# Patient Record
Sex: MALE | Race: WHITE | NOT HISPANIC OR LATINO | Employment: OTHER | URBAN - METROPOLITAN AREA
[De-identification: names, ages, dates, MRNs, and addresses within clinical notes are randomized per-mention and may not be internally consistent; named-entity substitution may affect disease eponyms.]

---

## 2017-06-16 ENCOUNTER — OFFICE VISIT (OUTPATIENT)
Dept: UROLOGY | Facility: CLINIC | Age: 67
End: 2017-06-16
Payer: MEDICARE

## 2017-06-16 VITALS
HEART RATE: 105 BPM | WEIGHT: 238 LBS | TEMPERATURE: 98 F | DIASTOLIC BLOOD PRESSURE: 97 MMHG | BODY MASS INDEX: 37.35 KG/M2 | HEIGHT: 67 IN | SYSTOLIC BLOOD PRESSURE: 159 MMHG

## 2017-06-16 DIAGNOSIS — R79.89 LOW SERUM TESTOSTERONE LEVEL: ICD-10-CM

## 2017-06-16 DIAGNOSIS — N40.0 BENIGN PROSTATIC HYPERPLASIA, PRESENCE OF LOWER URINARY TRACT SYMPTOMS UNSPECIFIED, UNSPECIFIED MORPHOLOGY: Primary | ICD-10-CM

## 2017-06-16 LAB
BILIRUB SERPL-MCNC: ABNORMAL MG/DL
BLOOD URINE, POC: ABNORMAL
COLOR, POC UA: ABNORMAL
GLUCOSE UR QL STRIP: ABNORMAL
KETONES UR QL STRIP: ABNORMAL
LEUKOCYTE ESTERASE URINE, POC: ABNORMAL
NITRITE, POC UA: ABNORMAL
PH, POC UA: 5
PROTEIN, POC: ABNORMAL
SPECIFIC GRAVITY, POC UA: 1.02
UROBILINOGEN, POC UA: ABNORMAL

## 2017-06-16 PROCEDURE — 1159F MED LIST DOCD IN RCRD: CPT | Mod: S$GLB,,, | Performed by: UROLOGY

## 2017-06-16 PROCEDURE — 99213 OFFICE O/P EST LOW 20 MIN: CPT | Mod: 25,S$GLB,, | Performed by: UROLOGY

## 2017-06-16 PROCEDURE — 99999 PR PBB SHADOW E&M-EST. PATIENT-LVL III: CPT | Mod: PBBFAC,,, | Performed by: UROLOGY

## 2017-06-16 PROCEDURE — 81002 URINALYSIS NONAUTO W/O SCOPE: CPT | Mod: S$GLB,,, | Performed by: UROLOGY

## 2017-06-16 RX ORDER — TESTOSTERONE CYPIONATE 200 MG/ML
200 INJECTION, SOLUTION INTRAMUSCULAR
Qty: 1 ML | Refills: 5 | Status: SHIPPED | OUTPATIENT
Start: 2017-06-16 | End: 2017-12-27 | Stop reason: SDUPTHER

## 2017-06-16 RX ORDER — DIVALPROEX SODIUM 500 MG/1
TABLET, FILM COATED, EXTENDED RELEASE ORAL
COMMUNITY
Start: 2017-05-16 | End: 2017-06-16

## 2017-06-16 NOTE — PROGRESS NOTES
OFFICE NOTE    CHIEF COMPLAINT:  Low serum testosterone.  History of chronic recurrent   prostatitis.    Mr. Orlando Treadwell is a 67-year-old male who has a long history of low serum   testosterone.  He is taking testosterone replacement in the form of   Depo-Testosterone 200 mg IM q. 28 days.  The patient feels that everything is   within normal limits when taking that amount of medication.  The patient gives   himself injections every 28 days.  He uses 1 mL of the medicine intramuscular.    The patient is free of any lower urinary tract symptoms.  He refers that   urinating with a good flow, good control.  No dysuria.  No hematuria and he   feels that he can empty the bladder satisfactorily.  We had a PSA done on him on   12/16/2016 of 1.9.  The patient in December have his physical exam and the   prostate gland was unremarkable.    The urinalysis today is within normal limits.    I went ahead and suggested to the patient to keep the same management and to   return to the clinic in six months for his annual prostate evaluation.  I spent   approximately 30 minutes with Mr. Treadwell and all the time was spent on   counseling and he left the office in satisfactory condition.      SAMMY/YARIEL  dd: 06/16/2017 09:25:28 (CDT)  td: 06/16/2017 14:58:24 (CDT)  Doc ID   #[id number]  Job ID #[id number]    CC:

## 2017-06-19 RX ORDER — TESTOSTERONE CYPIONATE 200 MG/ML
INJECTION, SOLUTION INTRAMUSCULAR
Qty: 1 ML | Refills: 5 | Status: SHIPPED | OUTPATIENT
Start: 2017-06-19 | End: 2018-01-06 | Stop reason: SDUPTHER

## 2017-09-15 DIAGNOSIS — M25.519 SHOULDER PAIN, UNSPECIFIED CHRONICITY, UNSPECIFIED LATERALITY: Primary | ICD-10-CM

## 2017-09-16 ENCOUNTER — PATIENT MESSAGE (OUTPATIENT)
Dept: ADMINISTRATIVE | Facility: OTHER | Age: 67
End: 2017-09-16

## 2017-09-18 ENCOUNTER — HOSPITAL ENCOUNTER (OUTPATIENT)
Dept: RADIOLOGY | Facility: HOSPITAL | Age: 67
Discharge: HOME | End: 2017-09-18
Attending: ORTHOPAEDIC SURGERY
Payer: MEDICARE

## 2017-09-18 ENCOUNTER — OFFICE VISIT (OUTPATIENT)
Dept: ORTHOPEDICS | Facility: CLINIC | Age: 67
End: 2017-09-18
Payer: MEDICARE

## 2017-09-18 VITALS
HEART RATE: 95 BPM | HEIGHT: 67 IN | DIASTOLIC BLOOD PRESSURE: 101 MMHG | SYSTOLIC BLOOD PRESSURE: 172 MMHG | WEIGHT: 238 LBS | BODY MASS INDEX: 37.35 KG/M2

## 2017-09-18 DIAGNOSIS — M25.519 SHOULDER PAIN, UNSPECIFIED CHRONICITY, UNSPECIFIED LATERALITY: ICD-10-CM

## 2017-09-18 DIAGNOSIS — M19.032 PRIMARY OSTEOARTHRITIS OF LEFT WRIST: ICD-10-CM

## 2017-09-18 DIAGNOSIS — M25.532 LEFT WRIST PAIN: ICD-10-CM

## 2017-09-18 DIAGNOSIS — M19.011 OSTEOARTHRITIS OF RIGHT GLENOHUMERAL JOINT: Primary | ICD-10-CM

## 2017-09-18 DIAGNOSIS — M25.532 LEFT WRIST PAIN: Primary | ICD-10-CM

## 2017-09-18 PROCEDURE — 73110 X-RAY EXAM OF WRIST: CPT | Mod: 26,LT,, | Performed by: RADIOLOGY

## 2017-09-18 PROCEDURE — 3008F BODY MASS INDEX DOCD: CPT | Mod: S$GLB,,, | Performed by: ORTHOPAEDIC SURGERY

## 2017-09-18 PROCEDURE — 73030 X-RAY EXAM OF SHOULDER: CPT | Mod: TC,PN,RT

## 2017-09-18 PROCEDURE — 20605 DRAIN/INJ JOINT/BURSA W/O US: CPT | Mod: 51,LT,S$GLB, | Performed by: ORTHOPAEDIC SURGERY

## 2017-09-18 PROCEDURE — 73110 X-RAY EXAM OF WRIST: CPT | Mod: TC,PN,LT

## 2017-09-18 PROCEDURE — 99213 OFFICE O/P EST LOW 20 MIN: CPT | Mod: 25,S$GLB,, | Performed by: ORTHOPAEDIC SURGERY

## 2017-09-18 PROCEDURE — 20610 DRAIN/INJ JOINT/BURSA W/O US: CPT | Mod: RT,S$GLB,, | Performed by: ORTHOPAEDIC SURGERY

## 2017-09-18 PROCEDURE — 1159F MED LIST DOCD IN RCRD: CPT | Mod: S$GLB,,, | Performed by: ORTHOPAEDIC SURGERY

## 2017-09-18 PROCEDURE — 1125F AMNT PAIN NOTED PAIN PRSNT: CPT | Mod: S$GLB,,, | Performed by: ORTHOPAEDIC SURGERY

## 2017-09-18 PROCEDURE — 73030 X-RAY EXAM OF SHOULDER: CPT | Mod: 26,RT,, | Performed by: RADIOLOGY

## 2017-09-18 PROCEDURE — 99999 PR PBB SHADOW E&M-EST. PATIENT-LVL III: CPT | Mod: PBBFAC,,, | Performed by: ORTHOPAEDIC SURGERY

## 2017-09-18 RX ORDER — TRIAMCINOLONE ACETONIDE 40 MG/ML
40 INJECTION, SUSPENSION INTRA-ARTICULAR; INTRAMUSCULAR
Status: DISCONTINUED | OUTPATIENT
Start: 2017-09-18 | End: 2017-09-18 | Stop reason: HOSPADM

## 2017-09-18 RX ADMIN — TRIAMCINOLONE ACETONIDE 40 MG: 40 INJECTION, SUSPENSION INTRA-ARTICULAR; INTRAMUSCULAR at 03:09

## 2017-09-18 NOTE — PROCEDURES
Large Joint Aspiration/Injection  Date/Time: 9/18/2017 3:32 PM  Performed by: BARBER FAN  Authorized by: BARBER FAN     Consent Done?:  Yes (Verbal)  Indications:  Pain  Procedure site marked: Yes    Timeout: Prior to procedure the correct patient, procedure, and site was verified      Location:  Shoulder  Site:  R glenohumeral  Prep: Patient was prepped and draped in usual sterile fashion    Ultrasonic Guidance for needle placement: No  Needle size:  20 G  Approach:  Posterior  Medications:  40 mg triamcinolone acetonide 40 mg/mL  Patient tolerance:  Patient tolerated the procedure well with no immediate complications

## 2017-09-18 NOTE — PROCEDURES
Intermediate Joint Aspiration/Injection  Date/Time: 9/18/2017 3:32 PM  Performed by: BARBER FAN  Authorized by: BARBER FAN     Consent Done?:  Yes (Verbal)  Indications:  Pain  Timeout: Prior to procedure the correct patient, procedure, and site was verified      Location:  Wrist  Site:  L radiocarpal  Prep: Patient was prepped and draped in usual sterile fashion    Medications:  40 mg triamcinolone acetonide 40 mg/mL

## 2017-09-18 NOTE — PROGRESS NOTES
Past Medical History:   Diagnosis Date    Arthritis     Asbestos exposure     SOB     Back pain     Bulging discs     lumbar    Hypertension     Kidney stone     Prostatitis     Wears glasses        Past Surgical History:   Procedure Laterality Date    APPENDECTOMY      HAND SURGERY      rt hand    JOINT REPLACEMENT      right knee    KNEE LIGAMENT RECONSTRUCTION      left knee    LITHOTRIPSY      TONSILLECTOMY, ADENOIDECTOMY, BILATERAL MYRINGOTOMY AND TUBES      TOTAL KNEE ARTHROPLASTY  1971    rt knee    URETERAL STENT PLACEMENT         Current Outpatient Prescriptions   Medication Sig    amlodipine (NORVASC) 10 MG tablet Take 10 mg by mouth once daily.     aspirin 325 MG tablet Take 325 mg by mouth once daily.    diclofenac sodium 1 % Gel Apply 2 g topically 2 (two) times daily.    gabapentin (NEURONTIN) 300 MG capsule Take 300 mg by mouth every evening.     hydrochlorothiazide (MICROZIDE) 12.5 mg capsule Take 12.5 mg by mouth once daily.    hydrocodone-acetaminophen 10-325mg (NORCO)  mg Tab Take 1 tablet by mouth daily as needed.    lisinopril (PRINIVIL,ZESTRIL) 40 MG tablet Take 40 mg by mouth once daily.    metoprolol succinate (TOPROL-XL) 100 MG 24 hr tablet Take 50 mg by mouth once daily.     PROAIR HFA 90 mcg/actuation inhaler INL 2 PFS PO Q 4 H PRN    testosterone cypionate (DEPOTESTOTERONE CYPIONATE) 200 mg/mL injection Inject 1 mL (200 mg total) into the muscle every 28 days.    testosterone cypionate (DEPOTESTOTERONE CYPIONATE) 200 mg/mL injection INJECT 1 ML IN THE MUSCLE EVERY 28 DAYS    tramadol (ULTRAM) 50 mg tablet Take 50 mg by mouth every 8 (eight) hours as needed.    venlafaxine (EFFEXOR-XR) 150 MG Cp24 Take 75 mg by mouth once daily.     No current facility-administered medications for this visit.        Allergies   Allergen Reactions    Morphine Nausea Only     Ok with nausea med.       Family History   Problem Relation Age of Onset    Cancer Mother      Stroke Father     Collagen disease Neg Hx        Social History     Social History    Marital status:      Spouse name: N/A    Number of children: N/A    Years of education: N/A     Occupational History    Not on file.     Social History Main Topics    Smoking status: Never Smoker    Smokeless tobacco: Never Used    Alcohol use No    Drug use: No    Sexual activity: Not on file     Other Topics Concern    Not on file     Social History Narrative    No narrative on file       Chief Complaint:   Chief Complaint   Patient presents with    Shoulder Pain       History of present illness: This is a 67-year-old patient of mine who comes in with recurrent right shoulder pain.  Patient has polyarthritis throughout his whole body.  He's had both knees replaced.  Patient states the pain in these joints of been getting worse over the last year or so.  Pain when reaching above his head.  Pain with reaching behind his back.  Rates the pain overall as a 4 out of 10.  Gave him a cortisone injection back in April 2016.  Left wrist is also hurting him.  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        Answers for HPI/ROS submitted by the patient on 9/16/2017   Arm pain  unexpected weight change: No  appetite change : No  sleep disturbance: No  IMMUNOCOMPROMISED: No  nervous/ anxious: No  dysphoric mood: No  rash: No  visual disturbance: No  eye redness: No  eye pain: No  ear pain: No  tinnitus: No  hearing loss: No  sinus pressure : No  nosebleeds: No  enviro allergies: No  food allergies: No  cough: No  shortness of breath: No  sweating: No  frequency: No  difficulty urinating: No  hematuria: No  chest pain: No  palpitations: No  nausea: No  vomiting: No  diarrhea: No  blood in stool: No  constipation: No  headaches: No  dizziness: No  numbness: No  seizures: No  joint swelling: Yes  myalgia: Yes  weakness: Yes  back pain: No  Pain Chronicity: recurrent  History of trauma: No  Onset: more than 1 year ago  Frequency:  daily  Progression since onset: waxing and waning  injury location: at home  pain- numeric: 6/10  pain location: right shoulder, left wrist  pain quality: aching, throbbing  Radiating Pain: No  Aggravating factors: extension, twisting, rotation  fever: No  inability to bear weight: No  itching: No  joint locking: No  limited range of motion: Yes  stiffness: Yes  tingling: No  Treatments tried: cold, heat, injection treatment, oral narcotics  physical therapy: not tried  Improvement on treatment: significant        Physical Examination:    Vital Signs:    There were no vitals filed for this visit.    Body mass index is 37.28 kg/m².    This a well-developed, well nourished patient in no acute distress.  They are alert and oriented and cooperative to examination.  Pt. walks without an antalgic gait.      Examination of the right shoulder shows no rashes or erythema. There are no masses, ecchymosis, or atrophy. The patient has .  range of motion in forward flexion, external rotation, and internal rotation to the lower L-spine. The patient has marked crepitus and impingement signs.  Nontender to palpation over a.c. joint. Passive range of motion: Forward flexion of 180°, external rotation at 90° of 90°, internal rotation of 50°, and external rotation at 0° of 50°. 2+ radial pulse. Intact axillary, radial, median and ulnar sensation. 5 out of 5 resisted forward flexion, external rotation, and negative lift off test.    Examination of the left hand and wrist shows no signs of rashes or erythema. Patient has no masses ecchymosis or effusions. Patient has limited range of motion of the wrist in flexion and extension as well as ulnar and radial deviation.  Pain and crepitus.  The patient also has full range of motion of all joints in the hand. There are 2+ radial pulse and intact light touch sensation in all 5 digits.       X-rays: 4 views of the right shoulder ordered and review which show marked osteoarthritis of the right  shoulder.  Loose body or calcific deposit within the rotator cuff.  3 views of the left wrist are ordered and review which show well moderate to severe arthritic changes of the radial carpal joint      Assessment::right shoulder  Arthritis  Left wrist arthritis    Plan:  I reviewed the findings with the patient today.  We discussed treatment options including injections and ultimately some form of arthroplasty particularly for the shoulder.  Patient does not want to do that at this time as he is still working.  He will follow-up as needed.  He did agreed to the injection.

## 2017-12-27 NOTE — TELEPHONE ENCOUNTER
----- Message from Sneha Zurita sent at 12/27/2017 11:30 AM CST -----  Contact: self [phone]  Patient is needing a refill on testosterone injection/ please call when ready thanks

## 2017-12-28 RX ORDER — TESTOSTERONE CYPIONATE 200 MG/ML
200 INJECTION, SOLUTION INTRAMUSCULAR
Qty: 1 ML | Refills: 5 | Status: SHIPPED | OUTPATIENT
Start: 2017-12-28 | End: 2018-07-02 | Stop reason: SDUPTHER

## 2018-01-08 RX ORDER — TESTOSTERONE CYPIONATE 200 MG/ML
INJECTION, SOLUTION INTRAMUSCULAR
Qty: 1 ML | Refills: 0 | Status: SHIPPED | OUTPATIENT
Start: 2018-01-08 | End: 2018-07-18 | Stop reason: SDUPTHER

## 2018-01-11 ENCOUNTER — TELEPHONE (OUTPATIENT)
Dept: UROLOGY | Facility: CLINIC | Age: 68
End: 2018-01-11

## 2018-01-11 NOTE — TELEPHONE ENCOUNTER
----- Message from Giovana Melo sent at 1/11/2018  9:00 AM CST -----  Contact: Malachi with [facility] [phone]  Malachi with [facility] [phone] calling because they need the patients testosterone levels, and the risk benefits statement over the phone. Please advise. Thanks.

## 2018-06-05 ENCOUNTER — TELEPHONE (OUTPATIENT)
Dept: UROLOGY | Facility: CLINIC | Age: 68
End: 2018-06-05

## 2018-06-05 NOTE — TELEPHONE ENCOUNTER
----- Message from Holly Galo sent at 6/5/2018 11:42 AM CDT -----  Type:  Sooner Apoointment Request    Caller is requesting a sooner appointment.  Caller declined first available appointment listed below.  Caller will not accept being placed on the waitlist and is requesting a message be sent to doctor.    Name of Caller:  patient  When is the first available appointment? 9/20/18  Symptoms:  annual  Best Call Back Number: [phone] (home)     Additional Information:  Need medication refills by next month

## 2018-06-07 ENCOUNTER — TELEPHONE (OUTPATIENT)
Dept: UROLOGY | Facility: CLINIC | Age: 68
End: 2018-06-07

## 2018-06-07 NOTE — TELEPHONE ENCOUNTER
Pt returned call, states will be out of medication prior to Sept appointment and no earlier availability.  Informed Dr. Stark will be seeing patients in [place] and can get him scheduled in July.  States will like to stay with him so that will be fine.  Appointment scheduled as requested.

## 2018-07-03 RX ORDER — TESTOSTERONE CYPIONATE 200 MG/ML
INJECTION, SOLUTION INTRAMUSCULAR
Qty: 1 ML | Refills: 0 | Status: SHIPPED | OUTPATIENT
Start: 2018-07-03 | End: 2019-06-20 | Stop reason: SDUPTHER

## 2018-07-18 ENCOUNTER — OFFICE VISIT (OUTPATIENT)
Dept: UROLOGY | Facility: CLINIC | Age: 68
End: 2018-07-18
Payer: MEDICARE

## 2018-07-18 ENCOUNTER — LAB VISIT (OUTPATIENT)
Dept: LAB | Facility: HOSPITAL | Age: 68
End: 2018-07-18
Attending: UROLOGY
Payer: MEDICARE

## 2018-07-18 VITALS
HEART RATE: 81 BPM | HEIGHT: 67 IN | DIASTOLIC BLOOD PRESSURE: 92 MMHG | WEIGHT: 233 LBS | SYSTOLIC BLOOD PRESSURE: 157 MMHG | BODY MASS INDEX: 36.57 KG/M2

## 2018-07-18 DIAGNOSIS — R79.89 LOW SERUM TESTOSTERONE LEVEL: ICD-10-CM

## 2018-07-18 DIAGNOSIS — N40.0 BENIGN PROSTATIC HYPERPLASIA, UNSPECIFIED WHETHER LOWER URINARY TRACT SYMPTOMS PRESENT: ICD-10-CM

## 2018-07-18 DIAGNOSIS — R79.89 LOW SERUM TESTOSTERONE LEVEL: Primary | ICD-10-CM

## 2018-07-18 DIAGNOSIS — N40.0 BENIGN PROSTATIC HYPERPLASIA: ICD-10-CM

## 2018-07-18 LAB
BILIRUB SERPL-MCNC: NEGATIVE MG/DL
BLOOD URINE, POC: NEGATIVE
COLOR, POC UA: NORMAL
COMPLEXED PSA SERPL-MCNC: 3.8 NG/ML
GLUCOSE UR QL STRIP: NEGATIVE
KETONES UR QL STRIP: NEGATIVE
LEUKOCYTE ESTERASE URINE, POC: NEGATIVE
NITRITE, POC UA: NEGATIVE
PH, POC UA: 5
PROTEIN, POC: NEGATIVE
SPECIFIC GRAVITY, POC UA: 1020
UROBILINOGEN, POC UA: NEGATIVE

## 2018-07-18 PROCEDURE — 36415 COLL VENOUS BLD VENIPUNCTURE: CPT

## 2018-07-18 PROCEDURE — 99204 OFFICE O/P NEW MOD 45 MIN: CPT | Mod: 25,S$GLB,, | Performed by: UROLOGY

## 2018-07-18 PROCEDURE — 84153 ASSAY OF PSA TOTAL: CPT

## 2018-07-18 PROCEDURE — 99999 PR PBB SHADOW E&M-EST. PATIENT-LVL III: CPT | Mod: PBBFAC,,, | Performed by: UROLOGY

## 2018-07-18 PROCEDURE — 81002 URINALYSIS NONAUTO W/O SCOPE: CPT | Mod: S$GLB,,, | Performed by: UROLOGY

## 2018-07-18 RX ORDER — TESTOSTERONE CYPIONATE 200 MG/ML
200 INJECTION, SOLUTION INTRAMUSCULAR
Qty: 1 ML | Refills: 5 | Status: SHIPPED | OUTPATIENT
Start: 2018-07-18 | End: 2019-02-02 | Stop reason: SDUPTHER

## 2018-07-31 NOTE — PROGRESS NOTES
Subjective:       Patient ID: Orlando Treadwell is a 68 y.o. male.    Chief Complaint:   CHIEF COMPLAINT:  Low serum testosterone, BPH.    Mr. Angulo is a 68-year-old male with a long history of low serum  testosterone.  He is managing his condition with the injections of  Depo-Testosterone 200 mg every 28 days.  With that he feels fine.  His  energy level is adequate.  He feels that he is not suffering of any fatigue  or loss of muscle mass.  The patient also is due to have his PSA level  drawn.    Regarding his urination, the patient referred that he is doing fairly well  with nocturia x1, on occasions x2.  No dysuria.  No hematuria.  The flow is  adequate and he feels that he empties the bladder satisfactory.    PAST MEDICAL HISTORY:  Well documented in the electronic health record and  all these were reviewed by me during this visit.    The urinalysis today is within normal limits.        EOR/IN dd: 07/31/2018 16:27:10 (CDT)   td: 07/31/2018 20:15:07 (CDT)  Doc ID #[id number]   Job ID #[id number]    CC:            HPI  Review of Systems   Constitutional: Negative for activity change and appetite change.        Morbid Obese 233 lb   HENT: Negative.    Eyes: Negative for discharge.   Respiratory: Negative for cough and shortness of breath.    Cardiovascular: Negative for chest pain and palpitations.   Gastrointestinal: Negative for abdominal distention, abdominal pain, constipation and vomiting.   Genitourinary: Negative for discharge, dysuria, flank pain, frequency, hematuria, testicular pain and urgency.   Musculoskeletal: Negative for arthralgias.   Skin: Negative for rash.   Neurological: Negative for dizziness.   Psychiatric/Behavioral: The patient is not nervous/anxious.        Objective:      Physical Exam   Constitutional: He appears well-developed and well-nourished.   HENT:   Head: Normocephalic.   Eyes: Pupils are equal, round, and reactive to light.   Neck: Normal range of motion.   Cardiovascular: Normal rate.     Pulmonary/Chest: Effort normal.   Abdominal: Soft. He exhibits no distension and no mass. There is no tenderness. Hernia confirmed negative in the right inguinal area and confirmed negative in the left inguinal area.   Genitourinary: Rectum normal, prostate normal and penis normal. Rectal exam shows no external hemorrhoid, no mass and no tenderness. Prostate is not enlarged and not tender. Right testis shows no mass and no tenderness. Left testis shows no mass and no tenderness. No discharge found.   Musculoskeletal: Normal range of motion.   Neurological: He is alert.   Skin: Skin is warm.     Psychiatric: He has a normal mood and affect.       Assessment:       1. Low serum testosterone level    2. Benign prostatic hyperplasia, unspecified whether lower urinary tract symptoms present        Plan:       Low serum testosterone level    Benign prostatic hyperplasia, unspecified whether lower urinary tract symptoms present  -     POCT URINE DIPSTICK WITHOUT MICROSCOPE  -     Prostate Specific Antigen, Diagnostic; Future; Expected date: 07/18/2018    Other orders  -     testosterone cypionate (DEPOTESTOTERONE CYPIONATE) 200 mg/mL injection; Inject 1 mL (200 mg total) into the muscle every 28 days. for 6 doses  Dispense: 1 mL; Refill: 5    RTC 1 yr

## 2019-02-04 RX ORDER — TESTOSTERONE CYPIONATE 200 MG/ML
INJECTION, SOLUTION INTRAMUSCULAR
Qty: 1 ML | Refills: 0 | Status: SHIPPED | OUTPATIENT
Start: 2019-02-04 | End: 2019-03-06 | Stop reason: SDUPTHER

## 2019-03-06 RX ORDER — TESTOSTERONE CYPIONATE 200 MG/ML
INJECTION, SOLUTION INTRAMUSCULAR
Qty: 1 ML | Refills: 5 | Status: SHIPPED | OUTPATIENT
Start: 2019-03-06 | End: 2019-09-02

## 2019-03-11 RX ORDER — TESTOSTERONE CYPIONATE 200 MG/ML
INJECTION, SOLUTION INTRAMUSCULAR
Qty: 1 ML | Refills: 5 | Status: SHIPPED | OUTPATIENT
Start: 2019-03-11 | End: 2019-09-20 | Stop reason: SDUPTHER

## 2019-05-31 ENCOUNTER — TELEPHONE (OUTPATIENT)
Dept: DERMATOLOGY | Facility: CLINIC | Age: 69
End: 2019-05-31

## 2019-06-03 ENCOUNTER — OFFICE VISIT (OUTPATIENT)
Dept: DERMATOLOGY | Facility: CLINIC | Age: 69
End: 2019-06-03
Payer: MEDICARE

## 2019-06-03 VITALS — WEIGHT: 233 LBS | HEIGHT: 67 IN | BODY MASS INDEX: 36.57 KG/M2

## 2019-06-03 DIAGNOSIS — L03.113 CELLULITIS OF RIGHT UPPER EXTREMITY: Primary | ICD-10-CM

## 2019-06-03 PROCEDURE — 87205 SMEAR GRAM STAIN: CPT

## 2019-06-03 PROCEDURE — 99202 OFFICE O/P NEW SF 15 MIN: CPT | Mod: 25,S$GLB,, | Performed by: DERMATOLOGY

## 2019-06-03 PROCEDURE — 87176 TISSUE HOMOGENIZATION CULTR: CPT

## 2019-06-03 PROCEDURE — 87077 CULTURE AEROBIC IDENTIFY: CPT

## 2019-06-03 PROCEDURE — 11104 PUNCH BX SKIN SINGLE LESION: CPT | Mod: S$GLB,,, | Performed by: DERMATOLOGY

## 2019-06-03 PROCEDURE — 1101F PR PT FALLS ASSESS DOC 0-1 FALLS W/OUT INJ PAST YR: ICD-10-PCS | Mod: CPTII,S$GLB,, | Performed by: DERMATOLOGY

## 2019-06-03 PROCEDURE — 99999 PR PBB SHADOW E&M-EST. PATIENT-LVL III: CPT | Mod: PBBFAC,,, | Performed by: DERMATOLOGY

## 2019-06-03 PROCEDURE — 11104 PR PUNCH BIOPSY, SKIN, SINGLE LESION: ICD-10-PCS | Mod: S$GLB,,, | Performed by: DERMATOLOGY

## 2019-06-03 PROCEDURE — 87070 CULTURE OTHR SPECIMN AEROBIC: CPT

## 2019-06-03 PROCEDURE — 99202 PR OFFICE/OUTPT VISIT, NEW, LEVL II, 15-29 MIN: ICD-10-PCS | Mod: 25,S$GLB,, | Performed by: DERMATOLOGY

## 2019-06-03 PROCEDURE — 99999 PR PBB SHADOW E&M-EST. PATIENT-LVL III: ICD-10-PCS | Mod: PBBFAC,,, | Performed by: DERMATOLOGY

## 2019-06-03 PROCEDURE — 1101F PT FALLS ASSESS-DOCD LE1/YR: CPT | Mod: CPTII,S$GLB,, | Performed by: DERMATOLOGY

## 2019-06-03 PROCEDURE — 87186 SC STD MICRODIL/AGAR DIL: CPT | Mod: 59

## 2019-06-03 RX ORDER — OXYCODONE AND ACETAMINOPHEN 10; 325 MG/1; MG/1
TABLET ORAL
Refills: 0 | COMMUNITY
Start: 2019-05-06 | End: 2020-01-08 | Stop reason: ALTCHOICE

## 2019-06-03 RX ORDER — OLMESARTAN MEDOXOMIL AND HYDROCHLOROTHIAZIDE 40/25 40; 25 MG/1; MG/1
1 TABLET ORAL NIGHTLY
Refills: 3 | COMMUNITY
Start: 2019-05-21 | End: 2020-10-29

## 2019-06-03 RX ORDER — HYDRALAZINE HYDROCHLORIDE 50 MG/1
TABLET, FILM COATED ORAL
Refills: 11 | COMMUNITY
Start: 2019-04-30 | End: 2019-06-03

## 2019-06-03 NOTE — PROGRESS NOTES
Subjective:       Patient ID:  Orlando Treadwell is a 69 y.o. male who presents for   Chief Complaint   Patient presents with    Lesion     B arms, shoulder-Tender, red, warm to touch, pain radiates to ears, itchy     Initial visit  C/o 4 days history of worsening redness and tenderness with wounds on R shoulder  Also describes wounds on R forearm and left upper arm, draining pus  Works for [employer], cannot elaborate on exact function, [employer]  Cannot tell me if has been out of country or where  Denies water exposure    15 years ago, h/o severe infection to R hand after water related injury    PMH includes PTSD    Felt feverish, took a lot of ASA and tylenol    Current Outpatient Medications:     aspirin 325 MG tablet, Take 325 mg by mouth once daily., Disp: , Rfl:     diclofenac sodium 1 % Gel, Apply 2 g topically 2 (two) times daily., Disp: 1 Tube, Rfl: 2    gabapentin (NEURONTIN) 300 MG capsule, Take 300 mg by mouth every evening. , Disp: , Rfl:     hydrochlorothiazide (MICROZIDE) 12.5 mg capsule, Take 12.5 mg by mouth once daily., Disp: , Rfl:     hydrocodone-acetaminophen 10-325mg (NORCO)  mg Tab, Take 1 tablet by mouth daily as needed., Disp: 50 tablet, Rfl: 0    lisinopril (PRINIVIL,ZESTRIL) 40 MG tablet, Take 40 mg by mouth once daily., Disp: , Rfl:     metoprolol fall-hydrochlorothiaz 50-12.5 mg Tb24, metoprolol succ 50 mg-hydrochlorothiazide 12.5 mg tablet,ext.rel 24 hr  Take 1 tablet every day by oral route., Disp: , Rfl:     metoprolol succinate (TOPROL-XL) 100 MG 24 hr tablet, Take 50 mg by mouth once daily. , Disp: , Rfl:     olmesartan-hydrochlorothiazide (BENICAR HCT) 40-25 mg per tablet, Take 1 tablet by mouth once daily., Disp: , Rfl: 3    oxyCODONE-acetaminophen (PERCOCET)  mg per tablet, TK 1 T PO QID PRN P, Disp: , Rfl: 0    pantoprazole (PROTONIX) 40 MG tablet, TAKE 1 TABLET BY MOUTH EVERY DAY, Disp: 30 tablet, Rfl: 0    QUEtiapine (SEROQUEL) 50  MG tablet, TAKE 1 TABLET BY MOUTH AT BEDTIME, Disp: 30 tablet, Rfl: 0    testosterone cypionate (DEPOTESTOTERONE CYPIONATE) 200 mg/mL injection, ADMINISTER 1 ML(200 MG) IN THE MUSCLE EVERY 28 DAYS, Disp: 1 mL, Rfl: 0    testosterone cypionate (DEPOTESTOTERONE CYPIONATE) 200 mg/mL injection, ADMINISTER 1 ML IN THE MUSCLE EVERY 28 DAYS FOR 6 DOSES, Disp: 1 mL, Rfl: 5    testosterone cypionate (DEPOTESTOTERONE CYPIONATE) 200 mg/mL injection, ADMINISTER 1 ML IN THE MUSCLE EVERY 28 DAYS FOR 6 DOSES, Disp: 1 mL, Rfl: 5    tramadol (ULTRAM) 50 mg tablet, Take 50 mg by mouth every 8 (eight) hours as needed., Disp: , Rfl: 1    venlafaxine (EFFEXOR-XR) 150 MG Cp24, Take 75 mg by mouth once daily., Disp: , Rfl:         Lesion  - Initial  Affected locations: B arms, shoulders.  Duration: 4 months  Signs / symptoms: itching, redness, tender, swelling, inflamed and blistering  Severity: mild  Timing: constant  Aggravated by: nothing  Treatments tried: Voltaren gel, neosporin.  Improvement on treatment: no relief        Review of Systems   Constitutional: Positive for fever, chills and fatigue.   Skin: Positive for itching and rash. Negative for activity-related sunscreen use and wears hat.   Neurological: Positive for lightheadedness with standing.   Hematologic/Lymphatic: Does not bruise/bleed easily.        Objective:    Physical Exam   Skin:                 Diagram Legend     Erythematous scaling macule/papule c/w actinic keratosis       Vascular papule c/w angioma      Pigmented verrucoid papule/plaque c/w seborrheic keratosis      Yellow umbilicated papule c/w sebaceous hyperplasia      Irregularly shaped tan macule c/w lentigo     1-2 mm smooth white papules consistent with Milia      Movable subcutaneous cyst with punctum c/w epidermal inclusion cyst      Subcutaneous movable cyst c/w pilar cyst      Firm pink to brown papule c/w dermatofibroma      Pedunculated fleshy papule(s) c/w skin tag(s)      Evenly pigmented  macule c/w junctional nevus     Mildly variegated pigmented, slightly irregular-bordered macule c/w mildly atypical nevus      Flesh colored to evenly pigmented papule c/w intradermal nevus       Pink pearly papule/plaque c/w basal cell carcinoma      Erythematous hyperkeratotic cursted plaque c/w SCC      Surgical scar with no sign of skin cancer recurrence      Open and closed comedones      Inflammatory papules and pustules      Verrucoid papule consistent consistent with wart     Erythematous eczematous patches and plaques     Dystrophic onycholytic nail with subungual debris c/w onychomycosis     Umbilicated papule    Erythematous-base heme-crusted tan verrucoid plaque consistent with inflamed seborrheic keratosis     Erythematous Silvery Scaling Plaque c/w Psoriasis     See annotation              Assessment / Plan:        Cellulitis of right upper extremity  -     Aerobic culture  -     Aerobic culture  -     GRAM STAIN    multiple satellite lesions on face, arms and left shoulder  No purulence  H/o subjective fevers (measured temp normal today but took a lot of antipiretics)  Rapidly progressing, systemic symptoms, intense shoulder pain, needs IV antibiotics with broad spectrum coverage for staph strep and GN, CBC and potentially blood cultures  Mysterious occupational history and travel history, denies recent travel to [place] or [place]    Tissue culture for accurate organism identification  Punch biopsy procedure note:  Punch biopsy performed after verbal consent obtained. Area marked and prepped with alcohol. Approximately 1cc of 1% lidocaine with epinephrine injected. 3 mm disposable punch used to remove lesion. Hemostasis obtained with surgifoam. Wound care instructions reviewed with patient and handout given.    Has PCP appointment at 2:30 today  Called  Dr Worthy, shared concern and need for direct admission for IV abx  Will take it from here             Follow up if symptoms worsen or fail to  improve.

## 2019-06-04 ENCOUNTER — TELEPHONE (OUTPATIENT)
Dept: ORTHOPEDICS | Facility: CLINIC | Age: 69
End: 2019-06-04

## 2019-06-04 ENCOUNTER — OUTSIDE PLACE OF SERVICE (OUTPATIENT)
Dept: ORTHOPEDICS | Facility: CLINIC | Age: 69
End: 2019-06-04
Payer: MEDICARE

## 2019-06-04 LAB
GRAM STN SPEC: NORMAL
GRAM STN SPEC: NORMAL

## 2019-06-04 PROCEDURE — 10060 I&D ABSCESS SIMPLE/SINGLE: CPT | Mod: 59,51,LT, | Performed by: ORTHOPAEDIC SURGERY

## 2019-06-04 PROCEDURE — 10060 PR DRAIN SKIN ABSCESS SIMPLE: ICD-10-PCS | Mod: 59,51,LT, | Performed by: ORTHOPAEDIC SURGERY

## 2019-06-04 PROCEDURE — 23030 I&D SHOULDER DEEP ABSC/HMTMA: CPT | Mod: RT,,, | Performed by: ORTHOPAEDIC SURGERY

## 2019-06-04 PROCEDURE — 99223 1ST HOSP IP/OBS HIGH 75: CPT | Mod: 25,,, | Performed by: ORTHOPAEDIC SURGERY

## 2019-06-04 PROCEDURE — 99223 PR INITIAL HOSPITAL CARE,LEVL III: ICD-10-PCS | Mod: 25,,, | Performed by: ORTHOPAEDIC SURGERY

## 2019-06-04 PROCEDURE — 23030 PR INCIS/DRAIN SHLDR ABSC/HEMA,DEEP: ICD-10-PCS | Mod: RT,,, | Performed by: ORTHOPAEDIC SURGERY

## 2019-06-04 NOTE — TELEPHONE ENCOUNTER
Returned call to nurse and advised Dr. Ty would like pt to remain NPO. Nurse verbalized understanding.

## 2019-06-04 NOTE — TELEPHONE ENCOUNTER
----- Message from Ismael Schwarz sent at 6/4/2019 10:47 AM CDT -----  Contact: Beba with [facility]  Beba has question regarding pt care. Please call ASAP

## 2019-06-06 LAB
BACTERIA SPEC AEROBE CULT: NORMAL
BACTERIA SPEC AEROBE CULT: NORMAL

## 2019-06-10 ENCOUNTER — TELEPHONE (OUTPATIENT)
Dept: INFECTIOUS DISEASES | Facility: CLINIC | Age: 69
End: 2019-06-10

## 2019-06-10 ENCOUNTER — TELEPHONE (OUTPATIENT)
Dept: ORTHOPEDICS | Facility: CLINIC | Age: 69
End: 2019-06-10

## 2019-06-10 NOTE — TELEPHONE ENCOUNTER
----- Message from Ismael Schwarz sent at 6/10/2019 11:10 AM CDT -----  Contact: pt  Patient need to be seen post op this week. I can not schedule, please call at

## 2019-06-10 NOTE — TELEPHONE ENCOUNTER
Does this pt need to be seen this week? [facility] consult s/p shoulder I&D 6/4/19.    Please advise. Thanks!

## 2019-06-10 NOTE — TELEPHONE ENCOUNTER
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

## 2019-06-14 LAB
ALBUMIN SERPL-MCNC: 3.8 G/DL (ref 3.1–4.7)
ALP SERPL-CCNC: 46 IU/L (ref 40–104)
ALT (SGPT): 42 IU/L (ref 3–33)
AST SERPL-CCNC: 35 IU/L (ref 10–40)
BILIRUB SERPL-MCNC: 0.5 MG/DL (ref 0.3–1)
BUN SERPL-MCNC: 22 MG/DL (ref 8–20)
CALCIUM SERPL-MCNC: 9.7 MG/DL (ref 7.7–10.4)
CHLORIDE: 104 MMOL/L (ref 98–110)
CK SERPL-CCNC: 89 IU/L (ref 18–170)
CO2 SERPL-SCNC: 28.8 MMOL/L (ref 22.8–31.6)
CREATININE: 1.16 MG/DL (ref 0.6–1.4)
CRP SERPL-MCNC: 0.53 MG/DL (ref 0–1.4)
GLUCOSE: 137 MG/DL (ref 70–99)
HCT VFR BLD AUTO: 47 % (ref 39–55)
HGB BLD-MCNC: 14.3 G/DL (ref 14–16)
MCH RBC QN AUTO: 28 PG (ref 25–35)
MCHC RBC AUTO-ENTMCNC: 30.4 G/DL (ref 31–36)
MCV RBC AUTO: 92.2 FL (ref 80–100)
NUCLEATED RBCS: 0 %
PLATELET # BLD AUTO: 317 K/UL (ref 140–440)
POTASSIUM SERPL-SCNC: 4.5 MMOL/L (ref 3.5–5)
PROT SERPL-MCNC: 8.1 G/DL (ref 6–8.2)
RBC # BLD AUTO: 5.1 M/UL (ref 4.3–5.9)
SODIUM: 143 MMOL/L (ref 134–144)
WBC # BLD AUTO: 9 K/UL (ref 5–10)

## 2019-06-17 ENCOUNTER — TELEPHONE (OUTPATIENT)
Dept: ORTHOPEDICS | Facility: CLINIC | Age: 69
End: 2019-06-17

## 2019-06-17 NOTE — TELEPHONE ENCOUNTER
Returned call and advised ok to change dressing on 6/19. Advised stiches need to be removed on 6/19 as well per Dr. Ty. Belén verbalized understanding.

## 2019-06-17 NOTE — TELEPHONE ENCOUNTER
----- Message from Ness Pretty MA sent at 6/17/2019 11:57 AM CDT -----  Contact: [facility]  [facility]   Wants to know if dressing change can be moved to Wednesday   Same day Pic line dressing will be changed   Call back      They only see pt 1 weekly

## 2019-06-19 LAB
ALBUMIN SERPL-MCNC: 3.8 G/DL (ref 3.1–4.7)
ALP SERPL-CCNC: 39 IU/L (ref 40–104)
ALT (SGPT): 33 IU/L (ref 3–33)
AST SERPL-CCNC: 30 IU/L (ref 10–40)
BASOPHILS NFR BLD: 0.1 K/UL (ref 0–0.2)
BASOPHILS NFR BLD: 0.7 %
BILIRUB SERPL-MCNC: 0.7 MG/DL (ref 0.3–1)
BUN SERPL-MCNC: 19 MG/DL (ref 8–20)
CALCIUM SERPL-MCNC: 9.2 MG/DL (ref 7.7–10.4)
CHLORIDE: 101 MMOL/L (ref 98–110)
CK SERPL-CCNC: 107 IU/L (ref 18–170)
CO2 SERPL-SCNC: 29.4 MMOL/L (ref 22.8–31.6)
CREATININE: 1.1 MG/DL (ref 0.6–1.4)
CRP SERPL-MCNC: 0.79 MG/DL (ref 0–1.4)
EOSINOPHIL NFR BLD: 0.2 K/UL (ref 0–0.7)
EOSINOPHIL NFR BLD: 2.3 %
ERYTHROCYTE [DISTWIDTH] IN BLOOD BY AUTOMATED COUNT: 15 % (ref 11.7–14.9)
GLUCOSE: 123 MG/DL (ref 70–99)
GRAN #: 4.4 K/UL (ref 1.4–6.5)
GRAN%: 58.4 %
HCT VFR BLD AUTO: 43.6 % (ref 39–55)
HGB BLD-MCNC: 13.5 G/DL (ref 14–16)
IMMATURE GRANS (ABS): 0 K/UL (ref 0–1)
IMMATURE GRANULOCYTES: 0.3 %
LYMPH #: 2 K/UL (ref 1.2–3.4)
LYMPH%: 26.5 %
MCH RBC QN AUTO: 27.9 PG (ref 25–35)
MCHC RBC AUTO-ENTMCNC: 31 G/DL (ref 31–36)
MCV RBC AUTO: 90.1 FL (ref 80–100)
MONO #: 0.9 K/UL (ref 0.1–0.6)
MONO%: 11.8 %
NUCLEATED RBCS: 0 %
PLATELET # BLD AUTO: 267 K/UL (ref 140–440)
PMV BLD AUTO: 10.9 FL (ref 8.8–12.7)
POTASSIUM SERPL-SCNC: 4.2 MMOL/L (ref 3.5–5)
PROT SERPL-MCNC: 7.7 G/DL (ref 6–8.2)
RBC # BLD AUTO: 4.84 M/UL (ref 4.3–5.9)
SODIUM: 138 MMOL/L (ref 134–144)
WBC # BLD AUTO: 7.5 K/UL (ref 5–10)

## 2019-06-20 ENCOUNTER — INITIAL CONSULT (OUTPATIENT)
Dept: INFECTIOUS DISEASES | Facility: CLINIC | Age: 69
End: 2019-06-20
Payer: MEDICARE

## 2019-06-20 VITALS
SYSTOLIC BLOOD PRESSURE: 126 MMHG | OXYGEN SATURATION: 96 % | BODY MASS INDEX: 36.73 KG/M2 | HEART RATE: 88 BPM | HEIGHT: 67 IN | TEMPERATURE: 98 F | WEIGHT: 234 LBS | DIASTOLIC BLOOD PRESSURE: 80 MMHG

## 2019-06-20 DIAGNOSIS — Z79.2 ENCOUNTER FOR LONG-TERM (CURRENT) USE OF ANTIBIOTICS: ICD-10-CM

## 2019-06-20 DIAGNOSIS — M71.021: ICD-10-CM

## 2019-06-20 DIAGNOSIS — A41.02 SEPSIS DUE TO METHICILLIN RESISTANT STAPHYLOCOCCUS AUREUS (MRSA): Primary | ICD-10-CM

## 2019-06-20 DIAGNOSIS — F42.4 EXCORIATION (SKIN-PICKING) DISORDER: ICD-10-CM

## 2019-06-20 PROCEDURE — 1101F PR PT FALLS ASSESS DOC 0-1 FALLS W/OUT INJ PAST YR: ICD-10-PCS | Mod: ,,, | Performed by: INTERNAL MEDICINE

## 2019-06-20 PROCEDURE — 1101F PT FALLS ASSESS-DOCD LE1/YR: CPT | Mod: ,,, | Performed by: INTERNAL MEDICINE

## 2019-06-20 PROCEDURE — 99214 PR OFFICE/OUTPT VISIT, EST, LEVL IV, 30-39 MIN: ICD-10-PCS | Mod: ,,, | Performed by: INTERNAL MEDICINE

## 2019-06-20 PROCEDURE — 99214 OFFICE O/P EST MOD 30 MIN: CPT | Mod: ,,, | Performed by: INTERNAL MEDICINE

## 2019-06-20 NOTE — PATIENT INSTRUCTIONS
No further antibiotics are needed  Keep the draining spot clean with soap and water and covered until its closed    Please call if you develop chills or fever or any spot that you are concerned about developing STaph infection    Please keep your nails short so that you are less able to scratch and cause skin injury.

## 2019-06-20 NOTE — PROGRESS NOTES
"Subjective:       Patient ID: Orlando Treadwell is a 69 y.o. male.    Chief Complaint:: Hospital Follow Up    HPI seen at [facility] 6/3: for Staph aureus infection right shoulder and left upper arm, requiring I and D, with bacteremia  "69 year old man, sent from derm/PCP office to ED for extensive soft tissue infection and abscess right shoulder and smaller abscess left shoulder. this began 4-5 days prior to admission, was associated with fever and local inflammation, pain and swelling. He could not identify a source of infection, but his wife does endorse that he picks. he has had a serious infection of the right hand(water related) in 2007 for which he required more than one surgical procedure. He states this affected his lymph nodes on the right, in the axilla. he had a long wait in the ED, about which he is quite frustrated, underwent limited I and D of right arm abscesses and was admitted and placed on Vanc and ancef. He has had no further fever and is NPO for orthopedic I and D this afternoon. Last tetanus 3-4 years ago  Dermatologist obtained biopsy and culture, in progress, at [facility]."    06/20/2019: He underwent incision and drainage of a large right upper arm abscess and a smaller left upper arm abscess. Blood and abscess cultures grew MRSA. Blood cultures cleared in less than 48 hours. He was discharged to receive the remainder of 14 days of daptomycin. He states that the antibiotic "got him down" but he has had resolution of bilateral upper arm infection. Laboratory studies have been supportive of his response with normal CRP ×2 weeks. He has returned to usual activities, only limited by his midline catheter. He does report that he needs eventual right shoulder replacement. We discussed how his involuntary picking and scratching of the upper extremities makes this a higher risk surgery. We also discussed trimming his nails and addressing the PTSD which is likely responsible for this behavior. He is under the " care of his primary physician for this, having had an unpleasant experience with a psychiatrist. He has no chills, fever, sweats and no additional areas of skin infection or joint pain.      Review of patient's allergies indicates:   Allergen Reactions    Morphine Nausea Only     Ok with nausea med.     Past Medical History:   Diagnosis Date    Arthritis     Asbestos exposure     SOB     Back pain     Basal cell carcinoma     Bulging discs     lumbar    Hypertension     Kidney stone     Prostatitis     Sepsis due to methicillin resistant Staphylococcus aureus (MRSA) 06/2019    Due to large right upper arm abscess    Wears glasses     posttraumatic stress disorder, anxiety  GERD  15 years ago, h/o severe infection to R hand after water related injury that affected his lymph nodes   He did not have endocarditis  TROY      left knee ligament reconstruction  lumbar discectomy 1/2015  low testosterone, BPH  Past Surgical History:   Procedure Laterality Date    APPENDECTOMY      ARTHROPLASTY-KNEE Left 9/23/2014    [facility]    CYSTOSCOPY, WITH URETERAL STENT INSERTION Left 6/14/2013    [facility] at [facility] OR    HAND SURGERY      rt hand    INCISION AND DRAINAGE Bilateral 06/2019    Right upper arm and left upper arm abscesses    INJECTION-STEROID-EPIDURAL-TRANSFORAMINAL Right 11/18/2014    [facility]    INJECTION-STEROID-EPIDURAL-TRANSFORAMINAL Right 7/15/2014    [facility] at [facility]    JOINT REPLACEMENT      right knee    KNEE LIGAMENT RECONSTRUCTION      left knee    LITHOTRIPSY      TONSILLECTOMY, ADENOIDECTOMY, BILATERAL MYRINGOTOMY AND TUBES      TOTAL KNEE ARTHROPLASTY  1971    rt knee    URETERAL STENT PLACEMENT       Social History     Tobacco Use    Smoking status: Never Smoker    Smokeless tobacco: Never Used   Substance Use Topics    Alcohol use: No     Social History  "    Occupational History    Not on file     Family History   Problem Relation Age of Onset    Cancer Mother     Stroke Father     Collagen disease Neg Hx     Melanoma Neg Hx     Psoriasis Neg Hx     Lupus Neg Hx     Eczema Neg Hx    mother had cancer and father had a stroke      Review of Systems    Constitutional: No fever, chills, sweats,     Eyes: No change in vision, loss of vision      ENT: No sore throat, mouth pains, or lesions    Cardiovascular: No chest pain, RODRIGUES, or pedal edema    Respiratory: No shortness of breath, RODRIGUES,     Gastrointestinal: No abdominal pain, nausea, vomiting, diarrhea,    or focal abdominal pain. He has been taking a probiotic    Genitourinary: No dysuria, hematuria     Musculoskeletal: No new pain, joint swelling, or injuries    Integumentary: No new rashes, skin lesions, or wounds    Neurological: No dizziness, vertigo, unusual headaches     Psychiatric: Under treatment for PTSD    Endocrine: Blood sugars are well controlled    Lymphatic: No lymphadenopathy, blood loss, anemia, malignancy    VAD:     Objective:      Blood pressure 126/80, pulse 88, temperature 97.6 °F (36.4 °C), temperature source Temporal, height 5' 7" (1.702 m), weight 106.1 kg (234 lb), SpO2 96 %. Body mass index is 36.65 kg/m².  Physical Exam      General: Alert and attentive, cooperative and in no distress, pressured speech, very pleasant    Eyes: Pupils equal, round, reactive to light, anicteric, EOMI    Neck: Supple, non-tender, no thyromegaly or masses    ENT: EAC patent, TM normal, nares patent, no oral lesions, teeth in good condition, no thrush    Cardiovascular: Regular rate and rhythm, 1/6 systolic murmurs, no rubs, or gallop    Respiratory: Lungs clear without wheezes, rales, rubs or rhonci    Gastrointestinal:  Soft, bowel sounds normal, protuberant    Genitourinary: no flank tenderness    Integumentary: Numerous excoriated skin lesions on upper extremities and chest, and jagged " fingernails    Vascular: No peripheral edema or phlebitis, warm and well perfused    Musculoskeletal: Ambulates without difficulty, no acute arthritis, synovitis or myositis. Normal muscle bulk and strength    Lymphatic: No cervical, axillary,   LAD    Neurological: Normal LOC, cranial nerves, speech, gait    Psychiatric: Very pleasant, pressured speech, very difficult to redirect from his history in [place] and with the [employer]     Wound: tiny opening superior end of incision, some dependent edema. No cellulitis, no purulence    VAD:  Midline removed, tip intact       Recent Diagnostics:  SOURCE: Blood LEFT HAND COLLECTED: 06/03/19 17:53   CULTURE BLOOD FINAL 06/06/19 07:59 From both bottles METHICILLIN RESISTANT STAPHYLOCOCCUS   _______________________________________________  Vancomycin 1 S     Aerobic Bacterial Culture METHICILLIN RESISTANT STAPHYLOCOCCUS AUREUS   Many    Resulting Agency [facility]   Susceptibility      Methicillin resistant staphylococcus aureus     CULTURE, AEROBIC  (SPECIFY SOURCE)     Clindamycin <=0.5 mcg/mL Sensitive     Erythromycin >4 mcg/mL Resistant     Oxacillin >2 mcg/mL Resistant     Penicillin >8 mcg/mL Resistant     Tetracycline <=4 mcg/mL Sensitive     Trimeth/Sulfa <=0.5/9.5 m... Sensitive     Vancomycin 1 mcg/mL Sensitive         CRP  June 19, 2019 13:30 0.79 mg/dL   June 14, 2019 11:00 0.53 mg/dL   June 4, 2019 05:39 23.51 H mg/dL   Vandana 3, 2019 15:19 23.81 H       Assessment and Plan:           Sepsis due to methicillin resistant Staphylococcus aureus (MRSA)    Abscess of bursa of right upper arm    Encounter for long-term (current) use of antibiotics    Excoriation (skin-picking) disorder      No further antibiotics are needed  Keep the draining spot clean with soap and water and covered until its closed    Please call if you develop chills or fever or any spot that you are concerned about developing STaph infection    Please keep your nails short so that you are less able to scratch  and cause skin injury.     This note was created using Dragon voice recognition software that occasionally misinterpreted phrases or words.

## 2019-07-01 ENCOUNTER — OFFICE VISIT (OUTPATIENT)
Dept: ORTHOPEDICS | Facility: CLINIC | Age: 69
End: 2019-07-01
Payer: MEDICARE

## 2019-07-01 VITALS — HEIGHT: 67 IN | WEIGHT: 234 LBS | BODY MASS INDEX: 36.73 KG/M2

## 2019-07-01 DIAGNOSIS — L02.413 ABSCESS OF RIGHT SHOULDER: Primary | ICD-10-CM

## 2019-07-01 PROCEDURE — 1101F PR PT FALLS ASSESS DOC 0-1 FALLS W/OUT INJ PAST YR: ICD-10-PCS | Mod: CPTII,S$GLB,, | Performed by: ORTHOPAEDIC SURGERY

## 2019-07-01 PROCEDURE — 1101F PT FALLS ASSESS-DOCD LE1/YR: CPT | Mod: CPTII,S$GLB,, | Performed by: ORTHOPAEDIC SURGERY

## 2019-07-01 PROCEDURE — 99999 PR PBB SHADOW E&M-EST. PATIENT-LVL II: ICD-10-PCS | Mod: PBBFAC,,, | Performed by: ORTHOPAEDIC SURGERY

## 2019-07-01 PROCEDURE — 99999 PR PBB SHADOW E&M-EST. PATIENT-LVL II: CPT | Mod: PBBFAC,,, | Performed by: ORTHOPAEDIC SURGERY

## 2019-07-01 PROCEDURE — 99024 PR POST-OP FOLLOW-UP VISIT: ICD-10-PCS | Mod: S$GLB,,, | Performed by: ORTHOPAEDIC SURGERY

## 2019-07-01 PROCEDURE — 99024 POSTOP FOLLOW-UP VISIT: CPT | Mod: S$GLB,,, | Performed by: ORTHOPAEDIC SURGERY

## 2019-07-01 NOTE — PROGRESS NOTES
Past Medical History:   Diagnosis Date    Arthritis     Asbestos exposure     SOB     Back pain     Basal cell carcinoma     Bulging discs     lumbar    Hypertension     Kidney stone     Prostatitis     Sepsis due to methicillin resistant Staphylococcus aureus (MRSA) 06/2019    Due to large right upper arm abscess    Wears glasses        Past Surgical History:   Procedure Laterality Date    APPENDECTOMY      ARTHROPLASTY-KNEE Left 9/23/2014    [facility] at [facility] OR    CYSTOSCOPY, WITH URETERAL STENT INSERTION Left 6/14/2013    Performed by Sof[facility]ahu [facility] at [facility] OR    HAND SURGERY      rt hand    INCISION AND DRAINAGE Bilateral 06/2019    Right upper arm and left upper arm abscesses    INJECTION-STEROID-EPIDURAL-TRANSFORAMINAL Right 11/18/2014    [facility]    INJECTION-STEROID-EPIDURAL-TRANSFORAMINAL Right 7/15/2014    [facility] OR    JOINT REPLACEMENT      right knee    KNEE LIGAMENT RECONSTRUCTION      left knee    LITHOTRIPSY      TONSILLECTOMY, ADENOIDECTOMY, BILATERAL MYRINGOTOMY AND TUBES      TOTAL KNEE ARTHROPLASTY  1971    rt knee    URETERAL STENT PLACEMENT         Current Outpatient Medications   Medication Sig    aspirin 325 MG tablet Take 325 mg by mouth once daily.    diclofenac sodium 1 % Gel Apply 2 g topically 2 (two) times daily.    hydrochlorothiazide (MICROZIDE) 12.5 mg capsule Take 12.5 mg by mouth once daily.    metoprolol fall-hydrochlorothiaz 50-12.5 mg Tb24 metoprolol succ 50 mg-hydrochlorothiazide 12.5 mg tablet,ext.rel 24 hr   Take 1 tablet every day by oral route.    metoprolol succinate (TOPROL-XL) 100 MG 24 hr tablet Take 50 mg by mouth once daily.     olmesartan-hydrochlorothiazide (BENICAR HCT) 40-25 mg per tablet Take 1 tablet by mouth once daily.    oxyCODONE-acetaminophen (PERCOCET)  mg per tablet TK 1 T PO QID PRN P    pantoprazole (PROTONIX) 40 MG tablet  TAKE 1 TABLET BY MOUTH EVERY DAY    QUEtiapine (SEROQUEL) 50 MG tablet TAKE 1 TABLET BY MOUTH AT BEDTIME    testosterone cypionate (DEPOTESTOTERONE CYPIONATE) 200 mg/mL injection ADMINISTER 1 ML IN THE MUSCLE EVERY 28 DAYS FOR 6 DOSES    testosterone cypionate (DEPOTESTOTERONE CYPIONATE) 200 mg/mL injection ADMINISTER 1 ML IN THE MUSCLE EVERY 28 DAYS FOR 6 DOSES    tramadol (ULTRAM) 50 mg tablet Take 50 mg by mouth every 8 (eight) hours as needed.    venlafaxine (EFFEXOR-XR) 150 MG Cp24 Take 75 mg by mouth once daily.     No current facility-administered medications for this visit.        Review of patient's allergies indicates:   Allergen Reactions    Morphine Nausea Only     Ok with nausea med.       Family History   Problem Relation Age of Onset    Cancer Mother     Stroke Father     Collagen disease Neg Hx     Melanoma Neg Hx     Psoriasis Neg Hx     Lupus Neg Hx     Eczema Neg Hx        Social History     Socioeconomic History    Marital status:      Spouse name: Not on file    Number of children: Not on file    Years of education: Not on file    Highest education level: Not on file   Occupational History    Not on file   Social Needs    Financial resource strain: Not on file    Food insecurity:     Worry: Not on file     Inability: Not on file    Transportation needs:     Medical: Not on file     Non-medical: Not on file   Tobacco Use    Smoking status: Never Smoker    Smokeless tobacco: Never Used   Substance and Sexual Activity    Alcohol use: No    Drug use: No    Sexual activity: Not on file   Lifestyle    Physical activity:     Days per week: Not on file     Minutes per session: Not on file    Stress: Not on file   Relationships    Social connections:     Talks on phone: Not on file     Gets together: Not on file     Attends Rastafari service: Not on file     Active member of club or organization: Not on file     Attends meetings of clubs or organizations: Not on file      Relationship status: Not on file   Other Topics Concern    Not on file   Social History Narrative    Not on file       Chief Complaint:   Chief Complaint   Patient presents with    Shoulder Pain     R shoulder I&D 6/3/19        Date of surgery:  Vandana 3, 2019    History of present illness:  69-year-old male who underwent right shoulder I&D for right shoulder abscess.  It did not involve the joint was more superficial.  Patient is doing well. He is finishing off some antibiotics.  Pain is a 3/10.  No more drainage or redness.      Review of Systems:    Musculoskeletal:  See HPI        Physical Examination:    Vital Signs:  There were no vitals filed for this visit.    Body mass index is 36.65 kg/m².    This a well-developed, well nourished patient in no acute distress.  They are alert and oriented and cooperative to examination.  Pt. walks without an antalgic gait.      Examination of the right shoulder shows healing surgical incision. No erythema or drainage.    X-rays:  None     Assessment::  Status post incision and drainage of right shoulder abscess    Plan:  I reviewed the findings with him today.  Needs to continue the antibiotics per the Infectious Disease doctor.  Follow-up as needed.    This note was created using [email or web address] voice recognition software that occasionally misinterpreted phrases or words.

## 2019-09-18 DIAGNOSIS — R10.9 STOMACH ACHE: Primary | ICD-10-CM

## 2019-09-19 ENCOUNTER — HOSPITAL ENCOUNTER (OUTPATIENT)
Dept: RADIOLOGY | Facility: HOSPITAL | Age: 69
Discharge: HOME | End: 2019-09-19
Attending: FAMILY MEDICINE
Payer: MEDICARE

## 2019-09-19 DIAGNOSIS — R10.9 STOMACH ACHE: ICD-10-CM

## 2019-09-19 PROCEDURE — 74177 CT ABD & PELVIS W/CONTRAST: CPT | Mod: TC

## 2019-09-19 PROCEDURE — 25500020 PHARM REV CODE 255: Performed by: FAMILY MEDICINE

## 2019-09-19 RX ADMIN — IOHEXOL 100 ML: 350 INJECTION, SOLUTION INTRAVENOUS at 11:09

## 2019-09-20 RX ORDER — TESTOSTERONE CYPIONATE 200 MG/ML
INJECTION, SOLUTION INTRAMUSCULAR
Qty: 1 ML | Refills: 0 | Status: SHIPPED | OUTPATIENT
Start: 2019-09-20 | End: 2019-10-30 | Stop reason: SDUPTHER

## 2019-10-30 RX ORDER — TESTOSTERONE CYPIONATE 200 MG/ML
INJECTION, SOLUTION INTRAMUSCULAR
Qty: 1 ML | Refills: 0 | Status: SHIPPED | OUTPATIENT
Start: 2019-10-30 | End: 2019-11-13 | Stop reason: SDUPTHER

## 2019-11-13 ENCOUNTER — OFFICE VISIT (OUTPATIENT)
Dept: UROLOGY | Facility: CLINIC | Age: 69
End: 2019-11-13
Payer: MEDICARE

## 2019-11-13 VITALS
BODY MASS INDEX: 37.2 KG/M2 | HEART RATE: 114 BPM | DIASTOLIC BLOOD PRESSURE: 86 MMHG | TEMPERATURE: 98 F | HEIGHT: 67 IN | SYSTOLIC BLOOD PRESSURE: 136 MMHG | WEIGHT: 237 LBS | RESPIRATION RATE: 16 BRPM

## 2019-11-13 DIAGNOSIS — N40.0 BENIGN PROSTATIC HYPERPLASIA, UNSPECIFIED WHETHER LOWER URINARY TRACT SYMPTOMS PRESENT: Primary | ICD-10-CM

## 2019-11-13 PROCEDURE — 1101F PR PT FALLS ASSESS DOC 0-1 FALLS W/OUT INJ PAST YR: ICD-10-PCS | Mod: CPTII,S$GLB,, | Performed by: UROLOGY

## 2019-11-13 PROCEDURE — 1101F PT FALLS ASSESS-DOCD LE1/YR: CPT | Mod: CPTII,S$GLB,, | Performed by: UROLOGY

## 2019-11-13 PROCEDURE — 99214 PR OFFICE/OUTPT VISIT, EST, LEVL IV, 30-39 MIN: ICD-10-PCS | Mod: S$GLB,,, | Performed by: UROLOGY

## 2019-11-13 PROCEDURE — 99999 PR PBB SHADOW E&M-EST. PATIENT-LVL III: CPT | Mod: PBBFAC,,, | Performed by: UROLOGY

## 2019-11-13 PROCEDURE — 99999 PR PBB SHADOW E&M-EST. PATIENT-LVL III: ICD-10-PCS | Mod: PBBFAC,,, | Performed by: UROLOGY

## 2019-11-13 PROCEDURE — 99214 OFFICE O/P EST MOD 30 MIN: CPT | Mod: S$GLB,,, | Performed by: UROLOGY

## 2019-11-13 RX ORDER — TESTOSTERONE CYPIONATE 200 MG/ML
200 INJECTION, SOLUTION INTRAMUSCULAR
Qty: 1 ML | Refills: 5 | Status: SHIPPED | OUTPATIENT
Start: 2019-11-13 | End: 2020-01-08 | Stop reason: SDUPTHER

## 2019-11-13 NOTE — PROGRESS NOTES
Subjective:       Patient ID: Orlando Treadwell is a 69 y.o. male.    Chief Complaint:   Low serum testosterone.  BPH.  HPI   Mr. Treadwell this is a 69-year-old male who has been treated for low serum testosterone for quite some time with 200 mg of Depo-Testosterone IM every 28 days the patient referred that that regimen a.m. keep him free of any low testosterone serum level symptoms.    He is referred to have nocturia x2 occasionally x3 denies dysuria the urinary flow is adequate on cues refers that he is able to empty the bladder satisfactory.    His past medical and surgical history is well documented in the medical record including medications and allergies are all these were reviewed by me during this visit.  He is to be noted that he recently at the end of June of this year he suffered an MRSA infection in his right shoulder a require surgical drainage on care on IV antibiotics with a prolonged hospitalization.  At the present time the patient is taking no antibiotics.  He is also to be noted the patient is a morbidly overweight obese patient with a weight of 237 lb.  Review of Systems   Constitutional: Negative for activity change and appetite change.   HENT: Negative.    Eyes: Negative for discharge.   Respiratory: Negative for cough and shortness of breath.    Cardiovascular: Negative for chest pain and palpitations.   Gastrointestinal: Negative for abdominal distention, abdominal pain, constipation and vomiting.   Genitourinary: Negative for discharge, dysuria, flank pain, frequency, hematuria, testicular pain and urgency.   Musculoskeletal: Positive for arthralgias.   Skin: Negative for rash.   Neurological: Negative for dizziness.   Psychiatric/Behavioral: The patient is not nervous/anxious.          Objective:      Physical Exam   Constitutional: He appears well-developed and well-nourished.   HENT:   Head: Normocephalic.   Eyes: Pupils are equal, round, and reactive to light.   Neck: Normal range of  motion.   Cardiovascular: Normal rate.    Pulmonary/Chest: Effort normal.   Abdominal: Soft. He exhibits no distension and no mass. There is no tenderness.   Genitourinary: Rectum normal, prostate normal and penis normal. Rectal exam shows no external hemorrhoid, no mass and no tenderness. Prostate is not enlarged and not tender. Right testis shows tenderness. Right testis shows no mass. Left testis shows no mass and no tenderness. No discharge found.             Musculoskeletal: Normal range of motion.   Neurological: He is alert.   Skin: Skin is warm.     Psychiatric: He has a normal mood and affect.       Assessment:       1. Benign prostatic hyperplasia, unspecified whether lower urinary tract symptoms present        Plan:       Benign prostatic hyperplasia, unspecified whether lower urinary tract symptoms present  -     POCT URINE DIPSTICK WITHOUT MICROSCOPE  -     Prostate Specific Antigen, Diagnostic; Future; Expected date: 11/13/2019    Other orders  -     testosterone cypionate (DEPOTESTOTERONE CYPIONATE) 200 mg/mL injection; Inject 1 mL (200 mg total) into the muscle every 28 days. for 6 days  Dispense: 1 mL; Refill: 5     Patient to be informed of the results of the PSA level.  He is to continue to 100 mg of Depo-Testosterone IM Q for 28 days.  Return to the clinic in 1 year.

## 2019-11-13 NOTE — LETTER
November 13, 2019      Kleber Worthy III, MD  [address] [facility]  [address] [place] [id number]           [facility]  [address] [place] [address]  Phone: [phone]  Fax: [phone]          Patient: Orlando rTeadwell   MR Number: [id number]   YOB: 1950   Date of Visit: 11/13/2019       Dear Dr. Kleber Worthy III:    Thank you for referring Orlando Treadwell to me for evaluation. Attached you will find relevant portions of my assessment and plan of care.    If you have questions, please do not hesitate to call me. I look forward to following Orlando Treadwell along with you.    Sincerely,    Sahil Stark MD    Enclosure  CC:  No Recipients    If you would like to receive this communication electronically, please contact [email or web address] or [phone] to request more information on [employer] access.    For providers and/or their staff who would like to refer a patient to [facility], please contact us through our one-stop-shop provider referral line, [facility], at [phone].    If you feel you have received this communication in error or would no longer like to receive these types of communications, please e-mail [email or web address]

## 2019-11-15 DIAGNOSIS — M25.511 RIGHT SHOULDER PAIN, UNSPECIFIED CHRONICITY: Primary | ICD-10-CM

## 2019-11-18 ENCOUNTER — HOSPITAL ENCOUNTER (OUTPATIENT)
Dept: RADIOLOGY | Facility: HOSPITAL | Age: 69
Discharge: HOME | End: 2019-11-18
Attending: ORTHOPAEDIC SURGERY
Payer: MEDICARE

## 2019-11-18 ENCOUNTER — OFFICE VISIT (OUTPATIENT)
Dept: ORTHOPEDICS | Facility: CLINIC | Age: 69
End: 2019-11-18
Payer: MEDICARE

## 2019-11-18 VITALS — RESPIRATION RATE: 18 BRPM | BODY MASS INDEX: 36.1 KG/M2 | HEIGHT: 67 IN | WEIGHT: 230 LBS

## 2019-11-18 DIAGNOSIS — M25.511 RIGHT SHOULDER PAIN, UNSPECIFIED CHRONICITY: ICD-10-CM

## 2019-11-18 DIAGNOSIS — M19.011 OSTEOARTHRITIS OF RIGHT GLENOHUMERAL JOINT: Primary | ICD-10-CM

## 2019-11-18 DIAGNOSIS — M25.511 RIGHT SHOULDER PAIN, UNSPECIFIED CHRONICITY: Primary | ICD-10-CM

## 2019-11-18 PROCEDURE — 99999 PR PBB SHADOW E&M-EST. PATIENT-LVL III: ICD-10-PCS | Mod: PBBFAC,,, | Performed by: ORTHOPAEDIC SURGERY

## 2019-11-18 PROCEDURE — 1101F PR PT FALLS ASSESS DOC 0-1 FALLS W/OUT INJ PAST YR: ICD-10-PCS | Mod: CPTII,S$GLB,, | Performed by: ORTHOPAEDIC SURGERY

## 2019-11-18 PROCEDURE — 73030 XR SHOULDER TRAUMA 3 VIEW RIGHT: ICD-10-PCS | Mod: 26,RT,, | Performed by: RADIOLOGY

## 2019-11-18 PROCEDURE — 99999 PR PBB SHADOW E&M-EST. PATIENT-LVL III: CPT | Mod: PBBFAC,,, | Performed by: ORTHOPAEDIC SURGERY

## 2019-11-18 PROCEDURE — 73030 X-RAY EXAM OF SHOULDER: CPT | Mod: TC,PN,RT

## 2019-11-18 PROCEDURE — 73030 X-RAY EXAM OF SHOULDER: CPT | Mod: 26,RT,, | Performed by: RADIOLOGY

## 2019-11-18 PROCEDURE — 1101F PT FALLS ASSESS-DOCD LE1/YR: CPT | Mod: CPTII,S$GLB,, | Performed by: ORTHOPAEDIC SURGERY

## 2019-11-18 PROCEDURE — 99213 PR OFFICE/OUTPT VISIT, EST, LEVL III, 20-29 MIN: ICD-10-PCS | Mod: S$GLB,,, | Performed by: ORTHOPAEDIC SURGERY

## 2019-11-18 PROCEDURE — 99213 OFFICE O/P EST LOW 20 MIN: CPT | Mod: S$GLB,,, | Performed by: ORTHOPAEDIC SURGERY

## 2019-11-18 NOTE — PROGRESS NOTES
Past Medical History:   Diagnosis Date    Arthritis     Asbestos exposure     SOB     Back pain     Basal cell carcinoma     Bulging discs     lumbar    Hypertension     Kidney stone     Prostatitis     Sepsis due to methicillin resistant Staphylococcus aureus (MRSA) 06/2019    Due to large right upper arm abscess    Wears glasses        Past Surgical History:   Procedure Laterality Date    APPENDECTOMY      HAND SURGERY      rt hand    INCISION AND DRAINAGE Bilateral 06/2019    Right upper arm and left upper arm abscesses    JOINT REPLACEMENT      right knee    KNEE LIGAMENT RECONSTRUCTION      left knee    LITHOTRIPSY      TONSILLECTOMY, ADENOIDECTOMY, BILATERAL MYRINGOTOMY AND TUBES      TOTAL KNEE ARTHROPLASTY  1971    rt knee    URETERAL STENT PLACEMENT         Current Outpatient Medications   Medication Sig    aspirin 325 MG tablet Take 325 mg by mouth once daily.    diclofenac sodium 1 % Gel Apply 2 g topically 2 (two) times daily.    metoprolol succinate (TOPROL-XL) 100 MG 24 hr tablet Take 50 mg by mouth once daily.     olmesartan-hydrochlorothiazide (BENICAR HCT) 40-25 mg per tablet Take 1 tablet by mouth once daily.    oxyCODONE-acetaminophen (PERCOCET)  mg per tablet TK 1 T PO QID PRN P    pantoprazole (PROTONIX) 40 MG tablet TAKE 1 TABLET BY MOUTH EVERY DAY    QUEtiapine (SEROQUEL) 50 MG tablet TAKE 1 TABLET BY MOUTH AT BEDTIME    testosterone cypionate (DEPOTESTOTERONE CYPIONATE) 200 mg/mL injection Inject 1 mL (200 mg total) into the muscle every 28 days. for 6 days    tramadol (ULTRAM) 50 mg tablet Take 50 mg by mouth every 8 (eight) hours as needed.    venlafaxine (EFFEXOR-XR) 150 MG Cp24 Take 75 mg by mouth once daily.     No current facility-administered medications for this visit.        Review of patient's allergies indicates:   Allergen Reactions    Morphine Nausea Only     Ok with nausea med.       Family History   Problem Relation Age of Onset    Cancer  Mother     Stroke Father     Collagen disease Neg Hx     Melanoma Neg Hx     Psoriasis Neg Hx     Lupus Neg Hx     Eczema Neg Hx        Social History     Socioeconomic History    Marital status:      Spouse name: Not on file    Number of children: Not on file    Years of education: Not on file    Highest education level: Not on file   Occupational History    Not on file   Social Needs    Financial resource strain: Not on file    Food insecurity:     Worry: Not on file     Inability: Not on file    Transportation needs:     Medical: Not on file     Non-medical: Not on file   Tobacco Use    Smoking status: Never Smoker    Smokeless tobacco: Never Used   Substance and Sexual Activity    Alcohol use: No    Drug use: No    Sexual activity: Not on file   Lifestyle    Physical activity:     Days per week: Not on file     Minutes per session: Not on file    Stress: Not on file   Relationships    Social connections:     Talks on phone: Not on file     Gets together: Not on file     Attends [employer] service: Not on file     Active member of club or organization: Not on file     Attends meetings of clubs or organizations: Not on file     Relationship status: Not on file   Other Topics Concern    Not on file   Social History Narrative    Not on file       Chief Complaint:   Chief Complaint   Patient presents with    Right Shoulder - Pain       Date of surgery:  Vandana 3, 2019    History of present illness:  69-year-old male who underwent right shoulder I&D for right shoulder abscess.  It did not involve the joint and was more superficial.  Patient is doing a little worse.  More pain up in the neck and lateral shoulder.  Saw Dr. Hurst I recommended evaluation for possible osteomyelitis.  Patient recently had an ultrasound guided cortisone injection into his right shoulder by Dr. Arias.  It did not help.  Pain is an 8/10.      Review of Systems:    Musculoskeletal:  See HPI        Physical  Examination:    Vital Signs:    Vitals:    11/18/19 1418   Resp: 18       Body mass index is 36.02 kg/m².    This a well-developed, well nourished patient in no acute distress.  They are alert and oriented and cooperative to examination.  Pt. walks without an antalgic gait.      Examination of the right shoulder shows healed surgical incision. No erythema or drainage. Shoulder stiff and has a lot of pain in the joint.    X-rays:  X-rays of the right shoulder ordered and reviewed which show severe glenohumeral arthritis. No obvious signs of osteomyelitis.     Assessment::  Status post incision and drainage of right shoulder abscess  Right glenohumeral arthritis    Plan:  I reviewed the findings with him today.  I recommended an MRI to further evaluate the bone and rule out osteomyelitis.    This note was created using [employer] voice recognition software that occasionally misinterpreted phrases or words.

## 2019-11-18 NOTE — LETTER
November 18, 2019      Sahil Stark MD  [address] [facility] [place] [address]           [facility] [place] [address] [place] [id number]  Phone: [phone]          Patient: Orlando Treadwell   MR Number: [id number]   YOB: 1950   Date of Visit: 11/18/2019       Dear Dr. Sahil Stark:    Thank you for referring Orlando Treadwell to me for evaluation. Attached you will find relevant portions of my assessment and plan of care.    If you have questions, please do not hesitate to call me. I look forward to following Orlando Treadwell along with you.    Sincerely,    Guille Ty MD    Enclosure  CC:  No Recipients    If you would like to receive this communication electronically, please contact [email or web address] or [phone] to request more information on [employer] Link access.    For providers and/or their staff who would like to refer a patient to [facility], please contact us through our one-stop-shop provider referral line, [facility] , at [phone].    If you feel you have received this communication in error or would no longer like to receive these types of communications, please e-mail [email or web address]

## 2019-11-20 ENCOUNTER — HOSPITAL ENCOUNTER (OUTPATIENT)
Dept: RADIOLOGY | Facility: HOSPITAL | Age: 69
Discharge: HOME | End: 2019-11-20
Attending: ORTHOPAEDIC SURGERY
Payer: MEDICARE

## 2019-11-20 DIAGNOSIS — M25.511 RIGHT SHOULDER PAIN, UNSPECIFIED CHRONICITY: ICD-10-CM

## 2019-11-20 LAB
CREAT SERPL-MCNC: 1.1 MG/DL (ref 0.5–1.4)
SAMPLE: NORMAL

## 2019-11-20 PROCEDURE — A9585 GADOBUTROL INJECTION: HCPCS | Mod: PO | Performed by: ORTHOPAEDIC SURGERY

## 2019-11-20 PROCEDURE — 25500020 PHARM REV CODE 255: Mod: PO | Performed by: ORTHOPAEDIC SURGERY

## 2019-11-20 PROCEDURE — 73223 MRI JOINT UPR EXTR W/O&W/DYE: CPT | Mod: TC,PO,RT

## 2019-11-20 RX ORDER — GADOBUTROL 604.72 MG/ML
10.5 INJECTION INTRAVENOUS
Status: COMPLETED | OUTPATIENT
Start: 2019-11-20 | End: 2019-11-20

## 2019-11-20 RX ADMIN — GADOBUTROL 10.5 ML: 604.72 INJECTION INTRAVENOUS at 01:11

## 2019-12-02 ENCOUNTER — LAB VISIT (OUTPATIENT)
Dept: LAB | Facility: HOSPITAL | Age: 69
End: 2019-12-02
Attending: UROLOGY
Payer: MEDICARE

## 2019-12-02 DIAGNOSIS — N40.0 BENIGN PROSTATIC HYPERPLASIA, UNSPECIFIED WHETHER LOWER URINARY TRACT SYMPTOMS PRESENT: ICD-10-CM

## 2019-12-02 LAB — COMPLEXED PSA SERPL-MCNC: 1.6 NG/ML (ref 0–4)

## 2019-12-02 PROCEDURE — 84153 ASSAY OF PSA TOTAL: CPT

## 2019-12-02 PROCEDURE — 36415 COLL VENOUS BLD VENIPUNCTURE: CPT | Mod: PO

## 2020-01-08 ENCOUNTER — HOSPITAL ENCOUNTER (INPATIENT)
Facility: HOSPITAL | Age: 70
LOS: 6 days | Discharge: HOME | DRG: 603 | End: 2020-01-14
Attending: EMERGENCY MEDICINE | Admitting: INTERNAL MEDICINE
Payer: MEDICARE

## 2020-01-08 DIAGNOSIS — B95.62 MRSA BACTEREMIA: ICD-10-CM

## 2020-01-08 DIAGNOSIS — R78.81 MRSA BACTEREMIA: ICD-10-CM

## 2020-01-08 DIAGNOSIS — L02.91 ABSCESS: Primary | ICD-10-CM

## 2020-01-08 DIAGNOSIS — R53.1 WEAKNESS: ICD-10-CM

## 2020-01-08 DIAGNOSIS — R78.81 BACTEREMIA: ICD-10-CM

## 2020-01-08 PROBLEM — F11.90 CHRONIC NARCOTIC USE: Status: ACTIVE | Noted: 2020-01-08

## 2020-01-08 PROBLEM — I10 HYPERTENSION: Status: ACTIVE | Noted: 2020-01-08

## 2020-01-08 PROBLEM — A49.02 MRSA INFECTION: Status: ACTIVE | Noted: 2020-01-08

## 2020-01-08 LAB
ALBUMIN SERPL BCP-MCNC: 3.6 G/DL (ref 3.5–5.2)
ALP SERPL-CCNC: 58 U/L (ref 55–135)
ALT SERPL W/O P-5'-P-CCNC: 32 U/L (ref 10–44)
ANION GAP SERPL CALC-SCNC: 12 MMOL/L (ref 8–16)
AST SERPL-CCNC: 36 U/L (ref 10–40)
BACTERIA #/AREA URNS HPF: NEGATIVE /HPF
BASOPHILS # BLD AUTO: 0.06 K/UL (ref 0–0.2)
BASOPHILS NFR BLD: 0.4 % (ref 0–1.9)
BILIRUB SERPL-MCNC: 0.7 MG/DL (ref 0.1–1)
BILIRUB UR QL STRIP: NEGATIVE
BUN SERPL-MCNC: 31 MG/DL (ref 8–23)
CALCIUM SERPL-MCNC: 10.2 MG/DL (ref 8.7–10.5)
CHLORIDE SERPL-SCNC: 95 MMOL/L (ref 95–110)
CLARITY UR: CLEAR
CO2 SERPL-SCNC: 28 MMOL/L (ref 23–29)
COLOR UR: YELLOW
CREAT SERPL-MCNC: 1.2 MG/DL (ref 0.5–1.4)
DIFFERENTIAL METHOD: ABNORMAL
EOSINOPHIL # BLD AUTO: 0.1 K/UL (ref 0–0.5)
EOSINOPHIL NFR BLD: 0.8 % (ref 0–8)
ERYTHROCYTE [DISTWIDTH] IN BLOOD BY AUTOMATED COUNT: 15.7 % (ref 11.5–14.5)
EST. GFR  (AFRICAN AMERICAN): >60 ML/MIN/1.73 M^2
EST. GFR  (NON AFRICAN AMERICAN): >60 ML/MIN/1.73 M^2
GLUCOSE SERPL-MCNC: 112 MG/DL (ref 70–110)
GLUCOSE UR QL STRIP: NEGATIVE
HCT VFR BLD AUTO: 42.9 % (ref 40–54)
HGB BLD-MCNC: 13.5 G/DL (ref 14–18)
HGB UR QL STRIP: ABNORMAL
HYALINE CASTS #/AREA URNS LPF: 23 /LPF
IMM GRANULOCYTES # BLD AUTO: 0.15 K/UL (ref 0–0.04)
IMM GRANULOCYTES NFR BLD AUTO: 1 % (ref 0–0.5)
KETONES UR QL STRIP: NEGATIVE
LDH SERPL L TO P-CCNC: 1.56 MMOL/L (ref 0.5–2.2)
LEUKOCYTE ESTERASE UR QL STRIP: NEGATIVE
LYMPHOCYTES # BLD AUTO: 1.2 K/UL (ref 1–4.8)
LYMPHOCYTES NFR BLD: 7.6 % (ref 18–48)
MCH RBC QN AUTO: 26.9 PG (ref 27–31)
MCHC RBC AUTO-ENTMCNC: 31.5 G/DL (ref 32–36)
MCV RBC AUTO: 86 FL (ref 82–98)
MICROSCOPIC COMMENT: ABNORMAL
MONOCYTES # BLD AUTO: 1.7 K/UL (ref 0.3–1)
MONOCYTES NFR BLD: 11 % (ref 4–15)
NEUTROPHILS # BLD AUTO: 12.3 K/UL (ref 1.8–7.7)
NEUTROPHILS NFR BLD: 79.2 % (ref 38–73)
NITRITE UR QL STRIP: NEGATIVE
NRBC BLD-RTO: 0 /100 WBC
PH UR STRIP: 6 [PH] (ref 5–8)
PLATELET # BLD AUTO: 341 K/UL (ref 150–350)
PMV BLD AUTO: 11.1 FL (ref 9.2–12.9)
POTASSIUM SERPL-SCNC: 4.1 MMOL/L (ref 3.5–5.1)
PROT SERPL-MCNC: 8.7 G/DL (ref 6–8.4)
PROT UR QL STRIP: ABNORMAL
RBC # BLD AUTO: 5.01 M/UL (ref 4.6–6.2)
RBC #/AREA URNS HPF: 7 /HPF (ref 0–4)
SAMPLE: NORMAL
SODIUM SERPL-SCNC: 135 MMOL/L (ref 136–145)
SP GR UR STRIP: 1.02 (ref 1–1.03)
SQUAMOUS #/AREA URNS HPF: 4 /HPF
URN SPEC COLLECT METH UR: ABNORMAL
UROBILINOGEN UR STRIP-ACNC: NEGATIVE EU/DL
WBC # BLD AUTO: 15.47 K/UL (ref 3.9–12.7)
WBC #/AREA URNS HPF: 3 /HPF (ref 0–5)

## 2020-01-08 PROCEDURE — 99285 EMERGENCY DEPT VISIT HI MDM: CPT | Mod: 25

## 2020-01-08 PROCEDURE — 25000003 PHARM REV CODE 250: Performed by: INTERNAL MEDICINE

## 2020-01-08 PROCEDURE — 63600175 PHARM REV CODE 636 W HCPCS: Performed by: EMERGENCY MEDICINE

## 2020-01-08 PROCEDURE — 80053 COMPREHEN METABOLIC PANEL: CPT

## 2020-01-08 PROCEDURE — 87186 SC STD MICRODIL/AGAR DIL: CPT

## 2020-01-08 PROCEDURE — 83605 ASSAY OF LACTIC ACID: CPT

## 2020-01-08 PROCEDURE — 87070 CULTURE OTHR SPECIMN AEROBIC: CPT

## 2020-01-08 PROCEDURE — 85025 COMPLETE CBC W/AUTO DIFF WBC: CPT

## 2020-01-08 PROCEDURE — 96375 TX/PRO/DX INJ NEW DRUG ADDON: CPT

## 2020-01-08 PROCEDURE — 93005 ELECTROCARDIOGRAM TRACING: CPT

## 2020-01-08 PROCEDURE — 87077 CULTURE AEROBIC IDENTIFY: CPT

## 2020-01-08 PROCEDURE — 87040 BLOOD CULTURE FOR BACTERIA: CPT | Mod: 59

## 2020-01-08 PROCEDURE — 96365 THER/PROPH/DIAG IV INF INIT: CPT

## 2020-01-08 PROCEDURE — 63600175 PHARM REV CODE 636 W HCPCS: Performed by: INTERNAL MEDICINE

## 2020-01-08 PROCEDURE — 36415 COLL VENOUS BLD VENIPUNCTURE: CPT

## 2020-01-08 PROCEDURE — 87147 CULTURE TYPE IMMUNOLOGIC: CPT

## 2020-01-08 PROCEDURE — 12000002 HC ACUTE/MED SURGE SEMI-PRIVATE ROOM

## 2020-01-08 PROCEDURE — 81001 URINALYSIS AUTO W/SCOPE: CPT

## 2020-01-08 RX ORDER — POLYETHYLENE GLYCOL 3350 17 G/17G
17 POWDER, FOR SOLUTION ORAL DAILY
Status: DISCONTINUED | OUTPATIENT
Start: 2020-01-09 | End: 2020-01-14 | Stop reason: HOSPADM

## 2020-01-08 RX ORDER — ACETAMINOPHEN 325 MG/1
650 TABLET ORAL EVERY 4 HOURS PRN
Status: DISCONTINUED | OUTPATIENT
Start: 2020-01-08 | End: 2020-01-14 | Stop reason: HOSPADM

## 2020-01-08 RX ORDER — ASPIRIN 325 MG
325 TABLET ORAL DAILY
Status: DISCONTINUED | OUTPATIENT
Start: 2020-01-09 | End: 2020-01-14 | Stop reason: HOSPADM

## 2020-01-08 RX ORDER — MUPIROCIN 20 MG/G
1 OINTMENT TOPICAL 2 TIMES DAILY
COMMUNITY
Start: 2020-01-06 | End: 2020-10-29

## 2020-01-08 RX ORDER — GABAPENTIN 300 MG/1
600 CAPSULE ORAL NIGHTLY
Status: DISCONTINUED | OUTPATIENT
Start: 2020-01-08 | End: 2020-01-14 | Stop reason: HOSPADM

## 2020-01-08 RX ORDER — DOXYCYCLINE 100 MG/1
100 CAPSULE ORAL EVERY 12 HOURS
COMMUNITY
Start: 2020-01-14 | End: 2020-01-23

## 2020-01-08 RX ORDER — HYDRALAZINE HYDROCHLORIDE 25 MG/1
50 TABLET, FILM COATED ORAL EVERY 12 HOURS
Status: DISCONTINUED | OUTPATIENT
Start: 2020-01-08 | End: 2020-01-14 | Stop reason: HOSPADM

## 2020-01-08 RX ORDER — VANCOMYCIN HCL IN 5 % DEXTROSE 1G/250ML
1000 PLASTIC BAG, INJECTION (ML) INTRAVENOUS
Status: DISCONTINUED | OUTPATIENT
Start: 2020-01-08 | End: 2020-01-08

## 2020-01-08 RX ORDER — IBUPROFEN 200 MG
16 TABLET ORAL
Status: DISCONTINUED | OUTPATIENT
Start: 2020-01-08 | End: 2020-01-14 | Stop reason: HOSPADM

## 2020-01-08 RX ORDER — ACETAMINOPHEN 325 MG/1
650 TABLET ORAL EVERY 8 HOURS PRN
Status: DISCONTINUED | OUTPATIENT
Start: 2020-01-08 | End: 2020-01-14 | Stop reason: HOSPADM

## 2020-01-08 RX ORDER — ENOXAPARIN SODIUM 100 MG/ML
40 INJECTION SUBCUTANEOUS EVERY 24 HOURS
Status: DISCONTINUED | OUTPATIENT
Start: 2020-01-08 | End: 2020-01-14 | Stop reason: HOSPADM

## 2020-01-08 RX ORDER — GABAPENTIN 300 MG/1
600 CAPSULE ORAL NIGHTLY
COMMUNITY
Start: 2019-12-12

## 2020-01-08 RX ORDER — TRAMADOL HYDROCHLORIDE 50 MG/1
50 TABLET ORAL EVERY 8 HOURS PRN
Status: DISCONTINUED | OUTPATIENT
Start: 2020-01-08 | End: 2020-01-14 | Stop reason: HOSPADM

## 2020-01-08 RX ORDER — DICLOFENAC SODIUM 10 MG/G
2 GEL TOPICAL 2 TIMES DAILY
Status: DISCONTINUED | OUTPATIENT
Start: 2020-01-08 | End: 2020-01-09

## 2020-01-08 RX ORDER — ONDANSETRON 2 MG/ML
4 INJECTION INTRAMUSCULAR; INTRAVENOUS EVERY 8 HOURS PRN
Status: DISCONTINUED | OUTPATIENT
Start: 2020-01-08 | End: 2020-01-14 | Stop reason: HOSPADM

## 2020-01-08 RX ORDER — HYDRALAZINE HYDROCHLORIDE 25 MG/1
50 TABLET, FILM COATED ORAL EVERY 12 HOURS
Status: DISCONTINUED | OUTPATIENT
Start: 2020-01-08 | End: 2020-01-08

## 2020-01-08 RX ORDER — METOPROLOL SUCCINATE 50 MG/1
50 TABLET, EXTENDED RELEASE ORAL NIGHTLY
Status: DISCONTINUED | OUTPATIENT
Start: 2020-01-08 | End: 2020-01-14 | Stop reason: HOSPADM

## 2020-01-08 RX ORDER — QUETIAPINE FUMARATE 25 MG/1
50 TABLET, FILM COATED ORAL NIGHTLY
Status: DISCONTINUED | OUTPATIENT
Start: 2020-01-08 | End: 2020-01-14 | Stop reason: HOSPADM

## 2020-01-08 RX ORDER — SODIUM CHLORIDE 0.9 % (FLUSH) 0.9 %
10 SYRINGE (ML) INJECTION
Status: DISCONTINUED | OUTPATIENT
Start: 2020-01-08 | End: 2020-01-14 | Stop reason: HOSPADM

## 2020-01-08 RX ORDER — HYDRALAZINE HYDROCHLORIDE 50 MG/1
50 TABLET, FILM COATED ORAL 2 TIMES DAILY
COMMUNITY

## 2020-01-08 RX ORDER — VENLAFAXINE HYDROCHLORIDE 150 MG/1
150 CAPSULE, EXTENDED RELEASE ORAL NIGHTLY
Status: DISCONTINUED | OUTPATIENT
Start: 2020-01-08 | End: 2020-01-14 | Stop reason: HOSPADM

## 2020-01-08 RX ORDER — HYDROMORPHONE HYDROCHLORIDE 1 MG/ML
1 INJECTION, SOLUTION INTRAMUSCULAR; INTRAVENOUS; SUBCUTANEOUS
Status: COMPLETED | OUTPATIENT
Start: 2020-01-08 | End: 2020-01-08

## 2020-01-08 RX ORDER — MUPIROCIN 20 MG/G
1 OINTMENT TOPICAL 2 TIMES DAILY
Status: DISCONTINUED | OUTPATIENT
Start: 2020-01-08 | End: 2020-01-14 | Stop reason: HOSPADM

## 2020-01-08 RX ORDER — VENLAFAXINE HYDROCHLORIDE 150 MG/1
150 CAPSULE, EXTENDED RELEASE ORAL DAILY
Status: DISCONTINUED | OUTPATIENT
Start: 2020-01-09 | End: 2020-01-08

## 2020-01-08 RX ORDER — PANTOPRAZOLE SODIUM 40 MG/1
40 TABLET, DELAYED RELEASE ORAL DAILY
Status: DISCONTINUED | OUTPATIENT
Start: 2020-01-09 | End: 2020-01-14 | Stop reason: HOSPADM

## 2020-01-08 RX ORDER — OXYCODONE HYDROCHLORIDE 5 MG/1
15 TABLET ORAL 4 TIMES DAILY PRN
Status: DISCONTINUED | OUTPATIENT
Start: 2020-01-08 | End: 2020-01-13

## 2020-01-08 RX ORDER — OXYCODONE HYDROCHLORIDE 15 MG/1
15 TABLET ORAL 4 TIMES DAILY PRN
Status: ON HOLD | COMMUNITY
Start: 2019-12-19 | End: 2020-10-23 | Stop reason: HOSPADM

## 2020-01-08 RX ORDER — TESTOSTERONE CYPIONATE 200 MG/ML
200 INJECTION, SOLUTION INTRAMUSCULAR
COMMUNITY
Start: 2020-01-06 | End: 2020-05-25

## 2020-01-08 RX ORDER — ESOMEPRAZOLE MAGNESIUM 40 MG/1
40 CAPSULE, DELAYED RELEASE ORAL NIGHTLY
COMMUNITY
Start: 2020-01-01 | End: 2020-04-21

## 2020-01-08 RX ORDER — IBUPROFEN 200 MG
24 TABLET ORAL
Status: DISCONTINUED | OUTPATIENT
Start: 2020-01-08 | End: 2020-01-14 | Stop reason: HOSPADM

## 2020-01-08 RX ADMIN — OXYCODONE HYDROCHLORIDE 15 MG: 5 TABLET ORAL at 05:01

## 2020-01-08 RX ADMIN — PIPERACILLIN AND TAZOBACTAM 4.5 G: 4; .5 INJECTION, POWDER, LYOPHILIZED, FOR SOLUTION INTRAVENOUS; PARENTERAL at 01:01

## 2020-01-08 RX ADMIN — PIPERACILLIN AND TAZOBACTAM 4.5 G: 4; .5 INJECTION, POWDER, LYOPHILIZED, FOR SOLUTION INTRAVENOUS; PARENTERAL at 09:01

## 2020-01-08 RX ADMIN — ACETAMINOPHEN 650 MG: 325 TABLET ORAL at 10:01

## 2020-01-08 RX ADMIN — HYDRALAZINE HYDROCHLORIDE 50 MG: 25 TABLET, FILM COATED ORAL at 09:01

## 2020-01-08 RX ADMIN — QUETIAPINE 50 MG: 25 TABLET ORAL at 09:01

## 2020-01-08 RX ADMIN — VANCOMYCIN HYDROCHLORIDE 1500 MG: 1.5 INJECTION, POWDER, LYOPHILIZED, FOR SOLUTION INTRAVENOUS at 01:01

## 2020-01-08 RX ADMIN — METOPROLOL SUCCINATE 50 MG: 50 TABLET, FILM COATED, EXTENDED RELEASE ORAL at 09:01

## 2020-01-08 RX ADMIN — SODIUM CHLORIDE 1000 ML: 900 INJECTION INTRAVENOUS at 01:01

## 2020-01-08 RX ADMIN — MUPIROCIN 1 G: 20 OINTMENT TOPICAL at 09:01

## 2020-01-08 RX ADMIN — VENLAFAXINE HYDROCHLORIDE 150 MG: 150 CAPSULE, EXTENDED RELEASE ORAL at 10:01

## 2020-01-08 RX ADMIN — GABAPENTIN 600 MG: 300 CAPSULE ORAL at 09:01

## 2020-01-08 RX ADMIN — HYDROMORPHONE HYDROCHLORIDE 1 MG: 1 INJECTION, SOLUTION INTRAMUSCULAR; INTRAVENOUS; SUBCUTANEOUS at 01:01

## 2020-01-08 NOTE — HPI
Year old white male with known past medical history of hypertension, pre diabetes, PTSD, chronic pain on opioids in outpatient setting, history of cellulitis and soft tissue infection with MRSA (last admission 6/3/19-6/7/19 with MRSA wound and bacteremia), presented to ER on 01/08/2020 with complaint of pain, redness and concern for MRSA abscess on the right upper back.  Patient informed that his wife was recently discharged yesterday from the hospital for MRSA wound in her pubic area.  Patient reports he has a history of MRSA infection and has been using Hibiclens at home.  Patient does report that he has PTSD and he scratches his skin at night when he is not aware and that is how he has all the excoriation marks and the healed scars from the previous small wounds on his skin  Currently patient denies fever, chills, weakness.  Reports he was seen by Dr. Worthy day before yesterday on 01/06/2020, was started on doxycycline oral and had blood work done and he was informed today that his kidney function has declined markedly and he needs to come to the ER today.   In the ER patient WBC count was 15.4 H&H 13.5/42.9, platelet 341 CMP showed sodium 135 potassium 4.1 BUN 31, creatinine 1.2 with GFR greater than 60.  I did compared his levels from 01/06/2020 which showed BUN and creatinine of 43/2.35 with GFR of 27  Discussed with patient that his BUN creatinine has improved and the GFR is normal today but the concern for MRSA bacteremia still there and we would wait till his blood cultures comes back, we will consult Dr. Hurst and if needed will consult surgery for I&D  Patient specifically was concerned about his pain medication from home reporting he takes his medicine a certain way.  Discussed with patient that we will continue his medication as prescribed by his physician/home medications.    I did reviewed patient passed admission with this.  Noted he was admitted 06/03  at [facility] with MRSA sepsis right upper  extremity and small abscess left upper extremity with bacteremia.  Repeat blood cultures after 24-48 hours were negative.  Patient was discharged on 06/07/2019 on 2 weeks of IV daptomycin

## 2020-01-08 NOTE — SUBJECTIVE & OBJECTIVE
Past Medical History:   Diagnosis Date    Arthritis     Asbestos exposure     SOB     Back pain     Basal cell carcinoma     Bulging discs     lumbar    Hypertension     Kidney stone     Prostatitis     Sepsis due to methicillin resistant Staphylococcus aureus (MRSA) 06/2019    Due to large right upper arm abscess    Wears glasses        Past Surgical History:   Procedure Laterality Date    APPENDECTOMY      HAND SURGERY      rt hand    INCISION AND DRAINAGE Bilateral 06/2019    Right upper arm and left upper arm abscesses    JOINT REPLACEMENT      right knee    KNEE LIGAMENT RECONSTRUCTION      left knee    LITHOTRIPSY      TONSILLECTOMY, ADENOIDECTOMY, BILATERAL MYRINGOTOMY AND TUBES      TOTAL KNEE ARTHROPLASTY  1971    rt knee    URETERAL STENT PLACEMENT         Review of patient's allergies indicates:   Allergen Reactions    Morphine Nausea Only     Ok with nausea med.       No current facility-administered medications on file prior to encounter.      Current Outpatient Medications on File Prior to Encounter   Medication Sig    aspirin 325 MG tablet Take 325 mg by mouth once daily.    diclofenac sodium 1 % Gel Apply 2 g topically 2 (two) times daily.    doxycycline (VIBRAMYCIN) 100 MG Cap Take 100 mg by mouth every 12 (twelve) hours.     esomeprazole (NEXIUM) 40 MG capsule Take 40 mg by mouth every evening.     gabapentin (NEURONTIN) 300 MG capsule Take 600 mg by mouth every evening.     hydrALAZINE (APRESOLINE) 50 MG tablet Take 50 mg by mouth 2 (two) times daily.    Lactobacillus rhamnosus GG (CULTURELLE) 10 billion cell capsule Take 1 capsule by mouth once daily.    metoprolol succinate (TOPROL-XL) 100 MG 24 hr tablet Take 50 mg by mouth every evening.     multivitamin (THERAGRAN) tablet Take 1 tablet by mouth once daily.    mupirocin (BACTROBAN) 2 % ointment Apply 1 g topically 2 (two) times daily. Pt applying to would and putting in nares    olmesartan-hydrochlorothiazide  (BENICAR HCT) 40-25 mg per tablet Take 1 tablet by mouth every evening.     oxyCODONE (ROXICODONE) 15 MG Tab Take 15 mg by mouth 4 (four) times daily as needed for Pain.     QUEtiapine (SEROQUEL) 50 MG tablet TAKE 1 TABLET BY MOUTH AT BEDTIME (Patient taking differently: Take 50 mg by mouth nightly. )    tramadol (ULTRAM) 50 mg tablet Take 50 mg by mouth every 8 (eight) hours as needed for Pain.     venlafaxine (EFFEXOR-XR) 150 MG Cp24 TAKE 1 CAPSULE BY MOUTH EVERY DAY (Patient taking differently: Take 150 mg by mouth every evening. )    testosterone cypionate (DEPOTESTOTERONE CYPIONATE) 200 mg/mL injection Inject 200 mg into the muscle every 28 days.     [DISCONTINUED] FLUZONE HIGH-DOSE 2019-20, PF, 180 mcg/0.5 mL Syrg ADM 0.5ML IM UTD    [DISCONTINUED] oxyCODONE-acetaminophen (PERCOCET)  mg per tablet TK 1 T PO QID PRN P    [DISCONTINUED] pantoprazole (PROTONIX) 40 MG tablet TAKE 1 TABLET BY MOUTH EVERY DAY    [DISCONTINUED] testosterone cypionate (DEPOTESTOTERONE CYPIONATE) 200 mg/mL injection Inject 1 mL (200 mg total) into the muscle every 28 days. for 6 days     Family History     Problem Relation (Age of Onset)    Cancer Mother    Heart disease Father    Stroke Father        Tobacco Use    Smoking status: Never Smoker    Smokeless tobacco: Never Used   Substance and Sexual Activity    Alcohol use: No    Drug use: No    Sexual activity: Not on file     Review of Systems   Constitutional: Negative.    HENT: Negative.    Eyes: Negative.    Respiratory: Negative.    Cardiovascular: Negative.    Gastrointestinal: Negative.    Genitourinary: Negative.    Musculoskeletal: Positive for arthralgias, back pain, joint swelling and neck pain.        Joint deformity   Skin: Positive for wound (Right upper back).   Neurological: Negative.    Psychiatric/Behavioral:        History of PTSD     Objective:     Vital Signs (Most Recent):  Temp: 98.7 °F (37.1 °C) (01/08/20 1216)  Pulse: (!) 122 (01/08/20  1216)  Resp: 20 (01/08/20 1216)  BP: (!) 125/4 (01/08/20 1216)  SpO2: 96 % (01/08/20 1216) Vital Signs (24h Range):  Temp:  [98.7 °F (37.1 °C)] 98.7 °F (37.1 °C)  Pulse:  [122] 122  Resp:  [20] 20  SpO2:  [96 %] 96 %  BP: (125)/(4) 125/4     Weight: 104.3 kg (230 lb)  Body mass index is 37.12 kg/m².    Physical Exam   Constitutional: He is oriented to person, place, and time. He appears well-developed and well-nourished. No distress.   Obese   HENT:   Head: Normocephalic and atraumatic.   Mouth/Throat: Oropharynx is clear and moist.   Eyes: Pupils are equal, round, and reactive to light. EOM are normal. No scleral icterus.   Neck: Normal range of motion. Neck supple.   Cardiovascular: Regular rhythm and intact distal pulses. Exam reveals no gallop and no friction rub.   Murmur (2/6 systolic murmur) heard.  Tachycardia   Pulmonary/Chest: Effort normal and breath sounds normal. No respiratory distress. He has no wheezes. He has no rales.   Examined anteriorly as patient uncomfortable with sitting up due to his generalized chronic pain   Abdominal: Soft. Bowel sounds are normal.   Obese   Musculoskeletal: Normal range of motion. He exhibits deformity ( deformities in the small joints of hands). He exhibits no edema or tenderness.   Neurological: He is alert and oriented to person, place, and time.   Skin: Skin is warm. No rash noted. He is not diaphoretic. No erythema.   Unable to examine the wound on the right upper shoulder as it has been dressed after wound cultures collected by the ER physician   Psychiatric: He has a normal mood and affect. His behavior is normal.   Nursing note and vitals reviewed.        CRANIAL NERVES     CN III, IV, VI   Pupils are equal, round, and reactive to light.  Extraocular motions are normal.        Significant Labs:   Blood Culture: No results for input(s): LABBLOO in the last 48 hours.  CBC:   Recent Labs   Lab 01/06/20  1509 01/08/20  1240   WBC 16.5* 15.47*   HGB 13.0* 13.5*   HCT  40.0 42.9    341     CMP:   Recent Labs   Lab 01/06/20  1509 01/08/20  1240   * 135*   K 3.6 4.1   CL 95* 95   CO2 27 28    112*   BUN 43* 31*   CREATININE 2.35* 1.2   CALCIUM 9.4 10.2   PROT 7.8 8.7*   ALBUMIN 3.8 3.6   BILITOT 0.5 0.7   ALKPHOS 58 58   AST 21 36   ALT 20 32   ANIONGAP  --  12   EGFRNONAA 27* >60.0     Cardiac Markers: No results for input(s): CKMB, MYOGLOBIN, BNP, TROPISTAT in the last 48 hours.  Lactic Acid: No results for input(s): LACTATE in the last 48 hours.  Lipase: No results for input(s): LIPASE in the last 48 hours.  Lipid Panel: No results for input(s): CHOL, HDL, LDLCALC, TRIG, CHOLHDL in the last 48 hours.  Magnesium: No results for input(s): MG in the last 48 hours.  Troponin: No results for input(s): TROPONINI in the last 48 hours.  TSH: No results for input(s): TSH in the last 4320 hours.  Urine Studies:   Recent Labs   Lab 01/08/20  1303   COLORU Yellow   APPEARANCEUA Clear   PHUR 6.0   SPECGRAV 1.020   PROTEINUA 1+*   GLUCUA Negative   KETONESU Negative   BILIRUBINUA Negative   OCCULTUA 1+*   NITRITE Negative   UROBILINOGEN Negative   LEUKOCYTESUR Negative   RBCUA 7*   WBCUA 3   BACTERIA Negative   SQUAMEPITHEL 4   HYALINECASTS 23*       Significant Imaging: I have reviewed all pertinent imaging results/findings within the past 24 hours.     X-Ray Chest AP Portable   Final Result      No acute cardiopulmonary abnormality.         Electronically signed by: Elizabeth Lynch MD   Date:    01/08/2020   Time:    12:49       X-ray chest personally reviewed

## 2020-01-08 NOTE — ASSESSMENT & PLAN NOTE
Admit to [facility] with tele monitoring  History of MRSA bacteremia  in 06/2019  Currently having abscess with pus drainage on the right upper back  Vancomycin plus Zosyn for now  Blood cultures collected will follow and deescalate antibiotic per sensitivities  Wound culture collected in the ER  Id Dr. Hurst consulted  Will continue normal saline at 75 cc/hour.  Patient does not look septic but highly suspected of bacteremia  If needed will consult surgery for I and D  Contact precautions

## 2020-01-08 NOTE — ASSESSMENT & PLAN NOTE
I spoke to Dr. Hugo Cross to verify the dose of his roxycodone.  Informed that his oxycodone as prescribed as 15 mg Q i.d.  Patient reports on last visit Dr. pierre recommended taking 1.5 tablets t.i.d. and also provided him with a pill cutter  Informed patient that I will continue the medicine as prescribed and is written on the bottle.  Patient verbalized understanding  Dr. mujica eager is nurse Tay will call me back after speaking to Dr. iyer later today or tomorrow morning  Will also continue his tramadol as prescribed

## 2020-01-08 NOTE — H&P
[facility]  History & Physical    Patient Name: Orlando Treadwell  MRN: [id number]  Admission Date: 1/8/2020  Attending Physician: Boo Martinez MD   Primary Care Provider: Kleber Willard III, MD         Patient information was obtained from patient, past medical records and ER records.   Discussed case with ED physician Dr Saunders    Subjective:     Principal Problem:MRSA infection    Chief Complaint:   Chief Complaint   Patient presents with    Abnormal Lab     states sent by dr willard told has abnormal lab results, feeling weak all over for days not eating, states being treated for MRSA of the back and states now in blood        HPI: Year old white male with known past medical history of hypertension, pre diabetes, PTSD, chronic pain on opioids in outpatient setting, history of cellulitis and soft tissue infection with MRSA (last admission 6/3/19-6/7/19 with MRSA wound and bacteremia), presented to ER on 01/08/2020 with complaint of pain, redness and concern for MRSA abscess on the right upper back.  Patient informed that his wife was recently discharged yesterday from the hospital for MRSA wound in her pubic area.  Patient reports he has a history of MRSA infection and has been using Hibiclens at home.  Patient does report that he has PTSD and he scratches his skin at night when he is not aware and that is how he has all the excoriation marks and the healed scars from the previous small wounds on his skin  Currently patient denies fever, chills, weakness.  Reports he was seen by Dr. Willard day before yesterday on 01/06/2020, was started on doxycycline oral and had blood work done and he was informed today that his kidney function has declined markedly and he needs to come to the ER today.   In the ER patient WBC count was 15.4 H&H 13.5/42.9, platelet 341 CMP showed sodium 135 potassium 4.1 BUN 31, creatinine 1.2 with GFR greater than 60.  I did compared his levels from 01/06/2020 which  showed BUN and creatinine of 43/2.35 with GFR of 27  Discussed with patient that his BUN creatinine has improved and the GFR is normal today but the concern for MRSA bacteremia still there and we would wait till his blood cultures comes back, we will consult Dr. Hurst and if needed will consult surgery for I&D  Patient specifically was concerned about his pain medication from home reporting he takes his medicine a certain way.  Discussed with patient that we will continue his medication as prescribed by his physician/home medications.    I did reviewed patient passed admission with this.  Noted he was admitted 06/03  at [facility] with MRSA sepsis right upper extremity and small abscess left upper extremity with bacteremia.  Repeat blood cultures after 24-48 hours were negative.  Patient was discharged on 06/07/2019 on 2 weeks of IV daptomycin    Past Medical History:   Diagnosis Date    Arthritis     Asbestos exposure     SOB     Back pain     Basal cell carcinoma     Bulging discs     lumbar    Hypertension     Kidney stone     Prostatitis     Sepsis due to methicillin resistant Staphylococcus aureus (MRSA) 06/2019    Due to large right upper arm abscess    Wears glasses        Past Surgical History:   Procedure Laterality Date    APPENDECTOMY      HAND SURGERY      rt hand    INCISION AND DRAINAGE Bilateral 06/2019    Right upper arm and left upper arm abscesses    JOINT REPLACEMENT      right knee    KNEE LIGAMENT RECONSTRUCTION      left knee    LITHOTRIPSY      TONSILLECTOMY, ADENOIDECTOMY, BILATERAL MYRINGOTOMY AND TUBES      TOTAL KNEE ARTHROPLASTY  1971    rt knee    URETERAL STENT PLACEMENT         Review of patient's allergies indicates:   Allergen Reactions    Morphine Nausea Only     Ok with nausea med.       No current facility-administered medications on file prior to encounter.      Current Outpatient Medications on File Prior to Encounter   Medication Sig    aspirin 325 MG  tablet Take 325 mg by mouth once daily.    diclofenac sodium 1 % Gel Apply 2 g topically 2 (two) times daily.    doxycycline (VIBRAMYCIN) 100 MG Cap Take 100 mg by mouth every 12 (twelve) hours.     esomeprazole (NEXIUM) 40 MG capsule Take 40 mg by mouth every evening.     gabapentin (NEURONTIN) 300 MG capsule Take 600 mg by mouth every evening.     hydrALAZINE (APRESOLINE) 50 MG tablet Take 50 mg by mouth 2 (two) times daily.    Lactobacillus rhamnosus GG (CULTURELLE) 10 billion cell capsule Take 1 capsule by mouth once daily.    metoprolol succinate (TOPROL-XL) 100 MG 24 hr tablet Take 50 mg by mouth every evening.     multivitamin (THERAGRAN) tablet Take 1 tablet by mouth once daily.    mupirocin (BACTROBAN) 2 % ointment Apply 1 g topically 2 (two) times daily. Pt applying to would and putting in nares    olmesartan-hydrochlorothiazide (BENICAR HCT) 40-25 mg per tablet Take 1 tablet by mouth every evening.     oxyCODONE (ROXICODONE) 15 MG Tab Take 15 mg by mouth 4 (four) times daily as needed for Pain.     QUEtiapine (SEROQUEL) 50 MG tablet TAKE 1 TABLET BY MOUTH AT BEDTIME (Patient taking differently: Take 50 mg by mouth nightly. )    tramadol (ULTRAM) 50 mg tablet Take 50 mg by mouth every 8 (eight) hours as needed for Pain.     venlafaxine (EFFEXOR-XR) 150 MG Cp24 TAKE 1 CAPSULE BY MOUTH EVERY DAY (Patient taking differently: Take 150 mg by mouth every evening. )    testosterone cypionate (DEPOTESTOTERONE CYPIONATE) 200 mg/mL injection Inject 200 mg into the muscle every 28 days.     [DISCONTINUED] FLUZONE HIGH-DOSE 2019-20, PF, 180 mcg/0.5 mL Syrg ADM 0.5ML IM UTD    [DISCONTINUED] oxyCODONE-acetaminophen (PERCOCET)  mg per tablet TK 1 T PO QID PRN P    [DISCONTINUED] pantoprazole (PROTONIX) 40 MG tablet TAKE 1 TABLET BY MOUTH EVERY DAY    [DISCONTINUED] testosterone cypionate (DEPOTESTOTERONE CYPIONATE) 200 mg/mL injection Inject 1 mL (200 mg total) into the muscle every 28 days. for  6 days     Family History     Problem Relation (Age of Onset)    Cancer Mother    Heart disease Father    Stroke Father        Tobacco Use    Smoking status: Never Smoker    Smokeless tobacco: Never Used   Substance and Sexual Activity    Alcohol use: No    Drug use: No    Sexual activity: Not on file     Review of Systems   Constitutional: Negative.    HENT: Negative.    Eyes: Negative.    Respiratory: Negative.    Cardiovascular: Negative.    Gastrointestinal: Negative.    Genitourinary: Negative.    Musculoskeletal: Positive for arthralgias, back pain, joint swelling and neck pain.        Joint deformity   Skin: Positive for wound (Right upper back).   Neurological: Negative.    Psychiatric/Behavioral:        History of PTSD     Objective:     Vital Signs (Most Recent):  Temp: 98.7 °F (37.1 °C) (01/08/20 1216)  Pulse: (!) 122 (01/08/20 1216)  Resp: 20 (01/08/20 1216)  BP: (!) 125/4 (01/08/20 1216)  SpO2: 96 % (01/08/20 1216) Vital Signs (24h Range):  Temp:  [98.7 °F (37.1 °C)] 98.7 °F (37.1 °C)  Pulse:  [122] 122  Resp:  [20] 20  SpO2:  [96 %] 96 %  BP: (125)/(4) 125/4     Weight: 104.3 kg (230 lb)  Body mass index is 37.12 kg/m².    Physical Exam   Constitutional: He is oriented to person, place, and time. He appears well-developed and well-nourished. No distress.   Obese   HENT:   Head: Normocephalic and atraumatic.   Mouth/Throat: Oropharynx is clear and moist.   Eyes: Pupils are equal, round, and reactive to light. EOM are normal. No scleral icterus.   Neck: Normal range of motion. Neck supple.   Cardiovascular: Regular rhythm and intact distal pulses. Exam reveals no gallop and no friction rub.   Murmur (2/6 systolic murmur) heard.  Tachycardia   Pulmonary/Chest: Effort normal and breath sounds normal. No respiratory distress. He has no wheezes. He has no rales.   Examined anteriorly as patient uncomfortable with sitting up due to his generalized chronic pain   Abdominal: Soft. Bowel sounds are normal.    Obese   Musculoskeletal: Normal range of motion. He exhibits deformity ( deformities in the small joints of hands). He exhibits no edema or tenderness.   Neurological: He is alert and oriented to person, place, and time.   Skin: Skin is warm. No rash noted. He is not diaphoretic. No erythema.   Unable to examine the wound on the right upper shoulder as it has been dressed after wound cultures collected by the ER physician   Psychiatric: He has a normal mood and affect. His behavior is normal.   Nursing note and vitals reviewed.        CRANIAL NERVES     CN III, IV, VI   Pupils are equal, round, and reactive to light.  Extraocular motions are normal.        Significant Labs:   Blood Culture: No results for input(s): LABBLOO in the last 48 hours.  CBC:   Recent Labs   Lab 01/06/20  1509 01/08/20  1240   WBC 16.5* 15.47*   HGB 13.0* 13.5*   HCT 40.0 42.9    341     CMP:   Recent Labs   Lab 01/06/20  1509 01/08/20  1240   * 135*   K 3.6 4.1   CL 95* 95   CO2 27 28    112*   BUN 43* 31*   CREATININE 2.35* 1.2   CALCIUM 9.4 10.2   PROT 7.8 8.7*   ALBUMIN 3.8 3.6   BILITOT 0.5 0.7   ALKPHOS 58 58   AST 21 36   ALT 20 32   ANIONGAP  --  12   EGFRNONAA 27* >60.0     Cardiac Markers: No results for input(s): CKMB, MYOGLOBIN, BNP, TROPISTAT in the last 48 hours.  Lactic Acid: No results for input(s): LACTATE in the last 48 hours.  Lipase: No results for input(s): LIPASE in the last 48 hours.  Lipid Panel: No results for input(s): CHOL, HDL, LDLCALC, TRIG, CHOLHDL in the last 48 hours.  Magnesium: No results for input(s): MG in the last 48 hours.  Troponin: No results for input(s): TROPONINI in the last 48 hours.  TSH: No results for input(s): TSH in the last 4320 hours.  Urine Studies:   Recent Labs   Lab 01/08/20  1303   COLORU Yellow   APPEARANCEUA Clear   PHUR 6.0   SPECGRAV 1.020   PROTEINUA 1+*   GLUCUA Negative   KETONESU Negative   BILIRUBINUA Negative   OCCULTUA 1+*   NITRITE Negative    UROBILINOGEN Negative   LEUKOCYTESUR Negative   RBCUA 7*   WBCUA 3   BACTERIA Negative   SQUAMEPITHEL 4   HYALINECASTS 23*       Significant Imaging: I have reviewed all pertinent imaging results/findings within the past 24 hours.     X-Ray Chest AP Portable   Final Result      No acute cardiopulmonary abnormality.         Electronically signed by: Elizabeth Lynch MD   Date:    01/08/2020   Time:    12:49       X-ray chest personally reviewed      Assessment/Plan:     * MRSA infection, with history of MRSA infections and bactermeia in the past  Admit to [facility] with tele monitoring  History of MRSA bacteremia  in 06/2019  Currently having abscess with pus drainage on the right upper back  Vancomycin plus Zosyn for now  Blood cultures collected will follow and deescalate antibiotic per sensitivities  Wound culture collected in the ER  Id Dr. Hurst consulted  Will continue normal saline at 75 cc/hour.  Patient does not look septic but highly suspected of bacteremia  If needed will consult surgery for I and D  Contact precautions  I ordered an echocardiogram as I heard a murmur        Hypertension  Reports has been taking metoprolol only and is not taking his almost heart and HCTZ and hydralazine as his blood pressure has been running low for the last 5 days  Metoprolol continued  Hydralazine 50 mg b.i.d. restarted as blood pressure 157/92  Will hold off on losartan HCTZ as 2 days ago patient had a KI      MRSA bacteremia  Highly suspected of MRSA bacteremia  Blood cultures collected  Noted patient was treated for 2 more weeks with IV daptomycin upon discharge on 06/07/2019      Chronic narcotic use  I spoke to Dr. Hugo Cross to verify the dose of his roxycodone.  Informed that his oxycodone as prescribed as 15 mg Q i.d.  Patient reports on last visit Dr. pierre recommended taking 1.5 tablets t.i.d. and also provided him with a pill cutter  Informed patient that I will continue the medicine as prescribed  and is written on the bottle.  Patient verbalized understanding  Dr. mujica eager is nurse Tay will call me back after speaking to Dr. iyer later today or tomorrow morning  Will also continue his tramadol as prescribed      VTE Risk Mitigation (From admission, onward)         Ordered     enoxaparin injection 40 mg  Daily      01/08/20 9088                   Boo Martinez MD  Department of Hospital Medicine   [facility]

## 2020-01-08 NOTE — PROGRESS NOTES
Pharmacokinetic Initial Assessment: IV Vancomycin    Assessment/Plan:    Initiate intravenous vancomycin with loading dose of 2000 mg once followed by a maintenance dose of vancomycin 1750 mg IV every 18 hours  Desired empiric serum trough concentration is 15 to 20 mcg/mL  Draw vancomycin trough level 30 min prior to fourth dose on 1/10 at approximately 22:00   Pharmacy will continue to follow and monitor vancomycin.      Please contact pharmacy at extension [phone] with any questions regarding this assessment.     Thank you for the consult,   Anya Acevedo       Patient brief summary:  Orlando Treadwell is a 69 y.o. male initiated on antimicrobial therapy with IV Vancomycin for treatment of suspected MRSA bacteremia, abscess    Drug Allergies:   Review of patient's allergies indicates:   Allergen Reactions    Morphine Nausea Only     Ok with nausea med.       Actual Body Weight:   104.3 kg    Renal Function:   Estimated Creatinine Clearance: 64.8 mL/min (based on SCr of 1.2 mg/dL).,     Dialysis Method (if applicable):  N/A    CBC (last 72 hours):  Recent Labs   Lab Result Units 01/06/20  1509 01/08/20  1240   WBC K/uL 16.5* 15.47*   Hemoglobin g/dL 13.0* 13.5*   Hematocrit % 40.0 42.9   Platelets K/uL 281 341   Gran% %  --  79.2*   Lymph% % 11.3 7.6*   Mono% % 10.0 11.0   Eosinophil% % 0.7 0.8   Basophil% % 0.3 0.4   Differential Method   --  Automated       Metabolic Panel (last 72 hours):  Recent Labs   Lab Result Units 01/06/20  1509 01/08/20  1240 01/08/20  1303   Sodium mmol/L 133* 135*  --    Potassium mmol/L 3.6 4.1  --    Chloride mmol/L 95* 95  --    CO2 mmol/L 27 28  --    Glucose mg/dL 108 112*  --    Glucose, UA   --   --  Negative   BUN, Bld mg/dL 43* 31*  --    Creatinine mg/dL 2.35* 1.2  --    Albumin g/dL 3.8 3.6  --    Total Bilirubin mg/dL 0.5 0.7  --    Alkaline Phosphatase U/L 58 58  --    AST U/L 21 36  --    ALT U/L 20 32  --        Drug levels (last 3 results):  No results for input(s): VANCOMYCINRA,  VANCOMYCINPE, VANCOMYCINTR in the last 72 hours.    Microbiologic Results:  Microbiology Results (last 7 days)       Procedure Component Value Units Date/Time    Aerobic culture [[id number]] Collected:  01/08/20 1356    Order Status:  Sent Specimen:  Abscess from Back Updated:  01/08/20 1411    Blood culture x two cultures. Draw prior to antibiotics. [[id number]] Collected:  01/08/20 1246    Order Status:  Sent Specimen:  Blood from Peripheral, Antecubital, Left Updated:  01/08/20 1320    Blood culture x two cultures. Draw prior to antibiotics. [[id number]] Collected:  01/08/20 1240    Order Status:  Sent Specimen:  Blood from Peripheral, Antecubital, Right Updated:  01/08/20 1319

## 2020-01-08 NOTE — ED PROVIDER NOTES
Encounter Date: 1/8/2020       History     Chief Complaint   Patient presents with    Abnormal Lab     states sent by dr willard told has abnormal lab results, feeling weak all over for days not eating, states being treated for MRSA of the back and states now in blood     69-year-old male presented emergency department sent from Dr. Willard office for admission.  Patient has a upper back abscess which is spontaneously draining.  Patient also has low-grade fever.  Patient had previous history of MRSA abscess.  Patient was sent here for admission and further management.        Review of patient's allergies indicates:   Allergen Reactions    Morphine Nausea Only     Ok with nausea med.     Past Medical History:   Diagnosis Date    Arthritis     Asbestos exposure     SOB     Back pain     Basal cell carcinoma     Bulging discs     lumbar    Hypertension     Kidney stone     Prostatitis     Sepsis due to methicillin resistant Staphylococcus aureus (MRSA) 06/2019    Due to large right upper arm abscess    Wears glasses      Past Surgical History:   Procedure Laterality Date    APPENDECTOMY      HAND SURGERY      rt hand    INCISION AND DRAINAGE Bilateral 06/2019    Right upper arm and left upper arm abscesses    JOINT REPLACEMENT      right knee    KNEE LIGAMENT RECONSTRUCTION      left knee    LITHOTRIPSY      TONSILLECTOMY, ADENOIDECTOMY, BILATERAL MYRINGOTOMY AND TUBES      TOTAL KNEE ARTHROPLASTY  1971    rt knee    URETERAL STENT PLACEMENT       Family History   Problem Relation Age of Onset    Cancer Mother     Stroke Father     Collagen disease Neg Hx     Melanoma Neg Hx     Psoriasis Neg Hx     Lupus Neg Hx     Eczema Neg Hx      Social History     Tobacco Use    Smoking status: Never Smoker    Smokeless tobacco: Never Used   Substance Use Topics    Alcohol use: No    Drug use: No     Review of Systems   Constitutional: Positive for fatigue and fever.   HENT: Negative.    Eyes:  Negative.    Respiratory: Negative.    Cardiovascular: Negative.    Gastrointestinal: Negative.    Endocrine: Negative.    Genitourinary: Negative.    Skin: Positive for wound.        Upper back abscess which is spontaneously draining   Allergic/Immunologic: Negative.    Neurological: Negative.    Hematological: Negative.    Psychiatric/Behavioral: Negative.    All other systems reviewed and are negative.      Physical Exam     Initial Vitals [01/08/20 1216]   BP Pulse Resp Temp SpO2   (!) 125/4 (!) 122 20 98.7 °F (37.1 °C) 96 %      MAP       --         Physical Exam    Nursing note and vitals reviewed.  Constitutional: He appears well-developed and well-nourished.   HENT:   Head: Normocephalic and atraumatic.   Nose: Nose normal.   Eyes: Conjunctivae and EOM are normal.   Neck: Normal range of motion. Neck supple. No tracheal deviation present.   Cardiovascular: Normal rate, regular rhythm, normal heart sounds and intact distal pulses. Exam reveals no friction rub.    No murmur heard.  Pulmonary/Chest: Breath sounds normal. No respiratory distress. He has no wheezes. He has no rales.   Abdominal: Soft. He exhibits no distension. There is no tenderness.   Musculoskeletal: Normal range of motion.   Neurological: He is alert and oriented to person, place, and time. He has normal strength. No cranial nerve deficit or sensory deficit. GCS score is 15. GCS eye subscore is 4. GCS verbal subscore is 5. GCS motor subscore is 6.   Skin: Skin is warm. Capillary refill takes less than 2 seconds. Abscess noted. There is erythema.   Tenderness of the upper back with a spontaneously draining abscess and every time there is pressure applied pus is draining out and is open with some redness in the surrounding tissue   Psychiatric: He has a normal mood and affect. Thought content normal.         ED Course   Procedures  Labs Reviewed   CBC W/ AUTO DIFFERENTIAL - Abnormal; Notable for the following components:       Result Value     WBC 15.47 (*)     Hemoglobin 13.5 (*)     Mean Corpuscular Hemoglobin 26.9 (*)     Mean Corpuscular Hemoglobin Conc 31.5 (*)     RDW 15.7 (*)     Immature Granulocytes 1.0 (*)     Gran # (ANC) 12.3 (*)     Immature Grans (Abs) 0.15 (*)     Mono # 1.7 (*)     Gran% 79.2 (*)     Lymph% 7.6 (*)     All other components within normal limits   CULTURE, BLOOD   CULTURE, BLOOD   CULTURE, AEROBIC  (SPECIFY SOURCE)   COMPREHENSIVE METABOLIC PANEL   URINALYSIS, REFLEX TO URINE CULTURE   ISTAT LACTATE   POCT LACTATE        ECG Results          EKG 12-lead (In process)  Result time 01/08/20 12:32:05    In process by Interface, Lab In [facility] (01/08/20 12:32:05)                 Narrative:    Test Reason : R53.1,    Vent. Rate : 108 BPM     Atrial Rate : 108 BPM     P-R Int : 158 ms          QRS Dur : 112 ms      QT Int : 346 ms       P-R-T Axes : 043 019 014 degrees     QTc Int : 463 ms    Sinus tachycardia  Inferior infarct ,age undetermined  Abnormal ECG  When compared with ECG of 16-SEP-2014 14:38,  No significant change was found    Referred By:             Confirmed By:                             Imaging Results          X-Ray Chest AP Portable (Final result)  Result time 01/08/20 12:49:22    Final result by Elizabeth Lynch MD (01/08/20 12:49:22)                 Impression:      No acute cardiopulmonary abnormality.      Electronically signed by: Elizabeth Lynch MD  Date:    01/08/2020  Time:    12:49             Narrative:    EXAMINATION:  XR CHEST AP PORTABLE    CLINICAL HISTORY:  Sepsis;    FINDINGS:  Portable chest at 12:35 is compared to 06/03/2019 shows normal cardiomediastinal silhouette.    Lungs are clear. Pulmonary vasculature is normal. No acute osseous abnormality.                                 Medical Decision Making:   Differential Diagnosis:   69-year-old male presented emergency department sent for IV antibiotics.  Patient likely has bacteremia and a spontaneously draining abscess of upper back.   Broad-spectrum antibiotics started.  IV fluids given.  Pain control.  Hospital Medicine consult for evaluation for admission and further management.  Clinical Tests:   Lab Tests: Reviewed                                 Clinical Impression:       ICD-10-CM ICD-9-CM   1. Abscess L02.91 682.9   2. Weakness R53.1 780.79   3. Bacteremia R78.81 790.7                             Annika Saunders MD  01/08/20 0649

## 2020-01-08 NOTE — ASSESSMENT & PLAN NOTE
Highly suspected of MRSA bacteremia  Blood cultures collected  Noted patient was treated for 2 more weeks with IV daptomycin upon discharge on 06/07/2019

## 2020-01-09 ENCOUNTER — CLINICAL SUPPORT (OUTPATIENT)
Dept: CARDIOLOGY | Facility: HOSPITAL | Age: 70
DRG: 603 | End: 2020-01-09
Attending: INTERNAL MEDICINE
Payer: MEDICARE

## 2020-01-09 PROBLEM — L02.91 ABSCESS: Status: ACTIVE | Noted: 2020-01-09

## 2020-01-09 LAB
ALBUMIN SERPL BCP-MCNC: 2.9 G/DL (ref 3.5–5.2)
ALP SERPL-CCNC: 43 U/L (ref 55–135)
ALT SERPL W/O P-5'-P-CCNC: 27 U/L (ref 10–44)
ANION GAP SERPL CALC-SCNC: 10 MMOL/L (ref 8–16)
AST SERPL-CCNC: 25 U/L (ref 10–40)
BASOPHILS # BLD AUTO: 0.05 K/UL (ref 0–0.2)
BASOPHILS NFR BLD: 0.4 % (ref 0–1.9)
BILIRUB SERPL-MCNC: 0.9 MG/DL (ref 0.1–1)
BUN SERPL-MCNC: 24 MG/DL (ref 8–23)
CALCIUM SERPL-MCNC: 8.9 MG/DL (ref 8.7–10.5)
CHLORIDE SERPL-SCNC: 97 MMOL/L (ref 95–110)
CO2 SERPL-SCNC: 27 MMOL/L (ref 23–29)
CREAT SERPL-MCNC: 1.1 MG/DL (ref 0.5–1.4)
DIFFERENTIAL METHOD: ABNORMAL
EOSINOPHIL # BLD AUTO: 0.2 K/UL (ref 0–0.5)
EOSINOPHIL NFR BLD: 1.4 % (ref 0–8)
ERYTHROCYTE [DISTWIDTH] IN BLOOD BY AUTOMATED COUNT: 15.8 % (ref 11.5–14.5)
EST. GFR  (AFRICAN AMERICAN): >60 ML/MIN/1.73 M^2
EST. GFR  (NON AFRICAN AMERICAN): >60 ML/MIN/1.73 M^2
ESTIMATED AVG GLUCOSE: 134 MG/DL (ref 68–131)
GLUCOSE SERPL-MCNC: 104 MG/DL (ref 70–110)
HBA1C MFR BLD HPLC: 6.3 % (ref 4.5–6.2)
HCT VFR BLD AUTO: 36.4 % (ref 40–54)
HGB BLD-MCNC: 11.6 G/DL (ref 14–18)
IMM GRANULOCYTES # BLD AUTO: 0.11 K/UL (ref 0–0.04)
IMM GRANULOCYTES NFR BLD AUTO: 0.8 % (ref 0–0.5)
LYMPHOCYTES # BLD AUTO: 1.5 K/UL (ref 1–4.8)
LYMPHOCYTES NFR BLD: 11.8 % (ref 18–48)
MAGNESIUM SERPL-MCNC: 1.8 MG/DL (ref 1.6–2.6)
MCH RBC QN AUTO: 27.1 PG (ref 27–31)
MCHC RBC AUTO-ENTMCNC: 31.9 G/DL (ref 32–36)
MCV RBC AUTO: 85 FL (ref 82–98)
MONOCYTES # BLD AUTO: 1.5 K/UL (ref 0.3–1)
MONOCYTES NFR BLD: 11.6 % (ref 4–15)
NEUTROPHILS # BLD AUTO: 9.7 K/UL (ref 1.8–7.7)
NEUTROPHILS NFR BLD: 74 % (ref 38–73)
NRBC BLD-RTO: 0 /100 WBC
PHOSPHATE SERPL-MCNC: 4.1 MG/DL (ref 2.7–4.5)
PLATELET # BLD AUTO: 278 K/UL (ref 150–350)
PMV BLD AUTO: 10.7 FL (ref 9.2–12.9)
POTASSIUM SERPL-SCNC: 3.5 MMOL/L (ref 3.5–5.1)
PROT SERPL-MCNC: 7.1 G/DL (ref 6–8.4)
RBC # BLD AUTO: 4.28 M/UL (ref 4.6–6.2)
SODIUM SERPL-SCNC: 134 MMOL/L (ref 136–145)
WBC # BLD AUTO: 13.05 K/UL (ref 3.9–12.7)

## 2020-01-09 PROCEDURE — 25000003 PHARM REV CODE 250: Performed by: INTERNAL MEDICINE

## 2020-01-09 PROCEDURE — 85025 COMPLETE CBC W/AUTO DIFF WBC: CPT

## 2020-01-09 PROCEDURE — G0378 HOSPITAL OBSERVATION PER HR: HCPCS

## 2020-01-09 PROCEDURE — 83735 ASSAY OF MAGNESIUM: CPT

## 2020-01-09 PROCEDURE — 12000002 HC ACUTE/MED SURGE SEMI-PRIVATE ROOM

## 2020-01-09 PROCEDURE — 93306 TTE W/DOPPLER COMPLETE: CPT

## 2020-01-09 PROCEDURE — 83036 HEMOGLOBIN GLYCOSYLATED A1C: CPT

## 2020-01-09 PROCEDURE — 80053 COMPREHEN METABOLIC PANEL: CPT

## 2020-01-09 PROCEDURE — 63600175 PHARM REV CODE 636 W HCPCS: Performed by: INTERNAL MEDICINE

## 2020-01-09 PROCEDURE — 36415 COLL VENOUS BLD VENIPUNCTURE: CPT

## 2020-01-09 PROCEDURE — 84100 ASSAY OF PHOSPHORUS: CPT

## 2020-01-09 RX ORDER — MUPIROCIN 20 MG/G
OINTMENT TOPICAL 2 TIMES DAILY
Status: COMPLETED | OUTPATIENT
Start: 2020-01-09 | End: 2020-01-13

## 2020-01-09 RX ORDER — DOXYCYCLINE 100 MG/1
100 CAPSULE ORAL EVERY 12 HOURS
Status: DISCONTINUED | OUTPATIENT
Start: 2020-01-09 | End: 2020-01-14 | Stop reason: HOSPADM

## 2020-01-09 RX ORDER — GABAPENTIN 100 MG/1
100 CAPSULE ORAL 2 TIMES DAILY
Status: DISCONTINUED | OUTPATIENT
Start: 2020-01-09 | End: 2020-01-14 | Stop reason: HOSPADM

## 2020-01-09 RX ADMIN — OXYCODONE HYDROCHLORIDE 15 MG: 5 TABLET ORAL at 12:01

## 2020-01-09 RX ADMIN — MUPIROCIN 1 G: 20 OINTMENT TOPICAL at 09:01

## 2020-01-09 RX ADMIN — DOXYCYCLINE HYCLATE 100 MG: 100 CAPSULE ORAL at 09:01

## 2020-01-09 RX ADMIN — LACTOBACILLUS TAB 1 TABLET: TAB at 12:01

## 2020-01-09 RX ADMIN — OXYCODONE HYDROCHLORIDE 15 MG: 5 TABLET ORAL at 07:01

## 2020-01-09 RX ADMIN — GABAPENTIN 100 MG: 100 CAPSULE ORAL at 03:01

## 2020-01-09 RX ADMIN — OXYCODONE HYDROCHLORIDE 15 MG: 5 TABLET ORAL at 06:01

## 2020-01-09 RX ADMIN — PIPERACILLIN AND TAZOBACTAM 4.5 G: 4; .5 INJECTION, POWDER, LYOPHILIZED, FOR SOLUTION INTRAVENOUS; PARENTERAL at 05:01

## 2020-01-09 RX ADMIN — HYDRALAZINE HYDROCHLORIDE 50 MG: 25 TABLET, FILM COATED ORAL at 09:01

## 2020-01-09 RX ADMIN — PANTOPRAZOLE SODIUM 40 MG: 40 TABLET, DELAYED RELEASE ORAL at 05:01

## 2020-01-09 RX ADMIN — GABAPENTIN 600 MG: 300 CAPSULE ORAL at 09:01

## 2020-01-09 RX ADMIN — GABAPENTIN 100 MG: 100 CAPSULE ORAL at 08:01

## 2020-01-09 RX ADMIN — VENLAFAXINE HYDROCHLORIDE 150 MG: 150 CAPSULE, EXTENDED RELEASE ORAL at 09:01

## 2020-01-09 RX ADMIN — MUPIROCIN: 20 OINTMENT TOPICAL at 10:01

## 2020-01-09 RX ADMIN — METOPROLOL SUCCINATE 50 MG: 50 TABLET, FILM COATED, EXTENDED RELEASE ORAL at 09:01

## 2020-01-09 RX ADMIN — MUPIROCIN: 20 OINTMENT TOPICAL at 09:01

## 2020-01-09 RX ADMIN — OXYCODONE HYDROCHLORIDE 15 MG: 5 TABLET ORAL at 01:01

## 2020-01-09 RX ADMIN — VANCOMYCIN HYDROCHLORIDE 1750 MG: 1 INJECTION, POWDER, LYOPHILIZED, FOR SOLUTION INTRAVENOUS at 11:01

## 2020-01-09 RX ADMIN — LACTOBACILLUS TAB 1 TABLET: TAB at 07:01

## 2020-01-09 RX ADMIN — ASPIRIN 325 MG ORAL TABLET 325 MG: 325 PILL ORAL at 09:01

## 2020-01-09 RX ADMIN — QUETIAPINE 50 MG: 25 TABLET ORAL at 09:01

## 2020-01-09 RX ADMIN — LACTOBACILLUS TAB 1 TABLET: TAB at 05:01

## 2020-01-09 NOTE — ASSESSMENT & PLAN NOTE
Currently having abscess with pus drainage on the right upper back and he has a hx of MRSA bacteremia in 06/2019 - culture of wound growing Staph Aureus - likely mRsa  Continue iv antibiotics with vancomycin and PO doxycycline per ID recs  Gen surg consulted for I &D  Contact precautions

## 2020-01-09 NOTE — CONSULTS
[facility]  General Surgery  Consult Note    Patient Name: Orlando Treadwell  MRN: [id number]  Code Status: DNR  Admission Date: 1/8/2020  Hospital Length of Stay: 1 days  Attending Physician: Mia Nuñez MD  Primary Care Provider: Kleber Worthy III, MD    Patient information was obtained from patient and ER records.     Consults  Subjective:     Principal Problem: MRSA infection    History of Present Illness: Patient is a 69-year-old male with a history of MRSA infection requiring hospitalization in June 2019.  He underwent incision and drainage of right posterior shoulder by Dr. Beck.  Chart review shows he is had persistent pain in this area and last saw the orthopedic surgeon in November 2019.  MRI was done at that time.    His wife was just recently hospitalized for a skin infection that required incision and drainage and was confirmed MRSA.  He reports a 5 day history of worsening right posterior shoulder pain.  He saw his primary care provider 3 days ago and was given doxycycline and had blood work done.  Because of poor renal function he was told to go to the emergency room and was then admitted yesterday.  He has been on IV antibiotics.  He denies any drainage from the area. He denies fever or chills. WBC is trending down.    No current facility-administered medications on file prior to encounter.      Current Outpatient Medications on File Prior to Encounter   Medication Sig    aspirin 325 MG tablet Take 325 mg by mouth once daily.    diclofenac sodium 1 % Gel Apply 2 g topically 2 (two) times daily.    doxycycline (VIBRAMYCIN) 100 MG Cap Take 100 mg by mouth every 12 (twelve) hours.     esomeprazole (NEXIUM) 40 MG capsule Take 40 mg by mouth every evening.     gabapentin (NEURONTIN) 300 MG capsule Take 600 mg by mouth every evening.     hydrALAZINE (APRESOLINE) 50 MG tablet Take 50 mg by mouth 2 (two) times daily.    Lactobacillus rhamnosus GG (CULTURELLE) 10 billion cell  capsule Take 1 capsule by mouth once daily.    metoprolol succinate (TOPROL-XL) 100 MG 24 hr tablet Take 50 mg by mouth every evening.     multivitamin (THERAGRAN) tablet Take 1 tablet by mouth once daily.    mupirocin (BACTROBAN) 2 % ointment Apply 1 g topically 2 (two) times daily. Pt applying to would and putting in nares    olmesartan-hydrochlorothiazide (BENICAR HCT) 40-25 mg per tablet Take 1 tablet by mouth every evening.     oxyCODONE (ROXICODONE) 15 MG Tab Take 15 mg by mouth 4 (four) times daily as needed for Pain.     QUEtiapine (SEROQUEL) 50 MG tablet TAKE 1 TABLET BY MOUTH AT BEDTIME (Patient taking differently: Take 50 mg by mouth nightly. )    tramadol (ULTRAM) 50 mg tablet Take 50 mg by mouth every 8 (eight) hours as needed for Pain.     venlafaxine (EFFEXOR-XR) 150 MG Cp24 TAKE 1 CAPSULE BY MOUTH EVERY DAY (Patient taking differently: Take 150 mg by mouth every evening. )    testosterone cypionate (DEPOTESTOTERONE CYPIONATE) 200 mg/mL injection Inject 200 mg into the muscle every 28 days.        Review of patient's allergies indicates:   Allergen Reactions    Morphine Nausea Only     Ok with nausea med.       Past Medical History:   Diagnosis Date    Arthritis     Asbestos exposure     SOB     Back pain     Basal cell carcinoma     Bulging discs     lumbar    Hypertension     Kidney stone     Prostatitis     Sepsis due to methicillin resistant Staphylococcus aureus (MRSA) 06/2019    Due to large right upper arm abscess    Wears glasses      Past Surgical History:   Procedure Laterality Date    APPENDECTOMY      HAND SURGERY      rt hand    INCISION AND DRAINAGE Bilateral 06/2019    Right upper arm and left upper arm abscesses    JOINT REPLACEMENT      right knee    KNEE LIGAMENT RECONSTRUCTION      left knee    LITHOTRIPSY      TONSILLECTOMY, ADENOIDECTOMY, BILATERAL MYRINGOTOMY AND TUBES      TOTAL KNEE ARTHROPLASTY  1971    rt knee    URETERAL STENT PLACEMENT        Family History     Problem Relation (Age of Onset)    Cancer Mother    Heart disease Father    Stroke Father        Tobacco Use    Smoking status: Never Smoker    Smokeless tobacco: Never Used   Substance and Sexual Activity    Alcohol use: No    Drug use: No    Sexual activity: Not on file     Review of Systems   Constitutional: Negative for appetite change, chills, fever and unexpected weight change.   HENT: Negative for hearing loss, rhinorrhea, sore throat and voice change.    Eyes: Negative for photophobia and visual disturbance.   Respiratory: Negative for cough, choking and shortness of breath.    Cardiovascular: Negative for chest pain, palpitations and leg swelling.   Gastrointestinal: Negative for abdominal pain, blood in stool, constipation, diarrhea, nausea and vomiting.   Endocrine: Negative for cold intolerance, heat intolerance, polydipsia and polyuria.   Musculoskeletal: Positive for back pain. Negative for arthralgias, joint swelling and neck stiffness.   Skin: Positive for color change. Negative for pallor and rash.   Neurological: Negative for dizziness, seizures, syncope and headaches.   Hematological: Negative for adenopathy. Does not bruise/bleed easily.   Psychiatric/Behavioral: Negative for agitation, behavioral problems and confusion.     Objective:     Vital Signs (Most Recent):  Temp: 98.7 °F (37.1 °C) (01/09/20 1100)  Pulse: 88 (01/09/20 1100)  Resp: 19 (01/09/20 1100)  BP: 119/65 (01/09/20 1100)  SpO2: 96 % (01/09/20 1100) Vital Signs (24h Range):  Temp:  [97.7 °F (36.5 °C)-99.5 °F (37.5 °C)] 98.7 °F (37.1 °C)  Pulse:  [] 88  Resp:  [17-29] 19  SpO2:  [94 %-98 %] 96 %  BP: (108-177)/(58-92) 119/65     Weight: 104.3 kg (230 lb)  Body mass index is 37.12 kg/m².    Physical Exam   Constitutional: He appears well-developed and well-nourished.  Non-toxic appearance. No distress.   HENT:   Head: Normocephalic and atraumatic. Head is without abrasion and without laceration.    Right Ear: External ear normal.   Left Ear: External ear normal.   Nose: Nose normal.   Mouth/Throat: Oropharynx is clear and moist.   Eyes: Pupils are equal, round, and reactive to light. EOM are normal.   Neck: Trachea normal. No tracheal deviation and normal range of motion present. No thyroid mass and no thyromegaly present.   Cardiovascular: Normal rate and regular rhythm.   Pulmonary/Chest: Effort normal. No accessory muscle usage. No tachypnea. No respiratory distress.   Abdominal: Soft. Normal appearance and bowel sounds are normal. He exhibits no distension and no mass. There is no hepatosplenomegaly. There is no tenderness. There is no tenderness at McBurney's point and negative Sepulveda's sign. No hernia.   Lymphadenopathy:     He has no cervical adenopathy.     He has no axillary adenopathy.        Right: No inguinal adenopathy present.        Left: No inguinal adenopathy present.   Neurological: He is alert. Coordination and gait normal.   Skin: Skin is warm and intact. There is erythema.        Broad area of induration with minimal overlying erythema, tender to palpation.  No fluctuance is noted.  No drainage is noted   Psychiatric: He has a normal mood and affect. His speech is normal and behavior is normal.       Significant Labs:  CBC:   Recent Labs   Lab 01/09/20  0619   WBC 13.05*   RBC 4.28*   HGB 11.6*   HCT 36.4*      MCV 85   MCH 27.1   MCHC 31.9*     BMP:   Recent Labs   Lab 01/09/20  0619      *   K 3.5   CL 97   CO2 27   BUN 24*   CREATININE 1.1   CALCIUM 8.9   MG 1.8         Assessment/Plan:     * MRSA infection, with history of MRSA infections and bactermeia in the past  Area on right posterior shoulder is indurated with no evidence of fluctuance.  Recommend IV antibiotics and warm compresses to see if the area will declare itself.    If it does, may need incision and drainage.  Otherwise, it may resolve with IV antibiotics alone.      VTE Risk Mitigation (From  admission, onward)         Ordered     IP VTE HIGH RISK PATIENT  Once      01/08/20 1913     enoxaparin injection 40 mg  Daily      01/08/20 1510                Thank you for your consult. I will follow-up with patient. Please contact us if you have any additional questions.    Whit Higgins MD  General Surgery  [facility]

## 2020-01-09 NOTE — HPI
Patient is a 69-year-old male with a history of MRSA infection requiring hospitalization in June 2019.  He underwent incision and drainage of right posterior shoulder by Dr. Beck.  Chart review shows he is had persistent pain in this area and last saw the orthopedic surgeon in November 2019.  MRI was done at that time.    His wife was just recently hospitalized for a skin infection that required incision and drainage and was confirmed MRSA.  He reports a 5 day history of worsening right posterior shoulder pain.  He saw his primary care provider 3 days ago and was given doxycycline and had blood work done.  Because of poor renal function he was told to go to the emergency room and was then admitted yesterday.  He has been on IV antibiotics.  He denies any drainage from the area. He denies fever or chills. WBC is trending down.

## 2020-01-09 NOTE — SUBJECTIVE & OBJECTIVE
No current facility-administered medications on file prior to encounter.      Current Outpatient Medications on File Prior to Encounter   Medication Sig    aspirin 325 MG tablet Take 325 mg by mouth once daily.    diclofenac sodium 1 % Gel Apply 2 g topically 2 (two) times daily.    doxycycline (VIBRAMYCIN) 100 MG Cap Take 100 mg by mouth every 12 (twelve) hours.     esomeprazole (NEXIUM) 40 MG capsule Take 40 mg by mouth every evening.     gabapentin (NEURONTIN) 300 MG capsule Take 600 mg by mouth every evening.     hydrALAZINE (APRESOLINE) 50 MG tablet Take 50 mg by mouth 2 (two) times daily.    Lactobacillus rhamnosus GG (CULTURELLE) 10 billion cell capsule Take 1 capsule by mouth once daily.    metoprolol succinate (TOPROL-XL) 100 MG 24 hr tablet Take 50 mg by mouth every evening.     multivitamin (THERAGRAN) tablet Take 1 tablet by mouth once daily.    mupirocin (BACTROBAN) 2 % ointment Apply 1 g topically 2 (two) times daily. Pt applying to would and putting in nares    olmesartan-hydrochlorothiazide (BENICAR HCT) 40-25 mg per tablet Take 1 tablet by mouth every evening.     oxyCODONE (ROXICODONE) 15 MG Tab Take 15 mg by mouth 4 (four) times daily as needed for Pain.     QUEtiapine (SEROQUEL) 50 MG tablet TAKE 1 TABLET BY MOUTH AT BEDTIME (Patient taking differently: Take 50 mg by mouth nightly. )    tramadol (ULTRAM) 50 mg tablet Take 50 mg by mouth every 8 (eight) hours as needed for Pain.     venlafaxine (EFFEXOR-XR) 150 MG Cp24 TAKE 1 CAPSULE BY MOUTH EVERY DAY (Patient taking differently: Take 150 mg by mouth every evening. )    testosterone cypionate (DEPOTESTOTERONE CYPIONATE) 200 mg/mL injection Inject 200 mg into the muscle every 28 days.        Review of patient's allergies indicates:   Allergen Reactions    Morphine Nausea Only     Ok with nausea med.       Past Medical History:   Diagnosis Date    Arthritis     Asbestos exposure     SOB     Back pain     Basal cell carcinoma      Bulging discs     lumbar    Hypertension     Kidney stone     Prostatitis     Sepsis due to methicillin resistant Staphylococcus aureus (MRSA) 06/2019    Due to large right upper arm abscess    Wears glasses      Past Surgical History:   Procedure Laterality Date    APPENDECTOMY      HAND SURGERY      rt hand    INCISION AND DRAINAGE Bilateral 06/2019    Right upper arm and left upper arm abscesses    JOINT REPLACEMENT      right knee    KNEE LIGAMENT RECONSTRUCTION      left knee    LITHOTRIPSY      TONSILLECTOMY, ADENOIDECTOMY, BILATERAL MYRINGOTOMY AND TUBES      TOTAL KNEE ARTHROPLASTY  1971    rt knee    URETERAL STENT PLACEMENT       Family History     Problem Relation (Age of Onset)    Cancer Mother    Heart disease Father    Stroke Father        Tobacco Use    Smoking status: Never Smoker    Smokeless tobacco: Never Used   Substance and Sexual Activity    Alcohol use: No    Drug use: No    Sexual activity: Not on file     Review of Systems   Constitutional: Negative for appetite change, chills, fever and unexpected weight change.   HENT: Negative for hearing loss, rhinorrhea, sore throat and voice change.    Eyes: Negative for photophobia and visual disturbance.   Respiratory: Negative for cough, choking and shortness of breath.    Cardiovascular: Negative for chest pain, palpitations and leg swelling.   Gastrointestinal: Negative for abdominal pain, blood in stool, constipation, diarrhea, nausea and vomiting.   Endocrine: Negative for cold intolerance, heat intolerance, polydipsia and polyuria.   Musculoskeletal: Positive for back pain. Negative for arthralgias, joint swelling and neck stiffness.   Skin: Positive for color change. Negative for pallor and rash.   Neurological: Negative for dizziness, seizures, syncope and headaches.   Hematological: Negative for adenopathy. Does not bruise/bleed easily.   Psychiatric/Behavioral: Negative for agitation, behavioral problems and  confusion.     Objective:     Vital Signs (Most Recent):  Temp: 98.7 °F (37.1 °C) (01/09/20 1100)  Pulse: 88 (01/09/20 1100)  Resp: 19 (01/09/20 1100)  BP: 119/65 (01/09/20 1100)  SpO2: 96 % (01/09/20 1100) Vital Signs (24h Range):  Temp:  [97.7 °F (36.5 °C)-99.5 °F (37.5 °C)] 98.7 °F (37.1 °C)  Pulse:  [] 88  Resp:  [17-29] 19  SpO2:  [94 %-98 %] 96 %  BP: (108-177)/(58-92) 119/65     Weight: 104.3 kg (230 lb)  Body mass index is 37.12 kg/m².    Physical Exam   Constitutional: He appears well-developed and well-nourished.  Non-toxic appearance. No distress.   HENT:   Head: Normocephalic and atraumatic. Head is without abrasion and without laceration.   Right Ear: External ear normal.   Left Ear: External ear normal.   Nose: Nose normal.   Mouth/Throat: Oropharynx is clear and moist.   Eyes: Pupils are equal, round, and reactive to light. EOM are normal.   Neck: Trachea normal. No tracheal deviation and normal range of motion present. No thyroid mass and no thyromegaly present.   Cardiovascular: Normal rate and regular rhythm.   Pulmonary/Chest: Effort normal. No accessory muscle usage. No tachypnea. No respiratory distress.   Abdominal: Soft. Normal appearance and bowel sounds are normal. He exhibits no distension and no mass. There is no hepatosplenomegaly. There is no tenderness. There is no tenderness at McBurney's point and negative Sepulveda's sign. No hernia.   Lymphadenopathy:     He has no cervical adenopathy.     He has no axillary adenopathy.        Right: No inguinal adenopathy present.        Left: No inguinal adenopathy present.   Neurological: He is alert. Coordination and gait normal.   Skin: Skin is warm and intact. There is erythema.        Broad area of induration with minimal overlying erythema, tender to palpation.  No fluctuance is noted.  No drainage is noted   Psychiatric: He has a normal mood and affect. His speech is normal and behavior is normal.       Significant Labs:  CBC:   Recent  Labs   Lab 01/09/20 0619   WBC 13.05*   RBC 4.28*   HGB 11.6*   HCT 36.4*      MCV 85   MCH 27.1   MCHC 31.9*     BMP:   Recent Labs   Lab 01/09/20 0619      *   K 3.5   CL 97   CO2 27   BUN 24*   CREATININE 1.1   CALCIUM 8.9   MG 1.8

## 2020-01-09 NOTE — PLAN OF CARE
Problem: Fall Injury Risk  Goal: Absence of Fall and Fall-Related Injury  Outcome: Ongoing, Progressing     Problem: Adult Inpatient Plan of Care  Goal: Patient-Specific Goal (Individualization)  Outcome: Ongoing, Progressing     Problem: Adult Inpatient Plan of Care  Goal: Optimal Comfort and Wellbeing  Outcome: Ongoing, Progressing     Problem: Adult Inpatient Plan of Care  Goal: Readiness for Transition of Care  Outcome: Ongoing, Progressing     Problem: Adult Inpatient Plan of Care  Goal: Rounds/Family Conference  Outcome: Ongoing, Progressing     Problem: Infection  Goal: Infection Symptom Resolution  Outcome: Ongoing, Progressing

## 2020-01-09 NOTE — PROGRESS NOTES
01/09/20 1428   Discharge Assessment   Assessment Type Discharge Planning Assessment   Confirmed/corrected address and phone number on facesheet? Yes   Assessment information obtained from? Patient   Communicated expected length of stay with patient/caregiver no   Prior to hospitilization cognitive status: Alert/Oriented   Prior to hospitalization functional status: Independent   Current cognitive status: Alert/Oriented   Current Functional Status: Independent   Lives With spouse   Able to Return to Prior Arrangements yes   Is patient able to care for self after discharge? Yes   Patient's perception of discharge disposition home or selfcare   Patient currently being followed by outpatient case management? No   Patient currently receives any other outside agency services? No   Equipment Currently Used at Home none   Do you have any problems affording any of your prescribed medications? No   Is the patient taking medications as prescribed? yes   Does the patient have transportation home? Yes   Transportation Anticipated car, drives self;agency   Does the patient receive services at the [facility]? No   Discharge Plan A Home with family   DME Needed Upon Discharge  none   Patient/Family in Agreement with Plan yes     Introduced self to patient asked if ok to take a few min of their time for short interview for D/C planning and ok with patient

## 2020-01-09 NOTE — PLAN OF CARE
01/09/20 1601   CELIS Message   Medicare Outpatient and Observation Notification regarding financial responsibility Given to patient/caregiver;Explained to patient/caregiver;Signed/date by patient/caregiver   Date CELIS was signed 01/09/20   Time CELIS was signed 1600     Introduced self to patient and asked if ok to take a few min to discuss Medicare form, and ok with patient.  Explained that pt in observation status and Medicare wants to inform them of CELIS form, pt verbalized an understanding to form, form signed and scanned into CM notes.   Action 1: Continue Detail Level: Zone

## 2020-01-09 NOTE — ASSESSMENT & PLAN NOTE
Area on right posterior shoulder is indurated with no evidence of fluctuance.  Recommend IV antibiotics and warm compresses to see if the area will declare itself.    If it does, may need incision and drainage.  Otherwise, it may resolve with IV antibiotics alone.

## 2020-01-09 NOTE — SUBJECTIVE & OBJECTIVE
Interval History: started on vancomycin and PO doxycycline. He has been afebrile on the floor. He does not like the cardiac diet he is on and wants it changed.     Review of Systems   Constitutional: Positive for chills.   HENT: Negative for congestion and sore throat.    Cardiovascular: Negative for chest pain and palpitations.   Gastrointestinal: Negative for abdominal pain, constipation, diarrhea, nausea and vomiting.   Genitourinary: Negative for dysuria, hematuria and urgency.   Musculoskeletal: Positive for back pain.   Skin: Negative for rash.     Objective:     Vital Signs (Most Recent):  Temp: 98.7 °F (37.1 °C) (01/09/20 1100)  Pulse: 88 (01/09/20 1100)  Resp: 19 (01/09/20 1100)  BP: 119/65 (01/09/20 1100)  SpO2: 96 % (01/09/20 1100) Vital Signs (24h Range):  Temp:  [97.7 °F (36.5 °C)-99.5 °F (37.5 °C)] 98.7 °F (37.1 °C)  Pulse:  [] 88  Resp:  [17-29] 19  SpO2:  [92 %-98 %] 96 %  BP: (108-177)/(58-92) 119/65     Weight: 104.3 kg (230 lb)  Body mass index is 37.12 kg/m².    Intake/Output Summary (Last 24 hours) at 1/9/2020 1234  Last data filed at 1/9/2020 0500  Gross per 24 hour   Intake --   Output 250 ml   Net -250 ml      Physical Exam   Constitutional: He is oriented to person, place, and time. He appears well-developed and well-nourished.   HENT:   Mouth/Throat: Oropharynx is clear and moist.   Eyes: Pupils are equal, round, and reactive to light. EOM are normal.   Cardiovascular: Normal rate, regular rhythm and normal heart sounds.   No murmur heard.  Pulmonary/Chest: Effort normal and breath sounds normal. No respiratory distress. He has no wheezes. He has no rales.   Abdominal: Soft. Bowel sounds are normal. There is no tenderness.   Musculoskeletal: Normal range of motion.   Lymphadenopathy:     He has no cervical adenopathy.   Neurological: He is alert and oriented to person, place, and time. No cranial nerve deficit.   Skin: Skin is warm. Capillary refill takes less than 2 seconds.   On  right upper back, area of 3cm x 5cm with peripheral induration and fluctuant center.    Nursing note and vitals reviewed.      Significant Labs:   BMP:   Recent Labs   Lab 01/09/20  0619      *   K 3.5   CL 97   CO2 27   BUN 24*   CREATININE 1.1   CALCIUM 8.9   MG 1.8     CBC:   Recent Labs   Lab 01/08/20  1240 01/09/20  0619   WBC 15.47* 13.05*   HGB 13.5* 11.6*   HCT 42.9 36.4*    278     Magnesium:   Recent Labs   Lab 01/09/20  0619   MG 1.8       Significant Imaging: I have reviewed all pertinent imaging results/findings within the past 24 hours.

## 2020-01-09 NOTE — CONSULTS
Consult Note  Infectious Disease    Reason for Consult:  Back abscess    HPI: Orlando Treadwell is a69 y.o. male known to me from prior consultation in June of 2019 for MRSA abscess of bilateral upper extremities with bacteremia.  He and his wife both have had skin infections this past week, and she required admission and incision and drainage Heart.  MRSA was isolated.  During the same time he had increasing pain tenderness and redness of the right suprascapular area.  He denied any trauma, some bases cyst or traumatizing this area himself.  He saw his primary care physician on January 6 who performed some laboratory studies and gave him oral doxycycline.  His creatinine increased to 2.35 and he was called and asked to come to the hospital.  By that time his creatinine was 1.2.  He had been taking a fair amount of ibuprofen at home for a persistent headache.  He has had no fever, chills, sweats, but was admitted to the hospital yesterday and placed on vancomycin and Zosyn.  A culture of the right upper back abscess was submitted. He complains of burning pain in the area of the abscess/cellulitis.     Review of patient's allergies indicates:   Allergen Reactions    Morphine Nausea Only     Ok with nausea med.     Past Medical History:   Diagnosis Date    Arthritis     Asbestos exposure     SOB     Back pain     Basal cell carcinoma     Bulging discs     lumbar    Hypertension     Kidney stone     Prostatitis     Sepsis due to methicillin resistant Staphylococcus aureus (MRSA) 06/2019    Due to large right upper arm abscess    Wears glasses    low testosterone, BPH   posttraumatic stress disorder, anxiety  GERD  15 years ago, h/o severe infection to R hand after water related injury that affected his lymph nodes   TROY  bilateral knee replacement  lumbar discectomy 1/2015    Past Surgical History:   Procedure Laterality Date    APPENDECTOMY      HAND SURGERY      rt hand    INCISION AND DRAINAGE Bilateral  06/2019    Right upper arm and left upper arm abscesses    JOINT REPLACEMENT      right knee    KNEE LIGAMENT RECONSTRUCTION      left knee    LITHOTRIPSY      TONSILLECTOMY, ADENOIDECTOMY, BILATERAL MYRINGOTOMY AND TUBES      TOTAL KNEE ARTHROPLASTY  1971    rt knee    URETERAL STENT PLACEMENT       Social History     Socioeconomic History    Marital status:      Spouse name: Not on file    Number of children: Not on file    Years of education: Not on file    Highest education level: Not on file   Occupational History    Not on file   Social Needs    Financial resource strain: Not on file    Food insecurity:     Worry: Not on file     Inability: Not on file    Transportation needs:     Medical: Not on file     Non-medical: Not on file   Tobacco Use    Smoking status: Never Smoker    Smokeless tobacco: Never Used   Substance and Sexual Activity    Alcohol use: No    Drug use: No    Sexual activity: Not on file   Lifestyle    Physical activity:     Days per week: Not on file     Minutes per session: Not on file    Stress: Not on file   Relationships    Social connections:     Talks on phone: Not on file     Gets together: Not on file     Attends Jewish service: Not on file     Active member of club or organization: Not on file     Attends meetings of clubs or organizations: Not on file     Relationship status: Not on file   Other Topics Concern    Not on file   Social History Narrative    Not on file     Family History   Problem Relation Age of Onset    Cancer Mother     Stroke Father     Heart disease Father     Collagen disease Neg Hx     Melanoma Neg Hx     Psoriasis Neg Hx     Lupus Neg Hx     Eczema Neg Hx        Pertinent medications noted:     Review of Systems:   No chills, fever, sweats, since here  No change in vision,   No pain in mouth or throat. No problems with teeth, gums.  No chest pain,   No  shortness of breath,    No nausea, vomiting, diarrhea, constipation  or focal abd pain,   No swelling of joints, redness of joints, injuries, or new focal pain  Has had a persistent headache for the several days prior to admission.  He felt weak but had no  vertigo, he does take gabapentin 600 HS for neuropathy in his hand  He is on treatment for PTSD  No diabetes,    No bleeding, lymphadenopathy, anemia, malignancy, unusual bruising  No new rashes        EXAM & DIAGNOSTICS REVIEWED:   Vitals:     Temp:  [97.7 °F (36.5 °C)-99.5 °F (37.5 °C)]   Temp: 98.5 °F (36.9 °C) (01/09/20 0701)  Pulse: 75 (01/09/20 0701)  Resp: 19 (01/09/20 0701)  BP: 132/75 (01/09/20 0701)  SpO2: 96 % (01/09/20 0701)    Intake/Output Summary (Last 24 hours) at 1/9/2020 0836  Last data filed at 1/9/2020 0500  Gross per 24 hour   Intake --   Output 250 ml   Net -250 ml       General:  In NAD. Alert and attentive, cooperative,un comfortable  Eyes:  Anicteric, PERRL, EOMI  ENT:  No ulcers, exudates, thrush, nares patent, dentition is Good   Neck:  supple, no masses or adenopathy appreciated  Lungs: Clear, no consolidation, rales, wheezes, rub  Heart:  RRR, no gallop/murmur/rub noted  Abd:  Soft, NT, ND, normal BS, no masses or organomegaly appreciated.  :  Voids   no flank tenderness  Musc:  Joints without effusion, swelling, erythema, synovitis, muscle wasting.   Skin:  No rashes though he does have some neurotic excoriations on arms and this is from doing so inadvertently at night ..   Wound: Right suprascapular area has a small palpable abscess from which pus can be expressed.  There was a fair amount of drainage on the bandage from last night.  The cellulitis is improved from my visit to him last night.  Neuro:  Alert, attentive, speech fluent, face symmetric, moves all extremities, no focal weakness. Ambulatory  Psych:  Anxious cooperative  Lymphatic:     No cervical, supraclavicular, nodes  Extrem: No edema, erythema, phlebitis, cellulitis, warm and well perfused  VAD:    Peripheral   Isolation:   Contact    Lines/Tubes/Drains:    General Labs reviewed:  Recent Labs   Lab 01/06/20  1509 01/08/20  1240 01/09/20  0619   WBC 16.5* 15.47* 13.05*   HGB 13.0* 13.5* 11.6*   HCT 40.0 42.9 36.4*    341 278       Recent Labs   Lab 01/06/20  1509 01/08/20  1240 01/09/20  0619   * 135* 134*   K 3.6 4.1 3.5   CL 95* 95 97   CO2 27 28 27   BUN 43* 31* 24*   CREATININE 2.35* 1.2 1.1   CALCIUM 9.4 10.2 8.9   PROT 7.8 8.7* 7.1   BILITOT 0.5 0.7 0.9   ALKPHOS 58 58 43*   ALT 20 32 27   AST 21 36 25           Micro:  Microbiology Results (last 7 days)     Procedure Component Value Units Date/Time    Blood culture x two cultures. Draw prior to antibiotics. [[id number]] Collected:  01/08/20 1240    Order Status:  Completed Specimen:  Blood from Peripheral, Antecubital, Right Updated:  01/08/20 1958     Blood Culture, Routine No Growth to date    Narrative:       Aerobic and anaerobic    Blood culture x two cultures. Draw prior to antibiotics. [[id number]] Collected:  01/08/20 1246    Order Status:  Completed Specimen:  Blood from Peripheral, Antecubital, Left Updated:  01/08/20 1958     Blood Culture, Routine No Growth to date    Narrative:       Aerobic and anaerobic    Aerobic culture [[id number]] Collected:  01/08/20 1356    Order Status:  Sent Specimen:  Abscess from Back Updated:  01/08/20 1411       Abscess is growing Staph aureus  Imaging Reviewed:   CXR      Cardiology:    IMPRESSION & PLAN   1. Right upper back abscess, likely MRSA (wife had abscess requiring admission in the last week)  2 History of MRSA abscess with bacteremia 6/2019  3. Anxiety  4. Acute kidney injury, resolved, likely from nonsteroidals taken for his headache      Recommendations:  Vancomycin and po doxy, stop zosyn  Await cultures of upper back abscess  General surgery consult for small I an dD  Increase gabapentin by adding 100 mg bid in the daytime for burning pain  hibiclens and decolonization  Home tomorrow?

## 2020-01-09 NOTE — PROGRESS NOTES
[facility]  Progress Note    Patient Name: Orlando Treadwell  MRN: [id number]  Patient Class: IP- Inpatient   Admission Date: 1/8/2020  Length of Stay: 1 days  Attending Physician: Mia Nuñez MD  Primary Care Provider: Kleber Worthy III, MD        Subjective:     Principal Problem:MRSA infection        HPI:  Year old white male with known past medical history of hypertension, pre diabetes, PTSD, chronic pain on opioids in outpatient setting, history of cellulitis and soft tissue infection with MRSA (last admission 6/3/19-6/7/19 with MRSA wound and bacteremia), presented to ER on 01/08/2020 with complaint of pain, redness and concern for MRSA abscess on the right upper back.  Patient informed that his wife was recently discharged yesterday from the hospital for MRSA wound in her pubic area.  Patient reports he has a history of MRSA infection and has been using Hibiclens at home.  Patient does report that he has PTSD and he scratches his skin at night when he is not aware and that is how he has all the excoriation marks and the healed scars from the previous small wounds on his skin  Currently patient denies fever, chills, weakness.  Reports he was seen by Dr. Deacon stevenson before yesterday on 01/06/2020, was started on doxycycline oral and had blood work done and he was informed today that his kidney function has declined markedly and he needs to come to the ER today.   In the ER patient WBC count was 15.4 H&H 13.5/42.9, platelet 341 CMP showed sodium 135 potassium 4.1 BUN 31, creatinine 1.2 with GFR greater than 60.  I did compared his levels from 01/06/2020 which showed BUN and creatinine of 43/2.35 with GFR of 27  Discussed with patient that his BUN creatinine has improved and the GFR is normal today but the concern for MRSA bacteremia still there and we would wait till his blood cultures comes back, we will consult Dr. Hurst and if needed will consult surgery for  I&D  Patient specifically was concerned about his pain medication from home reporting he takes his medicine a certain way.  Discussed with patient that we will continue his medication as prescribed by his physician/home medications.    I did reviewed patient passed admission with this.  Noted he was admitted 06/03  at [facility] with MRSA sepsis right upper extremity and small abscess left upper extremity with bacteremia.  Repeat blood cultures after 24-48 hours were negative.  Patient was discharged on 06/07/2019 on 2 weeks of IV daptomycin    Overview/Hospital Course:  No notes on file    Interval History: started on vancomycin and PO doxycycline. He has been afebrile on the floor. He does not like the cardiac diet he is on and wants it changed.     Review of Systems   Constitutional: Positive for chills.   HENT: Negative for congestion and sore throat.    Cardiovascular: Negative for chest pain and palpitations.   Gastrointestinal: Negative for abdominal pain, constipation, diarrhea, nausea and vomiting.   Genitourinary: Negative for dysuria, hematuria and urgency.   Musculoskeletal: Positive for back pain.   Skin: Negative for rash.     Objective:     Vital Signs (Most Recent):  Temp: 98.7 °F (37.1 °C) (01/09/20 1100)  Pulse: 88 (01/09/20 1100)  Resp: 19 (01/09/20 1100)  BP: 119/65 (01/09/20 1100)  SpO2: 96 % (01/09/20 1100) Vital Signs (24h Range):  Temp:  [97.7 °F (36.5 °C)-99.5 °F (37.5 °C)] 98.7 °F (37.1 °C)  Pulse:  [] 88  Resp:  [17-29] 19  SpO2:  [92 %-98 %] 96 %  BP: (108-177)/(58-92) 119/65     Weight: 104.3 kg (230 lb)  Body mass index is 37.12 kg/m².    Intake/Output Summary (Last 24 hours) at 1/9/2020 1234  Last data filed at 1/9/2020 0500  Gross per 24 hour   Intake --   Output 250 ml   Net -250 ml      Physical Exam   Constitutional: He is oriented to person, place, and time. He appears well-developed and well-nourished.   HENT:   Mouth/Throat: Oropharynx is clear and moist.   Eyes: Pupils  are equal, round, and reactive to light. EOM are normal.   Cardiovascular: Normal rate, regular rhythm and normal heart sounds.   No murmur heard.  Pulmonary/Chest: Effort normal and breath sounds normal. No respiratory distress. He has no wheezes. He has no rales.   Abdominal: Soft. Bowel sounds are normal. There is no tenderness.   Musculoskeletal: Normal range of motion.   Lymphadenopathy:     He has no cervical adenopathy.   Neurological: He is alert and oriented to person, place, and time. No cranial nerve deficit.   Skin: Skin is warm. Capillary refill takes less than 2 seconds.   On right upper back, area of 3cm x 5cm with peripheral induration and fluctuant center.    Nursing note and vitals reviewed.      Significant Labs:   BMP:   Recent Labs   Lab 01/09/20  0619      *   K 3.5   CL 97   CO2 27   BUN 24*   CREATININE 1.1   CALCIUM 8.9   MG 1.8     CBC:   Recent Labs   Lab 01/08/20  1240 01/09/20  0619   WBC 15.47* 13.05*   HGB 13.5* 11.6*   HCT 42.9 36.4*    278     Magnesium:   Recent Labs   Lab 01/09/20  0619   MG 1.8       Significant Imaging: I have reviewed all pertinent imaging results/findings within the past 24 hours.      Assessment/Plan:      * MRSA infection, with history of MRSA infections and bactermeia in the past  Currently having abscess with pus drainage on the right upper back and he has a hx of MRSA bacteremia in 06/2019 - culture of wound growing Staph Aureus - likely mRsa  Continue iv antibiotics with vancomycin and PO doxycycline per ID recs  Gen surg consulted for I &D  Contact precautions        Hypertension  Continue Metoprolol, Hydralazine 50 mg b.i.d.    losartan HCTZ on hold      MRSA bacteremia  Highly suspected of MRSA bacteremia  Blood cultures collected  Noted patient was treated for 2 more weeks with IV daptomycin upon discharge on 06/07/2019      Chronic narcotic use  continue oxycodone as prescribed as 15 mg Q i.d.    Will also continue his tramadol as  prescribed      VTE Risk Mitigation (From admission, onward)         Ordered     IP VTE HIGH RISK PATIENT  Once      01/08/20 1913     enoxaparin injection 40 mg  Daily      01/08/20 1516                      Mia Nuñez MD  Department of Hospital Medicine   [facility]

## 2020-01-10 LAB
ALBUMIN SERPL BCP-MCNC: 2.9 G/DL (ref 3.5–5.2)
ALP SERPL-CCNC: 46 U/L (ref 55–135)
ALT SERPL W/O P-5'-P-CCNC: 37 U/L (ref 10–44)
ANION GAP SERPL CALC-SCNC: 10 MMOL/L (ref 8–16)
AORTIC ROOT ANNULUS: 3.51 CM
AORTIC VALVE CUSP SEPERATION: 1.59 CM
AST SERPL-CCNC: 35 U/L (ref 10–40)
AV INDEX (PROSTH): 0.8
AV MEAN GRADIENT: 6 MMHG
AV PEAK GRADIENT: 15 MMHG
AV VALVE AREA: 2.58 CM2
AV VELOCITY RATIO: 66.28
BACTERIA SPEC AEROBE CULT: ABNORMAL
BASOPHILS # BLD AUTO: 0.05 K/UL (ref 0–0.2)
BASOPHILS NFR BLD: 0.4 % (ref 0–1.9)
BILIRUB SERPL-MCNC: 0.7 MG/DL (ref 0.1–1)
BSA FOR ECHO PROCEDURE: 2.2 M2
BUN SERPL-MCNC: 17 MG/DL (ref 8–23)
CALCIUM SERPL-MCNC: 9.3 MG/DL (ref 8.7–10.5)
CHLORIDE SERPL-SCNC: 100 MMOL/L (ref 95–110)
CO2 SERPL-SCNC: 29 MMOL/L (ref 23–29)
CREAT SERPL-MCNC: 1.2 MG/DL (ref 0.5–1.4)
CV ECHO LV RWT: 0.41 CM
DIFFERENTIAL METHOD: ABNORMAL
DOP CALC AO PEAK VEL: 1.94 M/S
DOP CALC AO VTI: 27.03 CM
DOP CALC LVOT AREA: 3.2 CM2
DOP CALC LVOT DIAMETER: 2.02 CM
DOP CALC LVOT PEAK VEL: 128.58 M/S
DOP CALC LVOT STROKE VOLUME: 69.64 CM3
DOP CALCLVOT PEAK VEL VTI: 21.74 CM
E WAVE DECELERATION TIME: 236.2 MSEC
E/A RATIO: 0.77
E/E' RATIO: 4.52 M/S
ECHO LV POSTERIOR WALL: 0.9 CM (ref 0.6–1.1)
EOSINOPHIL # BLD AUTO: 0.2 K/UL (ref 0–0.5)
EOSINOPHIL NFR BLD: 1.7 % (ref 0–8)
ERYTHROCYTE [DISTWIDTH] IN BLOOD BY AUTOMATED COUNT: 15.8 % (ref 11.5–14.5)
EST. GFR  (AFRICAN AMERICAN): >60 ML/MIN/1.73 M^2
EST. GFR  (NON AFRICAN AMERICAN): >60 ML/MIN/1.73 M^2
FRACTIONAL SHORTENING: 33 % (ref 28–44)
GLUCOSE SERPL-MCNC: 105 MG/DL (ref 70–110)
HCT VFR BLD AUTO: 38.4 % (ref 40–54)
HGB BLD-MCNC: 12 G/DL (ref 14–18)
IMM GRANULOCYTES # BLD AUTO: 0.16 K/UL (ref 0–0.04)
IMM GRANULOCYTES NFR BLD AUTO: 1.3 % (ref 0–0.5)
INTERVENTRICULAR SEPTUM: 0.9 CM (ref 0.6–1.1)
LEFT ATRIUM SIZE: 3.55 CM
LEFT INTERNAL DIMENSION IN SYSTOLE: 2.92 CM (ref 2.1–4)
LEFT VENTRICLE MASS INDEX: 60 G/M2
LEFT VENTRICULAR INTERNAL DIMENSION IN DIASTOLE: 4.37 CM (ref 3.5–6)
LEFT VENTRICULAR MASS: 126.59 G
LV LATERAL E/E' RATIO: 4.07 M/S
LV SEPTAL E/E' RATIO: 5.08 M/S
LYMPHOCYTES # BLD AUTO: 1.5 K/UL (ref 1–4.8)
LYMPHOCYTES NFR BLD: 12.4 % (ref 18–48)
MAGNESIUM SERPL-MCNC: 1.9 MG/DL (ref 1.6–2.6)
MCH RBC QN AUTO: 26.7 PG (ref 27–31)
MCHC RBC AUTO-ENTMCNC: 31.3 G/DL (ref 32–36)
MCV RBC AUTO: 85 FL (ref 82–98)
MONOCYTES # BLD AUTO: 1.4 K/UL (ref 0.3–1)
MONOCYTES NFR BLD: 11.6 % (ref 4–15)
MV PEAK A VEL: 0.79 M/S
MV PEAK E VEL: 0.61 M/S
NEUTROPHILS # BLD AUTO: 8.8 K/UL (ref 1.8–7.7)
NEUTROPHILS NFR BLD: 72.6 % (ref 38–73)
NRBC BLD-RTO: 0 /100 WBC
PHOSPHATE SERPL-MCNC: 3.2 MG/DL (ref 2.7–4.5)
PISA TR MAX VEL: 2.36 M/S
PLATELET # BLD AUTO: 317 K/UL (ref 150–350)
PMV BLD AUTO: 10.6 FL (ref 9.2–12.9)
POTASSIUM SERPL-SCNC: 4.2 MMOL/L (ref 3.5–5.1)
PROT SERPL-MCNC: 7.4 G/DL (ref 6–8.4)
PV PEAK VELOCITY: 125.48 CM/S
RBC # BLD AUTO: 4.5 M/UL (ref 4.6–6.2)
SODIUM SERPL-SCNC: 139 MMOL/L (ref 136–145)
TDI LATERAL: 0.15 M/S
TDI SEPTAL: 0.12 M/S
TDI: 0.14 M/S
TR MAX PG: 22 MMHG
WBC # BLD AUTO: 12.11 K/UL (ref 3.9–12.7)

## 2020-01-10 PROCEDURE — 25000003 PHARM REV CODE 250: Performed by: INTERNAL MEDICINE

## 2020-01-10 PROCEDURE — 80053 COMPREHEN METABOLIC PANEL: CPT

## 2020-01-10 PROCEDURE — 63600175 PHARM REV CODE 636 W HCPCS: Performed by: INTERNAL MEDICINE

## 2020-01-10 PROCEDURE — 84100 ASSAY OF PHOSPHORUS: CPT

## 2020-01-10 PROCEDURE — 85025 COMPLETE CBC W/AUTO DIFF WBC: CPT

## 2020-01-10 PROCEDURE — 36415 COLL VENOUS BLD VENIPUNCTURE: CPT

## 2020-01-10 PROCEDURE — 83735 ASSAY OF MAGNESIUM: CPT

## 2020-01-10 PROCEDURE — 12000002 HC ACUTE/MED SURGE SEMI-PRIVATE ROOM

## 2020-01-10 RX ADMIN — VANCOMYCIN HYDROCHLORIDE 1750 MG: 1 INJECTION, POWDER, LYOPHILIZED, FOR SOLUTION INTRAVENOUS at 11:01

## 2020-01-10 RX ADMIN — GABAPENTIN 600 MG: 300 CAPSULE ORAL at 09:01

## 2020-01-10 RX ADMIN — GABAPENTIN 100 MG: 100 CAPSULE ORAL at 02:01

## 2020-01-10 RX ADMIN — LACTOBACILLUS TAB 1 TABLET: TAB at 09:01

## 2020-01-10 RX ADMIN — MUPIROCIN 1 G: 20 OINTMENT TOPICAL at 09:01

## 2020-01-10 RX ADMIN — METOPROLOL SUCCINATE 50 MG: 50 TABLET, FILM COATED, EXTENDED RELEASE ORAL at 09:01

## 2020-01-10 RX ADMIN — MUPIROCIN: 20 OINTMENT TOPICAL at 09:01

## 2020-01-10 RX ADMIN — PANTOPRAZOLE SODIUM 40 MG: 40 TABLET, DELAYED RELEASE ORAL at 07:01

## 2020-01-10 RX ADMIN — HYDRALAZINE HYDROCHLORIDE 50 MG: 25 TABLET, FILM COATED ORAL at 09:01

## 2020-01-10 RX ADMIN — OXYCODONE HYDROCHLORIDE 15 MG: 5 TABLET ORAL at 07:01

## 2020-01-10 RX ADMIN — OXYCODONE HYDROCHLORIDE 15 MG: 5 TABLET ORAL at 02:01

## 2020-01-10 RX ADMIN — TRAMADOL HYDROCHLORIDE 50 MG: 50 TABLET, FILM COATED ORAL at 07:01

## 2020-01-10 RX ADMIN — ASPIRIN 325 MG ORAL TABLET 325 MG: 325 PILL ORAL at 09:01

## 2020-01-10 RX ADMIN — OXYCODONE HYDROCHLORIDE 15 MG: 5 TABLET ORAL at 10:01

## 2020-01-10 RX ADMIN — DOXYCYCLINE HYCLATE 100 MG: 100 CAPSULE ORAL at 09:01

## 2020-01-10 RX ADMIN — GABAPENTIN 100 MG: 100 CAPSULE ORAL at 09:01

## 2020-01-10 RX ADMIN — VENLAFAXINE HYDROCHLORIDE 150 MG: 150 CAPSULE, EXTENDED RELEASE ORAL at 09:01

## 2020-01-10 RX ADMIN — VANCOMYCIN HYDROCHLORIDE 1750 MG: 1 INJECTION, POWDER, LYOPHILIZED, FOR SOLUTION INTRAVENOUS at 07:01

## 2020-01-10 RX ADMIN — QUETIAPINE 50 MG: 25 TABLET ORAL at 09:01

## 2020-01-10 NOTE — PLAN OF CARE
Problem: Fall Injury Risk  Goal: Absence of Fall and Fall-Related Injury  Outcome: Ongoing, Progressing     Problem: Adult Inpatient Plan of Care  Goal: Plan of Care Review  Outcome: Ongoing, Progressing     Problem: Adult Inpatient Plan of Care  Goal: Optimal Comfort and Wellbeing  Outcome: Ongoing, Progressing     Problem: Infection  Goal: Infection Symptom Resolution  Outcome: Ongoing, Progressing

## 2020-01-10 NOTE — SUBJECTIVE & OBJECTIVE
Interval History: he feels better, denies pain or tenderness around the site of the abscess. Was evaluated by surgery yesterday, not enough fluctuance for I&D.     Review of Systems   HENT: Negative for congestion and sore throat.    Cardiovascular: Negative for chest pain and palpitations.   Gastrointestinal: Negative for abdominal pain, constipation, diarrhea, nausea and vomiting.   Genitourinary: Negative for dysuria, hematuria and urgency.   Skin: Negative for rash.     Objective:     Vital Signs (Most Recent):  Temp: 97.8 °F (36.6 °C) (01/10/20 1224)  Pulse: 97 (01/10/20 1224)  Resp: 19 (01/10/20 1224)  BP: (!) 161/83 (01/10/20 1224)  SpO2: 96 % (01/10/20 1224) Vital Signs (24h Range):  Temp:  [97.8 °F (36.6 °C)-99.5 °F (37.5 °C)] 97.8 °F (36.6 °C)  Pulse:  [] 97  Resp:  [17-19] 19  SpO2:  [95 %-97 %] 96 %  BP: (133-161)/(73-83) 161/83     Weight: 104.3 kg (230 lb)  Body mass index is 37.12 kg/m².  No intake or output data in the 24 hours ending 01/10/20 1541   Physical Exam   Constitutional: He is oriented to person, place, and time. He appears well-developed and well-nourished.   HENT:   Mouth/Throat: Oropharynx is clear and moist.   Eyes: Pupils are equal, round, and reactive to light. EOM are normal.   Cardiovascular: Normal rate, regular rhythm and normal heart sounds.   No murmur heard.  Pulmonary/Chest: Effort normal and breath sounds normal. No respiratory distress. He has no wheezes. He has no rales.   Abdominal: Soft. Bowel sounds are normal. There is no tenderness.   Musculoskeletal: Normal range of motion.   Lymphadenopathy:     He has no cervical adenopathy.   Neurological: He is alert and oriented to person, place, and time. No cranial nerve deficit.   Skin: Skin is warm. Capillary refill takes less than 2 seconds.   On right upper back, area of 3cm x 5cm with peripheral induration and fluctuant center.    Nursing note and vitals reviewed.      Significant Labs:   Blood Culture: No results  for input(s): LABBLOO in the last 48 hours.  BMP:   Recent Labs   Lab 01/10/20  0605         K 4.2      CO2 29   BUN 17   CREATININE 1.2   CALCIUM 9.3   MG 1.9     CBC:   Recent Labs   Lab 01/09/20  0619 01/10/20  0605   WBC 13.05* 12.11   HGB 11.6* 12.0*   HCT 36.4* 38.4*    317       Significant Imaging: I have reviewed all pertinent imaging results/findings within the past 24 hours.

## 2020-01-10 NOTE — ASSESSMENT & PLAN NOTE
Continue iv antibiotics with vancomycin and PO doxycycline per ID recs  Continue Contact precautions

## 2020-01-10 NOTE — CARE UPDATE
Dr. Arias's nurse Tay called back today at 9:00 a.m. On 1/9/2020 informing she communicated with Dr. Arias regarding patient's statement that he was verbally told by Dr Arias to take Roxicodone 15 mg 1.5 tab TID prn instead of 1 tab QID, and Dr Arias said that he  Did not tell this to the pt. Informed Tay that I have continued his Meds as it was written on his bottle as 1 tab QID as we understand that pt is under [facility] management contract.     Boo Martinez MD  Department of Hospital Medicine   [facility]

## 2020-01-10 NOTE — PROGRESS NOTES
[facility]  Progress Note    Patient Name: Orlando Treadwell  MRN: [id number]  Patient Class: OP- Observation   Admission Date: 1/8/2020  Length of Stay: 1 days  Attending Physician: Mia Nuñez MD  Primary Care Provider: Kleber Worthy III, MD        Subjective:     Principal Problem:MRSA infection        HPI:  Year old white male with known past medical history of hypertension, pre diabetes, PTSD, chronic pain on opioids in outpatient setting, history of cellulitis and soft tissue infection with MRSA (last admission 6/3/19-6/7/19 with MRSA wound and bacteremia), presented to ER on 01/08/2020 with complaint of pain, redness and concern for MRSA abscess on the right upper back.  Patient informed that his wife was recently discharged yesterday from the hospital for MRSA wound in her pubic area.  Patient reports he has a history of MRSA infection and has been using Hibiclens at home.  Patient does report that he has PTSD and he scratches his skin at night when he is not aware and that is how he has all the excoriation marks and the healed scars from the previous small wounds on his skin  Currently patient denies fever, chills, weakness.  Reports he was seen by Dr. Deacon stevenson before yesterday on 01/06/2020, was started on doxycycline oral and had blood work done and he was informed today that his kidney function has declined markedly and he needs to come to the ER today.   In the ER patient WBC count was 15.4 H&H 13.5/42.9, platelet 341 CMP showed sodium 135 potassium 4.1 BUN 31, creatinine 1.2 with GFR greater than 60.  I did compared his levels from 01/06/2020 which showed BUN and creatinine of 43/2.35 with GFR of 27  Discussed with patient that his BUN creatinine has improved and the GFR is normal today but the concern for MRSA bacteremia still there and we would wait till his blood cultures comes back, we will consult Dr. Hurst and if needed will consult surgery for  I&D  Patient specifically was concerned about his pain medication from home reporting he takes his medicine a certain way.  Discussed with patient that we will continue his medication as prescribed by his physician/home medications.    I did reviewed patient passed admission with this.  Noted he was admitted 06/03  at [facility] with MRSA sepsis right upper extremity and small abscess left upper extremity with bacteremia.  Repeat blood cultures after 24-48 hours were negative.  Patient was discharged on 06/07/2019 on 2 weeks of IV daptomycin    Overview/Hospital Course:  No notes on file    Interval History: he feels better, denies pain or tenderness around the site of the abscess. Was evaluated by surgery yesterday, not enough fluctuance for I&D.     Review of Systems   HENT: Negative for congestion and sore throat.    Cardiovascular: Negative for chest pain and palpitations.   Gastrointestinal: Negative for abdominal pain, constipation, diarrhea, nausea and vomiting.   Genitourinary: Negative for dysuria, hematuria and urgency.   Skin: Negative for rash.     Objective:     Vital Signs (Most Recent):  Temp: 97.8 °F (36.6 °C) (01/10/20 1224)  Pulse: 97 (01/10/20 1224)  Resp: 19 (01/10/20 1224)  BP: (!) 161/83 (01/10/20 1224)  SpO2: 96 % (01/10/20 1224) Vital Signs (24h Range):  Temp:  [97.8 °F (36.6 °C)-99.5 °F (37.5 °C)] 97.8 °F (36.6 °C)  Pulse:  [] 97  Resp:  [17-19] 19  SpO2:  [95 %-97 %] 96 %  BP: (133-161)/(73-83) 161/83     Weight: 104.3 kg (230 lb)  Body mass index is 37.12 kg/m².  No intake or output data in the 24 hours ending 01/10/20 1541   Physical Exam   Constitutional: He is oriented to person, place, and time. He appears well-developed and well-nourished.   HENT:   Mouth/Throat: Oropharynx is clear and moist.   Eyes: Pupils are equal, round, and reactive to light. EOM are normal.   Cardiovascular: Normal rate, regular rhythm and normal heart sounds.   No murmur heard.  Pulmonary/Chest: Effort  normal and breath sounds normal. No respiratory distress. He has no wheezes. He has no rales.   Abdominal: Soft. Bowel sounds are normal. There is no tenderness.   Musculoskeletal: Normal range of motion.   Lymphadenopathy:     He has no cervical adenopathy.   Neurological: He is alert and oriented to person, place, and time. No cranial nerve deficit.   Skin: Skin is warm. Capillary refill takes less than 2 seconds.   On right upper back, area of 3cm x 5cm with peripheral induration and fluctuant center.    Nursing note and vitals reviewed.      Significant Labs:   Blood Culture: No results for input(s): LABBLOO in the last 48 hours.  BMP:   Recent Labs   Lab 01/10/20  0605         K 4.2      CO2 29   BUN 17   CREATININE 1.2   CALCIUM 9.3   MG 1.9     CBC:   Recent Labs   Lab 01/09/20  0619 01/10/20  0605   WBC 13.05* 12.11   HGB 11.6* 12.0*   HCT 36.4* 38.4*    317       Significant Imaging: I have reviewed all pertinent imaging results/findings within the past 24 hours.      Assessment/Plan:      * MRSA infection, with history of MRSA infections and bactermeia in the past  Continue iv antibiotics with vancomycin and PO doxycycline per ID recs  Continue Contact precautions        Abscess        Hypertension  Continue Metoprolol, Hydralazine 50 mg b.i.d.   Will resume losartan. HCTZ on hold      MRSA bacteremia  Highly suspected of MRSA bacteremia  Blood cultures collected  Noted patient was treated for 2 more weeks with IV daptomycin upon discharge on 06/07/2019      Chronic narcotic use  continue oxycodone as prescribed as 15 mg Q i.d.    Will also continue his tramadol as prescribed      VTE Risk Mitigation (From admission, onward)         Ordered     IP VTE HIGH RISK PATIENT  Once      01/08/20 1913     enoxaparin injection 40 mg  Daily      01/08/20 1510                      Mia Nuñez MD  Department of Hospital Medicine   [facility]

## 2020-01-10 NOTE — PROGRESS NOTES
Consult Note  Infectious Disease    Reason for Consult:  Back abscess    HPI: Orlando Treadwell is a69 y.o. male known to me from prior consultation in June of 2019 for MRSA abscess of bilateral upper extremities with bacteremia.  He and his wife both have had skin infections this past week, and she required admission and incision and drainage Heart.  MRSA was isolated.  During the same time he had increasing pain tenderness and redness of the right suprascapular area.  He denied any trauma, some bases cyst or traumatizing this area himself.  He saw his primary care physician on January 6 who performed some laboratory studies and gave him oral doxycycline.  His creatinine increased to 2.35 and he was called and asked to come to the hospital.  By that time his creatinine was 1.2.  He had been taking a fair amount of ibuprofen at home for a persistent headache.  He has had no fever, chills, sweats, but was admitted to the hospital yesterday and placed on vancomycin and Zosyn.  A culture of the right upper back abscess was submitted. He complains of burning pain in the area of the abscess/cellulitis.     1/10 : he feels better, afebrile. Less pain and tenderness. Surgery consult noted. He still has a lot of redness and induration. There seems to be a secondary abscess becoming more evident.     EXAM & DIAGNOSTICS REVIEWED:   Vitals:     Temp:  [97.8 °F (36.6 °C)-99.5 °F (37.5 °C)]   Temp: 97.8 °F (36.6 °C) (01/10/20 1224)  Pulse: 97 (01/10/20 1224)  Resp: 19 (01/10/20 1224)  BP: (!) 161/83 (01/10/20 1224)  SpO2: 96 % (01/10/20 1224)  No intake or output data in the 24 hours ending 01/10/20 1446    General:  In NAD. Alert and attentive, cooperative,un comfortable  Eyes:  Anicteric,  , EOMI  ENT:     Neck:  supple,    Lungs:    Heart:     Abd:   .  :  Voids     Musc:  Joints without effusion, swelling, erythema, synovitis, muscle wasting.   Skin:  No rashes though he does have some neurotic excoriations on arms and this  is from doing so inadvertently at night ..   Wound: Right suprascapular area has a small palpable abscess from which pus can still be  expressed.  There is persistent cellulitis medial to this and there is a tender fluctuant area near the midline. I marked it.   Neuro:  Alert, attentive, speech fluent, face symmetric, moves all extremities, no focal weakness. Ambulatory  Psych:   calm cooperative     Extrem:   VAD:    Peripheral   Isolation:  Contact    Lines/Tubes/Drains:    General Labs reviewed:  Recent Labs   Lab 01/08/20  1240 01/09/20  0619 01/10/20  0605   WBC 15.47* 13.05* 12.11   HGB 13.5* 11.6* 12.0*   HCT 42.9 36.4* 38.4*    278 317       Recent Labs   Lab 01/08/20  1240 01/09/20  0619 01/10/20  0605   * 134* 139   K 4.1 3.5 4.2   CL 95 97 100   CO2 28 27 29   BUN 31* 24* 17   CREATININE 1.2 1.1 1.2   CALCIUM 10.2 8.9 9.3   PROT 8.7* 7.1 7.4   BILITOT 0.7 0.9 0.7   ALKPHOS 58 43* 46*   ALT 32 27 37   AST 36 25 35           Micro:  Microbiology Results (last 7 days)     Procedure Component Value Units Date/Time    Blood culture x two cultures. Draw prior to antibiotics. [[id number]] Collected:  01/08/20 1240    Order Status:  Completed Specimen:  Blood from Peripheral, Antecubital, Right Updated:  01/10/20 1432     Blood Culture, Routine No Growth to date      No Growth to date      No Growth to date    Narrative:       Aerobic and anaerobic    Blood culture x two cultures. Draw prior to antibiotics. [[id number]] Collected:  01/08/20 1246    Order Status:  Completed Specimen:  Blood from Peripheral, Antecubital, Left Updated:  01/10/20 1432     Blood Culture, Routine No Growth to date      No Growth to date      No Growth to date    Narrative:       Aerobic and anaerobic    Aerobic culture [[id number]]  (Abnormal)  (Susceptibility) Collected:  01/08/20 1356    Order Status:  Completed Specimen:  Abscess from Back Updated:  01/10/20 0834     Aerobic Bacterial Culture METHICILLIN RESISTANT  STAPHYLOCOCCUS AUREUS  Few  Results called to and read back by: Yolanda Tripathi RN on [address]  01/09/2020  12:45 by RODRI         Abscess is growing Staph aureus  Imaging Reviewed:   CXR      Cardiology:    IMPRESSION & PLAN   1. Right upper back abscess, likely MRSA (wife had abscess requiring admission in the last week), sensitive to doxycycline  2 History of MRSA abscess with bacteremia 6/2019  3. Anxiety  4. Acute kidney injury, resolved, likely from nonsteroidals taken for his headache      Recommendations:  Vancomycin and po doxy,    Surgical follow up  Us of the soft tissue     Increase gabapentin by adding 100 mg bid in the daytime for burning pain  hibiclens and decolonization

## 2020-01-11 LAB
ALBUMIN SERPL BCP-MCNC: 2.9 G/DL (ref 3.5–5.2)
ALP SERPL-CCNC: 48 U/L (ref 55–135)
ALT SERPL W/O P-5'-P-CCNC: 63 U/L (ref 10–44)
ANION GAP SERPL CALC-SCNC: 11 MMOL/L (ref 8–16)
AST SERPL-CCNC: 50 U/L (ref 10–40)
BASOPHILS # BLD AUTO: 0.05 K/UL (ref 0–0.2)
BASOPHILS NFR BLD: 0.5 % (ref 0–1.9)
BILIRUB SERPL-MCNC: 1 MG/DL (ref 0.1–1)
BUN SERPL-MCNC: 14 MG/DL (ref 8–23)
CALCIUM SERPL-MCNC: 8.9 MG/DL (ref 8.7–10.5)
CHLORIDE SERPL-SCNC: 100 MMOL/L (ref 95–110)
CO2 SERPL-SCNC: 26 MMOL/L (ref 23–29)
CREAT SERPL-MCNC: 1 MG/DL (ref 0.5–1.4)
DIFFERENTIAL METHOD: ABNORMAL
EOSINOPHIL # BLD AUTO: 0.3 K/UL (ref 0–0.5)
EOSINOPHIL NFR BLD: 2.3 % (ref 0–8)
ERYTHROCYTE [DISTWIDTH] IN BLOOD BY AUTOMATED COUNT: 15.9 % (ref 11.5–14.5)
EST. GFR  (AFRICAN AMERICAN): >60 ML/MIN/1.73 M^2
EST. GFR  (NON AFRICAN AMERICAN): >60 ML/MIN/1.73 M^2
GLUCOSE SERPL-MCNC: 93 MG/DL (ref 70–110)
HCT VFR BLD AUTO: 37.9 % (ref 40–54)
HGB BLD-MCNC: 12 G/DL (ref 14–18)
IMM GRANULOCYTES # BLD AUTO: 0.19 K/UL (ref 0–0.04)
IMM GRANULOCYTES NFR BLD AUTO: 1.7 % (ref 0–0.5)
LYMPHOCYTES # BLD AUTO: 1.6 K/UL (ref 1–4.8)
LYMPHOCYTES NFR BLD: 14 % (ref 18–48)
MAGNESIUM SERPL-MCNC: 1.8 MG/DL (ref 1.6–2.6)
MCH RBC QN AUTO: 27.1 PG (ref 27–31)
MCHC RBC AUTO-ENTMCNC: 31.7 G/DL (ref 32–36)
MCV RBC AUTO: 86 FL (ref 82–98)
MONOCYTES # BLD AUTO: 1.2 K/UL (ref 0.3–1)
MONOCYTES NFR BLD: 10.4 % (ref 4–15)
NEUTROPHILS # BLD AUTO: 7.9 K/UL (ref 1.8–7.7)
NEUTROPHILS NFR BLD: 71.1 % (ref 38–73)
NRBC BLD-RTO: 0 /100 WBC
PHOSPHATE SERPL-MCNC: 3.5 MG/DL (ref 2.7–4.5)
PLATELET # BLD AUTO: 319 K/UL (ref 150–350)
PMV BLD AUTO: 10.4 FL (ref 9.2–12.9)
POTASSIUM SERPL-SCNC: 3.8 MMOL/L (ref 3.5–5.1)
PROT SERPL-MCNC: 7.3 G/DL (ref 6–8.4)
RBC # BLD AUTO: 4.43 M/UL (ref 4.6–6.2)
SODIUM SERPL-SCNC: 137 MMOL/L (ref 136–145)
VANCOMYCIN TROUGH SERPL-MCNC: 14.4 UG/ML (ref 10–22)
WBC # BLD AUTO: 11.11 K/UL (ref 3.9–12.7)

## 2020-01-11 PROCEDURE — 85025 COMPLETE CBC W/AUTO DIFF WBC: CPT

## 2020-01-11 PROCEDURE — 25000003 PHARM REV CODE 250: Performed by: INTERNAL MEDICINE

## 2020-01-11 PROCEDURE — 10061 I&D ABSCESS COMP/MULTIPLE: CPT | Mod: ,,, | Performed by: SURGERY

## 2020-01-11 PROCEDURE — 36415 COLL VENOUS BLD VENIPUNCTURE: CPT

## 2020-01-11 PROCEDURE — 80053 COMPREHEN METABOLIC PANEL: CPT

## 2020-01-11 PROCEDURE — 87077 CULTURE AEROBIC IDENTIFY: CPT

## 2020-01-11 PROCEDURE — 84100 ASSAY OF PHOSPHORUS: CPT

## 2020-01-11 PROCEDURE — 87070 CULTURE OTHR SPECIMN AEROBIC: CPT

## 2020-01-11 PROCEDURE — 10061 INCISION AND DRAINAGE: ICD-10-PCS | Mod: ,,, | Performed by: SURGERY

## 2020-01-11 PROCEDURE — 87075 CULTR BACTERIA EXCEPT BLOOD: CPT

## 2020-01-11 PROCEDURE — 83735 ASSAY OF MAGNESIUM: CPT

## 2020-01-11 PROCEDURE — 63600175 PHARM REV CODE 636 W HCPCS: Performed by: SURGERY

## 2020-01-11 PROCEDURE — 63600175 PHARM REV CODE 636 W HCPCS: Performed by: INTERNAL MEDICINE

## 2020-01-11 PROCEDURE — 87147 CULTURE TYPE IMMUNOLOGIC: CPT | Mod: 59

## 2020-01-11 PROCEDURE — 25000003 PHARM REV CODE 250: Performed by: SURGERY

## 2020-01-11 PROCEDURE — 87205 SMEAR GRAM STAIN: CPT

## 2020-01-11 PROCEDURE — 12000002 HC ACUTE/MED SURGE SEMI-PRIVATE ROOM

## 2020-01-11 PROCEDURE — 80202 ASSAY OF VANCOMYCIN: CPT

## 2020-01-11 PROCEDURE — 25000003 PHARM REV CODE 250

## 2020-01-11 PROCEDURE — 87186 SC STD MICRODIL/AGAR DIL: CPT

## 2020-01-11 RX ORDER — LIDOCAINE HYDROCHLORIDE 20 MG/ML
10 INJECTION, SOLUTION EPIDURAL; INFILTRATION; INTRACAUDAL; PERINEURAL ONCE
Status: COMPLETED | OUTPATIENT
Start: 2020-01-11 | End: 2020-01-11

## 2020-01-11 RX ORDER — HYDROMORPHONE HYDROCHLORIDE 1 MG/ML
1 INJECTION, SOLUTION INTRAMUSCULAR; INTRAVENOUS; SUBCUTANEOUS EVERY 6 HOURS PRN
Status: DISCONTINUED | OUTPATIENT
Start: 2020-01-11 | End: 2020-01-11

## 2020-01-11 RX ORDER — HYDROMORPHONE HYDROCHLORIDE 1 MG/ML
1 INJECTION, SOLUTION INTRAMUSCULAR; INTRAVENOUS; SUBCUTANEOUS EVERY 4 HOURS PRN
Status: DISCONTINUED | OUTPATIENT
Start: 2020-01-11 | End: 2020-01-13

## 2020-01-11 RX ORDER — HYDROMORPHONE HYDROCHLORIDE 1 MG/ML
0.5 INJECTION, SOLUTION INTRAMUSCULAR; INTRAVENOUS; SUBCUTANEOUS EVERY 6 HOURS PRN
Status: DISCONTINUED | OUTPATIENT
Start: 2020-01-11 | End: 2020-01-11

## 2020-01-11 RX ORDER — LIDOCAINE HYDROCHLORIDE 20 MG/ML
INJECTION, SOLUTION EPIDURAL; INFILTRATION; INTRACAUDAL; PERINEURAL
Status: COMPLETED
Start: 2020-01-11 | End: 2020-01-11

## 2020-01-11 RX ADMIN — DOXYCYCLINE HYCLATE 100 MG: 100 CAPSULE ORAL at 08:01

## 2020-01-11 RX ADMIN — OXYCODONE HYDROCHLORIDE 15 MG: 5 TABLET ORAL at 01:01

## 2020-01-11 RX ADMIN — LACTOBACILLUS TAB 1 TABLET: TAB at 06:01

## 2020-01-11 RX ADMIN — MUPIROCIN: 20 OINTMENT TOPICAL at 10:01

## 2020-01-11 RX ADMIN — LIDOCAINE HYDROCHLORIDE 200 MG: 20 INJECTION, SOLUTION EPIDURAL; INFILTRATION; INTRACAUDAL; PERINEURAL at 12:01

## 2020-01-11 RX ADMIN — METOPROLOL SUCCINATE 50 MG: 50 TABLET, FILM COATED, EXTENDED RELEASE ORAL at 08:01

## 2020-01-11 RX ADMIN — HYDROMORPHONE HYDROCHLORIDE 1 MG: 1 INJECTION, SOLUTION INTRAMUSCULAR; INTRAVENOUS; SUBCUTANEOUS at 09:01

## 2020-01-11 RX ADMIN — VENLAFAXINE HYDROCHLORIDE 150 MG: 150 CAPSULE, EXTENDED RELEASE ORAL at 08:01

## 2020-01-11 RX ADMIN — HYDRALAZINE HYDROCHLORIDE 50 MG: 25 TABLET, FILM COATED ORAL at 08:01

## 2020-01-11 RX ADMIN — OXYCODONE HYDROCHLORIDE 15 MG: 5 TABLET ORAL at 08:01

## 2020-01-11 RX ADMIN — HYDROMORPHONE HYDROCHLORIDE 0.5 MG: 1 INJECTION, SOLUTION INTRAMUSCULAR; INTRAVENOUS; SUBCUTANEOUS at 12:01

## 2020-01-11 RX ADMIN — VANCOMYCIN HYDROCHLORIDE 1750 MG: 1 INJECTION, POWDER, LYOPHILIZED, FOR SOLUTION INTRAVENOUS at 06:01

## 2020-01-11 RX ADMIN — GABAPENTIN 600 MG: 300 CAPSULE ORAL at 08:01

## 2020-01-11 RX ADMIN — MUPIROCIN 1 G: 20 OINTMENT TOPICAL at 10:01

## 2020-01-11 RX ADMIN — QUETIAPINE 50 MG: 25 TABLET ORAL at 08:01

## 2020-01-11 RX ADMIN — GABAPENTIN 100 MG: 100 CAPSULE ORAL at 04:01

## 2020-01-11 RX ADMIN — ONDANSETRON HYDROCHLORIDE 4 MG: 2 INJECTION, SOLUTION INTRAMUSCULAR; INTRAVENOUS at 11:01

## 2020-01-11 RX ADMIN — HYDROMORPHONE HYDROCHLORIDE 1 MG: 1 INJECTION, SOLUTION INTRAMUSCULAR; INTRAVENOUS; SUBCUTANEOUS at 05:01

## 2020-01-11 RX ADMIN — LIDOCAINE HYDROCHLORIDE: 20 INJECTION, SOLUTION EPIDURAL; INFILTRATION; INTRACAUDAL; PERINEURAL at 12:01

## 2020-01-11 RX ADMIN — OXYCODONE HYDROCHLORIDE 15 MG: 5 TABLET ORAL at 07:01

## 2020-01-11 RX ADMIN — MUPIROCIN: 20 OINTMENT TOPICAL at 08:01

## 2020-01-11 RX ADMIN — DOXYCYCLINE HYCLATE 100 MG: 100 CAPSULE ORAL at 10:01

## 2020-01-11 NOTE — PROGRESS NOTES
"Consult Note  Infectious Disease    Reason for Consult:  Back abscess    HPI: Orlando Treadwell is a69 y.o. male known to me from prior consultation in June of 2019 for MRSA abscess of bilateral upper extremities with bacteremia.  He and his wife both have had skin infections this past week, and she required admission and incision and drainage Heart.  MRSA was isolated.  During the same time he had increasing pain tenderness and redness of the right suprascapular area.  He denied any trauma, some bases cyst or traumatizing this area himself.  He saw his primary care physician on January 6 who performed some laboratory studies and gave him oral doxycycline.  His creatinine increased to 2.35 and he was called and asked to come to the hospital.  By that time his creatinine was 1.2.  He had been taking a fair amount of ibuprofen at home for a persistent headache.  He has had no fever, chills, sweats, but was admitted to the hospital yesterday and placed on vancomycin and Zosyn.  A culture of the right upper back abscess was submitted. He complains of burning pain in the area of the abscess/cellulitis.     1/10 : he feels better, afebrile. Less pain and tenderness. Surgery consult noted. He still has a lot of redness and induration. There seems to be a secondary abscess becoming more evident.   1/11: agitated  By waiting NPO for procedure and nauseated by taking doxy on an empty stomach. He reports that the back abscess drained copiously last night. The fluctuant area I palpated was seen on US as a "shadowing" without discernible fluid collection. Cellulitis is improved but he is still exquisitely tender medial to original opening.     EXAM & DIAGNOSTICS REVIEWED:   Vitals:     Temp:  [97.8 °F (36.6 °C)-99.3 °F (37.4 °C)]   Temp: 99.3 °F (37.4 °C) (01/11/20 1100)  Pulse: 82 (01/11/20 1100)  Resp: 18 (01/11/20 1100)  BP: (!) 141/79 (01/11/20 1100)  SpO2: (!) 93 % (01/11/20 1100)  No intake or output data in the 24 hours " ending 01/11/20 1219    General:  In NAD. Alert and attentive, cooperative,un comfortable  Eyes:  Anicteric,  , EOMI  ENT:     Neck:  supple,    Lungs:    Heart:     Abd:   .  :  Voids     Musc:  Joints without effusion, swelling, erythema, synovitis, muscle wasting.   Skin:  No rashes though he does have some neurotic excoriations on arms and this is from doing so inadvertently at night ..   Wound: Right suprascapular area has a small palpable abscess from which pus drained last night.  There is persistent cellulitis medial to this and there is a tender fluctuant area nearer the midline. I marked it.   Neuro:  Alert, attentive, speech fluent, face symmetric, moves all extremities, no focal weakness. Ambulatory  Psych:   agitated cooperative     Extrem:   VAD:    Peripheral   Isolation:  Contact    Lines/Tubes/Drains:    General Labs reviewed:  Recent Labs   Lab 01/09/20  0619 01/10/20  0605 01/11/20  0533   WBC 13.05* 12.11 11.11   HGB 11.6* 12.0* 12.0*   HCT 36.4* 38.4* 37.9*    317 319       Recent Labs   Lab 01/09/20  0619 01/10/20  0605 01/11/20  0532   * 139 137   K 3.5 4.2 3.8   CL 97 100 100   CO2 27 29 26   BUN 24* 17 14   CREATININE 1.1 1.2 1.0   CALCIUM 8.9 9.3 8.9   PROT 7.1 7.4 7.3   BILITOT 0.9 0.7 1.0   ALKPHOS 43* 46* 48*   ALT 27 37 63*   AST 25 35 50*           Micro:  Microbiology Results (last 7 days)     Procedure Component Value Units Date/Time    Blood culture x two cultures. Draw prior to antibiotics. [[id number]] Collected:  01/08/20 1240    Order Status:  Completed Specimen:  Blood from Peripheral, Antecubital, Right Updated:  01/10/20 1432     Blood Culture, Routine No Growth to date      No Growth to date      No Growth to date    Narrative:       Aerobic and anaerobic    Blood culture x two cultures. Draw prior to antibiotics. [[id number]] Collected:  01/08/20 1246    Order Status:  Completed Specimen:  Blood from Peripheral, Antecubital, Left Updated:  01/10/20 1432      Blood Culture, Routine No Growth to date      No Growth to date      No Growth to date    Narrative:       Aerobic and anaerobic    Aerobic culture [[id number]]  (Abnormal)  (Susceptibility) Collected:  01/08/20 1356    Order Status:  Completed Specimen:  Abscess from Back Updated:  01/10/20 0834     Aerobic Bacterial Culture METHICILLIN RESISTANT STAPHYLOCOCCUS AUREUS  Few  Results called to and read back by: Yolanda Tripathi RN on [address]  01/09/2020  12:45 by DRP         Abscess is growing Staph aureus  Imaging Reviewed:   CXR      US There is no focal fluid collection in the area of concern in the upper back.  There is a shadowing area within the right upper back which was marked per doctor's job air.  The musculature is indistinct within this region.    Cardiology:    IMPRESSION & PLAN   1. Right upper back abscess, likely MRSA (wife had abscess requiring admission in the last week), sensitive to doxycycline, improving, but multiloculated.  2 History of MRSA abscess with bacteremia 6/2019  3. Anxiety  4. Acute kidney injury, resolved, likely from nonsteroidals taken for his headache      Recommendations:  Vancomycin and po doxy,    Surgical follow up  Will need generous dose of opiates for bedside procedure, as he is opiate tolerant.        hibiclens and decolonization

## 2020-01-11 NOTE — PROCEDURES
"Orlando Treadwell is a 69 y.o. male patient.    Temp: 99.6 °F (37.6 °C) (01/11/20 1600)  Pulse: 95 (01/11/20 1600)  Resp: 16 (01/11/20 1600)  BP: 122/72 (01/11/20 1600)  SpO2: 95 % (01/11/20 1600)  Weight: 104.3 kg (230 lb) (01/08/20 1700)  Height: 5' 6" (167.6 cm) (01/08/20 1700)       Incision and Drainage  Date/Time: 1/11/2020 5:31 PM  Location procedure was performed: [address] SURGICAL UNIT  Performed by: Armani Jensen MD  Authorized by: Armani Jensen MD   Pre-operative diagnosis: abscess  Post-operative diagnosis: abscess  Consent Done: Yes  Consent: Verbal consent obtained. Written consent not obtained.  Risks and benefits: risks, benefits and alternatives were discussed  Consent given by: patient  Patient understanding: patient states understanding of the procedure being performed  Patient consent: the patient's understanding of the procedure matches consent given  Procedure consent: procedure consent matches procedure scheduled  Relevant documents: relevant documents present and verified  Test results: test results available and properly labeled  Site marked: the operative site was marked  Patient identity confirmed: name and verbally with patient  Time out: Immediately prior to procedure a "time out" was called to verify the correct patient, procedure, equipment, support staff and site/side marked as required.  Type: abscess  Body area: trunk  Location details: back  Anesthesia: local infiltration    Anesthesia:  Local anesthesia used: yes  Local Anesthetic: lidocaine 2% with epinephrine  Anesthetic total: 20 mL  Patient sedated: no  Description of findings: large abscess with tracking   Scalpel size: 11  Incision type: single straight  Complexity: complex  Drainage: pus  Drainage amount: copious  Wound treatment: incision and  wound left open  Technical procedures used: 3 straight incisions used totalling aproximately 15 cm in length  Complications: No  Estimated blood loss (mL): " 50  Specimens: Yes  Implants: No  Patient tolerance: Patient tolerated the procedure well with no immediate complications  Comments: Cultures sent wound packed  Multiple incisions used to drain abscess          Armani Jensen  1/11/2020

## 2020-01-11 NOTE — PROGRESS NOTES
[facility]  General Surgery  Progress Note    Subjective:     Interval History: Patient states back is little more tender. He has been afebrile.     Post-Op Info:  Procedure(s) (LRB):  INCISION AND DRAINAGE back (N/A)   Day of Surgery      Medications:  Continuous Infusions:  Scheduled Meds:   aspirin  325 mg Oral Daily    doxycycline  100 mg Oral Q12H    enoxaparin  40 mg Subcutaneous Daily    gabapentin  100 mg Oral BID    gabapentin  600 mg Oral QHS    hydrALAZINE  50 mg Oral Q12H    Lactobacillus acidoph-L.bulgar  1 tablet Oral TID WM    metoprolol succinate  50 mg Oral QHS    mupirocin  1 g Topical (Top) BID    mupirocin   Nasal BID    pantoprazole  40 mg Oral Daily    polyethylene glycol  17 g Oral Daily    QUEtiapine  50 mg Oral QHS    vancomycin (VANCOCIN) IVPB  1,750 mg Intravenous Q18H    vancomycin (VANCOCIN) IVPB  500 mg Intravenous Once    venlafaxine  150 mg Oral QHS     PRN Meds:acetaminophen, acetaminophen, dextrose 50%, glucose, glucose, ondansetron, oxyCODONE, promethazine (PHENERGAN) IVPB, sodium chloride 0.9%, traMADol, Pharmacy to dose Vancomycin consult **AND** vancomycin - pharmacy to dose     Objective:     Vital Signs (Most Recent):  Temp: 98.7 °F (37.1 °C) (01/10/20 2031)  Pulse: 82 (01/10/20 2031)  Resp: 18 (01/10/20 2031)  BP: (!) 157/80 (01/10/20 2031)  SpO2: 97 % (01/10/20 2031) Vital Signs (24h Range):  Temp:  [97.8 °F (36.6 °C)-99.5 °F (37.5 °C)] 98.7 °F (37.1 °C)  Pulse:  [] 82  Resp:  [18-19] 18  SpO2:  [95 %-97 %] 97 %  BP: (133-173)/(77-83) 157/80     No intake or output data in the 24 hours ending 01/10/20 7819    Physical Exam   Constitutional: He is oriented to person, place, and time.   Pulmonary/Chest: Effort normal. No respiratory distress.   Neurological: He is alert and oriented to person, place, and time.   Skin:        The upper back is tender. Cellulitis involves the upper mid to right upper back. There is now some involved  fluctuance.        Significant Labs:  CBC:   Recent Labs   Lab 01/10/20  0605   WBC 12.11   RBC 4.50*   HGB 12.0*   HCT 38.4*      MCV 85   MCH 26.7*   MCHC 31.3*     CMP:   Recent Labs   Lab 01/10/20  0605      CALCIUM 9.3   ALBUMIN 2.9*   PROT 7.4      K 4.2   CO2 29      BUN 17   CREATININE 1.2   ALKPHOS 46*   ALT 37   AST 35   BILITOT 0.7         Assessment/Plan:     Active Diagnoses:    Diagnosis Date Noted POA    PRINCIPAL PROBLEM:  MRSA infection, with history of MRSA infections and bactermeia in the past [A49.02] 01/08/2020 Yes    Abscess [L02.91] 01/09/2020 Yes    Chronic narcotic use [F11.90] 01/08/2020 Yes    Hypertension [I10] 01/08/2020 Yes      Problems Resolved During this Admission:     -Exam consistent with abscess. I and D scheduled.   -Patient ate just before procedure. Will cancel tonight and reschedule.      Edgard Hill III, MD  General Surgery  formerly [facility]

## 2020-01-11 NOTE — PROGRESS NOTES
Pt reported drainage coming from wound. Noted medium amount of yellowish drain to pillow case, bedding and gown. Site cleansed with wound care wash.     Pt encouraged to take surgical bath. IV site covered with non-porous barrier. Beddings changed. Wound site covered with 4x4 gauze and secured with paper tape.    Instructed pt to remain in bed until surgical tech arrived to pick him for his procedure. Urinal provided. Verbalized understanding.

## 2020-01-11 NOTE — PROGRESS NOTES
[facility]  Progress Note    Patient Name: Orlando Treadwell  MRN: [id number]  Patient Class: IP- Inpatient   Admission Date: 1/8/2020  Length of Stay: 2 days  Attending Physician: Mia Nuñez MD  Primary Care Provider: Kleber Worthy III, MD        Subjective:     Principal Problem:MRSA infection        HPI:  Year old white male with known past medical history of hypertension, pre diabetes, PTSD, chronic pain on opioids in outpatient setting, history of cellulitis and soft tissue infection with MRSA (last admission 6/3/19-6/7/19 with MRSA wound and bacteremia), presented to ER on 01/08/2020 with complaint of pain, redness and concern for MRSA abscess on the right upper back.  Patient informed that his wife was recently discharged yesterday from the hospital for MRSA wound in her pubic area.  Patient reports he has a history of MRSA infection and has been using Hibiclens at home.  Patient does report that he has PTSD and he scratches his skin at night when he is not aware and that is how he has all the excoriation marks and the healed scars from the previous small wounds on his skin  Currently patient denies fever, chills, weakness.  Reports he was seen by Dr. Deacon stevenson before yesterday on 01/06/2020, was started on doxycycline oral and had blood work done and he was informed today that his kidney function has declined markedly and he needs to come to the ER today.   In the ER patient WBC count was 15.4 H&H 13.5/42.9, platelet 341 CMP showed sodium 135 potassium 4.1 BUN 31, creatinine 1.2 with GFR greater than 60.  I did compared his levels from 01/06/2020 which showed BUN and creatinine of 43/2.35 with GFR of 27  Discussed with patient that his BUN creatinine has improved and the GFR is normal today but the concern for MRSA bacteremia still there and we would wait till his blood cultures comes back, we will consult Dr. Hurst and if needed will consult surgery for  I&D  Patient specifically was concerned about his pain medication from home reporting he takes his medicine a certain way.  Discussed with patient that we will continue his medication as prescribed by his physician/home medications.    I did reviewed patient passed admission with this.  Noted he was admitted 06/03  at [facility] with MRSA sepsis right upper extremity and small abscess left upper extremity with bacteremia.  Repeat blood cultures after 24-48 hours were negative.  Patient was discharged on 06/07/2019 on 2 weeks of IV daptomycin    Overview/Hospital Course:  No notes on file    Interval History: he is very angry that he is NPO for a procedure that did not happen yesterday.   He had increased drainage from the abscess yesteray.    Review of Systems   HENT: Negative for congestion and sore throat.    Cardiovascular: Negative for chest pain and palpitations.   Gastrointestinal: Negative for abdominal pain, constipation, diarrhea, nausea and vomiting.   Genitourinary: Negative for dysuria, hematuria and urgency.   Skin: Positive for wound (area of induration on right upper back with central drainage.). Negative for rash.     Objective:     Vital Signs (Most Recent):  Temp: 99.3 °F (37.4 °C) (01/11/20 1100)  Pulse: 82 (01/11/20 1100)  Resp: 18 (01/11/20 1100)  BP: (!) 141/79 (01/11/20 1100)  SpO2: (!) 93 % (01/11/20 1100) Vital Signs (24h Range):  Temp:  [98.5 °F (36.9 °C)-99.3 °F (37.4 °C)] 99.3 °F (37.4 °C)  Pulse:  [72-84] 82  Resp:  [18-19] 18  SpO2:  [93 %-99 %] 93 %  BP: (117-173)/(59-85) 141/79     Weight: 104.3 kg (230 lb)  Body mass index is 37.12 kg/m².  No intake or output data in the 24 hours ending 01/11/20 1351   Physical Exam   Constitutional: He is oriented to person, place, and time. He appears well-developed and well-nourished.   HENT:   Mouth/Throat: Oropharynx is clear and moist.   Eyes: Pupils are equal, round, and reactive to light. EOM are normal.   Cardiovascular: Normal rate, regular  rhythm and normal heart sounds.   No murmur heard.  Pulmonary/Chest: Effort normal and breath sounds normal. No respiratory distress. He has no wheezes. He has no rales.   Abdominal: Soft. Bowel sounds are normal. There is no tenderness.   Musculoskeletal: Normal range of motion.   Lymphadenopathy:     He has no cervical adenopathy.   Neurological: He is alert and oriented to person, place, and time. No cranial nerve deficit.   Skin: Skin is warm. Capillary refill takes less than 2 seconds.   On right upper back, area of 3cm x 5cm with peripheral induration.    Nursing note and vitals reviewed.      Significant Labs:   BMP:   Recent Labs   Lab 01/11/20  0532   GLU 93      K 3.8      CO2 26   BUN 14   CREATININE 1.0   CALCIUM 8.9   MG 1.8     CBC:   Recent Labs   Lab 01/10/20  0605 01/11/20  0533   WBC 12.11 11.11   HGB 12.0* 12.0*   HCT 38.4* 37.9*    319     Magnesium:   Recent Labs   Lab 01/10/20  0605 01/11/20  0532   MG 1.9 1.8       Significant Imaging: U/S: I have reviewed all pertinent results/findings within the past 24 hours and my personal findings are:  no sonographic evidence of abscess noted on soft tissue ultrasound.       Assessment/Plan:      * MRSA infection, with history of MRSA infections and bactermeia in the past  Bedside incision and drainage today.   Continue iv antibiotics with vancomycin and PO doxycycline per ID recs  Continue Contact precautions        Abscess        Hypertension  Continue Metoprolol, Hydralazine 50 mg b.i.d.   Will resume losartan. HCTZ on hold      MRSA bacteremia  Highly suspected of MRSA bacteremia  Blood cultures collected  Noted patient was treated for 2 more weeks with IV daptomycin upon discharge on 06/07/2019      Chronic narcotic use  continue oxycodone as prescribed as 15 mg Q i.d.    Will also continue his tramadol as prescribed      VTE Risk Mitigation (From admission, onward)         Ordered     IP VTE HIGH RISK PATIENT  Once      01/08/20  1913     enoxaparin injection 40 mg  Daily      01/08/20 1510                      Mia Nuñez MD  Department of Hospital Medicine   Replaced by [facility]

## 2020-01-11 NOTE — ASSESSMENT & PLAN NOTE
Bedside incision and drainage today.   Continue iv antibiotics with vancomycin and PO doxycycline per ID recs  Continue Contact precautions

## 2020-01-11 NOTE — SUBJECTIVE & OBJECTIVE
Interval History: he is very angry that he is NPO for a procedure that did not happen yesterday.   He had increased drainage from the abscess yesteray.    Review of Systems   HENT: Negative for congestion and sore throat.    Cardiovascular: Negative for chest pain and palpitations.   Gastrointestinal: Negative for abdominal pain, constipation, diarrhea, nausea and vomiting.   Genitourinary: Negative for dysuria, hematuria and urgency.   Skin: Positive for wound (area of induration on right upper back with central drainage.). Negative for rash.     Objective:     Vital Signs (Most Recent):  Temp: 99.3 °F (37.4 °C) (01/11/20 1100)  Pulse: 82 (01/11/20 1100)  Resp: 18 (01/11/20 1100)  BP: (!) 141/79 (01/11/20 1100)  SpO2: (!) 93 % (01/11/20 1100) Vital Signs (24h Range):  Temp:  [98.5 °F (36.9 °C)-99.3 °F (37.4 °C)] 99.3 °F (37.4 °C)  Pulse:  [72-84] 82  Resp:  [18-19] 18  SpO2:  [93 %-99 %] 93 %  BP: (117-173)/(59-85) 141/79     Weight: 104.3 kg (230 lb)  Body mass index is 37.12 kg/m².  No intake or output data in the 24 hours ending 01/11/20 1351   Physical Exam   Constitutional: He is oriented to person, place, and time. He appears well-developed and well-nourished.   HENT:   Mouth/Throat: Oropharynx is clear and moist.   Eyes: Pupils are equal, round, and reactive to light. EOM are normal.   Cardiovascular: Normal rate, regular rhythm and normal heart sounds.   No murmur heard.  Pulmonary/Chest: Effort normal and breath sounds normal. No respiratory distress. He has no wheezes. He has no rales.   Abdominal: Soft. Bowel sounds are normal. There is no tenderness.   Musculoskeletal: Normal range of motion.   Lymphadenopathy:     He has no cervical adenopathy.   Neurological: He is alert and oriented to person, place, and time. No cranial nerve deficit.   Skin: Skin is warm. Capillary refill takes less than 2 seconds.   On right upper back, area of 3cm x 5cm with peripheral induration.    Nursing note and vitals  reviewed.      Significant Labs:   BMP:   Recent Labs   Lab 01/11/20  0532   GLU 93      K 3.8      CO2 26   BUN 14   CREATININE 1.0   CALCIUM 8.9   MG 1.8     CBC:   Recent Labs   Lab 01/10/20  0605 01/11/20  0533   WBC 12.11 11.11   HGB 12.0* 12.0*   HCT 38.4* 37.9*    319     Magnesium:   Recent Labs   Lab 01/10/20  0605 01/11/20  0532   MG 1.9 1.8       Significant Imaging: U/S: I have reviewed all pertinent results/findings within the past 24 hours and my personal findings are:  no sonographic evidence of abscess noted on soft tissue ultrasound.

## 2020-01-11 NOTE — NURSING
"Patient is very frustrated. He is anxious and concerned about the possibility of having an incision and drainage today. He is angry that he is NPO and I do not have a time for the procedure. He is also angry we do not know when the physician will round. He states we have to "do better" because he does not want to sit here all day and not have anything on his stomach. He stated if we did not get in touch with someone he would get in touch with his  if he had to. We were able to get in touch with Dr Jurado who is no on today but said that he spoke to Dr Jensen. Dr Jensen was called and he is planning to do the procedure at the bedside.   "

## 2020-01-12 LAB
ALBUMIN SERPL BCP-MCNC: 2.6 G/DL (ref 3.5–5.2)
ALP SERPL-CCNC: 40 U/L (ref 55–135)
ALT SERPL W/O P-5'-P-CCNC: 54 U/L (ref 10–44)
ANION GAP SERPL CALC-SCNC: 8 MMOL/L (ref 8–16)
AST SERPL-CCNC: 40 U/L (ref 10–40)
BASOPHILS # BLD AUTO: 0.04 K/UL (ref 0–0.2)
BASOPHILS NFR BLD: 0.4 % (ref 0–1.9)
BILIRUB SERPL-MCNC: 0.7 MG/DL (ref 0.1–1)
BUN SERPL-MCNC: 15 MG/DL (ref 8–23)
CALCIUM SERPL-MCNC: 9 MG/DL (ref 8.7–10.5)
CHLORIDE SERPL-SCNC: 103 MMOL/L (ref 95–110)
CO2 SERPL-SCNC: 29 MMOL/L (ref 23–29)
CREAT SERPL-MCNC: 1 MG/DL (ref 0.5–1.4)
DIFFERENTIAL METHOD: ABNORMAL
EOSINOPHIL # BLD AUTO: 0.2 K/UL (ref 0–0.5)
EOSINOPHIL NFR BLD: 2.2 % (ref 0–8)
ERYTHROCYTE [DISTWIDTH] IN BLOOD BY AUTOMATED COUNT: 15.8 % (ref 11.5–14.5)
EST. GFR  (AFRICAN AMERICAN): >60 ML/MIN/1.73 M^2
EST. GFR  (NON AFRICAN AMERICAN): >60 ML/MIN/1.73 M^2
GLUCOSE SERPL-MCNC: 136 MG/DL (ref 70–110)
GRAM STN SPEC: NORMAL
GRAM STN SPEC: NORMAL
HCT VFR BLD AUTO: 36.7 % (ref 40–54)
HGB BLD-MCNC: 11.3 G/DL (ref 14–18)
IMM GRANULOCYTES # BLD AUTO: 0.11 K/UL (ref 0–0.04)
IMM GRANULOCYTES NFR BLD AUTO: 1.2 % (ref 0–0.5)
LYMPHOCYTES # BLD AUTO: 1.5 K/UL (ref 1–4.8)
LYMPHOCYTES NFR BLD: 16.5 % (ref 18–48)
MAGNESIUM SERPL-MCNC: 1.9 MG/DL (ref 1.6–2.6)
MCH RBC QN AUTO: 27 PG (ref 27–31)
MCHC RBC AUTO-ENTMCNC: 30.8 G/DL (ref 32–36)
MCV RBC AUTO: 88 FL (ref 82–98)
MONOCYTES # BLD AUTO: 1 K/UL (ref 0.3–1)
MONOCYTES NFR BLD: 11.1 % (ref 4–15)
NEUTROPHILS # BLD AUTO: 6.4 K/UL (ref 1.8–7.7)
NEUTROPHILS NFR BLD: 68.6 % (ref 38–73)
NRBC BLD-RTO: 0 /100 WBC
PHOSPHATE SERPL-MCNC: 4 MG/DL (ref 2.7–4.5)
PLATELET # BLD AUTO: 267 K/UL (ref 150–350)
PMV BLD AUTO: 10.2 FL (ref 9.2–12.9)
POTASSIUM SERPL-SCNC: 4 MMOL/L (ref 3.5–5.1)
PROT SERPL-MCNC: 6.8 G/DL (ref 6–8.4)
RBC # BLD AUTO: 4.19 M/UL (ref 4.6–6.2)
SODIUM SERPL-SCNC: 140 MMOL/L (ref 136–145)
WBC # BLD AUTO: 9.28 K/UL (ref 3.9–12.7)

## 2020-01-12 PROCEDURE — 84100 ASSAY OF PHOSPHORUS: CPT

## 2020-01-12 PROCEDURE — 85025 COMPLETE CBC W/AUTO DIFF WBC: CPT

## 2020-01-12 PROCEDURE — 63600175 PHARM REV CODE 636 W HCPCS: Performed by: INTERNAL MEDICINE

## 2020-01-12 PROCEDURE — 63600175 PHARM REV CODE 636 W HCPCS: Performed by: SURGERY

## 2020-01-12 PROCEDURE — 12000002 HC ACUTE/MED SURGE SEMI-PRIVATE ROOM

## 2020-01-12 PROCEDURE — 83735 ASSAY OF MAGNESIUM: CPT

## 2020-01-12 PROCEDURE — 25000003 PHARM REV CODE 250: Performed by: INTERNAL MEDICINE

## 2020-01-12 PROCEDURE — 36415 COLL VENOUS BLD VENIPUNCTURE: CPT

## 2020-01-12 PROCEDURE — 80053 COMPREHEN METABOLIC PANEL: CPT

## 2020-01-12 RX ADMIN — DOXYCYCLINE HYCLATE 100 MG: 100 CAPSULE ORAL at 09:01

## 2020-01-12 RX ADMIN — ASPIRIN 325 MG ORAL TABLET 325 MG: 325 PILL ORAL at 09:01

## 2020-01-12 RX ADMIN — POLYETHYLENE GLYCOL 3350 17 G: 17 POWDER, FOR SOLUTION ORAL at 09:01

## 2020-01-12 RX ADMIN — MUPIROCIN: 20 OINTMENT TOPICAL at 09:01

## 2020-01-12 RX ADMIN — VANCOMYCIN HYDROCHLORIDE 1750 MG: 1 INJECTION, POWDER, LYOPHILIZED, FOR SOLUTION INTRAVENOUS at 09:01

## 2020-01-12 RX ADMIN — OXYCODONE HYDROCHLORIDE 15 MG: 5 TABLET ORAL at 09:01

## 2020-01-12 RX ADMIN — PANTOPRAZOLE SODIUM 40 MG: 40 TABLET, DELAYED RELEASE ORAL at 06:01

## 2020-01-12 RX ADMIN — MUPIROCIN 1 G: 20 OINTMENT TOPICAL at 09:01

## 2020-01-12 RX ADMIN — HYDROMORPHONE HYDROCHLORIDE 1 MG: 1 INJECTION, SOLUTION INTRAMUSCULAR; INTRAVENOUS; SUBCUTANEOUS at 01:01

## 2020-01-12 RX ADMIN — LACTOBACILLUS TAB 1 TABLET: TAB at 09:01

## 2020-01-12 RX ADMIN — HYDROMORPHONE HYDROCHLORIDE 1 MG: 1 INJECTION, SOLUTION INTRAMUSCULAR; INTRAVENOUS; SUBCUTANEOUS at 07:01

## 2020-01-12 RX ADMIN — GABAPENTIN 100 MG: 100 CAPSULE ORAL at 05:01

## 2020-01-12 RX ADMIN — LACTOBACILLUS TAB 1 TABLET: TAB at 12:01

## 2020-01-12 RX ADMIN — ENOXAPARIN SODIUM 40 MG: 100 INJECTION SUBCUTANEOUS at 05:01

## 2020-01-12 RX ADMIN — LACTOBACILLUS TAB 1 TABLET: TAB at 05:01

## 2020-01-12 RX ADMIN — QUETIAPINE 50 MG: 25 TABLET ORAL at 09:01

## 2020-01-12 RX ADMIN — GABAPENTIN 600 MG: 300 CAPSULE ORAL at 09:01

## 2020-01-12 RX ADMIN — HYDRALAZINE HYDROCHLORIDE 50 MG: 25 TABLET, FILM COATED ORAL at 09:01

## 2020-01-12 RX ADMIN — VENLAFAXINE HYDROCHLORIDE 150 MG: 150 CAPSULE, EXTENDED RELEASE ORAL at 09:01

## 2020-01-12 RX ADMIN — HYDROMORPHONE HYDROCHLORIDE 1 MG: 1 INJECTION, SOLUTION INTRAMUSCULAR; INTRAVENOUS; SUBCUTANEOUS at 12:01

## 2020-01-12 RX ADMIN — METOPROLOL SUCCINATE 50 MG: 50 TABLET, FILM COATED, EXTENDED RELEASE ORAL at 09:01

## 2020-01-12 RX ADMIN — GABAPENTIN 100 MG: 100 CAPSULE ORAL at 09:01

## 2020-01-12 NOTE — NURSING
Pt called reporting bleeding to incisional site. Noted small amount of bleed to pillow case. Dsg reinforced with abd pad and secured with tape. PRN medication administered.

## 2020-01-12 NOTE — PROGRESS NOTES
Progress Note  Gen Surg    Admit Date: 1/8/2020  Attending: Mikey  S/P: Procedure(s) (LRB):  INCISION AND DRAINAGE back (N/A)    Post-operative Day: 2 Days Post-Op    Hospital Day: 5    SUBJECTIVE:     He feels like pain is much better controlled    Pain over the wounds is improved if not resolved although he does report some soreness on the lateral aspect of the incisions     OBJECTIVE:     Vital Signs (Most Recent)  Temp: 98.8 °F (37.1 °C) (01/12/20 1223)  Pulse: (!) 56 (01/12/20 1223)  Resp: 19 (01/12/20 1223)  BP: (!) 150/88 (01/12/20 1223)  SpO2: 100 % (01/12/20 1223)    Vital Signs Range (Last 24H):  Temp:  [98.1 °F (36.7 °C)-99.6 °F (37.6 °C)]   Pulse:  [56-97]   Resp:  [16-19]   BP: (115-176)/(72-88)   SpO2:  [94 %-100 %]     I & O (Last 24H):No intake or output data in the 24 hours ending 01/12/20 1232    Scheduled medications:   aspirin  325 mg Oral Daily    doxycycline  100 mg Oral Q12H    enoxaparin  40 mg Subcutaneous Daily    gabapentin  100 mg Oral BID    gabapentin  600 mg Oral QHS    hydrALAZINE  50 mg Oral Q12H    Lactobacillus acidoph-L.bulgar  1 tablet Oral TID WM    metoprolol succinate  50 mg Oral QHS    mupirocin  1 g Topical (Top) BID    mupirocin   Nasal BID    pantoprazole  40 mg Oral Daily    polyethylene glycol  17 g Oral Daily    QUEtiapine  50 mg Oral QHS    vancomycin (VANCOCIN) IVPB  1,750 mg Intravenous Q16H    vancomycin (VANCOCIN) IVPB  500 mg Intravenous Once    venlafaxine  150 mg Oral QHS       Physical Exam:  General: no distress  Lungs:  clear to auscultation bilaterally and normal respiratory effort  Heart: regular rate and rhythm, S1, S2 normal, no murmur, rub or gallop  Abdomen: soft, non-tender non-distented; bowel sounds normal; no masses,  no organomegaly    Wound/Incision:  Three separate incisions all appear to be healing well the lower to appeared to have some pus coming from them although they do appear to be clean and dry the erythema is nearly  resolved.  There is some erythema more medial now over the home of his back and neck.  I do not feel any flocculence to suspect that this is an abscess but I will plan to watch it    Laboratory:  Labs within the past 24 hours have been reviewed.    ASSESSMENT/PLAN:       Status post I and D are abscess  Dressings changed today continue antibiotics.  Plan for daily dressing changes in monitoring during the wound.    Armani Jensen MD

## 2020-01-12 NOTE — SUBJECTIVE & OBJECTIVE
Interval History: s/p I & D yesterday. Afebrile. VSS    Review of Systems   Constitutional: Negative for diaphoresis and fever.   Respiratory: Negative for cough and shortness of breath.    Cardiovascular: Negative for chest pain.   Musculoskeletal: Positive for back pain.     Objective:     Vital Signs (Most Recent):  Temp: 98.8 °F (37.1 °C) (01/12/20 1223)  Pulse: (!) 56 (01/12/20 1223)  Resp: 19 (01/12/20 1223)  BP: (!) 150/88 (01/12/20 1223)  SpO2: 100 % (01/12/20 1223) Vital Signs (24h Range):  Temp:  [98.1 °F (36.7 °C)-99.6 °F (37.6 °C)] 98.8 °F (37.1 °C)  Pulse:  [56-97] 56  Resp:  [16-19] 19  SpO2:  [94 %-100 %] 100 %  BP: (115-176)/(72-88) 150/88     Weight: 104.3 kg (230 lb)  Body mass index is 37.12 kg/m².  No intake or output data in the 24 hours ending 01/12/20 1251   Physical Exam   Constitutional: He is oriented to person, place, and time. He appears well-developed and well-nourished.   Eyes: Conjunctivae are normal.   Pulmonary/Chest: No respiratory distress. He has no wheezes. He has no rales.   Abdominal: There is no tenderness.   Lymphadenopathy:     He has no cervical adenopathy.   Neurological: He is alert and oriented to person, place, and time. No cranial nerve deficit.   Skin: Skin is warm. Capillary refill takes less than 2 seconds.   On right upper back, dressing soaked with purulent fluid.    Nursing note and vitals reviewed.      Significant Labs:   BMP:   Recent Labs   Lab 01/12/20  0530   *      K 4.0      CO2 29   BUN 15   CREATININE 1.0   CALCIUM 9.0   MG 1.9     CBC:   Recent Labs   Lab 01/11/20  0533 01/12/20  0531   WBC 11.11 9.28   HGB 12.0* 11.3*   HCT 37.9* 36.7*    267     Magnesium:   Recent Labs   Lab 01/11/20  0532 01/12/20  0530   MG 1.8 1.9       Significant Imaging: none

## 2020-01-12 NOTE — PROGRESS NOTES
"Consult Note  Infectious Disease    Reason for Consult:  Back abscess    HPI: Orlando Treadwell is a69 y.o. male known to me from prior consultation in June of 2019 for MRSA abscess of bilateral upper extremities with bacteremia.  He and his wife both have had skin infections this past week, and she required admission and incision and drainage Heart.  MRSA was isolated.  During the same time he had increasing pain tenderness and redness of the right suprascapular area.  He denied any trauma, some bases cyst or traumatizing this area himself.  He saw his primary care physician on January 6 who performed some laboratory studies and gave him oral doxycycline.  His creatinine increased to 2.35 and he was called and asked to come to the hospital.  By that time his creatinine was 1.2.  He had been taking a fair amount of ibuprofen at home for a persistent headache.  He has had no fever, chills, sweats, but was admitted to the hospital yesterday and placed on vancomycin and Zosyn.  A culture of the right upper back abscess was submitted. He complains of burning pain in the area of the abscess/cellulitis.     1/10 : he feels better, afebrile. Less pain and tenderness. Surgery consult noted. He still has a lot of redness and induration. There seems to be a secondary abscess becoming more evident.   1/11: agitated  By waiting NPO for procedure and nauseated by taking doxy on an empty stomach. He reports that the back abscess drained copiously last night. The fluctuant area I palpated was seen on US as a "shadowing" without discernible fluid collection. Cellulitis is improved but he is still exquisitely tender medial to original opening.   1/12: s/p I and D. 3 openings and copious pus relieved by Dr. Jensen. Cellulitis is improved. Pain is improved. Ambulatory. Left arm IV infiltrating.     EXAM & DIAGNOSTICS REVIEWED:   Vitals:     Temp:  [98.1 °F (36.7 °C)-99.6 °F (37.6 °C)]   Temp: 98.4 °F (36.9 °C) (01/12/20 0823)  Pulse: " 65 (01/12/20 0823)  Resp: 18 (01/12/20 0823)  BP: 115/73 (01/12/20 0823)  SpO2: (!) 94 % (01/12/20 0416)  No intake or output data in the 24 hours ending 01/12/20 1154    General:  In NAD. Alert and attentive, cooperative ,more  comfortable  Eyes:  Anicteric,  , EOMI  ENT:     Neck:  supple,    Lungs:    Heart:     Abd:   .  :  Voids     Musc:      Skin:  No rashes  ..   Wound: S/p I and D, 3 packed wounds, examined with surgery. Most inferior still has a little cloudy drainage, but mostly bloody. Cellulitis is substantially improved  Neuro:  Alert, attentive, speech fluent, face symmetric, moves all extremities, no focal weakness. Ambulatory  Psych:   calmer, but always critical of something, cooperative     Extrem:   VAD:    Peripheral. Left arm IV infiltrating. IV stopped, Rn notified   Isolation:  Contact    Lines/Tubes/Drains:    General Labs reviewed:  Recent Labs   Lab 01/10/20  0605 01/11/20  0533 01/12/20  0531   WBC 12.11 11.11 9.28   HGB 12.0* 12.0* 11.3*   HCT 38.4* 37.9* 36.7*    319 267       Recent Labs   Lab 01/10/20  0605 01/11/20  0532 01/12/20  0530    137 140   K 4.2 3.8 4.0    100 103   CO2 29 26 29   BUN 17 14 15   CREATININE 1.2 1.0 1.0   CALCIUM 9.3 8.9 9.0   PROT 7.4 7.3 6.8   BILITOT 0.7 1.0 0.7   ALKPHOS 46* 48* 40*   ALT 37 63* 54*   AST 35 50* 40           Micro:  Microbiology Results (last 7 days)     Procedure Component Value Units Date/Time    Aerobic culture [[id number]]  (Abnormal) Collected:  01/11/20 1254    Order Status:  Completed Specimen:  Abscess from Back Updated:  01/12/20 0912     Aerobic Bacterial Culture STAPHYLOCOCCUS AUREUS  Moderate  Susceptibility pending      Gram stain [[id number]] Collected:  01/11/20 1254    Order Status:  Completed Specimen:  Abscess from Back Updated:  01/11/20 1502     Gram Stain Result Many WBC's      Few Gram positive cocci    Blood culture x two cultures. Draw prior to antibiotics. [[id number]] Collected:  01/08/20 4403     Order Status:  Completed Specimen:  Blood from Peripheral, Antecubital, Right Updated:  01/11/20 1432     Blood Culture, Routine No Growth to date      No Growth to date      No Growth to date      No Growth to date    Narrative:       Aerobic and anaerobic    Blood culture x two cultures. Draw prior to antibiotics. [[id number]] Collected:  01/08/20 1246    Order Status:  Completed Specimen:  Blood from Peripheral, Antecubital, Left Updated:  01/11/20 1432     Blood Culture, Routine No Growth to date      No Growth to date      No Growth to date      No Growth to date    Narrative:       Aerobic and anaerobic    Culture, Anaerobic [[id number]] Collected:  01/11/20 1254    Order Status:  Sent Specimen:  Abscess from Back Updated:  01/11/20 1309    Aerobic culture [[id number]]  (Abnormal)  (Susceptibility) Collected:  01/08/20 1356    Order Status:  Completed Specimen:  Abscess from Back Updated:  01/10/20 0834     Aerobic Bacterial Culture METHICILLIN RESISTANT STAPHYLOCOCCUS AUREUS  Few  Results called to and read back by: Yolanda Tripathi RN on 1 [address]  01/09/2020  12:45 by DRP         Abscess is growing Staph aureus  Imaging Reviewed:   CXR      US There is no focal fluid collection in the area of concern in the upper back.  There is a shadowing area within the right upper back which was marked per doctor's job air.  The musculature is indistinct within this region.    Cardiology:    IMPRESSION & PLAN   1. Right upper back abscess, likely MRSA (wife had abscess requiring admission in the last week), sensitive to doxycycline, improving, but multiloculated.  2 History of MRSA abscess with bacteremia 6/2019  3. Anxiety  4. Acute kidney injury, resolved, likely from nonsteroidals taken for his headache         Recommendations:  Vancomycin and po doxy,     one more day in hospital      hibiclens and decolonization

## 2020-01-12 NOTE — ASSESSMENT & PLAN NOTE
Bedside incision and drainage yesterday  Daily dressing change.   Continue iv antibiotics with vancomycin and PO doxycycline per ID recs  Continue Contact precautions

## 2020-01-12 NOTE — PROGRESS NOTES
[facility]  Progress Note    Patient Name: Orlando Treadwell  MRN: [id number]  Patient Class: IP- Inpatient   Admission Date: 1/8/2020  Length of Stay: 3 days  Attending Physician: Mia Nuñez MD  Primary Care Provider: Kleber Worthy III, MD        Subjective:     Principal Problem:MRSA infection        HPI:  Year old white male with known past medical history of hypertension, pre diabetes, PTSD, chronic pain on opioids in outpatient setting, history of cellulitis and soft tissue infection with MRSA (last admission 6/3/19-6/7/19 with MRSA wound and bacteremia), presented to ER on 01/08/2020 with complaint of pain, redness and concern for MRSA abscess on the right upper back.  Patient informed that his wife was recently discharged yesterday from the hospital for MRSA wound in her pubic area.  Patient reports he has a history of MRSA infection and has been using Hibiclens at home.  Patient does report that he has PTSD and he scratches his skin at night when he is not aware and that is how he has all the excoriation marks and the healed scars from the previous small wounds on his skin  Currently patient denies fever, chills, weakness.  Reports he was seen by Dr. Deacon stevenson before yesterday on 01/06/2020, was started on doxycycline oral and had blood work done and he was informed today that his kidney function has declined markedly and he needs to come to the ER today.   In the ER patient WBC count was 15.4 H&H 13.5/42.9, platelet 341 CMP showed sodium 135 potassium 4.1 BUN 31, creatinine 1.2 with GFR greater than 60.  I did compared his levels from 01/06/2020 which showed BUN and creatinine of 43/2.35 with GFR of 27  Discussed with patient that his BUN creatinine has improved and the GFR is normal today but the concern for MRSA bacteremia still there and we would wait till his blood cultures comes back, we will consult Dr. Hurst and if needed will consult surgery for  I&D  Patient specifically was concerned about his pain medication from home reporting he takes his medicine a certain way.  Discussed with patient that we will continue his medication as prescribed by his physician/home medications.    I did reviewed patient passed admission with this.  Noted he was admitted 06/03  at [facility] with MRSA sepsis right upper extremity and small abscess left upper extremity with bacteremia.  Repeat blood cultures after 24-48 hours were negative.  Patient was discharged on 06/07/2019 on 2 weeks of IV daptomycin    Overview/Hospital Course:  No notes on file    Interval History: s/p I & D yesterday. Afebrile. VSS    Review of Systems   Constitutional: Negative for diaphoresis and fever.   Respiratory: Negative for cough and shortness of breath.    Cardiovascular: Negative for chest pain.   Musculoskeletal: Positive for back pain.     Objective:     Vital Signs (Most Recent):  Temp: 98.8 °F (37.1 °C) (01/12/20 1223)  Pulse: (!) 56 (01/12/20 1223)  Resp: 19 (01/12/20 1223)  BP: (!) 150/88 (01/12/20 1223)  SpO2: 100 % (01/12/20 1223) Vital Signs (24h Range):  Temp:  [98.1 °F (36.7 °C)-99.6 °F (37.6 °C)] 98.8 °F (37.1 °C)  Pulse:  [56-97] 56  Resp:  [16-19] 19  SpO2:  [94 %-100 %] 100 %  BP: (115-176)/(72-88) 150/88     Weight: 104.3 kg (230 lb)  Body mass index is 37.12 kg/m².  No intake or output data in the 24 hours ending 01/12/20 1251   Physical Exam   Constitutional: He is oriented to person, place, and time. He appears well-developed and well-nourished.   Eyes: Conjunctivae are normal.   Pulmonary/Chest: No respiratory distress. He has no wheezes. He has no rales.   Abdominal: There is no tenderness.   Lymphadenopathy:     He has no cervical adenopathy.   Neurological: He is alert and oriented to person, place, and time. No cranial nerve deficit.   Skin: Skin is warm. Capillary refill takes less than 2 seconds.   On right upper back, dressing soaked with purulent fluid.    Nursing  note and vitals reviewed.      Significant Labs:   BMP:   Recent Labs   Lab 01/12/20  0530   *      K 4.0      CO2 29   BUN 15   CREATININE 1.0   CALCIUM 9.0   MG 1.9     CBC:   Recent Labs   Lab 01/11/20  0533 01/12/20  0531   WBC 11.11 9.28   HGB 12.0* 11.3*   HCT 37.9* 36.7*    267     Magnesium:   Recent Labs   Lab 01/11/20  0532 01/12/20  0530   MG 1.8 1.9       Significant Imaging: none      Assessment/Plan:      * MRSA infection, with history of MRSA infections and bactermeia in the past  Bedside incision and drainage yesterday  Daily dressing change.   Continue iv antibiotics with vancomycin and PO doxycycline per ID recs  Continue Contact precautions        Abscess        Hypertension  Continue Metoprolol, Hydralazine 50 mg b.i.d.   Will resume losartan. HCTZ on hold      MRSA bacteremia  Highly suspected of MRSA bacteremia  Blood cultures collected  Noted patient was treated for 2 more weeks with IV daptomycin upon discharge on 06/07/2019      Chronic narcotic use  continue oxycodone as prescribed as 15 mg Q i.d.    Will also continue his tramadol as prescribed      VTE Risk Mitigation (From admission, onward)         Ordered     IP VTE HIGH RISK PATIENT  Once      01/08/20 1913     enoxaparin injection 40 mg  Daily      01/08/20 1510                      Mia Nuñez MD  Department of Hospital Medicine   [facility]

## 2020-01-12 NOTE — PROGRESS NOTES
Pharmacokinetic Assessment Follow Up: IV Vancomycin    Vancomycin serum concentration assessment(s):    The trough level was drawn correctly and can be used to guide therapy at this time. The measurement is below the desired definitive target range of 15 to 20 mcg/mL.    Vancomycin Regimen Plan:    Change regimen to Vancomycin 1750 mg IV every 16 hours with next serum trough concentration measured at 16:00 prior to 4 th dose on 1-13-20     Drug levels (last 3 results):  Recent Labs   Lab Result Units 01/11/20  1829   Vancomycin-Trough ug/mL 14.4       Pharmacy will continue to follow and monitor vancomycin.    Please contact pharmacy at extension [phone] for questions regarding this assessment.    Thank you for the consult,   Bebeto Smith       Patient brief summary:  Orlando Treadwell is a 69 y.o. male initiated on antimicrobial therapy with IV Vancomycin for treatment of bacteremia    The patient's current regimen is 1750 mg Q16H    Drug Allergies:   Review of patient's allergies indicates:   Allergen Reactions    Morphine Nausea Only     Ok with nausea med.       Actual Body Weight:   104.3 KG    Renal Function:   Estimated Creatinine Clearance: 77.8 mL/min (based on SCr of 1 mg/dL).,         CBC (last 72 hours):  Recent Labs   Lab Result Units 01/09/20  0619 01/10/20  0605 01/11/20  0533   WBC K/uL 13.05* 12.11 11.11   Hemoglobin g/dL 11.6* 12.0* 12.0*   Hemoglobin A1C % 6.3*  --   --    Hematocrit % 36.4* 38.4* 37.9*   Platelets K/uL 278 317 319   Gran% % 74.0* 72.6 71.1   Lymph% % 11.8* 12.4* 14.0*   Mono% % 11.6 11.6 10.4   Eosinophil% % 1.4 1.7 2.3   Basophil% % 0.4 0.4 0.5   Differential Method  Automated Automated Automated       Metabolic Panel (last 72 hours):  Recent Labs   Lab Result Units 01/09/20  0619 01/10/20  0605 01/11/20  0532   Sodium mmol/L 134* 139 137   Potassium mmol/L 3.5 4.2 3.8   Chloride mmol/L 97 100 100   CO2 mmol/L 27 29 26   Glucose mg/dL 104 105 93   BUN, Bld mg/dL 24* 17 14    Creatinine mg/dL 1.1 1.2 1.0   Albumin g/dL 2.9* 2.9* 2.9*   Total Bilirubin mg/dL 0.9 0.7 1.0   Alkaline Phosphatase U/L 43* 46* 48*   AST U/L 25 35 50*   ALT U/L 27 37 63*   Magnesium mg/dL 1.8 1.9 1.8   Phosphorus mg/dL 4.1 3.2 3.5       Vancomycin Administrations:  vancomycin given in the last 96 hours                     vancomycin (VANCOCIN) 1,750 mg in dextrose 5 % 500 mL IVPB (mg) 1,750 mg New Bag 01/11/20 1857     1,750 mg New Bag 01/10/20 2308     1,750 mg New Bag  0733     1,750 mg New Bag 01/09/20 1100                      Microbiologic Results:  Microbiology Results (last 7 days)       Procedure Component Value Units Date/Time    Gram stain [[id number]] Collected:  01/11/20 1254    Order Status:  Completed Specimen:  Abscess from Back Updated:  01/11/20 1502     Gram Stain Result Many WBC's      Few Gram positive cocci    Blood culture x two cultures. Draw prior to antibiotics. [[id number]] Collected:  01/08/20 1240    Order Status:  Completed Specimen:  Blood from Peripheral, Antecubital, Right Updated:  01/11/20 1432     Blood Culture, Routine No Growth to date      No Growth to date      No Growth to date      No Growth to date    Narrative:       Aerobic and anaerobic    Blood culture x two cultures. Draw prior to antibiotics. [[id number]] Collected:  01/08/20 1246    Order Status:  Completed Specimen:  Blood from Peripheral, Antecubital, Left Updated:  01/11/20 1432     Blood Culture, Routine No Growth to date      No Growth to date      No Growth to date      No Growth to date    Narrative:       Aerobic and anaerobic    Culture, Anaerobic [[id number]] Collected:  01/11/20 1254    Order Status:  Sent Specimen:  Abscess from Back Updated:  01/11/20 1309    Aerobic culture [[id number]] Collected:  01/11/20 1254    Order Status:  Sent Specimen:  Abscess from Back Updated:  01/11/20 1308    Aerobic culture [[id number]]  (Abnormal)  (Susceptibility) Collected:  01/08/20 1356    Order Status:  Completed  Specimen:  Abscess from Back Updated:  01/10/20 0834     Aerobic Bacterial Culture METHICILLIN RESISTANT STAPHYLOCOCCUS AUREUS  Few  Results called to and read back by: Yolanda Tripathi RN on 1 [address]  01/09/2020  12:45 by RODRI

## 2020-01-13 PROBLEM — L02.212 ABSCESS OF BACK: Status: ACTIVE | Noted: 2020-01-08

## 2020-01-13 LAB
ALBUMIN SERPL BCP-MCNC: 2.9 G/DL (ref 3.5–5.2)
ALP SERPL-CCNC: 43 U/L (ref 55–135)
ALT SERPL W/O P-5'-P-CCNC: 61 U/L (ref 10–44)
ANION GAP SERPL CALC-SCNC: 9 MMOL/L (ref 8–16)
AST SERPL-CCNC: 40 U/L (ref 10–40)
BACTERIA BLD CULT: NORMAL
BACTERIA BLD CULT: NORMAL
BACTERIA SPEC AEROBE CULT: ABNORMAL
BASOPHILS # BLD AUTO: 0.03 K/UL (ref 0–0.2)
BASOPHILS NFR BLD: 0.3 % (ref 0–1.9)
BILIRUB SERPL-MCNC: 0.9 MG/DL (ref 0.1–1)
BUN SERPL-MCNC: 12 MG/DL (ref 8–23)
CALCIUM SERPL-MCNC: 9.3 MG/DL (ref 8.7–10.5)
CHLORIDE SERPL-SCNC: 103 MMOL/L (ref 95–110)
CO2 SERPL-SCNC: 27 MMOL/L (ref 23–29)
CREAT SERPL-MCNC: 0.9 MG/DL (ref 0.5–1.4)
DIFFERENTIAL METHOD: ABNORMAL
EOSINOPHIL # BLD AUTO: 0.3 K/UL (ref 0–0.5)
EOSINOPHIL NFR BLD: 2.9 % (ref 0–8)
ERYTHROCYTE [DISTWIDTH] IN BLOOD BY AUTOMATED COUNT: 15.6 % (ref 11.5–14.5)
EST. GFR  (AFRICAN AMERICAN): >60 ML/MIN/1.73 M^2
EST. GFR  (NON AFRICAN AMERICAN): >60 ML/MIN/1.73 M^2
GLUCOSE SERPL-MCNC: 90 MG/DL (ref 70–110)
HCT VFR BLD AUTO: 39.8 % (ref 40–54)
HGB BLD-MCNC: 12.3 G/DL (ref 14–18)
IMM GRANULOCYTES # BLD AUTO: 0.14 K/UL (ref 0–0.04)
IMM GRANULOCYTES NFR BLD AUTO: 1.5 % (ref 0–0.5)
LYMPHOCYTES # BLD AUTO: 1.5 K/UL (ref 1–4.8)
LYMPHOCYTES NFR BLD: 15.9 % (ref 18–48)
MAGNESIUM SERPL-MCNC: 1.9 MG/DL (ref 1.6–2.6)
MCH RBC QN AUTO: 26.8 PG (ref 27–31)
MCHC RBC AUTO-ENTMCNC: 30.9 G/DL (ref 32–36)
MCV RBC AUTO: 87 FL (ref 82–98)
MONOCYTES # BLD AUTO: 1 K/UL (ref 0.3–1)
MONOCYTES NFR BLD: 10.6 % (ref 4–15)
NEUTROPHILS # BLD AUTO: 6.5 K/UL (ref 1.8–7.7)
NEUTROPHILS NFR BLD: 68.8 % (ref 38–73)
NRBC BLD-RTO: 0 /100 WBC
PHOSPHATE SERPL-MCNC: 3.3 MG/DL (ref 2.7–4.5)
PLATELET # BLD AUTO: 292 K/UL (ref 150–350)
PMV BLD AUTO: 10.4 FL (ref 9.2–12.9)
POTASSIUM SERPL-SCNC: 4 MMOL/L (ref 3.5–5.1)
PROT SERPL-MCNC: 7.3 G/DL (ref 6–8.4)
RBC # BLD AUTO: 4.59 M/UL (ref 4.6–6.2)
SODIUM SERPL-SCNC: 139 MMOL/L (ref 136–145)
VANCOMYCIN TROUGH SERPL-MCNC: 20.1 UG/ML (ref 10–22)
WBC # BLD AUTO: 9.46 K/UL (ref 3.9–12.7)

## 2020-01-13 PROCEDURE — 25000003 PHARM REV CODE 250: Performed by: INTERNAL MEDICINE

## 2020-01-13 PROCEDURE — 84100 ASSAY OF PHOSPHORUS: CPT

## 2020-01-13 PROCEDURE — 63600175 PHARM REV CODE 636 W HCPCS: Performed by: INTERNAL MEDICINE

## 2020-01-13 PROCEDURE — 36415 COLL VENOUS BLD VENIPUNCTURE: CPT

## 2020-01-13 PROCEDURE — 25000003 PHARM REV CODE 250: Performed by: SURGERY

## 2020-01-13 PROCEDURE — 80053 COMPREHEN METABOLIC PANEL: CPT

## 2020-01-13 PROCEDURE — 80202 ASSAY OF VANCOMYCIN: CPT

## 2020-01-13 PROCEDURE — 63600175 PHARM REV CODE 636 W HCPCS: Performed by: SURGERY

## 2020-01-13 PROCEDURE — 83735 ASSAY OF MAGNESIUM: CPT

## 2020-01-13 PROCEDURE — 85025 COMPLETE CBC W/AUTO DIFF WBC: CPT

## 2020-01-13 PROCEDURE — 12000002 HC ACUTE/MED SURGE SEMI-PRIVATE ROOM

## 2020-01-13 RX ORDER — OXYCODONE HYDROCHLORIDE 5 MG/1
20 TABLET ORAL 4 TIMES DAILY PRN
Status: DISCONTINUED | OUTPATIENT
Start: 2020-01-13 | End: 2020-01-13

## 2020-01-13 RX ORDER — OXYCODONE HYDROCHLORIDE 5 MG/1
20 TABLET ORAL EVERY 4 HOURS PRN
Status: DISCONTINUED | OUTPATIENT
Start: 2020-01-13 | End: 2020-01-14 | Stop reason: HOSPADM

## 2020-01-13 RX ADMIN — OXYCODONE HYDROCHLORIDE 15 MG: 5 TABLET ORAL at 05:01

## 2020-01-13 RX ADMIN — VANCOMYCIN HYDROCHLORIDE 1750 MG: 1 INJECTION, POWDER, LYOPHILIZED, FOR SOLUTION INTRAVENOUS at 05:01

## 2020-01-13 RX ADMIN — METOPROLOL SUCCINATE 50 MG: 50 TABLET, FILM COATED, EXTENDED RELEASE ORAL at 09:01

## 2020-01-13 RX ADMIN — DOXYCYCLINE HYCLATE 100 MG: 100 CAPSULE ORAL at 09:01

## 2020-01-13 RX ADMIN — QUETIAPINE 50 MG: 25 TABLET ORAL at 09:01

## 2020-01-13 RX ADMIN — OXYCODONE HYDROCHLORIDE 20 MG: 5 TABLET ORAL at 11:01

## 2020-01-13 RX ADMIN — HYDRALAZINE HYDROCHLORIDE 50 MG: 25 TABLET, FILM COATED ORAL at 09:01

## 2020-01-13 RX ADMIN — ENOXAPARIN SODIUM 40 MG: 100 INJECTION SUBCUTANEOUS at 05:01

## 2020-01-13 RX ADMIN — GABAPENTIN 100 MG: 100 CAPSULE ORAL at 03:01

## 2020-01-13 RX ADMIN — OXYCODONE HYDROCHLORIDE 5 MG: 5 TABLET ORAL at 10:01

## 2020-01-13 RX ADMIN — DOXYCYCLINE HYCLATE 100 MG: 100 CAPSULE ORAL at 10:01

## 2020-01-13 RX ADMIN — POLYETHYLENE GLYCOL 3350 17 G: 17 POWDER, FOR SOLUTION ORAL at 10:01

## 2020-01-13 RX ADMIN — MUPIROCIN: 20 OINTMENT TOPICAL at 10:01

## 2020-01-13 RX ADMIN — MUPIROCIN: 20 OINTMENT TOPICAL at 09:01

## 2020-01-13 RX ADMIN — GABAPENTIN 100 MG: 100 CAPSULE ORAL at 10:01

## 2020-01-13 RX ADMIN — OXYCODONE HYDROCHLORIDE 20 MG: 5 TABLET ORAL at 03:01

## 2020-01-13 RX ADMIN — HYDRALAZINE HYDROCHLORIDE 50 MG: 25 TABLET, FILM COATED ORAL at 10:01

## 2020-01-13 RX ADMIN — HYDROMORPHONE HYDROCHLORIDE 1 MG: 1 INJECTION, SOLUTION INTRAMUSCULAR; INTRAVENOUS; SUBCUTANEOUS at 07:01

## 2020-01-13 RX ADMIN — OXYCODONE HYDROCHLORIDE 20 MG: 5 TABLET ORAL at 07:01

## 2020-01-13 RX ADMIN — LACTOBACILLUS TAB 1 TABLET: TAB at 05:01

## 2020-01-13 RX ADMIN — ASPIRIN 325 MG ORAL TABLET 325 MG: 325 PILL ORAL at 10:01

## 2020-01-13 RX ADMIN — VANCOMYCIN HYDROCHLORIDE 1750 MG: 1 INJECTION, POWDER, LYOPHILIZED, FOR SOLUTION INTRAVENOUS at 01:01

## 2020-01-13 RX ADMIN — OXYCODONE HYDROCHLORIDE 15 MG: 5 TABLET ORAL at 09:01

## 2020-01-13 RX ADMIN — PANTOPRAZOLE SODIUM 40 MG: 40 TABLET, DELAYED RELEASE ORAL at 05:01

## 2020-01-13 RX ADMIN — VENLAFAXINE HYDROCHLORIDE 150 MG: 150 CAPSULE, EXTENDED RELEASE ORAL at 09:01

## 2020-01-13 RX ADMIN — MUPIROCIN 1 G: 20 OINTMENT TOPICAL at 11:01

## 2020-01-13 RX ADMIN — MUPIROCIN 1 G: 20 OINTMENT TOPICAL at 10:01

## 2020-01-13 RX ADMIN — LACTOBACILLUS TAB 1 TABLET: TAB at 10:01

## 2020-01-13 RX ADMIN — GABAPENTIN 600 MG: 300 CAPSULE ORAL at 09:01

## 2020-01-13 NOTE — PROGRESS NOTES
"Formerly [facility], 1928–1983  Adult Nutrition   Progress Note (Initial Assessment)     SUMMARY     Recommendations  Recommendation/Intervention: 1.) Continue diet as tolerated by pt 2.)  to assist with meal selections daily   Goals: 1.) Pt to meet >75% estimated nutritional needs prior to discharge   Nutrition Goal Status: new    Reason for Assessment    Reason For Assessment: length of stay  Diagnosis: infection/sepsis  Relevant Medical History: HTN, prediabetes, PTSD    Nutrition Risk Screen    Nutrition Risk Screen: no indicators present       Incision/Site 01/13/20 1015 Right Back upper other (see comments)-Wound Image: Images linked  MST Score: 0  Have you recently lost weight without trying?: No  Weight loss score: 0  Have you been eating poorly because of a decreased appetite?: No  Appetite score: 0       Nutrition/Diet History    Patient Reported Diet/Restrictions/Preferences: general  Spiritual, Cultural Beliefs, Restoration Practices, Values that Affect Care: no  Food Allergies: NKFA  Factors Affecting Nutritional Intake: None identified at this time    Anthropometrics    Temp: 97.9 °F (36.6 °C)  Height Method: Stated  Height: 5' 6" (167.6 cm)  Height (inches): 66 in  Weight Method: Bed Scale  Weight: 104.3 kg (230 lb)  Weight (lb): 230 lb  Ideal Body Weight (IBW), Male: 142 lb  % Ideal Body Weight, Male (lb): 161.97 %  BMI (Calculated): 37.1  BMI Grade: 35 - 39.9 - obesity - grade II       Weight History:  Wt Readings from Last 10 Encounters:   01/13/20 104.3 kg (230 lb)   11/18/19 104.3 kg (230 lb)   11/13/19 107.5 kg (237 lb)   07/01/19 106.1 kg (234 lb)   06/20/19 106.1 kg (234 lb)   06/03/19 105.7 kg (233 lb 0.4 oz)   07/18/18 105.7 kg (233 lb)   09/18/17 108 kg (238 lb)   06/16/17 108 kg (238 lb)   12/16/16 108 kg (238 lb 1.6 oz)       Lab/Procedures/Meds: Pertinent Labs Reviewed  Clinical Chemistry:  Recent Labs   Lab 01/11/20  0532 01/12/20  0530 01/13/20  0545    140 139   K 3.8 4.0 4.0 "    103 103   CO2 26 29 27   GLU 93 136* 90   BUN 14 15 12   CREATININE 1.0 1.0 0.9   CALCIUM 8.9 9.0 9.3   PROT 7.3 6.8 7.3   ALBUMIN 2.9* 2.6* 2.9*   BILITOT 1.0 0.7 0.9   ALKPHOS 48* 40* 43*   AST 50* 40 40   ALT 63* 54* 61*   ANIONGAP 11 8 9   ESTGFRAFRICA >60.0 >60.0 >60.0   EGFRNONAA >60.0 >60.0 >60.0   MG 1.8 1.9 1.9   PHOS 3.5 4.0 3.3     CBC:   Recent Labs   Lab 01/13/20  0545   WBC 9.46   RBC 4.59*   HGB 12.3*   HCT 39.8*      MCV 87   MCH 26.8*   MCHC 30.9*     Inflammatory Labs:  Recent Labs   Lab 01/06/20  1509   .1*     Diabetes:  Recent Labs   Lab 01/09/20  0619   HGBA1C 6.3*       Medications: Pertinent Medications reviewed  Scheduled Meds:   aspirin  325 mg Oral Daily    doxycycline  100 mg Oral Q12H    enoxaparin  40 mg Subcutaneous Daily    gabapentin  100 mg Oral BID    gabapentin  600 mg Oral QHS    hydrALAZINE  50 mg Oral Q12H    Lactobacillus acidoph-L.bulgar  1 tablet Oral TID WM    metoprolol succinate  50 mg Oral QHS    mupirocin  1 g Topical (Top) BID    mupirocin   Nasal BID    pantoprazole  40 mg Oral Daily    polyethylene glycol  17 g Oral Daily    QUEtiapine  50 mg Oral QHS    vancomycin (VANCOCIN) IVPB  1,750 mg Intravenous Q16H    vancomycin (VANCOCIN) IVPB  500 mg Intravenous Once    venlafaxine  150 mg Oral QHS     Continuous Infusions:  PRN Meds:.acetaminophen, acetaminophen, dextrose 50%, glucose, glucose, ondansetron, oxyCODONE, promethazine (PHENERGAN) IVPB, sodium chloride 0.9%, traMADol, Pharmacy to dose Vancomycin consult **AND** vancomycin - pharmacy to dose    Estimated/Assessed Needs    Weight Used For Calorie Calculations: 104.3 kg (229 lb 15 oz)  Energy Calorie Requirements (kcal): 2733-0398 (20-25 kcal/kg)  Energy Need Method: Kcal/kg  Protein Requirements: 98 g (1.5 g/kg) per IBW  Weight Used For Protein Calculations: 65 kg (143 lb 4.8 oz)(IBW)     Estimated Fluid Requirement Method: RDA Method  RDA Method (mL): 2086       Nutrition  Prescription Ordered    Current Diet Order: regular     Evaluation of Received Nutrient/Fluid Intake    Energy Calories Required: not meeting needs  Protein Required: not meeting needs  Fluid Required: meeting needs  Tolerance: tolerating     Intake/Output Summary (Last 24 hours) at 1/13/2020 1256  Last data filed at 1/13/2020 0500  Gross per 24 hour   Intake --   Output 475 ml   Net -475 ml        % Meal Intake: 50 - 75 %    Dietitian Rounds Brief    Pt assessed 2' LOS. Noted wound care following. Pt s/p I&D abscess (1/11) per surgery. + antbx for MRSA noted. Intake not great 2' acute illness. Denies any N/V. LBM 1/9. RD obtained food prefs--will communicate to kitchen. Pt declined need of oral supplement.     Nutrition Risk    Level of Risk/Frequency of Follow-up: moderate - high       Monitor and Evaluation    Food and Nutrient Intake: energy intake, food and beverage intake  Food and Nutrient Adminstration: diet order  Physical Activity and Function: nutrition-related ADLs and IADLs  Anthropometric Measurements: weight change, weight  Biochemical Data, Medical Tests and Procedures: gastrointestinal profile, electrolyte and renal panel, glucose/endocrine profile  Nutrition-Focused Physical Findings: overall appearance     Nutrition Follow-Up    RD Follow-up?: Yes    Bri Khan RD 01/13/2020 12:56 PM

## 2020-01-13 NOTE — SUBJECTIVE & OBJECTIVE
Interval History:  Patient complains about his pain control.  System Dilaudid only relieved his pain for about 15 min.  There is scant drainage from his incisions.    Review of Systems   Constitutional: Negative for diaphoresis and fever.   Respiratory: Negative for cough and shortness of breath.    Cardiovascular: Negative for chest pain.   Musculoskeletal: Positive for back pain.     Objective:     Vital Signs (Most Recent):  Temp: 98 °F (36.7 °C) (01/13/20 0823)  Pulse: 78 (01/13/20 0823)  Resp: 19 (01/13/20 0823)  BP: (!) 161/90(nurse notified) (01/13/20 0823)  SpO2: 96 % (01/13/20 0823) Vital Signs (24h Range):  Temp:  [98 °F (36.7 °C)-99.1 °F (37.3 °C)] 98 °F (36.7 °C)  Pulse:  [56-90] 78  Resp:  [17-19] 19  SpO2:  [94 %-100 %] 96 %  BP: (135-161)/(68-90) 161/90     Weight: 104.3 kg (230 lb)  Body mass index is 37.12 kg/m².    Intake/Output Summary (Last 24 hours) at 1/13/2020 1027  Last data filed at 1/13/2020 0500  Gross per 24 hour   Intake --   Output 475 ml   Net -475 ml      Physical Exam   Constitutional: He is oriented to person, place, and time. He appears well-developed and well-nourished.   Eyes: Conjunctivae are normal.   Pulmonary/Chest: No respiratory distress. He has no wheezes. He has no rales.   Abdominal: There is no tenderness.   Lymphadenopathy:     He has no cervical adenopathy.   Neurological: He is alert and oriented to person, place, and time. No cranial nerve deficit.   Skin: Skin is warm. Capillary refill takes less than 2 seconds.   Clean and appearing incisional wound.   Nursing note and vitals reviewed.          Significant Labs:   BMP:   Recent Labs   Lab 01/13/20  0545   GLU 90      K 4.0      CO2 27   BUN 12   CREATININE 0.9   CALCIUM 9.3   MG 1.9     CBC:   Recent Labs   Lab 01/12/20  0531 01/13/20  0545   WBC 9.28 9.46   HGB 11.3* 12.3*   HCT 36.7* 39.8*    292     Magnesium:   Recent Labs   Lab 01/12/20  0530 01/13/20  0545   MG 1.9 1.9       Significant  Imaging: none

## 2020-01-13 NOTE — PROGRESS NOTES
[facility] Medicine  Progress Note    Patient Name: Orlando Treadwell  MRN: [id number]  Patient Class: IP- Inpatient   Admission Date: 1/8/2020  Length of Stay: 4 days  Attending Physician: Mia Nuñez MD  Primary Care Provider: Kleber Worthy III, MD        Subjective:     Principal Problem:Abscess of back        HPI:  Year old white male with known past medical history of hypertension, pre diabetes, PTSD, chronic pain on opioids in outpatient setting, history of cellulitis and soft tissue infection with MRSA (last admission 6/3/19-6/7/19 with MRSA wound and bacteremia), presented to ER on 01/08/2020 with complaint of pain, redness and concern for MRSA abscess on the right upper back.  Patient informed that his wife was recently discharged yesterday from the hospital for MRSA wound in her pubic area.  Patient reports he has a history of MRSA infection and has been using Hibiclens at home.  Patient does report that he has PTSD and he scratches his skin at night when he is not aware and that is how he has all the excoriation marks and the healed scars from the previous small wounds on his skin  Currently patient denies fever, chills, weakness.  Reports he was seen by Dr. Deacon stevenson before yesterday on 01/06/2020, was started on doxycycline oral and had blood work done and he was informed today that his kidney function has declined markedly and he needs to come to the ER today.   In the ER patient WBC count was 15.4 H&H 13.5/42.9, platelet 341 CMP showed sodium 135 potassium 4.1 BUN 31, creatinine 1.2 with GFR greater than 60.  I did compared his levels from 01/06/2020 which showed BUN and creatinine of 43/2.35 with GFR of 27  Discussed with patient that his BUN creatinine has improved and the GFR is normal today but the concern for MRSA bacteremia still there and we would wait till his blood cultures comes back, we will consult Dr. Hurst and if needed will consult surgery for  I&D  Patient specifically was concerned about his pain medication from home reporting he takes his medicine a certain way.  Discussed with patient that we will continue his medication as prescribed by his physician/home medications.    I did reviewed patient passed admission with this.  Noted he was admitted 06/03  at [facility] with MRSA sepsis right upper extremity and small abscess left upper extremity with bacteremia.  Repeat blood cultures after 24-48 hours were negative.  Patient was discharged on 06/07/2019 on 2 weeks of IV daptomycin    Overview/Hospital Course:  He had a bedside I and D 01/11/2020 with expression of purulent material.  He has received IV vancomycin and p.o. doxycycline throughout the course of his admission.    Interval History:  Patient complains about his pain control.  System Dilaudid only relieved his pain for about 15 min.  There is scant drainage from his incisions.    Review of Systems   Constitutional: Negative for diaphoresis and fever.   Respiratory: Negative for cough and shortness of breath.    Cardiovascular: Negative for chest pain.   Musculoskeletal: Positive for back pain.     Objective:     Vital Signs (Most Recent):  Temp: 98 °F (36.7 °C) (01/13/20 0823)  Pulse: 78 (01/13/20 0823)  Resp: 19 (01/13/20 0823)  BP: (!) 161/90(nurse notified) (01/13/20 0823)  SpO2: 96 % (01/13/20 0823) Vital Signs (24h Range):  Temp:  [98 °F (36.7 °C)-99.1 °F (37.3 °C)] 98 °F (36.7 °C)  Pulse:  [56-90] 78  Resp:  [17-19] 19  SpO2:  [94 %-100 %] 96 %  BP: (135-161)/(68-90) 161/90     Weight: 104.3 kg (230 lb)  Body mass index is 37.12 kg/m².    Intake/Output Summary (Last 24 hours) at 1/13/2020 1027  Last data filed at 1/13/2020 0500  Gross per 24 hour   Intake --   Output 475 ml   Net -475 ml      Physical Exam   Constitutional: He is oriented to person, place, and time. He appears well-developed and well-nourished.   Eyes: Conjunctivae are normal.   Pulmonary/Chest: No respiratory distress. He  has no wheezes. He has no rales.   Abdominal: There is no tenderness.   Lymphadenopathy:     He has no cervical adenopathy.   Neurological: He is alert and oriented to person, place, and time. No cranial nerve deficit.   Skin: Skin is warm. Capillary refill takes less than 2 seconds.   Clean and appearing incisional wound.   Nursing note and vitals reviewed.          Significant Labs:   BMP:   Recent Labs   Lab 01/13/20  0545   GLU 90      K 4.0      CO2 27   BUN 12   CREATININE 0.9   CALCIUM 9.3   MG 1.9     CBC:   Recent Labs   Lab 01/12/20  0531 01/13/20  0545   WBC 9.28 9.46   HGB 11.3* 12.3*   HCT 36.7* 39.8*    292     Magnesium:   Recent Labs   Lab 01/12/20  0530 01/13/20  0545   MG 1.9 1.9       Significant Imaging: none      Assessment/Plan:      * MRSA  Abscess of back  Daily dressing change.   Continue iv antibiotics with vancomycin and PO doxycycline per ID recs  Continue Contact precautions  Anticipate home tomorrow on p.o. doxycycline.      Chronic narcotic use  Increased oxycodone to 20 mg Q i.d.prn  Discontinue dilaudid      Hypertension  Continue Metoprolol, losartan,  Hydralazine 50 mg b.i.d.   Will resume HCTZ       MRSA bacteremia          VTE Risk Mitigation (From admission, onward)         Ordered     IP VTE HIGH RISK PATIENT  Once      01/08/20 1913     enoxaparin injection 40 mg  Daily      01/08/20 1510                      Mia Nuñez MD  Department of Hospital Medicine   [facility]

## 2020-01-13 NOTE — PROGRESS NOTES
Progress Note  Gen Surg    Admit Date: 1/8/2020  Attending: Mikey  S/P: Procedure(s) (LRB):  INCISION AND DRAINAGE back (N/A)    Post-operative Day: 3 Days Post-Op    Hospital Day: 6    SUBJECTIVE:     Doing well, but still having some pain on right shoulder     OBJECTIVE:     Vital Signs (Most Recent)  Temp: 98 °F (36.7 °C) (01/13/20 0823)  Pulse: 78 (01/13/20 0823)  Resp: 19 (01/13/20 0823)  BP: (!) 161/90(nurse notified) (01/13/20 0823)  SpO2: 96 % (01/13/20 0823)    Vital Signs Range (Last 24H):  Temp:  [98 °F (36.7 °C)-99.1 °F (37.3 °C)]   Pulse:  [56-90]   Resp:  [17-19]   BP: (135-161)/(68-90)   SpO2:  [94 %-100 %]     I & O (Last 24H):    Intake/Output Summary (Last 24 hours) at 1/13/2020 1051  Last data filed at 1/13/2020 0500  Gross per 24 hour   Intake --   Output 475 ml   Net -475 ml       Scheduled medications:   aspirin  325 mg Oral Daily    doxycycline  100 mg Oral Q12H    enoxaparin  40 mg Subcutaneous Daily    gabapentin  100 mg Oral BID    gabapentin  600 mg Oral QHS    hydrALAZINE  50 mg Oral Q12H    Lactobacillus acidoph-L.bulgar  1 tablet Oral TID WM    metoprolol succinate  50 mg Oral QHS    mupirocin  1 g Topical (Top) BID    mupirocin   Nasal BID    pantoprazole  40 mg Oral Daily    polyethylene glycol  17 g Oral Daily    QUEtiapine  50 mg Oral QHS    vancomycin (VANCOCIN) IVPB  1,750 mg Intravenous Q16H    vancomycin (VANCOCIN) IVPB  500 mg Intravenous Once    venlafaxine  150 mg Oral QHS       Physical Exam:  General: no distress  Lungs:  clear to auscultation bilaterally and normal respiratory effort  Heart: regular rate and rhythm, S1, S2 normal, no murmur, rub or gallop  Abdomen: soft, non-tender non-distented; bowel sounds normal; no masses,  no organomegaly    Wound/Incision:  clean, dry, intact- erythema resolved    Laboratory:  Labs within the past 24 hours have been reviewed.    ASSESSMENT/PLAN:     Abscess resolving, erythema gone- daily wound care, pain control,  antibiotics, possible home tomorrow    Armani Jensen MD

## 2020-01-13 NOTE — PROGRESS NOTES
"Consult Note  Infectious Disease    Reason for Consult:  Back abscess    HPI: Orlando Treadwell is a69 y.o. male known to me from prior consultation in June of 2019 for MRSA abscess of bilateral upper extremities with bacteremia.  He and his wife both have had skin infections this past week, and she required admission and incision and drainage Heart.  MRSA was isolated.  During the same time he had increasing pain tenderness and redness of the right suprascapular area.  He denied any trauma, some bases cyst or traumatizing this area himself.  He saw his primary care physician on January 6 who performed some laboratory studies and gave him oral doxycycline.  His creatinine increased to 2.35 and he was called and asked to come to the hospital.  By that time his creatinine was 1.2.  He had been taking a fair amount of ibuprofen at home for a persistent headache.  He has had no fever, chills, sweats, but was admitted to the hospital yesterday and placed on vancomycin and Zosyn.  A culture of the right upper back abscess was submitted. He complains of burning pain in the area of the abscess/cellulitis.     1/10 : he feels better, afebrile. Less pain and tenderness. Surgery consult noted. He still has a lot of redness and induration. There seems to be a secondary abscess becoming more evident.   1/11: agitated  By waiting NPO for procedure and nauseated by taking doxy on an empty stomach. He reports that the back abscess drained copiously last night. The fluctuant area I palpated was seen on US as a "shadowing" without discernible fluid collection. Cellulitis is improved but he is still exquisitely tender medial to original opening.   1/12: s/p I and D. 3 openings and copious pus relieved by Dr. Jensen. Cellulitis is improved. Pain is improved. Ambulatory. Left arm IV infiltrating.   1/13: afebrile. Feels a little better daily. Pain control adjusted to his satisfaction. Dr. Jensen wished for him to stay another day. Will " need home health    EXAM & DIAGNOSTICS REVIEWED:   Vitals:     Temp:  [97.9 °F (36.6 °C)-99.1 °F (37.3 °C)]   Temp: 97.9 °F (36.6 °C) (01/13/20 1220)  Pulse: 93 (01/13/20 1220)  Resp: 18 (01/13/20 1220)  BP: (!) 165/92(nurse notified) (01/13/20 1220)  SpO2: 98 % (01/13/20 1220)    Intake/Output Summary (Last 24 hours) at 1/13/2020 1539  Last data filed at 1/13/2020 0500  Gross per 24 hour   Intake --   Output 475 ml   Net -475 ml       General:  In NAD. Alert and attentive, cooperative ,more  comfortable  Eyes:  Anicteric,  , EOMI  ENT:     Neck:  supple,    Lungs:    Heart:     Abd:   .  :  Voids     Musc:      Skin:  No rashes  ..   Wound: S/p I and D, 3 packed wounds, examined with surgery. Most inferior still has a little cloudy drainage, but mostly bloody. Cellulitis is substantially improved, and better than yesterday        Neuro:  Alert, attentive, speech fluent, face symmetric, moves all extremities, no focal weakness. Ambulatory  Psych:   calmer, but always critical of something, cooperative     Extrem:   VAD:    Peripheral. Left arm IV infiltrated. Mild phlebitis, no redness   Isolation:  Contact    Lines/Tubes/Drains:    General Labs reviewed:  Recent Labs   Lab 01/11/20  0533 01/12/20  0531 01/13/20  0545   WBC 11.11 9.28 9.46   HGB 12.0* 11.3* 12.3*   HCT 37.9* 36.7* 39.8*    267 292       Recent Labs   Lab 01/11/20  0532 01/12/20  0530 01/13/20  0545    140 139   K 3.8 4.0 4.0    103 103   CO2 26 29 27   BUN 14 15 12   CREATININE 1.0 1.0 0.9   CALCIUM 8.9 9.0 9.3   PROT 7.3 6.8 7.3   BILITOT 1.0 0.7 0.9   ALKPHOS 48* 40* 43*   ALT 63* 54* 61*   AST 50* 40 40           Micro:  Microbiology Results (last 7 days)     Procedure Component Value Units Date/Time    Blood culture x two cultures. Draw prior to antibiotics. [[id number]] Collected:  01/08/20 1246    Order Status:  Completed Specimen:  Blood from Peripheral, Antecubital, Left Updated:  01/13/20 1432     Blood Culture, Routine  No growth after 5 days.    Narrative:       Aerobic and anaerobic    Blood culture x two cultures. Draw prior to antibiotics. [[id number]] Collected:  01/08/20 1240    Order Status:  Completed Specimen:  Blood from Peripheral, Antecubital, Right Updated:  01/13/20 1432     Blood Culture, Routine No growth after 5 days.    Narrative:       Aerobic and anaerobic    Culture, Anaerobic [[id number]] Collected:  01/11/20 1254    Order Status:  Completed Specimen:  Abscess from Back Updated:  01/13/20 1344     Anaerobic Culture No anaerobes isolated    Aerobic culture [[id number]]  (Abnormal)  (Susceptibility) Collected:  01/11/20 1254    Order Status:  Completed Specimen:  Abscess from Back Updated:  01/13/20 0657     Aerobic Bacterial Culture METHICILLIN RESISTANT STAPHYLOCOCCUS AUREUS  Moderate  Results called to and read back by:Saran Roth 01/13/2020  06:56 JBM      Gram stain [[id number]] Collected:  01/11/20 1254    Order Status:  Completed Specimen:  Abscess from Back Updated:  01/12/20 1226     Gram Stain Result Many WBC's      Few Gram positive cocci    Aerobic culture [[id number]]  (Abnormal)  (Susceptibility) Collected:  01/08/20 1356    Order Status:  Completed Specimen:  Abscess from Back Updated:  01/10/20 0834     Aerobic Bacterial Culture METHICILLIN RESISTANT STAPHYLOCOCCUS AUREUS  Few  Results called to and read back by: Yolanda Tripathi RN on [address]  01/09/2020  12:45 by DRP         Abscess is growing Staph aureus  Imaging Reviewed:   CXR      US There is no focal fluid collection in the area of concern in the upper back.  There is a shadowing area within the right upper back which was marked per doctor's job air.  The musculature is indistinct within this region.    Cardiology:    IMPRESSION & PLAN   1. Right upper back abscess, likely MRSA (wife had abscess requiring admission in the last week), sensitive to doxycycline, improving, but multiloculated.  2 History of MRSA abscess with bacteremia  6/2019  3. Anxiety  4. Acute kidney injury, resolved, likely from nonsteroidals taken for his headache         Recommendations:  Vancomycin and po doxy,  Ok with me to dc tomorrow on the doxycycline that he has left, ?at least 7 days      hibiclens and decolonization

## 2020-01-13 NOTE — NURSING
Noted scant bleeding to top of posterior R shoulder dsg. Dsg reinforced with abd pad and paper tape.

## 2020-01-13 NOTE — PROGRESS NOTES
01/13/20 1015        Incision/Site 01/13/20 1015 Right Back upper other (see comments)   Date First Assessed/Time First Assessed: 01/13/20 1015   Side: Right  Location: Back  Orientation: upper  Incision Type: (c) other (see comments)   Wound Image    Dressing Appearance Moist drainage   Drainage Amount Large   Drainage Characteristics/Odor Serosanguineous   Appearance Red  (incision x 3)   Periwound Area Redness;Edematous  (miminal swelling and redness)   Wound Edges Open   Wound Length (cm)   (3.3x0.8x0.6cm, 2x.0.4x0.5, 3.8x1x0.7cm)   Care Cleansed with:;Wound cleanser   Dressing   (gauze, abd, secured with paper tape)   Packing Incision packing removed;Incision packed with  (1/2 inch strip gauze packing)   Dressing Change Due 01/14/20   Dr Jensen here to look at wounds, Dr. Nuñez also came in to look at wounds, [facility] patients nurse came in to see wounds.  Dr. Jensen ok's packing wounds daily while in hospital, states may pack every other day wnen discharge.

## 2020-01-13 NOTE — ASSESSMENT & PLAN NOTE
Daily dressing change.   Continue iv antibiotics with vancomycin and PO doxycycline per ID recs  Continue Contact precautions  Anticipate home tomorrow on p.o. doxycycline.

## 2020-01-13 NOTE — HOSPITAL COURSE
feels better, afebrile. Less pain and tenderness. Surgery consult noted. He still has a lot of redness and induration. There seems to be a secondary abscess becoming more evident.   He had a bedside I and D 01/11/2020 with expression of purulent material.  He has received IV vancomycin and p.o. doxycycline throughout the course of his admission.  1/11: agitated  By waiting NPO for procedure and nauseated by taking doxy on an empty stomach. He reports that the back abscess drained copiously last night. Ultrasound reported as no abscess being evident. Cellulitis is improved but he is still exquisitely tender medial to original opening.  He subsequently had an incision and drainage with 3 openings with drainage of copius pus by Dr. Jensen on 1/12.  Cellulitis is markedly improved. Pain is improved. Left arm IV infiltrating.   Pain control adjusted to his satisfaction.   Home health was arranged for for him for every other day dressing. Per Dr. Hurst he can continue to take the remaining doxycycline pills he has. He has enough for 8 days.

## 2020-01-14 VITALS
BODY MASS INDEX: 36.96 KG/M2 | HEIGHT: 66 IN | HEART RATE: 76 BPM | RESPIRATION RATE: 17 BRPM | TEMPERATURE: 99 F | WEIGHT: 230 LBS | OXYGEN SATURATION: 95 % | SYSTOLIC BLOOD PRESSURE: 167 MMHG | DIASTOLIC BLOOD PRESSURE: 93 MMHG

## 2020-01-14 PROBLEM — L02.212 ABSCESS OF BACK: Status: RESOLVED | Noted: 2020-01-08 | Resolved: 2020-01-14

## 2020-01-14 LAB
ALBUMIN SERPL BCP-MCNC: 2.8 G/DL (ref 3.5–5.2)
ALP SERPL-CCNC: 40 U/L (ref 55–135)
ALT SERPL W/O P-5'-P-CCNC: 52 U/L (ref 10–44)
ANION GAP SERPL CALC-SCNC: 9 MMOL/L (ref 8–16)
AST SERPL-CCNC: 32 U/L (ref 10–40)
BACTERIA SPEC ANAEROBE CULT: NORMAL
BASOPHILS # BLD AUTO: 0.04 K/UL (ref 0–0.2)
BASOPHILS NFR BLD: 0.4 % (ref 0–1.9)
BILIRUB SERPL-MCNC: 0.8 MG/DL (ref 0.1–1)
BUN SERPL-MCNC: 14 MG/DL (ref 8–23)
CALCIUM SERPL-MCNC: 8.9 MG/DL (ref 8.7–10.5)
CHLORIDE SERPL-SCNC: 102 MMOL/L (ref 95–110)
CO2 SERPL-SCNC: 28 MMOL/L (ref 23–29)
CREAT SERPL-MCNC: 1 MG/DL (ref 0.5–1.4)
DIFFERENTIAL METHOD: ABNORMAL
EOSINOPHIL # BLD AUTO: 0.3 K/UL (ref 0–0.5)
EOSINOPHIL NFR BLD: 2.7 % (ref 0–8)
ERYTHROCYTE [DISTWIDTH] IN BLOOD BY AUTOMATED COUNT: 15.7 % (ref 11.5–14.5)
EST. GFR  (AFRICAN AMERICAN): >60 ML/MIN/1.73 M^2
EST. GFR  (NON AFRICAN AMERICAN): >60 ML/MIN/1.73 M^2
GLUCOSE SERPL-MCNC: 93 MG/DL (ref 70–110)
HCT VFR BLD AUTO: 36.4 % (ref 40–54)
HGB BLD-MCNC: 11.4 G/DL (ref 14–18)
IMM GRANULOCYTES # BLD AUTO: 0.11 K/UL (ref 0–0.04)
IMM GRANULOCYTES NFR BLD AUTO: 1.2 % (ref 0–0.5)
LYMPHOCYTES # BLD AUTO: 1.8 K/UL (ref 1–4.8)
LYMPHOCYTES NFR BLD: 19.5 % (ref 18–48)
MAGNESIUM SERPL-MCNC: 1.8 MG/DL (ref 1.6–2.6)
MCH RBC QN AUTO: 27 PG (ref 27–31)
MCHC RBC AUTO-ENTMCNC: 31.3 G/DL (ref 32–36)
MCV RBC AUTO: 86 FL (ref 82–98)
MONOCYTES # BLD AUTO: 1 K/UL (ref 0.3–1)
MONOCYTES NFR BLD: 11 % (ref 4–15)
NEUTROPHILS # BLD AUTO: 6.2 K/UL (ref 1.8–7.7)
NEUTROPHILS NFR BLD: 65.2 % (ref 38–73)
NRBC BLD-RTO: 0 /100 WBC
PHOSPHATE SERPL-MCNC: 3.5 MG/DL (ref 2.7–4.5)
PLATELET # BLD AUTO: 284 K/UL (ref 150–350)
PMV BLD AUTO: 10.3 FL (ref 9.2–12.9)
POTASSIUM SERPL-SCNC: 4 MMOL/L (ref 3.5–5.1)
PROT SERPL-MCNC: 6.9 G/DL (ref 6–8.4)
RBC # BLD AUTO: 4.22 M/UL (ref 4.6–6.2)
SODIUM SERPL-SCNC: 139 MMOL/L (ref 136–145)
WBC # BLD AUTO: 9.43 K/UL (ref 3.9–12.7)

## 2020-01-14 PROCEDURE — 80053 COMPREHEN METABOLIC PANEL: CPT

## 2020-01-14 PROCEDURE — 84100 ASSAY OF PHOSPHORUS: CPT

## 2020-01-14 PROCEDURE — 25000003 PHARM REV CODE 250: Performed by: INTERNAL MEDICINE

## 2020-01-14 PROCEDURE — 36415 COLL VENOUS BLD VENIPUNCTURE: CPT

## 2020-01-14 PROCEDURE — 83735 ASSAY OF MAGNESIUM: CPT

## 2020-01-14 PROCEDURE — 25000003 PHARM REV CODE 250: Performed by: SURGERY

## 2020-01-14 PROCEDURE — 85025 COMPLETE CBC W/AUTO DIFF WBC: CPT

## 2020-01-14 RX ADMIN — GABAPENTIN 100 MG: 100 CAPSULE ORAL at 08:01

## 2020-01-14 RX ADMIN — OXYCODONE HYDROCHLORIDE 20 MG: 5 TABLET ORAL at 08:01

## 2020-01-14 RX ADMIN — ASPIRIN 325 MG ORAL TABLET 325 MG: 325 PILL ORAL at 08:01

## 2020-01-14 RX ADMIN — LACTOBACILLUS TAB 1 TABLET: TAB at 08:01

## 2020-01-14 RX ADMIN — DOXYCYCLINE HYCLATE 100 MG: 100 CAPSULE ORAL at 08:01

## 2020-01-14 RX ADMIN — OXYCODONE HYDROCHLORIDE 20 MG: 5 TABLET ORAL at 03:01

## 2020-01-14 RX ADMIN — HYDRALAZINE HYDROCHLORIDE 50 MG: 25 TABLET, FILM COATED ORAL at 08:01

## 2020-01-14 NOTE — PLAN OF CARE
01/14/20 0923   Post-Acute Status   Post-Acute Authorization Home Health/Hospice   Home Health/Hospice Status Referrals Sent   Spoke with patient about Freedom of Choice form, explained to patient and family that thy have the right to choose any agency, and a list of agencies was provided to patient and family to review, they verbalized an understanding, pt signed form and scanned into CM notes.    Referral sent via right fax to [facility] jose l ([phone]) CM spoke to Bri ([phone]) who will assume care.     14:15 Frannie from [facility] called to verify [facility] will assume care.

## 2020-01-14 NOTE — PHYSICIAN QUERY
PT Name: Orlando rTeadwell  MR #: [id number]     Physician Query Form - Diagnosis Clarification      CDS/: Dahlia Srivastava RN, CCDS               Contact information:  [phone]    This form is a permanent document in the medical record.     Query Date: January 14, 2020    By submitting this query, we are merely seeking further clarification of documentation.  Please utilize your independent clinical judgment when addressing the question(s) below.     The medical record contains the following:      Findings Supporting Clinical Information Location in Medical Record                 Acute kidney injury MRSA infection, with history of MRSA infections and bactermeia in the past    01/06/20:  Creatinine:  2.65  01/08/20:  Creatinine 1.2  01/09/20:  Creatinine 1.1    Acute kidney injury, resolved, likely from nonsteroidals taken for his headache    Creatinine 0.9 - 1.2 [range] 01/08/2020 H & P      01/09/2020 Infectious Diesease consult         01/09/2020 Infectious Diesease consult       Lab 01/08/2020 - 01/14/2020     Please clarify if the acute kidney injury diagnosis has been:    [x  ] Resolved prior to admission: on admission he had a Cr of 1.2 with GFR >60, does not meet diagnostic criteria for BRII.   [  ] Ruled In   [  ] Ruled In, Now Resolved   [  ] Ruled Out   [  ] Other/Clarification of findings (please specify):   [  ] Clinically insignificant     [  ] Clinically undetermined     Please document in your progress notes daily for the duration of treatment, until resolved, and include in your discharge summary.

## 2020-01-14 NOTE — DISCHARGE SUMMARY
[facility]  Discharge Summary      Patient Name: Orlando Treadwell  MRN: [id number]  Admission Date: 1/8/2020  Hospital Length of Stay: 5 days  Discharge Date and Time:  01/14/2020 1:11 PM  Attending Physician: Mia Nuñez MD   Discharging Provider: Mia Nuñez MD  Primary Care Provider: Kleber Worthy III, MD      HPI:   Year old white male with known past medical history of hypertension, pre diabetes, PTSD, chronic pain on opioids in outpatient setting, history of cellulitis and soft tissue infection with MRSA (last admission 6/3/19-6/7/19 with MRSA wound and bacteremia), presented to ER on 01/08/2020 with complaint of pain, redness and concern for MRSA abscess on the right upper back.  Patient informed that his wife was recently discharged yesterday from the hospital for MRSA wound in her pubic area.  Patient reports he has a history of MRSA infection and has been using Hibiclens at home.  Patient does report that he has PTSD and he scratches his skin at night when he is not aware and that is how he has all the excoriation marks and the healed scars from the previous small wounds on his skin  Currently patient denies fever, chills, weakness.  Reports he was seen by Dr. Worthy day before yesterday on 01/06/2020, was started on doxycycline oral and had blood work done and he was informed today that his kidney function has declined markedly and he needs to come to the ER today.   In the ER patient WBC count was 15.4 H&H 13.5/42.9, platelet 341 CMP showed sodium 135 potassium 4.1 BUN 31, creatinine 1.2 with GFR greater than 60.  I did compared his levels from 01/06/2020 which showed BUN and creatinine of 43/2.35 with GFR of 27  Discussed with patient that his BUN creatinine has improved and the GFR is normal today but the concern for MRSA bacteremia still there and we would wait till his blood cultures comes back, we will consult Dr. Hurst and if needed will consult  surgery for I&D  Patient specifically was concerned about his pain medication from home reporting he takes his medicine a certain way.  Discussed with patient that we will continue his medication as prescribed by his physician/home medications.    I did reviewed patient passed admission with this.  Noted he was admitted 06/03  at [facility] with MRSA sepsis right upper extremity and small abscess left upper extremity with bacteremia.  Repeat blood cultures after 24-48 hours were negative.  Patient was discharged on 06/07/2019 on 2 weeks of IV daptomycin    Procedure(s) (LRB):  INCISION AND DRAINAGE back (N/A)      Hospital Course:   feels better, afebrile. Less pain and tenderness. Surgery consult noted. He still has a lot of redness and induration. There seems to be a secondary abscess becoming more evident.   He had a bedside I and D 01/11/2020 with expression of purulent material.  He has received IV vancomycin and p.o. doxycycline throughout the course of his admission.  1/11: agitated  By waiting NPO for procedure and nauseated by taking doxy on an empty stomach. He reports that the back abscess drained copiously last night. Ultrasound reported as no abscess being evident. Cellulitis is improved but he is still exquisitely tender medial to original opening.  He subsequently had an incision and drainage with 3 openings with drainage of copius pus by Dr. Jensen on 1/12.  Cellulitis is markedly improved. Pain is improved. Left arm IV infiltrating.   Pain control adjusted to his satisfaction.   Home health was arranged for for him for every other day dressing. Per Dr. Hurst he can continue to take the remaining doxycycline pills he has. He has enough for 8 days.      Consults:   Consults (From admission, onward)        Status Ordering Provider     Inpatient consult to General Surgery  Once     Provider:  Whit Higgins MD    Acknowledged JOSE HURST     Inpatient consult to Hospitalist  Once      Provider:  Zandra Dave MD    Acknowledged ZANDRA DAVE     Inpatient consult to Infectious Diseases  Once     Provider:  Dahlia Hurst MD    Completed ZANDRA DAVE     Pharmacy to dose Vancomycin consult  Once     Provider:  (Not yet assigned)    Acknowledged ZANDRA DAVE          No new Assessment & Plan notes have been filed under this hospital service since the last note was generated.  Service: Hospital Medicine    Final Active Diagnoses:    Diagnosis Date Noted POA    Chronic narcotic use [F11.90] 01/08/2020 Yes    Hypertension [I10] 01/08/2020 Yes      Problems Resolved During this Admission:    Diagnosis Date Noted Date Resolved POA    PRINCIPAL PROBLEM:  MRSA  Abscess of back [L02.212] 01/08/2020 01/14/2020 Yes       Discharged Condition: fair    Disposition: Home or Self Care    Follow Up:  Follow-up Information     Dahlia Hurst MD In 1 week.    Specialty:  Infectious Diseases  Contact information:  [address] [place] [address]  [facility] [address]  [phone]                 Patient Instructions:      Referral to Home health   Referral Priority: Routine Referral Type: Home Health   Referral Reason: Specialty Services Required   Requested Specialty: Home Health Services   Number of Visits Requested: 1     Diet Adult Regular     Change dressing (specify)   Order Comments: Dressing change: cleanse with wound cleanser. Pack incisions with 1/2 inch strip gauze packing. Cover with gauze and tape.     Activity as tolerated       Significant Diagnostic Studies: Labs:   BMP:   Recent Labs   Lab 01/13/20  0545 01/14/20  0643   GLU 90 93    139   K 4.0 4.0    102   CO2 27 28   BUN 12 14   CREATININE 0.9 1.0   CALCIUM 9.3 8.9   MG 1.9 1.8    and CBC   Recent Labs   Lab 01/13/20  0545 01/14/20  0643   WBC 9.46 9.43   HGB 12.3* 11.4*   HCT 39.8* 36.4*    284       Pending Diagnostic Studies:     None         Medications:  Reconciled Home Medications:      Medication List      CHANGE how you  take these medications    QUEtiapine 50 MG tablet  Commonly known as:  SEROQUEL  TAKE 1 TABLET BY MOUTH AT BEDTIME  What changed:  when to take this     venlafaxine 150 MG Cp24  Commonly known as:  EFFEXOR-XR  TAKE 1 CAPSULE BY MOUTH EVERY DAY  What changed:  when to take this        CONTINUE taking these medications    aspirin 325 MG tablet  Take 325 mg by mouth once daily.     diclofenac sodium 1 % Gel  Commonly known as:  VOLTAREN  Apply 2 g topically 2 (two) times daily.     doxycycline 100 MG Cap  Commonly known as:  VIBRAMYCIN  Take 100 mg by mouth every 12 (twelve) hours.     esomeprazole 40 MG capsule  Commonly known as:  NEXIUM  Take 40 mg by mouth every evening.     gabapentin 300 MG capsule  Commonly known as:  NEURONTIN  Take 600 mg by mouth every evening.     hydrALAZINE 50 MG tablet  Commonly known as:  APRESOLINE  Take 50 mg by mouth 2 (two) times daily.     Lactobacillus rhamnosus GG 10 billion cell capsule  Commonly known as:  CULTURELLE  Take 1 capsule by mouth once daily.     metoprolol succinate 100 MG 24 hr tablet  Commonly known as:  TOPROL-XL  Take 50 mg by mouth every evening.     multivitamin tablet  Commonly known as:  THERAGRAN  Take 1 tablet by mouth once daily.     mupirocin 2 % ointment  Commonly known as:  BACTROBAN  Apply 1 g topically 2 (two) times daily. Pt applying to would and putting in nares     olmesartan-hydrochlorothiazide 40-25 mg per tablet  Commonly known as:  BENICAR HCT  Take 1 tablet by mouth every evening.     oxyCODONE 15 MG Tab  Commonly known as:  ROXICODONE  Take 15 mg by mouth 4 (four) times daily as needed for Pain.     testosterone cypionate 200 mg/mL injection  Commonly known as:  DEPOTESTOTERONE CYPIONATE  Inject 200 mg into the muscle every 28 days.     traMADol 50 mg tablet  Commonly known as:  ULTRAM  Take 50 mg by mouth every 8 (eight) hours as needed for Pain.            Indwelling Lines/Drains at time of discharge:   Lines/Drains/Airways     None                  Time spent on the discharge of patient: 36 minutes  Patient was seen and examined on the date of discharge and determined to be suitable for discharge.         Mia Nuñez MD  Department of Hospital Medicine  [facility]

## 2020-01-14 NOTE — PROGRESS NOTES
01/14/20 1500        Incision/Site 01/13/20 1015 Right Back upper other (see comments)   Date First Assessed/Time First Assessed: 01/13/20 1015   Side: Right  Location: Back  Orientation: upper  Incision Type: (c) other (see comments)   Wound Image    Dressing Appearance Moist drainage   Drainage Amount Moderate   Drainage Characteristics/Odor Sanguineous;Tan   Appearance [place]   Periwound Area Redness   Wound Length (cm)   (no changes in measurement)   Care Cleansed with:;Wound cleanser   Dressing   (abd pad)   Packing Incision packing removed;Incision packed with  (strip gauze 1/2 inch)   Dressing Change Due   (pt going home with home health for dressing change)   Notified home health would be seeing him as Dr. Jensen told him yesterday.

## 2020-01-14 NOTE — PROGRESS NOTES
Doing well  Pain appears to be better controlled    Vitals are reviewed    Labs were reviewed    Erythema over abscess resolving if not completely resolved   no obvious pus in any of the wounds    Assessment and plan status post I&D of right upper back abscess    Continue antibiotics    Follow-up cultures    Wound care daily moist to dry dressing

## 2020-01-14 NOTE — NURSING
"0826  During medication pass, patient pleasant, calm cooperative.     0900  Enter patient room to notify patient of discharge and patient complains, stating, "I'm not leaving until I see the surgeon again because he said he wanted to see me before I am discharged." Dr. Jensen notified of patient request, and Dr. Jensen agrees to see patient before patient discharged. Patient notified of communication with doctor and made aware of Dr. Jensen's plan to round on patient before discharge. Patient verbalizes understanding.   Patient discharge information given to patient. Instructions for discharge verbally explained to patient, patient verbalizes understanding.    0930  Patient calls via call light. Upon entering room, patient states, "My discharge paperwork says that my diagnosis is "chronic narcotic use" and that is bullshit. I want that taken off of here because that is not true and I don't want that on my record."   Explained to patient that the secondary diagnosis means that patient is prescribed pain medication legally for a chronic reason, and patient cuts nurse off and states, "No, that is total bullshit. Get this taken off or I'm going to lose it." Manjula Odom, director notified of patient complaint and Dr. Nuñez notified of patient complaint.   "

## 2020-01-14 NOTE — PLAN OF CARE
Patient rested well this shift.  Pain controlled with Oxycodone 20mg PO.  No injury sustained this shift.  No complaints.

## 2020-01-15 ENCOUNTER — TELEPHONE (OUTPATIENT)
Dept: BARIATRICS | Facility: CLINIC | Age: 70
End: 2020-01-15

## 2020-01-15 NOTE — TELEPHONE ENCOUNTER
----- Message from Laurence Malave sent at 1/15/2020  3:43 PM CST -----  Type: Needs Medical Advice    Who Called:  Jeffy VELASQUEZ nurse  Best Call Back Number: [phone]  Additional Information: patient has 3 wounds on his back. She wants to know if she can pack them with Silver alginate 3X per week. Please give call back

## 2020-01-16 ENCOUNTER — HOSPITAL ENCOUNTER (OUTPATIENT)
Facility: HOSPITAL | Age: 70
Discharge: HOME | End: 2020-01-19
Attending: EMERGENCY MEDICINE | Admitting: FAMILY MEDICINE
Payer: MEDICARE

## 2020-01-16 DIAGNOSIS — K81.9 CHOLECYSTITIS: ICD-10-CM

## 2020-01-16 DIAGNOSIS — R10.11 RUQ PAIN: ICD-10-CM

## 2020-01-16 DIAGNOSIS — R10.13 EPIGASTRIC PAIN: ICD-10-CM

## 2020-01-16 DIAGNOSIS — K80.00 CALCULUS OF GALLBLADDER WITH ACUTE CHOLECYSTITIS WITHOUT OBSTRUCTION: ICD-10-CM

## 2020-01-16 DIAGNOSIS — M62.08 DIASTASIS RECTI: ICD-10-CM

## 2020-01-16 DIAGNOSIS — K80.00 CHOLELITHIASIS WITH ACUTE CHOLECYSTITIS: Primary | ICD-10-CM

## 2020-01-16 DIAGNOSIS — R07.9 CHEST PAIN: ICD-10-CM

## 2020-01-16 LAB
ALBUMIN SERPL BCP-MCNC: 3.3 G/DL (ref 3.5–5.2)
ALP SERPL-CCNC: 104 U/L (ref 55–135)
ALT SERPL W/O P-5'-P-CCNC: 77 U/L (ref 10–44)
ANION GAP SERPL CALC-SCNC: 11 MMOL/L (ref 8–16)
AST SERPL-CCNC: 63 U/L (ref 10–40)
BASOPHILS # BLD AUTO: 0.04 K/UL (ref 0–0.2)
BASOPHILS NFR BLD: 0.4 % (ref 0–1.9)
BILIRUB SERPL-MCNC: 0.7 MG/DL (ref 0.1–1)
BNP SERPL-MCNC: 42 PG/ML (ref 0–99)
BUN SERPL-MCNC: 17 MG/DL (ref 8–23)
CALCIUM SERPL-MCNC: 9.4 MG/DL (ref 8.7–10.5)
CHLORIDE SERPL-SCNC: 100 MMOL/L (ref 95–110)
CO2 SERPL-SCNC: 28 MMOL/L (ref 23–29)
CREAT SERPL-MCNC: 1.3 MG/DL (ref 0.5–1.4)
DIFFERENTIAL METHOD: ABNORMAL
EOSINOPHIL # BLD AUTO: 0.2 K/UL (ref 0–0.5)
EOSINOPHIL NFR BLD: 1.4 % (ref 0–8)
ERYTHROCYTE [DISTWIDTH] IN BLOOD BY AUTOMATED COUNT: 15.9 % (ref 11.5–14.5)
EST. GFR  (AFRICAN AMERICAN): >60 ML/MIN/1.73 M^2
EST. GFR  (NON AFRICAN AMERICAN): 55.3 ML/MIN/1.73 M^2
GLUCOSE SERPL-MCNC: 106 MG/DL (ref 70–110)
GLUCOSE SERPL-MCNC: 147 MG/DL (ref 70–110)
HCT VFR BLD AUTO: 39.5 % (ref 40–54)
HGB BLD-MCNC: 12.2 G/DL (ref 14–18)
IMM GRANULOCYTES # BLD AUTO: 0.14 K/UL (ref 0–0.04)
IMM GRANULOCYTES NFR BLD AUTO: 1.3 % (ref 0–0.5)
INR PPP: 1
LIPASE SERPL-CCNC: 159 U/L (ref 4–60)
LYMPHOCYTES # BLD AUTO: 1.9 K/UL (ref 1–4.8)
LYMPHOCYTES NFR BLD: 16.9 % (ref 18–48)
MCH RBC QN AUTO: 26.8 PG (ref 27–31)
MCHC RBC AUTO-ENTMCNC: 30.9 G/DL (ref 32–36)
MCV RBC AUTO: 87 FL (ref 82–98)
MONOCYTES # BLD AUTO: 0.9 K/UL (ref 0.3–1)
MONOCYTES NFR BLD: 8.3 % (ref 4–15)
MRSA SCREEN BY PCR: NEGATIVE
NEUTROPHILS # BLD AUTO: 8 K/UL (ref 1.8–7.7)
NEUTROPHILS NFR BLD: 71.7 % (ref 38–73)
NRBC BLD-RTO: 0 /100 WBC
PLATELET # BLD AUTO: 286 K/UL (ref 150–350)
PMV BLD AUTO: 10.8 FL (ref 9.2–12.9)
POTASSIUM SERPL-SCNC: 3.8 MMOL/L (ref 3.5–5.1)
PROT SERPL-MCNC: 7.7 G/DL (ref 6–8.4)
PROTHROMBIN TIME: 12.6 SEC (ref 10.6–14.8)
RBC # BLD AUTO: 4.55 M/UL (ref 4.6–6.2)
SODIUM SERPL-SCNC: 139 MMOL/L (ref 136–145)
TROPONIN I SERPL DL<=0.01 NG/ML-MCNC: <0.03 NG/ML
WBC # BLD AUTO: 11.09 K/UL (ref 3.9–12.7)

## 2020-01-16 PROCEDURE — 93005 ELECTROCARDIOGRAM TRACING: CPT

## 2020-01-16 PROCEDURE — 25500020 PHARM REV CODE 255: Performed by: EMERGENCY MEDICINE

## 2020-01-16 PROCEDURE — G0378 HOSPITAL OBSERVATION PER HR: HCPCS

## 2020-01-16 PROCEDURE — 25000003 PHARM REV CODE 250: Performed by: NURSE PRACTITIONER

## 2020-01-16 PROCEDURE — 80053 COMPREHEN METABOLIC PANEL: CPT

## 2020-01-16 PROCEDURE — 96376 TX/PRO/DX INJ SAME DRUG ADON: CPT

## 2020-01-16 PROCEDURE — 84484 ASSAY OF TROPONIN QUANT: CPT

## 2020-01-16 PROCEDURE — 85025 COMPLETE CBC W/AUTO DIFF WBC: CPT

## 2020-01-16 PROCEDURE — 85610 PROTHROMBIN TIME: CPT

## 2020-01-16 PROCEDURE — 83690 ASSAY OF LIPASE: CPT

## 2020-01-16 PROCEDURE — 87641 MR-STAPH DNA AMP PROBE: CPT

## 2020-01-16 PROCEDURE — 63600175 PHARM REV CODE 636 W HCPCS: Performed by: EMERGENCY MEDICINE

## 2020-01-16 PROCEDURE — 83880 ASSAY OF NATRIURETIC PEPTIDE: CPT

## 2020-01-16 PROCEDURE — 99285 EMERGENCY DEPT VISIT HI MDM: CPT | Mod: 25

## 2020-01-16 PROCEDURE — 36415 COLL VENOUS BLD VENIPUNCTURE: CPT

## 2020-01-16 PROCEDURE — 63600175 PHARM REV CODE 636 W HCPCS: Performed by: FAMILY MEDICINE

## 2020-01-16 PROCEDURE — 96374 THER/PROPH/DIAG INJ IV PUSH: CPT

## 2020-01-16 PROCEDURE — 25000003 PHARM REV CODE 250: Performed by: FAMILY MEDICINE

## 2020-01-16 PROCEDURE — 96372 THER/PROPH/DIAG INJ SC/IM: CPT | Mod: 59

## 2020-01-16 RX ORDER — METOPROLOL SUCCINATE 25 MG/1
50 TABLET, EXTENDED RELEASE ORAL NIGHTLY
Status: DISCONTINUED | OUTPATIENT
Start: 2020-01-16 | End: 2020-01-19 | Stop reason: HOSPADM

## 2020-01-16 RX ORDER — NAPROXEN SODIUM 220 MG/1
324 TABLET, FILM COATED ORAL ONCE
Status: DISCONTINUED | OUTPATIENT
Start: 2020-01-16 | End: 2020-01-19 | Stop reason: HOSPADM

## 2020-01-16 RX ORDER — PANTOPRAZOLE SODIUM 40 MG/1
40 TABLET, DELAYED RELEASE ORAL DAILY
Status: DISCONTINUED | OUTPATIENT
Start: 2020-01-17 | End: 2020-01-19 | Stop reason: HOSPADM

## 2020-01-16 RX ORDER — HYDROCHLOROTHIAZIDE 25 MG/1
25 TABLET ORAL DAILY
Status: DISCONTINUED | OUTPATIENT
Start: 2020-01-16 | End: 2020-01-19 | Stop reason: HOSPADM

## 2020-01-16 RX ORDER — ONDANSETRON 4 MG/1
8 TABLET, ORALLY DISINTEGRATING ORAL EVERY 8 HOURS PRN
Status: DISCONTINUED | OUTPATIENT
Start: 2020-01-16 | End: 2020-01-19 | Stop reason: HOSPADM

## 2020-01-16 RX ORDER — HYDRALAZINE HYDROCHLORIDE 25 MG/1
50 TABLET, FILM COATED ORAL 2 TIMES DAILY
Status: DISCONTINUED | OUTPATIENT
Start: 2020-01-16 | End: 2020-01-19 | Stop reason: HOSPADM

## 2020-01-16 RX ORDER — SODIUM CHLORIDE 9 MG/ML
INJECTION, SOLUTION INTRAVENOUS CONTINUOUS
Status: ACTIVE | OUTPATIENT
Start: 2020-01-16 | End: 2020-01-17

## 2020-01-16 RX ORDER — QUETIAPINE FUMARATE 25 MG/1
50 TABLET, FILM COATED ORAL NIGHTLY
Status: DISCONTINUED | OUTPATIENT
Start: 2020-01-16 | End: 2020-01-19 | Stop reason: HOSPADM

## 2020-01-16 RX ORDER — TRAMADOL HYDROCHLORIDE 50 MG/1
50 TABLET ORAL EVERY 8 HOURS PRN
Status: DISCONTINUED | OUTPATIENT
Start: 2020-01-16 | End: 2020-01-19 | Stop reason: HOSPADM

## 2020-01-16 RX ORDER — OLMESARTAN MEDOXOMIL 20 MG/1
40 TABLET ORAL DAILY
Status: DISCONTINUED | OUTPATIENT
Start: 2020-01-16 | End: 2020-01-19 | Stop reason: HOSPADM

## 2020-01-16 RX ORDER — SODIUM CHLORIDE 0.9 % (FLUSH) 0.9 %
10 SYRINGE (ML) INJECTION
Status: DISCONTINUED | OUTPATIENT
Start: 2020-01-16 | End: 2020-01-19 | Stop reason: HOSPADM

## 2020-01-16 RX ORDER — QUETIAPINE FUMARATE 50 MG/1
50 TABLET, FILM COATED ORAL NIGHTLY
COMMUNITY
End: 2020-06-12

## 2020-01-16 RX ORDER — ACETAMINOPHEN 325 MG/1
650 TABLET ORAL EVERY 4 HOURS PRN
Status: DISCONTINUED | OUTPATIENT
Start: 2020-01-16 | End: 2020-01-19 | Stop reason: HOSPADM

## 2020-01-16 RX ORDER — HYDROMORPHONE HYDROCHLORIDE 1 MG/ML
1 INJECTION, SOLUTION INTRAMUSCULAR; INTRAVENOUS; SUBCUTANEOUS
Status: COMPLETED | OUTPATIENT
Start: 2020-01-16 | End: 2020-01-16

## 2020-01-16 RX ORDER — ONDANSETRON 2 MG/ML
8 INJECTION INTRAMUSCULAR; INTRAVENOUS
Status: DISCONTINUED | OUTPATIENT
Start: 2020-01-16 | End: 2020-01-16

## 2020-01-16 RX ORDER — HYDROMORPHONE HYDROCHLORIDE 1 MG/ML
1 INJECTION, SOLUTION INTRAMUSCULAR; INTRAVENOUS; SUBCUTANEOUS
Status: DISCONTINUED | OUTPATIENT
Start: 2020-01-16 | End: 2020-01-16

## 2020-01-16 RX ORDER — GABAPENTIN 300 MG/1
600 CAPSULE ORAL NIGHTLY
Status: DISCONTINUED | OUTPATIENT
Start: 2020-01-16 | End: 2020-01-19 | Stop reason: HOSPADM

## 2020-01-16 RX ORDER — OLMESARTAN MEDOXOMIL AND HYDROCHLOROTHIAZIDE 40/25 40; 25 MG/1; MG/1
1 TABLET ORAL NIGHTLY
Status: DISCONTINUED | OUTPATIENT
Start: 2020-01-16 | End: 2020-01-16

## 2020-01-16 RX ORDER — MUPIROCIN 20 MG/G
1 OINTMENT TOPICAL 2 TIMES DAILY
Status: DISCONTINUED | OUTPATIENT
Start: 2020-01-16 | End: 2020-01-19 | Stop reason: HOSPADM

## 2020-01-16 RX ORDER — ACETAMINOPHEN 325 MG/1
650 TABLET ORAL EVERY 8 HOURS PRN
Status: DISCONTINUED | OUTPATIENT
Start: 2020-01-16 | End: 2020-01-19 | Stop reason: HOSPADM

## 2020-01-16 RX ORDER — AMOXICILLIN 250 MG
1 CAPSULE ORAL 2 TIMES DAILY
Status: DISCONTINUED | OUTPATIENT
Start: 2020-01-16 | End: 2020-01-19 | Stop reason: HOSPADM

## 2020-01-16 RX ORDER — HYDROMORPHONE HYDROCHLORIDE 1 MG/ML
0.5 INJECTION, SOLUTION INTRAMUSCULAR; INTRAVENOUS; SUBCUTANEOUS EVERY 4 HOURS PRN
Status: DISCONTINUED | OUTPATIENT
Start: 2020-01-16 | End: 2020-01-17

## 2020-01-16 RX ORDER — OXYCODONE HYDROCHLORIDE 5 MG/1
15 TABLET ORAL 4 TIMES DAILY PRN
Status: DISCONTINUED | OUTPATIENT
Start: 2020-01-16 | End: 2020-01-19 | Stop reason: HOSPADM

## 2020-01-16 RX ORDER — OLMESARTAN MEDOXOMIL 20 MG/1
40 TABLET ORAL DAILY
Status: DISCONTINUED | OUTPATIENT
Start: 2020-01-16 | End: 2020-01-16

## 2020-01-16 RX ORDER — HYDROCHLOROTHIAZIDE 25 MG/1
25 TABLET ORAL DAILY
Status: DISCONTINUED | OUTPATIENT
Start: 2020-01-16 | End: 2020-01-16

## 2020-01-16 RX ORDER — DOXYCYCLINE 100 MG/1
100 CAPSULE ORAL EVERY 12 HOURS
Status: DISCONTINUED | OUTPATIENT
Start: 2020-01-16 | End: 2020-01-19 | Stop reason: HOSPADM

## 2020-01-16 RX ORDER — VENLAFAXINE HYDROCHLORIDE 150 MG/1
150 CAPSULE, EXTENDED RELEASE ORAL NIGHTLY
Status: DISCONTINUED | OUTPATIENT
Start: 2020-01-16 | End: 2020-01-19 | Stop reason: HOSPADM

## 2020-01-16 RX ORDER — ONDANSETRON 2 MG/ML
4 INJECTION INTRAMUSCULAR; INTRAVENOUS
Status: COMPLETED | OUTPATIENT
Start: 2020-01-16 | End: 2020-01-16

## 2020-01-16 RX ADMIN — SENNOSIDES AND DOCUSATE SODIUM 1 TABLET: 8.6; 5 TABLET ORAL at 10:01

## 2020-01-16 RX ADMIN — HYDROCHLOROTHIAZIDE 25 MG: 25 TABLET ORAL at 10:01

## 2020-01-16 RX ADMIN — ONDANSETRON 4 MG: 2 INJECTION INTRAMUSCULAR; INTRAVENOUS at 06:01

## 2020-01-16 RX ADMIN — DOXYCYCLINE HYCLATE 100 MG: 100 CAPSULE ORAL at 10:01

## 2020-01-16 RX ADMIN — OXYCODONE HYDROCHLORIDE 15 MG: 5 TABLET ORAL at 10:01

## 2020-01-16 RX ADMIN — VENLAFAXINE HYDROCHLORIDE 150 MG: 150 CAPSULE, EXTENDED RELEASE ORAL at 10:01

## 2020-01-16 RX ADMIN — HYDRALAZINE HYDROCHLORIDE 50 MG: 25 TABLET, FILM COATED ORAL at 10:01

## 2020-01-16 RX ADMIN — GABAPENTIN 600 MG: 300 CAPSULE ORAL at 10:01

## 2020-01-16 RX ADMIN — METOPROLOL SUCCINATE 50 MG: 25 TABLET, FILM COATED, EXTENDED RELEASE ORAL at 10:01

## 2020-01-16 RX ADMIN — IOHEXOL 100 ML: 350 INJECTION, SOLUTION INTRAVENOUS at 04:01

## 2020-01-16 RX ADMIN — MUPIROCIN 1 G: 20 OINTMENT TOPICAL at 10:01

## 2020-01-16 RX ADMIN — HYDROMORPHONE HYDROCHLORIDE: 1 INJECTION, SOLUTION INTRAMUSCULAR; INTRAVENOUS; SUBCUTANEOUS at 10:01

## 2020-01-16 RX ADMIN — HYDROMORPHONE HYDROCHLORIDE 1 MG: 1 INJECTION, SOLUTION INTRAMUSCULAR; INTRAVENOUS; SUBCUTANEOUS at 06:01

## 2020-01-16 RX ADMIN — OLMESARTAN MEDOXOMIL 40 MG: 20 TABLET, FILM COATED ORAL at 10:01

## 2020-01-16 RX ADMIN — SODIUM CHLORIDE: 0.9 INJECTION, SOLUTION INTRAVENOUS at 10:01

## 2020-01-16 RX ADMIN — QUETIAPINE FUMARATE 50 MG: 25 TABLET, FILM COATED ORAL at 10:01

## 2020-01-16 NOTE — ED TRIAGE NOTES
Pt began having chest pain 45 minutes pta after eating some fried chicken. Pt is currently being treated for an MRSA wound infection.

## 2020-01-17 ENCOUNTER — ANESTHESIA (OUTPATIENT)
Dept: SURGERY | Facility: HOSPITAL | Age: 70
End: 2020-01-17
Payer: MEDICARE

## 2020-01-17 ENCOUNTER — ANESTHESIA EVENT (OUTPATIENT)
Dept: SURGERY | Facility: HOSPITAL | Age: 70
End: 2020-01-17
Payer: MEDICARE

## 2020-01-17 PROBLEM — R10.13 EPIGASTRIC PAIN: Status: ACTIVE | Noted: 2020-01-17

## 2020-01-17 PROBLEM — R10.13 EPIGASTRIC PAIN: Status: RESOLVED | Noted: 2020-01-16 | Resolved: 2020-01-17

## 2020-01-17 LAB
ALBUMIN SERPL BCP-MCNC: 2.9 G/DL (ref 3.5–5.2)
ALP SERPL-CCNC: 176 U/L (ref 55–135)
ALT SERPL W/O P-5'-P-CCNC: 173 U/L (ref 10–44)
ANION GAP SERPL CALC-SCNC: 13 MMOL/L (ref 8–16)
AST SERPL-CCNC: 177 U/L (ref 10–40)
BASOPHILS # BLD AUTO: 0.05 K/UL (ref 0–0.2)
BASOPHILS NFR BLD: 0.4 % (ref 0–1.9)
BILIRUB SERPL-MCNC: 2.3 MG/DL (ref 0.1–1)
BUN SERPL-MCNC: 16 MG/DL (ref 8–23)
CALCIUM SERPL-MCNC: 9.5 MG/DL (ref 8.7–10.5)
CHLORIDE SERPL-SCNC: 98 MMOL/L (ref 95–110)
CO2 SERPL-SCNC: 26 MMOL/L (ref 23–29)
CREAT SERPL-MCNC: 1.2 MG/DL (ref 0.5–1.4)
DIFFERENTIAL METHOD: ABNORMAL
EOSINOPHIL # BLD AUTO: 0 K/UL (ref 0–0.5)
EOSINOPHIL NFR BLD: 0.2 % (ref 0–8)
ERYTHROCYTE [DISTWIDTH] IN BLOOD BY AUTOMATED COUNT: 16.3 % (ref 11.5–14.5)
EST. GFR  (AFRICAN AMERICAN): >60 ML/MIN/1.73 M^2
EST. GFR  (NON AFRICAN AMERICAN): >60 ML/MIN/1.73 M^2
GLUCOSE SERPL-MCNC: 113 MG/DL (ref 70–110)
GLUCOSE SERPL-MCNC: 133 MG/DL (ref 70–110)
GLUCOSE SERPL-MCNC: 204 MG/DL (ref 70–110)
HCT VFR BLD AUTO: 40.6 % (ref 40–54)
HGB BLD-MCNC: 12.5 G/DL (ref 14–18)
IMM GRANULOCYTES # BLD AUTO: 0.13 K/UL (ref 0–0.04)
IMM GRANULOCYTES NFR BLD AUTO: 1.1 % (ref 0–0.5)
LIPASE SERPL-CCNC: 48 U/L (ref 4–60)
LYMPHOCYTES # BLD AUTO: 0.9 K/UL (ref 1–4.8)
LYMPHOCYTES NFR BLD: 7 % (ref 18–48)
MCH RBC QN AUTO: 26.9 PG (ref 27–31)
MCHC RBC AUTO-ENTMCNC: 30.8 G/DL (ref 32–36)
MCV RBC AUTO: 87 FL (ref 82–98)
MONOCYTES # BLD AUTO: 0.9 K/UL (ref 0.3–1)
MONOCYTES NFR BLD: 7.6 % (ref 4–15)
NEUTROPHILS # BLD AUTO: 10.3 K/UL (ref 1.8–7.7)
NEUTROPHILS NFR BLD: 83.7 % (ref 38–73)
NRBC BLD-RTO: 0 /100 WBC
PLATELET # BLD AUTO: 275 K/UL (ref 150–350)
PMV BLD AUTO: 10.8 FL (ref 9.2–12.9)
POTASSIUM SERPL-SCNC: 3.6 MMOL/L (ref 3.5–5.1)
PROT SERPL-MCNC: 7.3 G/DL (ref 6–8.4)
RBC # BLD AUTO: 4.65 M/UL (ref 4.6–6.2)
SODIUM SERPL-SCNC: 137 MMOL/L (ref 136–145)
WBC # BLD AUTO: 12.32 K/UL (ref 3.9–12.7)

## 2020-01-17 PROCEDURE — 27000671 HC TUBING MICROBORE EXT: Performed by: ANESTHESIOLOGY

## 2020-01-17 PROCEDURE — 27000673 HC TUBING BLOOD Y: Performed by: ANESTHESIOLOGY

## 2020-01-17 PROCEDURE — 25000003 PHARM REV CODE 250: Performed by: SURGERY

## 2020-01-17 PROCEDURE — 25000003 PHARM REV CODE 250: Performed by: ANESTHESIOLOGY

## 2020-01-17 PROCEDURE — 25000003 PHARM REV CODE 250: Performed by: NURSE PRACTITIONER

## 2020-01-17 PROCEDURE — 99900035 HC TECH TIME PER 15 MIN (STAT)

## 2020-01-17 PROCEDURE — 71000033 HC RECOVERY, INTIAL HOUR: Performed by: SURGERY

## 2020-01-17 PROCEDURE — 85025 COMPLETE CBC W/AUTO DIFF WBC: CPT

## 2020-01-17 PROCEDURE — 27202107 HC XP QUATRO SENSOR: Performed by: ANESTHESIOLOGY

## 2020-01-17 PROCEDURE — 27201423 OPTIME MED/SURG SUP & DEVICES STERILE SUPPLY: Performed by: SURGERY

## 2020-01-17 PROCEDURE — 63600175 PHARM REV CODE 636 W HCPCS: Performed by: NURSE ANESTHETIST, CERTIFIED REGISTERED

## 2020-01-17 PROCEDURE — 47562 LAPAROSCOPIC CHOLECYSTECTOMY: CPT | Mod: ,,, | Performed by: SURGERY

## 2020-01-17 PROCEDURE — 36415 COLL VENOUS BLD VENIPUNCTURE: CPT

## 2020-01-17 PROCEDURE — 94799 UNLISTED PULMONARY SVC/PX: CPT

## 2020-01-17 PROCEDURE — 25000003 PHARM REV CODE 250: Performed by: NURSE ANESTHETIST, CERTIFIED REGISTERED

## 2020-01-17 PROCEDURE — 94761 N-INVAS EAR/PLS OXIMETRY MLT: CPT

## 2020-01-17 PROCEDURE — 83690 ASSAY OF LIPASE: CPT

## 2020-01-17 PROCEDURE — 88304 TISSUE EXAM BY PATHOLOGIST: CPT | Mod: TC

## 2020-01-17 PROCEDURE — 37000009 HC ANESTHESIA EA ADD 15 MINS: Performed by: SURGERY

## 2020-01-17 PROCEDURE — 37000008 HC ANESTHESIA 1ST 15 MINUTES: Performed by: SURGERY

## 2020-01-17 PROCEDURE — 47562 PR LAP,CHOLECYSTECTOMY: ICD-10-PCS | Mod: ,,, | Performed by: SURGERY

## 2020-01-17 PROCEDURE — 36000709 HC OR TIME LEV III EA ADD 15 MIN: Performed by: SURGERY

## 2020-01-17 PROCEDURE — 96376 TX/PRO/DX INJ SAME DRUG ADON: CPT | Mod: 59

## 2020-01-17 PROCEDURE — G0378 HOSPITAL OBSERVATION PER HR: HCPCS

## 2020-01-17 PROCEDURE — 27000080 OPTIME MED/SURG SUP & DEVICES GENERAL CLASSIFICATION: Performed by: SURGERY

## 2020-01-17 PROCEDURE — 36000708 HC OR TIME LEV III 1ST 15 MIN: Performed by: SURGERY

## 2020-01-17 PROCEDURE — 63600175 PHARM REV CODE 636 W HCPCS: Performed by: ANESTHESIOLOGY

## 2020-01-17 PROCEDURE — 96372 THER/PROPH/DIAG INJ SC/IM: CPT | Mod: 59

## 2020-01-17 PROCEDURE — 63600175 PHARM REV CODE 636 W HCPCS: Performed by: FAMILY MEDICINE

## 2020-01-17 PROCEDURE — 71000039 HC RECOVERY, EACH ADD'L HOUR: Performed by: SURGERY

## 2020-01-17 PROCEDURE — 63600175 PHARM REV CODE 636 W HCPCS: Performed by: INTERNAL MEDICINE

## 2020-01-17 PROCEDURE — 27201480 HC GLIDESCOPE: Performed by: ANESTHESIOLOGY

## 2020-01-17 PROCEDURE — 87040 BLOOD CULTURE FOR BACTERIA: CPT

## 2020-01-17 PROCEDURE — 63600175 PHARM REV CODE 636 W HCPCS: Performed by: SURGERY

## 2020-01-17 PROCEDURE — 80053 COMPREHEN METABOLIC PANEL: CPT

## 2020-01-17 RX ORDER — HYDROMORPHONE HYDROCHLORIDE 1 MG/ML
1 INJECTION, SOLUTION INTRAMUSCULAR; INTRAVENOUS; SUBCUTANEOUS ONCE
Status: COMPLETED | OUTPATIENT
Start: 2020-01-17 | End: 2020-01-17

## 2020-01-17 RX ORDER — OXYCODONE HYDROCHLORIDE 5 MG/1
5 TABLET ORAL ONCE
Status: DISCONTINUED | OUTPATIENT
Start: 2020-01-17 | End: 2020-01-19 | Stop reason: HOSPADM

## 2020-01-17 RX ORDER — CEFOXITIN SODIUM 2 G/50ML
INJECTION, SOLUTION INTRAVENOUS
Status: DISCONTINUED | OUTPATIENT
Start: 2020-01-17 | End: 2020-01-17

## 2020-01-17 RX ORDER — MORPHINE SULFATE 4 MG/ML
4 INJECTION, SOLUTION INTRAMUSCULAR; INTRAVENOUS EVERY 4 HOURS PRN
Status: DISCONTINUED | OUTPATIENT
Start: 2020-01-17 | End: 2020-01-19 | Stop reason: HOSPADM

## 2020-01-17 RX ORDER — NEOSTIGMINE METHYLSULFATE 1 MG/ML
INJECTION, SOLUTION INTRAVENOUS
Status: DISCONTINUED | OUTPATIENT
Start: 2020-01-17 | End: 2020-01-17

## 2020-01-17 RX ORDER — NALOXONE HCL 0.4 MG/ML
0.02 VIAL (ML) INJECTION ONCE
Status: DISCONTINUED | OUTPATIENT
Start: 2020-01-17 | End: 2020-01-19 | Stop reason: HOSPADM

## 2020-01-17 RX ORDER — LIDOCAINE HYDROCHLORIDE 20 MG/ML
INJECTION, SOLUTION EPIDURAL; INFILTRATION; INTRACAUDAL; PERINEURAL
Status: DISCONTINUED | OUTPATIENT
Start: 2020-01-17 | End: 2020-01-17

## 2020-01-17 RX ORDER — DEXAMETHASONE SODIUM PHOSPHATE 4 MG/ML
INJECTION, SOLUTION INTRA-ARTICULAR; INTRALESIONAL; INTRAMUSCULAR; INTRAVENOUS; SOFT TISSUE
Status: DISCONTINUED | OUTPATIENT
Start: 2020-01-17 | End: 2020-01-17

## 2020-01-17 RX ORDER — KETAMINE HYDROCHLORIDE 50 MG/ML
INJECTION, SOLUTION INTRAMUSCULAR; INTRAVENOUS
Status: DISCONTINUED | OUTPATIENT
Start: 2020-01-17 | End: 2020-01-17

## 2020-01-17 RX ORDER — SODIUM CHLORIDE, SODIUM LACTATE, POTASSIUM CHLORIDE, CALCIUM CHLORIDE 600; 310; 30; 20 MG/100ML; MG/100ML; MG/100ML; MG/100ML
INJECTION, SOLUTION INTRAVENOUS CONTINUOUS PRN
Status: DISCONTINUED | OUTPATIENT
Start: 2020-01-17 | End: 2020-01-17

## 2020-01-17 RX ORDER — PROPOFOL 10 MG/ML
VIAL (ML) INTRAVENOUS
Status: DISCONTINUED | OUTPATIENT
Start: 2020-01-17 | End: 2020-01-17

## 2020-01-17 RX ORDER — PHENYLEPHRINE HYDROCHLORIDE 10 MG/ML
INJECTION INTRAVENOUS
Status: DISCONTINUED | OUTPATIENT
Start: 2020-01-17 | End: 2020-01-17

## 2020-01-17 RX ORDER — FENTANYL CITRATE 50 UG/ML
INJECTION, SOLUTION INTRAMUSCULAR; INTRAVENOUS
Status: DISCONTINUED | OUTPATIENT
Start: 2020-01-17 | End: 2020-01-17

## 2020-01-17 RX ORDER — OXYCODONE HYDROCHLORIDE 5 MG/1
5 TABLET ORAL ONCE
Status: COMPLETED | OUTPATIENT
Start: 2020-01-17 | End: 2020-01-17

## 2020-01-17 RX ORDER — SODIUM CHLORIDE 0.9 % (FLUSH) 0.9 %
10 SYRINGE (ML) INJECTION
Status: DISCONTINUED | OUTPATIENT
Start: 2020-01-17 | End: 2020-01-19 | Stop reason: HOSPADM

## 2020-01-17 RX ORDER — CEFOXITIN SODIUM 2 G/50ML
2 INJECTION, SOLUTION INTRAVENOUS
Status: DISCONTINUED | OUTPATIENT
Start: 2020-01-17 | End: 2020-01-19 | Stop reason: HOSPADM

## 2020-01-17 RX ORDER — FENTANYL CITRATE 50 UG/ML
25 INJECTION, SOLUTION INTRAMUSCULAR; INTRAVENOUS
Status: COMPLETED | OUTPATIENT
Start: 2020-01-17 | End: 2020-01-17

## 2020-01-17 RX ORDER — BUPIVACAINE HYDROCHLORIDE AND EPINEPHRINE 2.5; 5 MG/ML; UG/ML
INJECTION, SOLUTION EPIDURAL; INFILTRATION; INTRACAUDAL; PERINEURAL
Status: DISCONTINUED | OUTPATIENT
Start: 2020-01-17 | End: 2020-01-17 | Stop reason: HOSPADM

## 2020-01-17 RX ORDER — SUCCINYLCHOLINE CHLORIDE 20 MG/ML
INJECTION INTRAMUSCULAR; INTRAVENOUS
Status: DISCONTINUED | OUTPATIENT
Start: 2020-01-17 | End: 2020-01-17

## 2020-01-17 RX ORDER — ONDANSETRON HYDROCHLORIDE 2 MG/ML
INJECTION, SOLUTION INTRAMUSCULAR; INTRAVENOUS
Status: DISCONTINUED | OUTPATIENT
Start: 2020-01-17 | End: 2020-01-17

## 2020-01-17 RX ORDER — OXYCODONE HYDROCHLORIDE 5 MG/1
5 TABLET ORAL
Status: DISCONTINUED | OUTPATIENT
Start: 2020-01-17 | End: 2020-01-17

## 2020-01-17 RX ORDER — ONDANSETRON 2 MG/ML
4 INJECTION INTRAMUSCULAR; INTRAVENOUS DAILY PRN
Status: DISCONTINUED | OUTPATIENT
Start: 2020-01-17 | End: 2020-01-17

## 2020-01-17 RX ORDER — ROCURONIUM BROMIDE 10 MG/ML
INJECTION, SOLUTION INTRAVENOUS
Status: DISCONTINUED | OUTPATIENT
Start: 2020-01-17 | End: 2020-01-17

## 2020-01-17 RX ORDER — GLYCOPYRROLATE 0.2 MG/ML
INJECTION INTRAMUSCULAR; INTRAVENOUS
Status: DISCONTINUED | OUTPATIENT
Start: 2020-01-17 | End: 2020-01-17

## 2020-01-17 RX ADMIN — ONDANSETRON 4 MG: 2 INJECTION, SOLUTION INTRAMUSCULAR; INTRAVENOUS at 01:01

## 2020-01-17 RX ADMIN — FENTANYL CITRATE 25 MCG: 50 INJECTION, SOLUTION INTRAMUSCULAR; INTRAVENOUS at 03:01

## 2020-01-17 RX ADMIN — PHENYLEPHRINE HYDROCHLORIDE 200 MCG: 10 INJECTION INTRAVENOUS at 02:01

## 2020-01-17 RX ADMIN — GLYCOPYRROLATE 0.8 MG: 0.2 INJECTION INTRAMUSCULAR; INTRAVENOUS at 02:01

## 2020-01-17 RX ADMIN — ACETAMINOPHEN 650 MG: 325 TABLET ORAL at 10:01

## 2020-01-17 RX ADMIN — SODIUM CHLORIDE, SODIUM LACTATE, POTASSIUM CHLORIDE, AND CALCIUM CHLORIDE: .6; .31; .03; .02 INJECTION, SOLUTION INTRAVENOUS at 02:01

## 2020-01-17 RX ADMIN — HYDRALAZINE HYDROCHLORIDE 50 MG: 25 TABLET, FILM COATED ORAL at 09:01

## 2020-01-17 RX ADMIN — QUETIAPINE FUMARATE 50 MG: 25 TABLET, FILM COATED ORAL at 09:01

## 2020-01-17 RX ADMIN — OXYCODONE HYDROCHLORIDE 15 MG: 5 TABLET ORAL at 09:01

## 2020-01-17 RX ADMIN — MUPIROCIN 1 G: 20 OINTMENT TOPICAL at 09:01

## 2020-01-17 RX ADMIN — TRAZODONE HYDROCHLORIDE 25 MG: 50 TABLET ORAL at 10:01

## 2020-01-17 RX ADMIN — FENTANYL CITRATE 25 MCG: 50 INJECTION, SOLUTION INTRAMUSCULAR; INTRAVENOUS at 04:01

## 2020-01-17 RX ADMIN — SODIUM CHLORIDE, SODIUM LACTATE, POTASSIUM CHLORIDE, AND CALCIUM CHLORIDE: .6; .31; .03; .02 INJECTION, SOLUTION INTRAVENOUS at 01:01

## 2020-01-17 RX ADMIN — SUCCINYLCHOLINE CHLORIDE 200 MG: 20 INJECTION, SOLUTION INTRAMUSCULAR; INTRAVENOUS at 01:01

## 2020-01-17 RX ADMIN — HYDROMORPHONE HYDROCHLORIDE 1 MG: 1 INJECTION, SOLUTION INTRAMUSCULAR; INTRAVENOUS; SUBCUTANEOUS at 01:01

## 2020-01-17 RX ADMIN — OXYCODONE HYDROCHLORIDE 15 MG: 5 TABLET ORAL at 02:01

## 2020-01-17 RX ADMIN — HYDROMORPHONE HYDROCHLORIDE 0.5 MG: 1 INJECTION, SOLUTION INTRAMUSCULAR; INTRAVENOUS; SUBCUTANEOUS at 09:01

## 2020-01-17 RX ADMIN — PHENYLEPHRINE HYDROCHLORIDE 100 MCG: 10 INJECTION INTRAVENOUS at 02:01

## 2020-01-17 RX ADMIN — SUGAMMADEX 100 MG: 100 INJECTION, SOLUTION INTRAVENOUS at 03:01

## 2020-01-17 RX ADMIN — ROCURONIUM BROMIDE 10 MG: 10 INJECTION, SOLUTION INTRAVENOUS at 01:01

## 2020-01-17 RX ADMIN — ONDANSETRON HYDROCHLORIDE 4 MG: 2 INJECTION, SOLUTION INTRAMUSCULAR; INTRAVENOUS at 05:01

## 2020-01-17 RX ADMIN — DOXYCYCLINE HYCLATE 100 MG: 100 CAPSULE ORAL at 09:01

## 2020-01-17 RX ADMIN — ROCURONIUM BROMIDE 20 MG: 10 INJECTION, SOLUTION INTRAVENOUS at 01:01

## 2020-01-17 RX ADMIN — VENLAFAXINE HYDROCHLORIDE 150 MG: 150 CAPSULE, EXTENDED RELEASE ORAL at 09:01

## 2020-01-17 RX ADMIN — FENTANYL CITRATE 25 MCG: 50 INJECTION, SOLUTION INTRAMUSCULAR; INTRAVENOUS at 01:01

## 2020-01-17 RX ADMIN — METOPROLOL SUCCINATE 50 MG: 25 TABLET, FILM COATED, EXTENDED RELEASE ORAL at 09:01

## 2020-01-17 RX ADMIN — CEFOXITIN SODIUM 2 G: 2 INJECTION, SOLUTION INTRAVENOUS at 06:01

## 2020-01-17 RX ADMIN — OXYCODONE HYDROCHLORIDE 5 MG: 5 TABLET ORAL at 04:01

## 2020-01-17 RX ADMIN — OLMESARTAN MEDOXOMIL 40 MG: 20 TABLET, FILM COATED ORAL at 09:01

## 2020-01-17 RX ADMIN — GABAPENTIN 600 MG: 300 CAPSULE ORAL at 09:01

## 2020-01-17 RX ADMIN — PROPOFOL 200 MG: 10 INJECTION, EMULSION INTRAVENOUS at 01:01

## 2020-01-17 RX ADMIN — NEOSTIGMINE METHYLSULFATE 4 MG: 1 INJECTION INTRAVENOUS at 02:01

## 2020-01-17 RX ADMIN — TRAZODONE HYDROCHLORIDE 25 MG: 50 TABLET ORAL at 01:01

## 2020-01-17 RX ADMIN — SODIUM CHLORIDE, SODIUM LACTATE, POTASSIUM CHLORIDE, AND CALCIUM CHLORIDE: .6; .31; .03; .02 INJECTION, SOLUTION INTRAVENOUS at 03:01

## 2020-01-17 RX ADMIN — KETAMINE HYDROCHLORIDE 25 MG: 50 INJECTION INTRAMUSCULAR; INTRAVENOUS at 02:01

## 2020-01-17 RX ADMIN — MORPHINE SULFATE 4 MG: 4 INJECTION INTRAVENOUS at 06:01

## 2020-01-17 RX ADMIN — DEXAMETHASONE SODIUM PHOSPHATE 8 MG: 4 INJECTION, SOLUTION INTRAMUSCULAR; INTRAVENOUS at 02:01

## 2020-01-17 RX ADMIN — SENNOSIDES AND DOCUSATE SODIUM 1 TABLET: 8.6; 5 TABLET ORAL at 09:01

## 2020-01-17 RX ADMIN — CEFOXITIN SODIUM 2 G: 2 INJECTION, SOLUTION INTRAVENOUS at 01:01

## 2020-01-17 RX ADMIN — HYDROCHLOROTHIAZIDE 25 MG: 25 TABLET ORAL at 09:01

## 2020-01-17 RX ADMIN — LIDOCAINE HYDROCHLORIDE 100 MG: 20 INJECTION, SOLUTION EPIDURAL; INFILTRATION; INTRACAUDAL; PERINEURAL at 01:01

## 2020-01-17 NOTE — OP NOTE
Surgery Date: 1/17/2020     Surgeon(s) and Role:     * Edgard Hill III, MD - Primary    Assisting Surgeon: None    Pre-op Diagnosis:  Calculus of gallbladder with acute cholecystitis without obstruction [K80.00]    Post-op Diagnosis:  Post-Op Diagnosis Codes:     * Calculus of gallbladder with acute cholecystitis without obstruction [K80.00]    Procedure(s) (LRB):  CHOLECYSTECTOMY, LAPAROSCOPIC (N/A)    Anesthesia: General    Description of Procedure: The patient was brought to the operating room and transferred to the operating room table in the supine position.  Patient was given general anesthesia and intubated.  The abdomen was prepped and draped.  A transverse infraumbilical incision was made.  Dissection was carried down through the skin and subcutaneous tissue.  The abdomen was entered using the open Mary technique.  The abdomen was insufflated.  Under direct visualization, three 5mm ports were placed.  One was placed in the subxiphoid position, one in the right anterior axillary line and the other in the mid clavicular line. The gallbladder was very distended and tense.  It had to be aspirated to create enough slack to grasp it.  The gallbladder body was retracted superiorly and the infundibulum was retracted laterally.  The peritoneum overlying the cystic duct and artery was carefully taken down.  The cystic duct and cystic artery were individually isolated and dissected free 360 degrees.  The cystic duct was little too wide to accommodate the 5 mm clip.  The subxiphoid 5 mm port was removed and replaced with a 12 mm port. The large Ligaclip was then used.  The cystic duct and artery were individually clipped proximally and distally.  They were transected using endo mikayla.  Electrocautery was used to take the gallbladder off the liver bed.  The Endo-Catch bag was used to remove the gallbladder from the abdomen.  We irrigated out the right upper quadrant with irrigation.  We checked again and there  was no evidence of any bile leak or bleeding from our clips or from the liver bed.  The patient tolerated the procedure well.  We removed all of our ports.  We repaired the fascia with interrupted Ethibond sutures.  We repaired the skin with 4-0 Monocryl.  After that was done, the patient was extubated and taken to the recovery room in stable condition.  All lap and instrument counts were correct.    Description of the findings of the procedure:  Distended and inflamed gallbladder.  Multiple small gallstones within the gallbladder.     Estimated Blood Loss: 5 mL    Complications none    Instrument counts correct         Specimens:   Specimen (12h ago, onward)     Start     Ordered    01/17/20 1439  Specimen to Pathology - Surgery  Once     Comments:  Pre-op Diagnosis: Calculus of gallbladder with acute cholecystitis without obstruction [K80.00]Procedure(s):CHOLECYSTECTOMY, LAPAROSCOPIC Number of specimens: 1Name of specimens: gallbladder      01/17/20 1439

## 2020-01-17 NOTE — ASSESSMENT & PLAN NOTE
Monitor pain levels  Continue home PPI  General surgery consult  Await US results  Keep NPO after midnight.

## 2020-01-17 NOTE — HPI
Mr Orlando Treadwell is a 70 yo male pt who was just discharged from here 2 days ago. He was in with MRSA to his right posterior back. Today he had lunch of [employer] chicken breast with skin removed and then he developed mid epigastric pain. He has a history of indigestion and thought that was the problem. But it did not resolve so came to the ED. On CT scan it was noted he had a gallbladder with wall thickening. Dr Jensen was contacted and he ordered a US of the gallbladder. It will be reviewed by surgery in am and determine if he needs surgery.  Past medical history of hypertension, pre diabetes, PTSD, chronic pain on opioids in outpatient setting, history of cellulitis and soft tissue infection with MRSA.

## 2020-01-17 NOTE — TRANSFER OF CARE
"Anesthesia Transfer of Care Note    Patient: Orlando Treadwell    Procedure(s) Performed: Procedure(s) (LRB):  CHOLECYSTECTOMY, LAPAROSCOPIC (N/A)    Patient location: PACU    Anesthesia Type: general    Transport from OR: Transported from OR on room air with adequate spontaneous ventilation    Post pain: adequate analgesia    Post assessment: no apparent anesthetic complications    Post vital signs: stable    Level of consciousness: awake and alert    Nausea/Vomiting: no nausea/vomiting    Complications: none    Transfer of care protocol was followed      Last vitals:   Visit Vitals  BP (!) 88/63 (BP Location: Left arm, Patient Position: Lying)   Pulse 81   Temp 36.4 °C (97.6 °F) (Oral)   Resp 18   Ht 5' 7" (1.702 m)   Wt 104.3 kg (229 lb 15 oz)   SpO2 95%   BMI 36.01 kg/m²     "

## 2020-01-17 NOTE — PROGRESS NOTES
[facility]  Progress Note    Patient Name: Orlando Treadwell  MRN: [id number]  Patient Class: OP- Observation   Admission Date: 1/16/2020  Length of Stay: 0 days  Attending Physician: Erna Bruce DO  Primary Care Provider: Kleber Worthy III, MD        Subjective:     Principal Problem:Cholelithiasis with acute cholecystitis  Chief Complaint   Patient presents with    Chest Pain       Interval History: AF, WBC wnl, LFTs increasing from admission. NPO pending surgical evaluation.  Continuing oral doxy from prior admission (1/11 bx +MRSA) and ID consult pending.      Denies any CP, SOB, N/V/D,  headaches, fever or chills. Abdominal pain is well controlled on current regimen.       Review of Systems   Per interval history, all other systems reviewed and negative.     Objective:     Vital Signs (Most Recent):  Temp: 97.6 °F (36.4 °C) (01/17/20 0800)  Pulse: 81 (01/17/20 0800)  Resp: 18 (01/17/20 0800)  BP: (!) 88/63 (01/17/20 0800)  SpO2: 95 % (01/17/20 0800) Vital Signs (24h Range):  Temp:  [97.6 °F (36.4 °C)-99.4 °F (37.4 °C)] 97.6 °F (36.4 °C)  Pulse:  [] 81  Resp:  [16-24] 18  SpO2:  [93 %-98 %] 95 %  BP: ()/(63-97) 88/63     Weight: 104.3 kg (229 lb 15 oz)  Body mass index is 36.01 kg/m².    Intake/Output Summary (Last 24 hours) at 1/17/2020 0939  Last data filed at 1/17/2020 0558  Gross per 24 hour   Intake 575 ml   Output 320 ml   Net 255 ml      Physical Exam   Constitutional: He is oriented to person, place, and time. He appears well-developed and well-nourished.   HENT:   Head: Normocephalic.   Eyes: Pupils are equal, round, and reactive to light.   Neck: Normal range of motion. Neck supple. No thyromegaly present.   Cardiovascular: Normal rate, regular rhythm, normal heart sounds and intact distal pulses.   Pulmonary/Chest: Effort normal and breath sounds normal.   Abdominal: Soft. Bowel sounds are normal. He exhibits no mass. There is tenderness (RUQ). There is  no rebound and no guarding. A hernia is present.   Musculoskeletal: Normal range of motion.   Neurological: He is alert and oriented to person, place, and time.   Skin: Skin is warm and dry. Capillary refill takes 2 to 3 seconds.   Psychiatric: He has a normal mood and affect.       Significant Labs:   CBC:   Recent Labs   Lab 01/16/20  1440 01/17/20  0508   WBC 11.09 12.32   HGB 12.2* 12.5*   HCT 39.5* 40.6    275     CMP:   Recent Labs   Lab 01/16/20  1440 01/17/20  0508    137   K 3.8 3.6    98   CO2 28 26   * 133*   BUN 17 16   CREATININE 1.3 1.2   CALCIUM 9.4 9.5   PROT 7.7 7.3   ALBUMIN 3.3* 2.9*   BILITOT 0.7 2.3*   ALKPHOS 104 176*   AST 63* 177*   ALT 77* 173*   ANIONGAP 11 13   EGFRNONAA 55.3* >60.0     All pertinent labs within the past 24 hours have been reviewed.    Significant Imaging: I have reviewed all pertinent imaging results/findings within the past 24 hours.   Imaging Results          US Abdomen Limited (Final result)  Result time 01/16/20 20:04:49    Final result by Jacques Gunter MD (01/16/20 20:04:49)                 Narrative:        Exam: ULTRASOUND OF THE ABDOMEN LIMITED, RIGHT UPPER QUADRANT    Clinical data: Right upper quadrant pain.    Technique: Real-time grayscale imaging of the right upper quadrant was  performed. Images supplemented with color Doppler technique.    Prior studies: No prior studies submitted.                             CT Abdomen Pelvis With Contrast (Final result)  Result time 01/16/20 16:41:02    Final result by Guille Bruce MD (01/16/20 16:41:02)                 Impression:      1. No abdominal wall hernia identified.  2. Redemonstration of unchanged retroperitoneal lipomatosis.  3. Unchanged 5 mm renal calculus in the left distal ureter at the UPJ.  No left hydroureter nephrosis or hydroureter.      Electronically signed by: Guille Bruce MD  Date:    01/16/2020  Time:    16:41                     X-Ray Chest AP Portable (Final  result)  Result time 01/16/20 15:21:22    Final result by Guille Bruce MD (01/16/20 15:21:22)                 Impression:      No acute cardiopulmonary abnormality.      Electronically signed by: Guille Bruce MD  Date:    01/16/2020  Time:    15:21                             Assessment/Plan:      * Cholelithiasis with acute cholecystitis  Pt reports RUQ pain worse following meals  US: Cholelithiasis with wall thickening measuring 0.4 cm, trace pericholecystic fluid.   Continue home PPI  General surgery consult  NPO  IVF  Pain medication prn  Antiemetic prn      Hypertension  Monitor and trend BP  Continue home BP medications      MRSA bacteremia  +blood cx for MRSA from 1/11/20  Isolation  Consult ID  Continuing home medications, doxycyline  Repeat blood cx pending      Osteoarthritis  Chronic stable  Continue home pain medications        VTE Risk Mitigation (From admission, onward)         Ordered     Reason for no Mechanical VTE Prophylaxis  Once     Question:  Reasons:  Answer:  Patient is Ambulatory    01/16/20 1854     IP VTE HIGH RISK PATIENT  Once      01/16/20 1854                  Erna Bruce DO  Department of Hospital Medicine   [facility]

## 2020-01-17 NOTE — ASSESSMENT & PLAN NOTE
+blood cx for MRSA from 1/11/20  Isolation  Consult ID  Continuing home medications, doxycyline  Repeat blood cx pending

## 2020-01-17 NOTE — ED NOTES
VS stable.  SR on monitor.  Pt denies any chest pain or Abd. Pain at this time.  Was relieved well by Dilaudid.   Report was called to Linda, then to floor per wheelchair with ER tech, tele monitor in place.

## 2020-01-17 NOTE — CONSULTS
ID   I  came to see patient three times. He is not in the room. At this time he is in surgery   Will see him tomorrow        Antibiotics (From admission, onward)    Start     Stop Route Frequency Ordered    01/17/20 1730  ceFOXItin 2 g/50ml Dextrose IVPB      -- IV Every 8 hours (non-standard times) 01/17/20 1618    01/16/20 2100  doxycycline capsule 100 mg      -- Oral Every 12 hours 01/16/20 1854 01/16/20 2100  mupirocin 2 % ointment 1 g      -- Nasl 2 times daily 01/16/20 1854

## 2020-01-17 NOTE — ANESTHESIA PREPROCEDURE EVALUATION
01/17/2020  Orlando Treadwell is a 69 y.o., male.    Pre-op Assessment    I have reviewed the Patient Summary Reports.     I have reviewed the Nursing Notes.   I have reviewed the Medications.     Review of Systems  Anesthesia Hx:  No problems with previous Anesthesia Denies Hx of Anesthetic complications  Neg history of prior surgery. Denies Family Hx of Anesthesia complications.   Denies Personal Hx of Anesthesia complications.   Social:  Non-Smoker, No Alcohol Use    Hematology/Oncology:     Oncology Normal    -- Denies Anemia: Oncology Comments: Basal cell     EENT/Dental:EENT/Dental Normal   Cardiovascular:   Hypertension, well controlled ECG has been reviewed.    Pulmonary:   Sleep Apnea Patient states that he is unable to tolerate Cpap  Asbestos exposure   Education provided regarding risk of obstructive sleep apnea     Renal/:   Chronic Renal Disease renal calculi    Hepatic/GI:   GERD, well controlled No Active N/V  No Intestinal Obstuction   Musculoskeletal:   Arthritis   Spine Disorders: lumbar Degenerative disease and Chronic Pain    Neurological:   Right great Toe Numbness  LBP  Peripheral Neuropathy    Endocrine:  Endocrine Normal    Dermatological:  Skin Normal    Psych:  Psychiatric Normal         Patient Active Problem List   Diagnosis    Acute prostatitis    Low serum testosterone level    Chronic prostatitis    Ureterolithiasis    Status post total knee replacement using cement    DDD (degenerative disc disease), lumbar    Osteoarthritis    Osteoarthritis of right glenohumeral joint    Primary osteoarthritis of left wrist    Sepsis due to methicillin resistant Staphylococcus aureus (MRSA)    Chronic narcotic use    MRSA bacteremia    Hypertension    Cholelithiasis with acute cholecystitis       Past Surgical History:   Procedure Laterality Date    APPENDECTOMY      HAND  SURGERY      rt hand    INCISION AND DRAINAGE Bilateral 06/2019    Right upper arm and left upper arm abscesses    JOINT REPLACEMENT      right knee    KNEE LIGAMENT RECONSTRUCTION      left knee    LITHOTRIPSY      TONSILLECTOMY, ADENOIDECTOMY, BILATERAL MYRINGOTOMY AND TUBES      TOTAL KNEE ARTHROPLASTY  1971    rt knee    URETERAL STENT PLACEMENT          Tobacco Use:  The patient  reports that he has never smoked. He has never used smokeless tobacco.     Results for orders placed or performed during the hospital encounter of 01/16/20   EKG 12-lead    Collection Time: 01/16/20  2:33 PM    Narrative    Test Reason : R07.9,    Vent. Rate : 096 BPM     Atrial Rate : 096 BPM     P-R Int : 160 ms          QRS Dur : 108 ms      QT Int : 356 ms       P-R-T Axes : 045 034 041 degrees     QTc Int : 449 ms    Normal sinus rhythm  Incomplete right bundle branch block  Borderline Abnormal ECG  When compared with ECG of 08-JAN-2020 12:26,  Criteria for Inferior infarct are no longer Present    Referred By: [email or web address]   SELF           Confirmed By:              Lab Results   Component Value Date    WBC 12.32 01/17/2020    HGB 12.5 (L) 01/17/2020    HCT 40.6 01/17/2020    MCV 87 01/17/2020     01/17/2020     BMP  Lab Results   Component Value Date     01/17/2020    K 3.6 01/17/2020    CL 98 01/17/2020    CO2 26 01/17/2020    BUN 16 01/17/2020    CREATININE 1.2 01/17/2020    CALCIUM 9.5 01/17/2020    ANIONGAP 13 01/17/2020    ESTGFRAFRICA >60.0 01/17/2020    EGFRNONAA >60.0 01/17/2020     ECHO 1/9/2020  · Normal left ventricular systolic function. The estimated ejection fraction is 60%.  · Normal LV diastolic function.  · No wall motion abnormalities.  · No intracardiac thrombi/vegetations  · Technically difficult study.         Physical Exam  General:  Obesity    Airway/Jaw/Neck:  Airway Findings: Mouth Opening: Small, but > 3cm Tongue: Normal  General Airway Assessment: Adult  Mallampati: III  Improves to  III with phonation.  TM Distance: < 4 cm  Jaw/Neck Findings:  Neck ROM: Normal ROM      Dental:  Dental Findings: Molar caps   Chest/Lungs:  Chest/Lungs Findings: Clear to auscultation, Normal Respiratory Rate     Heart/Vascular:  Heart Findings: Rate: Normal  Rhythm: Regular Rhythm  Sounds: Normal        Mental Status:  Mental Status Findings:  Cooperative, Alert and Oriented         Anesthesia Plan  Type of Anesthesia, risks & benefits discussed:  Anesthesia Type:  general  Patient's Preference:   Intra-op Monitoring Plan: standard ASA monitors  Intra-op Monitoring Plan Comments:   Post Op Pain Control Plan: multimodal analgesia  Post Op Pain Control Plan Comments:   Induction:   IV  Beta Blocker:  Patient is on a Beta-Blocker and has received one dose within the past 24 hours (No further documentation required).       Informed Consent: Patient understands risks and agrees with Anesthesia plan.  Questions answered. Anesthesia consent signed with patient.  ASA Score: 3     Day of Surgery Review of History & Physical:        Anesthesia Plan Notes: GETA   Use GlideScope  No Versed  Minimal Fentanyl  Robinul   Ketamine  Decadron 8mg iv     Zofran 4 mg IV  Suggamadex   Extubate in sitting position awake with airway in place

## 2020-01-17 NOTE — ANESTHESIA POSTPROCEDURE EVALUATION
Anesthesia Post Evaluation    Patient: Orlando Treadwell    Procedure(s) Performed: Procedure(s) (LRB):  CHOLECYSTECTOMY, LAPAROSCOPIC (N/A)    Final Anesthesia Type: general    Patient location during evaluation: PACU  Patient participation: Yes- Able to Participate  Level of consciousness: awake and alert and oriented  Post-procedure vital signs: reviewed and stable  Pain management: adequate  Airway patency: patent    PONV status at discharge: No PONV  Anesthetic complications: no      Cardiovascular status: blood pressure returned to baseline and hemodynamically stable  Respiratory status: unassisted, spontaneous ventilation and nasal cannula  Hydration status: euvolemic  Follow-up not needed.          Vitals Value Taken Time   /72 1/17/2020  4:30 PM   Temp 36.7 °C (98.1 °F) 1/17/2020  4:30 PM   Pulse 83 1/17/2020  4:34 PM   Resp 16 1/17/2020  4:34 PM   SpO2 96 % 1/17/2020  4:34 PM   Vitals shown include unvalidated device data.      No case tracking events are documented in the log.      Pain/Sergio Score: Pain Rating Prior to Med Admin: 7 (1/17/2020  4:15 PM)  Pain Rating Post Med Admin: 5 (1/17/2020  4:15 PM)  Sergio Score: 9 (1/17/2020  4:30 PM)

## 2020-01-17 NOTE — PLAN OF CARE
01/17/20 1742   CELIS Message   Medicare Outpatient and Observation Notification regarding financial responsibility Given to patient/caregiver;Explained to patient/caregiver;Signed/date by patient/caregiver   Date CELIS was signed 01/17/20   Time CELIS was signed 1742

## 2020-01-17 NOTE — ANESTHESIA PROCEDURE NOTES
Intubation  Performed by: Patricio Molina CRNA  Authorized by: Abraham Malave MD     Intubation:     Induction:  Intravenous    Intubated:  Postinduction    Attempts:  2    Attempted By:  CRNA    Method of Intubation:  Direct    Blade:  Malave 2    Laryngeal View Grade: Grade III - only epiglottis visible      Attempted By (2nd Attempt):  CRNA    Method of Intubation (2nd Attempt):  Other (see comments)    Blade (2nd Attempt):  Glidescope 3    Difficult Airway Encountered?: No      Complications:  None    Airway Device:  Oral endotracheal tube    Airway Device Size:  7.5    Style/Cuff Inflation:  Cuffed    Tube secured:  24    Secured at:  The lips    Placement Verified By:  Capnometry    Complicating Factors:  None    Findings Post-Intubation:  BS equal bilateral and atraumatic/condition of teeth unchanged

## 2020-01-17 NOTE — ASSESSMENT & PLAN NOTE
Pt reports RUQ pain worse following meals  US: Cholelithiasis with wall thickening measuring 0.4 cm, trace pericholecystic fluid.   Continue home PPI  General surgery consult  NPO  IVF  Pain medication prn  Antiemetic prn

## 2020-01-17 NOTE — SUBJECTIVE & OBJECTIVE
Interval History: AF, WBC wnl, LFTs increasing from admission. NPO pending surgical evaluation.  Continuing oral doxy from prior admission (1/11 bx +MRSA) and ID consult pending.      Denies any CP, SOB, N/V/D,  headaches, fever or chills. Abdominal pain is well controlled on current regimen.       Review of Systems   Per interval history, all other systems reviewed and negative.     Objective:     Vital Signs (Most Recent):  Temp: 97.6 °F (36.4 °C) (01/17/20 0800)  Pulse: 81 (01/17/20 0800)  Resp: 18 (01/17/20 0800)  BP: (!) 88/63 (01/17/20 0800)  SpO2: 95 % (01/17/20 0800) Vital Signs (24h Range):  Temp:  [97.6 °F (36.4 °C)-99.4 °F (37.4 °C)] 97.6 °F (36.4 °C)  Pulse:  [] 81  Resp:  [16-24] 18  SpO2:  [93 %-98 %] 95 %  BP: ()/(63-97) 88/63     Weight: 104.3 kg (229 lb 15 oz)  Body mass index is 36.01 kg/m².    Intake/Output Summary (Last 24 hours) at 1/17/2020 0939  Last data filed at 1/17/2020 0558  Gross per 24 hour   Intake 575 ml   Output 320 ml   Net 255 ml      Physical Exam   Constitutional: He is oriented to person, place, and time. He appears well-developed and well-nourished.   HENT:   Head: Normocephalic.   Eyes: Pupils are equal, round, and reactive to light.   Neck: Normal range of motion. Neck supple. No thyromegaly present.   Cardiovascular: Normal rate, regular rhythm, normal heart sounds and intact distal pulses.   Pulmonary/Chest: Effort normal and breath sounds normal.   Abdominal: Soft. Bowel sounds are normal. He exhibits no mass. There is tenderness (RUQ). There is no rebound and no guarding. A hernia is present.   Musculoskeletal: Normal range of motion.   Neurological: He is alert and oriented to person, place, and time.   Skin: Skin is warm and dry. Capillary refill takes 2 to 3 seconds.   Psychiatric: He has a normal mood and affect.       Significant Labs:   CBC:   Recent Labs   Lab 01/16/20  1440 01/17/20  0508   WBC 11.09 12.32   HGB 12.2* 12.5*   HCT 39.5* 40.6     275     CMP:   Recent Labs   Lab 01/16/20  1440 01/17/20  0508    137   K 3.8 3.6    98   CO2 28 26   * 133*   BUN 17 16   CREATININE 1.3 1.2   CALCIUM 9.4 9.5   PROT 7.7 7.3   ALBUMIN 3.3* 2.9*   BILITOT 0.7 2.3*   ALKPHOS 104 176*   AST 63* 177*   ALT 77* 173*   ANIONGAP 11 13   EGFRNONAA 55.3* >60.0     All pertinent labs within the past 24 hours have been reviewed.    Significant Imaging: I have reviewed all pertinent imaging results/findings within the past 24 hours.   Imaging Results          US Abdomen Limited (Final result)  Result time 01/16/20 20:04:49    Final result by Jacques Gunter MD (01/16/20 20:04:49)                 Narrative:        Exam: ULTRASOUND OF THE ABDOMEN LIMITED, RIGHT UPPER QUADRANT    Clinical data: Right upper quadrant pain.    Technique: Real-time grayscale imaging of the right upper quadrant was  performed. Images supplemented with color Doppler technique.    Prior studies: No prior studies submitted.                             CT Abdomen Pelvis With Contrast (Final result)  Result time 01/16/20 16:41:02    Final result by Guille Bruce MD (01/16/20 16:41:02)                 Impression:      1. No abdominal wall hernia identified.  2. Redemonstration of unchanged retroperitoneal lipomatosis.  3. Unchanged 5 mm renal calculus in the left distal ureter at the UPJ.  No left hydroureter nephrosis or hydroureter.      Electronically signed by: Guille Bruce MD  Date:    01/16/2020  Time:    16:41                     X-Ray Chest AP Portable (Final result)  Result time 01/16/20 15:21:22    Final result by Guille Bruce MD (01/16/20 15:21:22)                 Impression:      No acute cardiopulmonary abnormality.      Electronically signed by: Guille Bruce MD  Date:    01/16/2020  Time:    15:21

## 2020-01-17 NOTE — BRIEF OP NOTE
Replaced by [facility]  Brief Operative Note    SUMMARY     Surgery Date: 1/17/2020     Surgeon(s) and Role:     * Edgard Hill III, MD - Primary    Assisting Surgeon: None    Pre-op Diagnosis:  Calculus of gallbladder with acute cholecystitis without obstruction [K80.00]    Post-op Diagnosis:  Post-Op Diagnosis Codes:     * Calculus of gallbladder with acute cholecystitis without obstruction [K80.00]    Procedure(s) (LRB):  CHOLECYSTECTOMY, LAPAROSCOPIC (N/A)    Anesthesia: General    Description of Procedure: The patient was brought to the operating room and transferred to the operating room table in the supine position.  Patient was given general anesthesia and intubated.  The abdomen was prepped and draped.  A transverse infraumbilical incision was made.  Dissection was carried down through the skin and subcutaneous tissue.  The abdomen was entered using the open Mary technique.  The abdomen was insufflated.  Under direct visualization, three 5mm ports were placed.  One was placed in the subxiphoid position, one in the right anterior axillary line and the other in the mid clavicular line. The gallbladder was very distended and tense.  It had to be aspirated to create enough slack to grasp it.  The gallbladder body was retracted superiorly and the infundibulum was retracted laterally.  The peritoneum overlying the cystic duct and artery was carefully taken down.  The cystic duct and cystic artery were individually isolated and dissected free 360 degrees.  The cystic duct was little too wide to accommodate the 5 mm clip.  The subxiphoid 5 mm port was removed and replaced with a 12 mm port. The large Ligaclip was then used.  The cystic duct and artery were individually clipped proximally and distally.  They were transected using endo mikayla.  Electrocautery was used to take the gallbladder off the liver bed.  The Endo-Catch bag was used to remove the gallbladder from the abdomen.  We irrigated out the  right upper quadrant with irrigation.  We checked again and there was no evidence of any bile leak or bleeding from our clips or from the liver bed.  The patient tolerated the procedure well.  We removed all of our ports.  We repaired the fascia with interrupted Ethibond sutures.  We repaired the skin with 4-0 Monocryl.  After that was done, the patient was extubated and taken to the recovery room in stable condition.  All lap and instrument counts were correct.    Description of the findings of the procedure:  Distended and inflamed gallbladder.  Multiple small gallstones within the gallbladder.     Estimated Blood Loss: 5 mL    Complications none    Instrument counts correct         Specimens:   Specimen (12h ago, onward)     Start     Ordered    01/17/20 1432  Specimen to Pathology - Surgery  Once     Comments:  Pre-op Diagnosis: Calculus of gallbladder with acute cholecystitis without obstruction [K80.00]Procedure(s):CHOLECYSTECTOMY, LAPAROSCOPIC Number of specimens: 1Name of specimens: gallbladder      01/17/20 1436

## 2020-01-17 NOTE — H&P
[facility]  History & Physical    Patient Name: Orlando Treadwell  MRN: [id number]  Admission Date: 1/16/2020  Attending Physician: MACIEJ Curran  Primary Care Provider: Kleber Worthy III, MD         Patient information was obtained from patient, past medical records and ER records.     Subjective:     Principal Problem:Cholelithiasis with acute cholecystitis    Chief Complaint:   Chief Complaint   Patient presents with    Chest Pain        HPI: Mr Orlando Treadwell is a 68 yo male pt who was just discharged from here 2 days ago. He was in with MRSA to his right posterior back. Today he had lunch of [employer] chicken breast with skin removed and then he developed mid epigastric pain. He has a history of indigestion and thought that was the problem. But it did not resolve so came to the ED. On CT scan it was noted he had a gallbladder with wall thickening. Dr Jensen was contacted and he ordered a US of the gallbladder. It will be reviewed by surgery in am and determine if he needs surgery.  Past medical history of hypertension, pre diabetes, PTSD, chronic pain on opioids in outpatient setting, history of cellulitis and soft tissue infection with MRSA.       Past Medical History:   Diagnosis Date    Arthritis     Asbestos exposure     SOB     Back pain     Basal cell carcinoma     Bulging discs     lumbar    Hypertension     Kidney stone     Prostatitis     Sepsis due to methicillin resistant Staphylococcus aureus (MRSA) 06/2019    Due to large right upper arm abscess    Wears glasses        Past Surgical History:   Procedure Laterality Date    APPENDECTOMY      HAND SURGERY      rt hand    INCISION AND DRAINAGE Bilateral 06/2019    Right upper arm and left upper arm abscesses    JOINT REPLACEMENT      right knee    KNEE LIGAMENT RECONSTRUCTION      left knee    LITHOTRIPSY      TONSILLECTOMY, ADENOIDECTOMY, BILATERAL MYRINGOTOMY AND TUBES      TOTAL KNEE ARTHROPLASTY   1971    rt knee    URETERAL STENT PLACEMENT         Review of patient's allergies indicates:   Allergen Reactions    Morphine Nausea Only     Ok with nausea med.       No current facility-administered medications on file prior to encounter.      Current Outpatient Medications on File Prior to Encounter   Medication Sig    aspirin 325 MG tablet Take 325 mg by mouth once daily.    diclofenac sodium 1 % Gel Apply 2 g topically 2 (two) times daily.    doxycycline (VIBRAMYCIN) 100 MG Cap Take 100 mg by mouth every 12 (twelve) hours.     esomeprazole (NEXIUM) 40 MG capsule Take 40 mg by mouth every evening.     gabapentin (NEURONTIN) 300 MG capsule Take 600 mg by mouth every evening.     hydrALAZINE (APRESOLINE) 50 MG tablet Take 50 mg by mouth 2 (two) times daily.    Lactobacillus rhamnosus GG (CULTURELLE) 10 billion cell capsule Take 2 capsules by mouth once daily.     metoprolol succinate (TOPROL-XL) 100 MG 24 hr tablet Take 50 mg by mouth every evening.     multivitamin (THERAGRAN) tablet Take 1 tablet by mouth once daily.    mupirocin (BACTROBAN) 2 % ointment 1 g by Nasal route 2 (two) times daily.     olmesartan-hydrochlorothiazide (BENICAR HCT) 40-25 mg per tablet Take 1 tablet by mouth every evening.     oxyCODONE (ROXICODONE) 15 MG Tab Take 15 mg by mouth 4 (four) times daily as needed for Pain.     QUEtiapine (SEROQUEL) 50 MG tablet Take 50 mg by mouth every evening.    tramadol (ULTRAM) 50 mg tablet Take 50 mg by mouth every 8 (eight) hours as needed for Pain.     venlafaxine (EFFEXOR-XR) 150 MG Cp24 TAKE 1 CAPSULE BY MOUTH EVERY DAY (Patient taking differently: Take 150 mg by mouth every evening. )    testosterone cypionate (DEPOTESTOTERONE CYPIONATE) 200 mg/mL injection Inject 200 mg into the muscle every 28 days.     [DISCONTINUED] QUEtiapine (SEROQUEL) 50 MG tablet TAKE 1 TABLET BY MOUTH AT BEDTIME (Patient taking differently: Take 50 mg by mouth nightly. )     Family History     Problem  Relation (Age of Onset)    Cancer Mother    Heart disease Father    Stroke Father        Tobacco Use    Smoking status: Never Smoker    Smokeless tobacco: Never Used   Substance and Sexual Activity    Alcohol use: No    Drug use: No    Sexual activity: Not on file     Review of Systems   Constitutional: Negative.    HENT: Negative.    Eyes: Negative.    Respiratory: Negative.    Cardiovascular: Negative.    Gastrointestinal: Positive for abdominal pain.        Indigestion   Endocrine: Negative.    Genitourinary: Negative.    Musculoskeletal: Negative.    Skin: Negative.    Allergic/Immunologic: Negative.    Neurological: Negative.    Hematological: Negative.    Psychiatric/Behavioral: Negative.      Objective:     Vital Signs (Most Recent):  Temp: 99.3 °F (37.4 °C) (01/16/20 1432)  Pulse: 99 (01/16/20 1432)  Resp: (!) 24 (01/16/20 1432)  BP: (!) 141/89 (01/16/20 1432)  SpO2: 98 % (01/16/20 1432) Vital Signs (24h Range):  Temp:  [99.3 °F (37.4 °C)] 99.3 °F (37.4 °C)  Pulse:  [99] 99  Resp:  [24] 24  SpO2:  [98 %] 98 %  BP: (141)/(89) 141/89     Weight: 104.3 kg (230 lb)  Body mass index is 36.02 kg/m².    Physical Exam   Constitutional: He is oriented to person, place, and time. He appears well-developed and well-nourished.   HENT:   Head: Normocephalic.   Eyes: Pupils are equal, round, and reactive to light.   Neck: Normal range of motion. Neck supple. No thyromegaly present.   Cardiovascular: Normal rate, regular rhythm, normal heart sounds and intact distal pulses.   Pulmonary/Chest: Effort normal and breath sounds normal.   Abdominal: Soft. Bowel sounds are normal.   Musculoskeletal: Normal range of motion.   Neurological: He is alert and oriented to person, place, and time.   Skin: Skin is warm and dry. Capillary refill takes 2 to 3 seconds.   Dressing clean and dry to posterior right shoulder.   Psychiatric: He has a normal mood and affect.         CRANIAL NERVES     CN III, IV, VI   Pupils are equal,  round, and reactive to light.       Significant Labs:   CBC:   Recent Labs   Lab 01/16/20  1440   WBC 11.09   HGB 12.2*   HCT 39.5*        CMP:   Recent Labs   Lab 01/16/20  1440      K 3.8      CO2 28   *   BUN 17   CREATININE 1.3   CALCIUM 9.4   PROT 7.7   ALBUMIN 3.3*   BILITOT 0.7   ALKPHOS 104   AST 63*   ALT 77*   ANIONGAP 11   EGFRNONAA 55.3*     Lactic Acid: No results for input(s): LACTATE in the last 48 hours.  Lipase:   Recent Labs   Lab 01/16/20  1440   LIPASE 159*       Significant Imaging:   CT abdomen  There is marked distension of the gallbladder with gallbladder wall thickening.  No radiopaque gallstones identified.  The common bile duct is unremarkable.  The pancreas, spleen, and adrenal glands unremarkable.   1. No abdominal wall hernia identified.  2. Redemonstration of unchanged retroperitoneal lipomatosis.  3. Unchanged 5 mm renal calculus in the left distal ureter at the UPJ.  No left hydroureter nephrosis or hydroureter.      Assessment/Plan:     * Cholelithiasis with acute cholecystitis  Monitor pain levels  Continue home PPI  General surgery consult  Await US results  Keep NPO after midnight.      Epigastric pain  As per above      Hypertension  Monitor and trend BP  Continue home BP medications      MRSA bacteremia  Isolation  Consult ID      Osteoarthritis  Monitor pain levels and trend  Continue home pain medications        VTE Risk Mitigation (From admission, onward)         Ordered     Reason for no Mechanical VTE Prophylaxis  Once     Question:  Reasons:  Answer:  Patient is Ambulatory    01/16/20 1854     IP VTE HIGH RISK PATIENT  Once      01/16/20 1854                   MACIEJ Curran  Department of Hospital Medicine   [facility]

## 2020-01-17 NOTE — SUBJECTIVE & OBJECTIVE
Past Medical History:   Diagnosis Date    Arthritis     Asbestos exposure     SOB     Back pain     Basal cell carcinoma     Bulging discs     lumbar    Hypertension     Kidney stone     Prostatitis     Sepsis due to methicillin resistant Staphylococcus aureus (MRSA) 06/2019    Due to large right upper arm abscess    Wears glasses        Past Surgical History:   Procedure Laterality Date    APPENDECTOMY      HAND SURGERY      rt hand    INCISION AND DRAINAGE Bilateral 06/2019    Right upper arm and left upper arm abscesses    JOINT REPLACEMENT      right knee    KNEE LIGAMENT RECONSTRUCTION      left knee    LITHOTRIPSY      TONSILLECTOMY, ADENOIDECTOMY, BILATERAL MYRINGOTOMY AND TUBES      TOTAL KNEE ARTHROPLASTY  1971    rt knee    URETERAL STENT PLACEMENT         Review of patient's allergies indicates:   Allergen Reactions    Morphine Nausea Only     Ok with nausea med.       No current facility-administered medications on file prior to encounter.      Current Outpatient Medications on File Prior to Encounter   Medication Sig    aspirin 325 MG tablet Take 325 mg by mouth once daily.    diclofenac sodium 1 % Gel Apply 2 g topically 2 (two) times daily.    doxycycline (VIBRAMYCIN) 100 MG Cap Take 100 mg by mouth every 12 (twelve) hours.     esomeprazole (NEXIUM) 40 MG capsule Take 40 mg by mouth every evening.     gabapentin (NEURONTIN) 300 MG capsule Take 600 mg by mouth every evening.     hydrALAZINE (APRESOLINE) 50 MG tablet Take 50 mg by mouth 2 (two) times daily.    Lactobacillus rhamnosus GG (CULTURELLE) 10 billion cell capsule Take 2 capsules by mouth once daily.     metoprolol succinate (TOPROL-XL) 100 MG 24 hr tablet Take 50 mg by mouth every evening.     multivitamin (THERAGRAN) tablet Take 1 tablet by mouth once daily.    mupirocin (BACTROBAN) 2 % ointment 1 g by Nasal route 2 (two) times daily.     olmesartan-hydrochlorothiazide (BENICAR HCT) 40-25 mg per tablet Take 1  tablet by mouth every evening.     oxyCODONE (ROXICODONE) 15 MG Tab Take 15 mg by mouth 4 (four) times daily as needed for Pain.     QUEtiapine (SEROQUEL) 50 MG tablet Take 50 mg by mouth every evening.    tramadol (ULTRAM) 50 mg tablet Take 50 mg by mouth every 8 (eight) hours as needed for Pain.     venlafaxine (EFFEXOR-XR) 150 MG Cp24 TAKE 1 CAPSULE BY MOUTH EVERY DAY (Patient taking differently: Take 150 mg by mouth every evening. )    testosterone cypionate (DEPOTESTOTERONE CYPIONATE) 200 mg/mL injection Inject 200 mg into the muscle every 28 days.     [DISCONTINUED] QUEtiapine (SEROQUEL) 50 MG tablet TAKE 1 TABLET BY MOUTH AT BEDTIME (Patient taking differently: Take 50 mg by mouth nightly. )     Family History     Problem Relation (Age of Onset)    Cancer Mother    Heart disease Father    Stroke Father        Tobacco Use    Smoking status: Never Smoker    Smokeless tobacco: Never Used   Substance and Sexual Activity    Alcohol use: No    Drug use: No    Sexual activity: Not on file     Review of Systems   Constitutional: Negative.    HENT: Negative.    Eyes: Negative.    Respiratory: Negative.    Cardiovascular: Negative.    Gastrointestinal: Positive for abdominal pain.        Indigestion   Endocrine: Negative.    Genitourinary: Negative.    Musculoskeletal: Negative.    Skin: Negative.    Allergic/Immunologic: Negative.    Neurological: Negative.    Hematological: Negative.    Psychiatric/Behavioral: Negative.      Objective:     Vital Signs (Most Recent):  Temp: 99.3 °F (37.4 °C) (01/16/20 1432)  Pulse: 99 (01/16/20 1432)  Resp: (!) 24 (01/16/20 1432)  BP: (!) 141/89 (01/16/20 1432)  SpO2: 98 % (01/16/20 1432) Vital Signs (24h Range):  Temp:  [99.3 °F (37.4 °C)] 99.3 °F (37.4 °C)  Pulse:  [99] 99  Resp:  [24] 24  SpO2:  [98 %] 98 %  BP: (141)/(89) 141/89     Weight: 104.3 kg (230 lb)  Body mass index is 36.02 kg/m².    Physical Exam   Constitutional: He is oriented to person, place, and time. He  appears well-developed and well-nourished.   HENT:   Head: Normocephalic.   Eyes: Pupils are equal, round, and reactive to light.   Neck: Normal range of motion. Neck supple. No thyromegaly present.   Cardiovascular: Normal rate, regular rhythm, normal heart sounds and intact distal pulses.   Pulmonary/Chest: Effort normal and breath sounds normal.   Abdominal: Soft. Bowel sounds are normal.   Musculoskeletal: Normal range of motion.   Neurological: He is alert and oriented to person, place, and time.   Skin: Skin is warm and dry. Capillary refill takes 2 to 3 seconds.   Dressing clean and dry to posterior right shoulder.   Psychiatric: He has a normal mood and affect.         CRANIAL NERVES     CN III, IV, VI   Pupils are equal, round, and reactive to light.       Significant Labs:   CBC:   Recent Labs   Lab 01/16/20  1440   WBC 11.09   HGB 12.2*   HCT 39.5*        CMP:   Recent Labs   Lab 01/16/20  1440      K 3.8      CO2 28   *   BUN 17   CREATININE 1.3   CALCIUM 9.4   PROT 7.7   ALBUMIN 3.3*   BILITOT 0.7   ALKPHOS 104   AST 63*   ALT 77*   ANIONGAP 11   EGFRNONAA 55.3*     Lactic Acid: No results for input(s): LACTATE in the last 48 hours.  Lipase:   Recent Labs   Lab 01/16/20  1440   LIPASE 159*       Significant Imaging:   CT abdomen  There is marked distension of the gallbladder with gallbladder wall thickening.  No radiopaque gallstones identified.  The common bile duct is unremarkable.  The pancreas, spleen, and adrenal glands unremarkable.   1. No abdominal wall hernia identified.  2. Redemonstration of unchanged retroperitoneal lipomatosis.  3. Unchanged 5 mm renal calculus in the left distal ureter at the UPJ.  No left hydroureter nephrosis or hydroureter.

## 2020-01-17 NOTE — NURSING
Pt to the floor from the ER. Pt agitated. When I ask questions for his admission he is refusing to answer my questions. He said its ridiculous that he should have to answer my questions when he was just here and all we have to do is look in his chart for the answers. I informed him b/c of this being a new admission we just want to make sure we have the most up to date information. He states he doesn't know what medicines he takes and he doesn't want me to look at the wounds on his back b/c the  nurse just packed his wounds today. His dsg is hanging down a little so I was able to see his wounds but he refused pictures.

## 2020-01-17 NOTE — CONSULTS
Formerly [facility], later [facility]  General Surgery  Consult Note    Inpatient consult to General surgery  Consult performed by: Edgard Hill III, MD  Consult ordered by: MACIEJ Freeman  Reason for consult: Acute cholecystitis        Subjective:     Chief Complaint/Reason for Admission:  Acute cholecystitis    History of Present Illness:  Patient is 69-year-old male admitted overnight with acute cholecystitis.  He presented with sudden onset of severe epigastric and right upper quadrant abdominal pain associated with nausea and radiation to his back.  Ultrasound showed distended gallbladder with thickened walls and multiple gallstones consistent with acute cholecystitis.  The common bile duct was normal in caliber.  CT scan of the abdomen showed markedly distended gallbladder with surrounding inflammation consistent with acute cholecystitis.  On admission his total bilirubin was within normal limits.  This morning total bilirubin 2.3.  Patient was discharge 48 hr ago after admission for an MRSA back abscess.  He currently has open wound to the upper back.  Patient reports past abdominal surgical history of appendectomy.         No current facility-administered medications on file prior to encounter.      Current Outpatient Medications on File Prior to Encounter   Medication Sig    aspirin 325 MG tablet Take 325 mg by mouth once daily.    diclofenac sodium 1 % Gel Apply 2 g topically 2 (two) times daily.    doxycycline (VIBRAMYCIN) 100 MG Cap Take 100 mg by mouth every 12 (twelve) hours.     esomeprazole (NEXIUM) 40 MG capsule Take 40 mg by mouth every evening.     gabapentin (NEURONTIN) 300 MG capsule Take 600 mg by mouth every evening.     hydrALAZINE (APRESOLINE) 50 MG tablet Take 50 mg by mouth 2 (two) times daily.    Lactobacillus rhamnosus GG (CULTURELLE) 10 billion cell capsule Take 2 capsules by mouth once daily.     metoprolol succinate (TOPROL-XL) 100 MG 24 hr tablet Take 50 mg by mouth every  evening.     multivitamin (THERAGRAN) tablet Take 1 tablet by mouth once daily.    mupirocin (BACTROBAN) 2 % ointment 1 g by Nasal route 2 (two) times daily.     olmesartan-hydrochlorothiazide (BENICAR HCT) 40-25 mg per tablet Take 1 tablet by mouth every evening.     oxyCODONE (ROXICODONE) 15 MG Tab Take 15 mg by mouth 4 (four) times daily as needed for Pain.     QUEtiapine (SEROQUEL) 50 MG tablet Take 50 mg by mouth every evening.    tramadol (ULTRAM) 50 mg tablet Take 50 mg by mouth every 8 (eight) hours as needed for Pain.     venlafaxine (EFFEXOR-XR) 150 MG Cp24 TAKE 1 CAPSULE BY MOUTH EVERY DAY (Patient taking differently: Take 150 mg by mouth every evening. )    testosterone cypionate (DEPOTESTOTERONE CYPIONATE) 200 mg/mL injection Inject 200 mg into the muscle every 28 days.        Review of patient's allergies indicates:   Allergen Reactions    Morphine Nausea Only     Ok with nausea med.       Past Medical History:   Diagnosis Date    Arthritis     Asbestos exposure     SOB     Back pain     Bulging discs     lumbar    Hypertension     Kidney stone     Prostatitis     Sepsis due to methicillin resistant Staphylococcus aureus (MRSA) 06/2019    Due to large right upper arm abscess    Wears glasses      Past Surgical History:   Procedure Laterality Date    APPENDECTOMY      HAND SURGERY      rt hand    INCISION AND DRAINAGE Bilateral 06/2019    Right upper arm and left upper arm abscesses    JOINT REPLACEMENT      right knee    KNEE LIGAMENT RECONSTRUCTION      left knee    LITHOTRIPSY      TONSILLECTOMY, ADENOIDECTOMY, BILATERAL MYRINGOTOMY AND TUBES      TOTAL KNEE ARTHROPLASTY  1971    rt knee    URETERAL STENT PLACEMENT       Family History     Problem Relation (Age of Onset)    Cancer Mother    Heart disease Father    Stroke Father        Tobacco Use    Smoking status: Never Smoker    Smokeless tobacco: Never Used   Substance and Sexual Activity    Alcohol use: No    Drug  use: No    Sexual activity: Not on file     Review of Systems   Constitutional: Negative for appetite change, chills, fever and unexpected weight change.   HENT: Negative for hearing loss, rhinorrhea, sore throat and voice change.    Eyes: Negative for photophobia and visual disturbance.   Respiratory: Negative for cough, choking and shortness of breath.    Cardiovascular: Negative for chest pain, palpitations and leg swelling.   Gastrointestinal: Positive for abdominal pain and nausea. Negative for blood in stool, constipation, diarrhea and vomiting.   Endocrine: Negative for cold intolerance, heat intolerance, polydipsia and polyuria.   Musculoskeletal: Positive for arthralgias and back pain. Negative for joint swelling and neck stiffness.   Skin: Positive for wound. Negative for color change, pallor and rash.   Neurological: Positive for numbness. Negative for dizziness, seizures, syncope and headaches.   Hematological: Negative for adenopathy. Does not bruise/bleed easily.   Psychiatric/Behavioral: Negative for agitation, behavioral problems and confusion. The patient is nervous/anxious.      Objective:     Vital Signs (Most Recent):  Temp: (P) 97.8 °F (36.6 °C) (01/17/20 1539)  Pulse: 88 (01/17/20 1600)  Resp: 14 (01/17/20 1600)  BP: (!) 143/73 (01/17/20 1600)  SpO2: 95 % (01/17/20 1600) Vital Signs (24h Range):  Temp:  [97.6 °F (36.4 °C)-99.4 °F (37.4 °C)] (P) 97.8 °F (36.6 °C)  Pulse:  [] 88  Resp:  [14-21] 14  SpO2:  [93 %-96 %] 95 %  BP: ()/(63-97) 143/73     Weight: 104.3 kg (229 lb 15 oz)  Body mass index is 36.01 kg/m².      Intake/Output Summary (Last 24 hours) at 1/17/2020 1607  Last data filed at 1/17/2020 1544  Gross per 24 hour   Intake 2725 ml   Output 365 ml   Net 2360 ml       Physical Exam   Constitutional: He is oriented to person, place, and time. Vital signs are normal. He is cooperative.   Neck: Phonation normal. Neck supple.   Cardiovascular: Normal rate and regular rhythm.    Pulmonary/Chest: Effort normal. No tachypnea and no bradypnea. No respiratory distress.   Abdominal: Soft. He exhibits no distension. There is tenderness. There is guarding. There is no rigidity.   Tender to palpation epigastrium and right upper quadrant with guarding and percussed or tenderness.     He has a wide diastasis recti.     He has an extremely protuberant and obese abdomen.    Neurological: He is alert and oriented to person, place, and time.       Significant Labs:  CBC:   Recent Labs   Lab 01/17/20  0508   WBC 12.32   RBC 4.65   HGB 12.5*   HCT 40.6      MCV 87   MCH 26.9*   MCHC 30.8*     CMP:   Recent Labs   Lab 01/17/20  0508   *   CALCIUM 9.5   ALBUMIN 2.9*   PROT 7.3      K 3.6   CO2 26   CL 98   BUN 16   CREATININE 1.2   ALKPHOS 176*   *   *   BILITOT 2.3*       Significant Diagnostics:  I have reviewed all pertinent imaging results/findings within the past 24 hours.    Assessment/Plan:     Active Diagnoses:    Diagnosis Date Noted POA    PRINCIPAL PROBLEM:  Cholelithiasis with acute cholecystitis [K80.00] 01/16/2020 Yes    Epigastric pain [R10.13] 01/17/2020 Yes    MRSA bacteremia [R78.81] 01/08/2020 Yes    Hypertension [I10] 01/08/2020 Yes    Osteoarthritis [M19.90] 09/23/2014 Yes      Problems Resolved During this Admission:    Diagnosis Date Noted Date Resolved POA    Epigastric pain [R10.13] 01/16/2020 01/17/2020 Yes     -patient has physical exam history and imaging consistent with acute cholecystitis.  His total bilirubin is elevated to 2.3 this morning.  His common bile duct was normal in caliber on imaging yesterday.  We will proceed to the operating room for cholecystectomy today.  His LFTs will be trended and if bilirubin remains high, will order MRCP and consult GI for common duct evaluation.   Thank you for your consult.     Edgard Hill III, MD  General Surgery  [facility]

## 2020-01-18 LAB
ALBUMIN SERPL BCP-MCNC: 2.7 G/DL (ref 3.5–5.2)
ALBUMIN SERPL BCP-MCNC: 2.9 G/DL (ref 3.5–5.2)
ALP SERPL-CCNC: 144 U/L (ref 55–135)
ALP SERPL-CCNC: 150 U/L (ref 55–135)
ALT SERPL W/O P-5'-P-CCNC: 188 U/L (ref 10–44)
ALT SERPL W/O P-5'-P-CCNC: 212 U/L (ref 10–44)
ANION GAP SERPL CALC-SCNC: 6 MMOL/L (ref 8–16)
ANION GAP SERPL CALC-SCNC: 8 MMOL/L (ref 8–16)
AST SERPL-CCNC: 105 U/L (ref 10–40)
AST SERPL-CCNC: 149 U/L (ref 10–40)
BASOPHILS # BLD AUTO: 0.01 K/UL (ref 0–0.2)
BASOPHILS NFR BLD: 0.1 % (ref 0–1.9)
BILIRUB SERPL-MCNC: 0.8 MG/DL (ref 0.1–1)
BILIRUB SERPL-MCNC: 0.9 MG/DL (ref 0.1–1)
BUN SERPL-MCNC: 18 MG/DL (ref 8–23)
BUN SERPL-MCNC: 20 MG/DL (ref 8–23)
CALCIUM SERPL-MCNC: 8.6 MG/DL (ref 8.7–10.5)
CALCIUM SERPL-MCNC: 9 MG/DL (ref 8.7–10.5)
CHLORIDE SERPL-SCNC: 101 MMOL/L (ref 95–110)
CHLORIDE SERPL-SCNC: 102 MMOL/L (ref 95–110)
CO2 SERPL-SCNC: 27 MMOL/L (ref 23–29)
CO2 SERPL-SCNC: 29 MMOL/L (ref 23–29)
CREAT SERPL-MCNC: 1.2 MG/DL (ref 0.5–1.4)
CREAT SERPL-MCNC: 1.4 MG/DL (ref 0.5–1.4)
CRP SERPL-MCNC: 4.12 MG/DL (ref 0–0.75)
DIFFERENTIAL METHOD: ABNORMAL
EOSINOPHIL # BLD AUTO: 0 K/UL (ref 0–0.5)
EOSINOPHIL NFR BLD: 0 % (ref 0–8)
ERYTHROCYTE [DISTWIDTH] IN BLOOD BY AUTOMATED COUNT: 16.6 % (ref 11.5–14.5)
EST. GFR  (AFRICAN AMERICAN): 58.4 ML/MIN/1.73 M^2
EST. GFR  (AFRICAN AMERICAN): >60 ML/MIN/1.73 M^2
EST. GFR  (NON AFRICAN AMERICAN): 50.5 ML/MIN/1.73 M^2
EST. GFR  (NON AFRICAN AMERICAN): >60 ML/MIN/1.73 M^2
GLUCOSE SERPL-MCNC: 118 MG/DL (ref 70–110)
GLUCOSE SERPL-MCNC: 119 MG/DL (ref 70–110)
GLUCOSE SERPL-MCNC: 90 MG/DL (ref 70–110)
GLUCOSE SERPL-MCNC: 94 MG/DL (ref 70–110)
HCT VFR BLD AUTO: 34.7 % (ref 40–54)
HGB BLD-MCNC: 10.8 G/DL (ref 14–18)
IMM GRANULOCYTES # BLD AUTO: 0.06 K/UL (ref 0–0.04)
IMM GRANULOCYTES NFR BLD AUTO: 0.5 % (ref 0–0.5)
LIPASE SERPL-CCNC: 30 U/L (ref 4–60)
LYMPHOCYTES # BLD AUTO: 0.9 K/UL (ref 1–4.8)
LYMPHOCYTES NFR BLD: 7.2 % (ref 18–48)
MCH RBC QN AUTO: 27.5 PG (ref 27–31)
MCHC RBC AUTO-ENTMCNC: 31.1 G/DL (ref 32–36)
MCV RBC AUTO: 88 FL (ref 82–98)
MONOCYTES # BLD AUTO: 1 K/UL (ref 0.3–1)
MONOCYTES NFR BLD: 7.6 % (ref 4–15)
NEUTROPHILS # BLD AUTO: 10.7 K/UL (ref 1.8–7.7)
NEUTROPHILS NFR BLD: 84.6 % (ref 38–73)
NRBC BLD-RTO: 0 /100 WBC
PLATELET # BLD AUTO: 258 K/UL (ref 150–350)
PMV BLD AUTO: 11.3 FL (ref 9.2–12.9)
POTASSIUM SERPL-SCNC: 3.8 MMOL/L (ref 3.5–5.1)
POTASSIUM SERPL-SCNC: 4.1 MMOL/L (ref 3.5–5.1)
PROT SERPL-MCNC: 6.4 G/DL (ref 6–8.4)
PROT SERPL-MCNC: 7 G/DL (ref 6–8.4)
RBC # BLD AUTO: 3.93 M/UL (ref 4.6–6.2)
SODIUM SERPL-SCNC: 135 MMOL/L (ref 136–145)
SODIUM SERPL-SCNC: 138 MMOL/L (ref 136–145)
WBC # BLD AUTO: 12.68 K/UL (ref 3.9–12.7)

## 2020-01-18 PROCEDURE — 63600175 PHARM REV CODE 636 W HCPCS: Performed by: SURGERY

## 2020-01-18 PROCEDURE — 80053 COMPREHEN METABOLIC PANEL: CPT

## 2020-01-18 PROCEDURE — 83690 ASSAY OF LIPASE: CPT

## 2020-01-18 PROCEDURE — 80074 ACUTE HEPATITIS PANEL: CPT

## 2020-01-18 PROCEDURE — 25000003 PHARM REV CODE 250: Performed by: SURGERY

## 2020-01-18 PROCEDURE — 25000003 PHARM REV CODE 250: Performed by: STUDENT IN AN ORGANIZED HEALTH CARE EDUCATION/TRAINING PROGRAM

## 2020-01-18 PROCEDURE — 96375 TX/PRO/DX INJ NEW DRUG ADDON: CPT

## 2020-01-18 PROCEDURE — 86140 C-REACTIVE PROTEIN: CPT

## 2020-01-18 PROCEDURE — 82962 GLUCOSE BLOOD TEST: CPT

## 2020-01-18 PROCEDURE — 36415 COLL VENOUS BLD VENIPUNCTURE: CPT

## 2020-01-18 PROCEDURE — 80053 COMPREHEN METABOLIC PANEL: CPT | Mod: 91

## 2020-01-18 PROCEDURE — G0378 HOSPITAL OBSERVATION PER HR: HCPCS

## 2020-01-18 PROCEDURE — 94761 N-INVAS EAR/PLS OXIMETRY MLT: CPT

## 2020-01-18 PROCEDURE — 85025 COMPLETE CBC W/AUTO DIFF WBC: CPT

## 2020-01-18 RX ORDER — CALCIUM CARBONATE 200(500)MG
500 TABLET,CHEWABLE ORAL DAILY PRN
Status: DISCONTINUED | OUTPATIENT
Start: 2020-01-18 | End: 2020-01-19 | Stop reason: HOSPADM

## 2020-01-18 RX ORDER — AMOXICILLIN AND CLAVULANATE POTASSIUM 875; 125 MG/1; MG/1
1 TABLET, FILM COATED ORAL EVERY 12 HOURS
Status: DISCONTINUED | OUTPATIENT
Start: 2020-01-18 | End: 2020-01-19 | Stop reason: HOSPADM

## 2020-01-18 RX ADMIN — METOPROLOL SUCCINATE 50 MG: 25 TABLET, FILM COATED, EXTENDED RELEASE ORAL at 09:01

## 2020-01-18 RX ADMIN — CALCIUM CARBONATE (ANTACID) CHEW TAB 500 MG 500 MG: 500 CHEW TAB at 09:01

## 2020-01-18 RX ADMIN — SENNOSIDES AND DOCUSATE SODIUM 1 TABLET: 8.6; 5 TABLET ORAL at 08:01

## 2020-01-18 RX ADMIN — HYDRALAZINE HYDROCHLORIDE 50 MG: 25 TABLET, FILM COATED ORAL at 09:01

## 2020-01-18 RX ADMIN — MORPHINE SULFATE 4 MG: 4 INJECTION INTRAVENOUS at 11:01

## 2020-01-18 RX ADMIN — OXYCODONE HYDROCHLORIDE 15 MG: 5 TABLET ORAL at 08:01

## 2020-01-18 RX ADMIN — OLMESARTAN MEDOXOMIL 40 MG: 20 TABLET, FILM COATED ORAL at 09:01

## 2020-01-18 RX ADMIN — CEFOXITIN SODIUM 2 G: 2 INJECTION, SOLUTION INTRAVENOUS at 09:01

## 2020-01-18 RX ADMIN — DOXYCYCLINE HYCLATE 100 MG: 100 CAPSULE ORAL at 09:01

## 2020-01-18 RX ADMIN — ALUMINUM HYDROXIDE, MAGNESIUM HYDROXIDE, AND SIMETHICONE 30 ML: 200; 200; 20 SUSPENSION ORAL at 05:01

## 2020-01-18 RX ADMIN — PANTOPRAZOLE SODIUM 40 MG: 40 TABLET, DELAYED RELEASE ORAL at 05:01

## 2020-01-18 RX ADMIN — QUETIAPINE FUMARATE 50 MG: 25 TABLET, FILM COATED ORAL at 09:01

## 2020-01-18 RX ADMIN — MUPIROCIN 1 G: 20 OINTMENT TOPICAL at 12:01

## 2020-01-18 RX ADMIN — CEFOXITIN SODIUM 2 G: 2 INJECTION, SOLUTION INTRAVENOUS at 12:01

## 2020-01-18 RX ADMIN — VENLAFAXINE HYDROCHLORIDE 150 MG: 150 CAPSULE, EXTENDED RELEASE ORAL at 09:01

## 2020-01-18 RX ADMIN — OXYCODONE HYDROCHLORIDE 15 MG: 5 TABLET ORAL at 09:01

## 2020-01-18 RX ADMIN — HYDRALAZINE HYDROCHLORIDE 50 MG: 25 TABLET, FILM COATED ORAL at 08:01

## 2020-01-18 RX ADMIN — DOXYCYCLINE HYCLATE 100 MG: 100 CAPSULE ORAL at 08:01

## 2020-01-18 RX ADMIN — HYDROCHLOROTHIAZIDE 25 MG: 25 TABLET ORAL at 09:01

## 2020-01-18 RX ADMIN — OXYCODONE HYDROCHLORIDE 15 MG: 5 TABLET ORAL at 03:01

## 2020-01-18 RX ADMIN — MORPHINE SULFATE 4 MG: 4 INJECTION INTRAVENOUS at 12:01

## 2020-01-18 RX ADMIN — GABAPENTIN 600 MG: 300 CAPSULE ORAL at 09:01

## 2020-01-18 RX ADMIN — CEFOXITIN SODIUM 2 G: 2 INJECTION, SOLUTION INTRAVENOUS at 05:01

## 2020-01-18 RX ADMIN — MORPHINE SULFATE 4 MG: 4 INJECTION INTRAVENOUS at 05:01

## 2020-01-18 RX ADMIN — MUPIROCIN 1 G: 20 OINTMENT TOPICAL at 09:01

## 2020-01-18 RX ADMIN — SENNOSIDES AND DOCUSATE SODIUM 1 TABLET: 8.6; 5 TABLET ORAL at 09:01

## 2020-01-18 RX ADMIN — AMOXICILLIN AND CLAVULANATE POTASSIUM 1 TABLET: 875; 125 TABLET, FILM COATED ORAL at 09:01

## 2020-01-18 NOTE — PLAN OF CARE
01/18/20 0933   Patient Assessment/Suction   Level of Consciousness (AVPU) alert   Respiratory Effort Normal;Unlabored   Expansion/Accessory Muscles/Retractions no use of accessory muscles   All Lung Fields Breath Sounds clear   Rhythm/Pattern, Respiratory unlabored;pattern regular;depth regular   Cough Frequency infrequent   Cough Type fair   PRE-TX-O2   O2 Device (Oxygen Therapy) room air   SpO2 95 %   Pulse Oximetry Type Intermittent   $ Pulse Oximetry - Multiple Charge Pulse Oximetry - Multiple   Pulse 86   Resp 14

## 2020-01-18 NOTE — SUBJECTIVE & OBJECTIVE
Interval History: POD 1 s/p lap jennifer  AF VSS, good uop, -bm/+flatus, tolerating diet without n/v. Pt reports some abdominal discomfort but improved with current pain regimen.  Patient states that today is routine wound dressing change for his chronic back abscess.  He states that his daughter who was been trained in his wound Care will come in later today to change the wound.  Denies any CP, SOB, N/V/D, headaches, fever or chills.       Scheduled Meds:   aspirin  324 mg Oral Once    ceFOXItin (MEFOXIN) IVPB  2 g Intravenous Q8H    doxycycline  100 mg Oral Q12H    gabapentin  600 mg Oral QHS    hydrALAZINE  50 mg Oral BID    olmesartan  40 mg Oral Daily    And    hydroCHLOROthiazide  25 mg Oral Daily    metoprolol succinate  50 mg Oral QHS    mupirocin  1 g Nasal BID    naloxone  0.02 mg Intravenous Once    oxyCODONE  5 mg Oral Once    oxyCODONE  5 mg Oral Once    pantoprazole  40 mg Oral Daily    QUEtiapine  50 mg Oral QHS    senna-docusate 8.6-50 mg  1 tablet Oral BID    venlafaxine  150 mg Oral QHS     Continuous Infusions:  PRN Meds:acetaminophen, acetaminophen, calcium carbonate, morphine, ondansetron, oxyCODONE, sodium chloride 0.9%, sodium chloride 0.9%, traMADol, trazodone    Review of patient's allergies indicates:   Allergen Reactions    Morphine Nausea Only     Ok with nausea med.       Review of Systems   Per interval history, all other systems reviewed and negative.     Objective:     Vital Signs (Most Recent):  Temp: 98.1 °F (36.7 °C) (01/18/20 0858)  Pulse: 86 (01/18/20 0933)  Resp: 14 (01/18/20 0933)  BP: (!) 159/90 (01/18/20 0858)  SpO2: 95 % (01/18/20 0933) Vital Signs (24h Range):  Temp:  [97.8 °F (36.6 °C)-99.8 °F (37.7 °C)] 98.1 °F (36.7 °C)  Pulse:  [] 86  Resp:  [14-21] 14  SpO2:  [93 %-100 %] 95 %  BP: (122-160)/(66-90) 159/90     Weight: 104.3 kg (229 lb 15 oz)  Body mass index is 36.01 kg/m².    Intake/Output Summary (Last 24 hours) at 1/18/2020 1212  Last data filed  at 1/18/2020 0500  Gross per 24 hour   Intake 3090 ml   Output 695 ml   Net 2395 ml      Physical Exam   Constitutional: He is oriented to person, place, and time. He appears well-developed and well-nourished.   HENT:   Head: Normocephalic.   Eyes: Pupils are equal, round, and reactive to light.   Neck: Normal range of motion. Neck supple. No thyromegaly present.   Cardiovascular: Normal rate, regular rhythm, normal heart sounds and intact distal pulses.   Pulmonary/Chest: Effort normal and breath sounds normal.   Abdominal: Soft. Bowel sounds are normal. He exhibits no mass. There is tenderness (RUQ). There is no rebound and no guarding. A hernia is present.   Musculoskeletal: Normal range of motion.   Neurological: He is alert and oriented to person, place, and time.   Skin: Skin is warm and dry. Capillary refill takes 2 to 3 seconds.   Psychiatric: He has a normal mood and affect.       Significant Labs:   CBC:   Recent Labs   Lab 01/16/20  1440 01/17/20  0508 01/18/20  0512   WBC 11.09 12.32 12.68   HGB 12.2* 12.5* 10.8*   HCT 39.5* 40.6 34.7*    275 258     CMP:   Recent Labs   Lab 01/16/20  1440 01/17/20  0508 01/18/20  0512    137 135*   K 3.8 3.6 4.1    98 102   CO2 28 26 27   * 133* 118*   BUN 17 16 18   CREATININE 1.3 1.2 1.4   CALCIUM 9.4 9.5 8.6*   PROT 7.7 7.3 6.4   ALBUMIN 3.3* 2.9* 2.7*   BILITOT 0.7 2.3* 0.9   ALKPHOS 104 176* 150*   AST 63* 177* 149*   ALT 77* 173* 212*   ANIONGAP 11 13 6*   EGFRNONAA 55.3* >60.0 50.5*     1/17 blood cx: NGTD  1/11 abscess cultures MRSA    All pertinent labs within the past 24 hours have been reviewed.    Significant Imaging: No new imaging today.

## 2020-01-18 NOTE — PROGRESS NOTES
[facility] Medicine  Progress Note    Patient Name: Orlando Treadwell  MRN: [id number]  Patient Class: OP- Observation   Admission Date: 1/16/2020  Length of Stay: 0 days  Attending Physician: Erna Bruce DO  Primary Care Provider: Kleber Worthy III, MD    Subjective:     Principal Problem:Cholelithiasis with acute cholecystitis  Chief Complaint   Patient presents with    Chest Pain       Interval History: POD 1 s/p lap jennifer  AF VSS, good uop, -bm/+flatus, tolerating diet without n/v. Pt reports some abdominal discomfort but improved with current pain regimen.  Patient states that today is routine wound dressing change for his chronic back abscess.  He states that his daughter who was been trained in his wound Care will come in later today to change the wound.  Denies any CP, SOB, N/V/D, headaches, fever or chills.       Scheduled Meds:   aspirin  324 mg Oral Once    ceFOXItin (MEFOXIN) IVPB  2 g Intravenous Q8H    doxycycline  100 mg Oral Q12H    gabapentin  600 mg Oral QHS    hydrALAZINE  50 mg Oral BID    olmesartan  40 mg Oral Daily    And    hydroCHLOROthiazide  25 mg Oral Daily    metoprolol succinate  50 mg Oral QHS    mupirocin  1 g Nasal BID    naloxone  0.02 mg Intravenous Once    oxyCODONE  5 mg Oral Once    oxyCODONE  5 mg Oral Once    pantoprazole  40 mg Oral Daily    QUEtiapine  50 mg Oral QHS    senna-docusate 8.6-50 mg  1 tablet Oral BID    venlafaxine  150 mg Oral QHS     Continuous Infusions:  PRN Meds:acetaminophen, acetaminophen, calcium carbonate, morphine, ondansetron, oxyCODONE, sodium chloride 0.9%, sodium chloride 0.9%, traMADol, trazodone    Review of patient's allergies indicates:   Allergen Reactions    Morphine Nausea Only     Ok with nausea med.       Review of Systems   Per interval history, all other systems reviewed and negative.     Objective:     Vital Signs (Most Recent):  Temp: 98.1 °F (36.7 °C) (01/18/20 0858)  Pulse: 86 (01/18/20  0933)  Resp: 14 (01/18/20 0933)  BP: (!) 159/90 (01/18/20 0858)  SpO2: 95 % (01/18/20 0933) Vital Signs (24h Range):  Temp:  [97.8 °F (36.6 °C)-99.8 °F (37.7 °C)] 98.1 °F (36.7 °C)  Pulse:  [] 86  Resp:  [14-21] 14  SpO2:  [93 %-100 %] 95 %  BP: (122-160)/(66-90) 159/90     Weight: 104.3 kg (229 lb 15 oz)  Body mass index is 36.01 kg/m².    Intake/Output Summary (Last 24 hours) at 1/18/2020 1219  Last data filed at 1/18/2020 0500  Gross per 24 hour   Intake 3090 ml   Output 695 ml   Net 2395 ml      Physical Exam   Constitutional: He is oriented to person, place, and time. He appears well-developed and well-nourished.   HENT:   Head: Normocephalic.   Eyes: Pupils are equal, round, and reactive to light.   Neck: Normal range of motion. Neck supple. No thyromegaly present.   Cardiovascular: Normal rate, regular rhythm, normal heart sounds and intact distal pulses.   Pulmonary/Chest: Effort normal and breath sounds normal.   Abdominal: Soft. Bowel sounds are normal. He exhibits no mass. There is tenderness (RUQ). There is no rebound and no guarding. A hernia is present.   Musculoskeletal: Normal range of motion.   Neurological: He is alert and oriented to person, place, and time.   Skin: Skin is warm and dry. Capillary refill takes 2 to 3 seconds.   Psychiatric: He has a normal mood and affect.       Significant Labs:   CBC:   Recent Labs   Lab 01/16/20  1440 01/17/20  0508 01/18/20  0512   WBC 11.09 12.32 12.68   HGB 12.2* 12.5* 10.8*   HCT 39.5* 40.6 34.7*    275 258     CMP:   Recent Labs   Lab 01/16/20  1440 01/17/20  0508 01/18/20  0512    137 135*   K 3.8 3.6 4.1    98 102   CO2 28 26 27   * 133* 118*   BUN 17 16 18   CREATININE 1.3 1.2 1.4   CALCIUM 9.4 9.5 8.6*   PROT 7.7 7.3 6.4   ALBUMIN 3.3* 2.9* 2.7*   BILITOT 0.7 2.3* 0.9   ALKPHOS 104 176* 150*   AST 63* 177* 149*   ALT 77* 173* 212*   ANIONGAP 11 13 6*   EGFRNONAA 55.3* >60.0 50.5*     1/17 blood cx: NGTD  1/11 abscess  cultures MRSA    All pertinent labs within the past 24 hours have been reviewed.    Significant Imaging: No new imaging today.      Assessment/Plan:      * Cholelithiasis with acute cholecystitis  Pt reports RUQ pain worse following meals  US: Cholelithiasis with wall thickening measuring 0.4 cm, trace pericholecystic fluid.   Continue home PPI  General surgery consulted and patient is now s/p lap jennifer POD 1  IVF  Pain medication prn  Antiemetic prn      Hypertension  Monitor and trend BP  Continue home BP medications      MRSA wound cultures  + cx for MRSA from 1/11/20  Isolation  Consult ID  Continuing home medications, doxycyline  Repeat blood cx pending  Per patient, today is scheduled routine packing/dressing change.  He states family will come to facility to assist in wound dressing change as they have been trained by patient's outside physician.      Osteoarthritis  Chronic stable  Continue home pain medications      Dispo: follow up GS regarding discharge planning    VTE Risk Mitigation (From admission, onward)         Ordered     Reason for no Mechanical VTE Prophylaxis  Once     Question:  Reasons:  Answer:  Patient is Ambulatory    01/16/20 1854     IP VTE HIGH RISK PATIENT  Once      01/16/20 1854                Erna Bruce DO  Department of Hospital Medicine   [facility]

## 2020-01-18 NOTE — PLAN OF CARE
01/17/20 2101   Patient Assessment/Suction   Level of Consciousness (AVPU) alert   Respiratory Effort Normal;Unlabored   PRE-TX-O2   O2 Device (Oxygen Therapy) room air   SpO2 98 %   Pulse Oximetry Type Intermittent   $ Pulse Oximetry - Multiple Charge Pulse Oximetry - Multiple   Pulse 76   Resp 16   Incentive Spirometer   $ Incentive Spirometer Charges postop instruction   Incentive Spirometer Predicted Level (mL) 2500   Administration (IS) instruction provided, initial   Number of Repetitions (IS) 10   Level Incentive Spirometer (mL) 1500   Patient Tolerance (IS) good   Respiratory Evaluation   $ Care Plan Tech Time 15 min   Evaluation For   (care plan)   d/c etco2/pain pump not in use

## 2020-01-18 NOTE — PROGRESS NOTES
Consult Note  Infectious Disease    Reason for Consult:      HPI: Orlando Treadwell is a  appearing 69 y.o. male with past medical history of diabetes, MRSA infection of bilateral upper arms status post successful treatment, recent MRSA infection over his back, requiring hospitalization for 2 days.  Patient received IND, IV antibiotics and was discharged home on doxycycline.  He came back this time complaining of epigastric chest pain.  He was diagnosed with acute cholecystitis and underwent laparoscopic cholecystectomy yesterday on 01/17/2020.  He has been on cefoxitin in preparation for surgery.  He has no fever no chills.  Abdominal pain is markedly improved to almost resolved.  There is some small amount of pain over the surgical wounds which is expected.  No nausea no vomiting.  He had a good bowel movement.  He called his friend Dr. evans who had told him that if a gallstone comes down and blocks the common bile duct it will happen most likely in the 1st 24 hr.  This is the reason that patient insists he wants to stay in the hospital until and if that happens.  ALT AST are still elevated but total bilirubin has normalized    Patient is pleasant and appreciative of my care.  He has although multiple complaints about multiple admissions and multiple providers.  He talk constantly.  I am not sure if he has bipolar disorder or simply anxiety.  His complaints include; he could not finish his antibiotics as he did not have good IV access on the day of admission.  On a prior admission he had a lot of bleeding at the IV site that he needs did to use a tourniquet.  Etc.  It took about 20 min to listen and of reassurance.      Antibiotics (From admission, onward)    Start     Stop Route Frequency Ordered    01/17/20 1730  ceFOXItin 2 g/50ml Dextrose IVPB      -- IV Every 8 hours (non-standard times) 01/17/20 1618    01/16/20 2100  doxycycline capsule 100 mg      -- Oral Every 12 hours 01/16/20 1854    01/16/20 2100   mupirocin 2 % ointment 1 g      -- Nasl 2 times daily 01/16/20 1854        Antifungals (From admission, onward)    None        Antivirals (From admission, onward)    None            Review of patient's allergies indicates:   Allergen Reactions    Morphine Nausea Only     Ok with nausea med.     Past Medical History:   Diagnosis Date    Arthritis     Asbestos exposure     SOB     Back pain     Bulging discs     lumbar    Hypertension     Kidney stone     Prostatitis     Sepsis due to methicillin resistant Staphylococcus aureus (MRSA) 06/2019    Due to large right upper arm abscess    Wears glasses      Past Surgical History:   Procedure Laterality Date    APPENDECTOMY      HAND SURGERY      rt hand    INCISION AND DRAINAGE Bilateral 06/2019    Right upper arm and left upper arm abscesses    JOINT REPLACEMENT      right knee    KNEE LIGAMENT RECONSTRUCTION      left knee    LITHOTRIPSY      TONSILLECTOMY, ADENOIDECTOMY, BILATERAL MYRINGOTOMY AND TUBES      TOTAL KNEE ARTHROPLASTY  1971    rt knee    URETERAL STENT PLACEMENT       Social History     Socioeconomic History    Marital status:      Spouse name: Not on file    Number of children: Not on file    Years of education: Not on file    Highest education level: Not on file   Occupational History    Not on file   Social Needs    Financial resource strain: Not on file    Food insecurity:     Worry: Not on file     Inability: Not on file    Transportation needs:     Medical: Not on file     Non-medical: Not on file   Tobacco Use    Smoking status: Never Smoker    Smokeless tobacco: Never Used   Substance and Sexual Activity    Alcohol use: No    Drug use: No    Sexual activity: Not on file   Lifestyle    Physical activity:     Days per week: Not on file     Minutes per session: Not on file    Stress: Not on file   Relationships    Social connections:     Talks on phone: Not on file     Gets together: Not on file     Attends  Temple service: Not on file     Active member of club or organization: Not on file     Attends meetings of clubs or organizations: Not on file     Relationship status: Not on file   Other Topics Concern    Not on file   Social History Narrative    Not on file     Family History   Problem Relation Age of Onset    Cancer Mother     Stroke Father     Heart disease Father     Collagen disease Neg Hx     Melanoma Neg Hx     Psoriasis Neg Hx     Lupus Neg Hx     Eczema Neg Hx        Pertinent medications noted:     Review of Systems:   No chills, fever, sweats, weight loss  No change in vision, loss of vision or diplopia  No sinus congestion, purulent nasal discharge, post nasal drip or facial pain  No pain in mouth or throat. No problems with teeth, gums.  No chest pain, palpitations, syncope  No cough, sputum production, shortness of breath, dyspnea on exertion, pleurisy, hemoptysis  No nausea, vomiting, diarrhea, constipation, blood in stool,  Epigastric pain radiating to the chest which resolved with surgery.    No dysphagia, odynophagia  No dysuria, hematuria, strangury, retention, incontinence, nocturia, prostatism,   No vaginal/penile discharge, genital ulcers, risk for STD  No swelling of joints, redness of joints, injuries, or new focal pain  No unusual headaches, dizziness, vertigo, numbness, paresthesias, neuropathy, falls  No anxiety, depression, substance abuse, sleep disturbance  Positive for diabetes  No bleeding, lymphadenopathy, anemia, malignancy, unusual bruising  No new rashes, lesions, or wounds  No TB exposure  No recent/prior steroids  Outdoor activities:  Travel:   Implants:   Antibiotic History:     EXAM & DIAGNOSTICS REVIEWED:   Vitals:     Temp:  [97.8 °F (36.6 °C)-99.8 °F (37.7 °C)]   Temp: 98.1 °F (36.7 °C) (01/18/20 0858)  Pulse: 74 (01/18/20 0858)  Resp: 16 (01/18/20 0858)  BP: (!) 159/90 (01/18/20 0858)  SpO2: 96 % (01/18/20 0858)    Intake/Output Summary (Last 24 hours) at  1/18/2020 0919  Last data filed at 1/18/2020 0500  Gross per 24 hour   Intake 3090 ml   Output 695 ml   Net 2395 ml       General:  In NAD. Alert and attentive, cooperative, comfortable  Eyes:  Anicteric, PERRL, EOMI  ENT:  No ulcers, exudates, thrush, nares patent, dentition is normal  Neck:  supple, no masses or adenopathy appreciated  Lungs: Clear, no consolidation, rales, wheezes, rub  Heart:  RRR, no gallop/murmur/rub noted  Abd:  Soft, NT, ND, normal BS, no masses or organomegaly appreciated.  :  Voids, urine clear, no flank tenderness  Musc:  Joints without effusion, swelling, erythema, synovitis, muscle wasting.   Skin:  No rashes. No palmar or plantar lesions. No subungual petechiae  Wound: Right upper back surgical wound is packed looks clear of infection.  Neuro: Alert, attentive, speech fluent, face symmetric, moves all extremities, no focal weakness. Ambulatory  Psych:  Calm, cooperative  Lymphatic:     No cervical, supraclavicular, axillary, or inguinal nodes  Extrem: No edema, erythema, phlebitis, cellulitis, warm and well perfused            General Labs reviewed:  Recent Labs   Lab 01/16/20  1440 01/17/20  0508 01/18/20  0512   WBC 11.09 12.32 12.68   HGB 12.2* 12.5* 10.8*   HCT 39.5* 40.6 34.7*    275 258       Recent Labs   Lab 01/16/20  1440 01/17/20  0508 01/18/20  0512    137 135*   K 3.8 3.6 4.1    98 102   CO2 28 26 27   BUN 17 16 18   CREATININE 1.3 1.2 1.4   CALCIUM 9.4 9.5 8.6*   PROT 7.7 7.3 6.4   BILITOT 0.7 2.3* 0.9   ALKPHOS 104 176* 150*   ALT 77* 173* 212*   AST 63* 177* 149*           Micro:  Microbiology Results (last 7 days)     Procedure Component Value Units Date/Time    Blood culture [[id number]] Collected:  01/17/20 1045    Order Status:  Completed Specimen:  Blood Updated:  01/17/20 1717     Blood Culture, Routine No Growth to date    MRSA Screen by PCR [[id number]] Collected:  01/16/20 5886    Order Status:  Completed Specimen:  Nasopharyngeal Swab from  Nasal Updated:  01/16/20 1933     MRSA SCREEN BY PCR Negative        Imaging Reviewed:  CT abdomen1. No abdominal wall hernia identified.  2. Redemonstration of unchanged retroperitoneal lipomatosis.  3. Unchanged 5 mm renal calculus in the left distal ureter at the UPJ.  No left hydroureter nephrosis or hydroureter.    Right upper quadrant ultrasoundCholelithiasis with wall thickening measuring 0.4 cm, trace  pericholecystic fluid.  However Sepulveda's sign is negative.  If there is high  index of suspicion for acute cholecystitis, CT scan or HIDA scan may be  performed.    Cardiology:    IMPRESSION & PLAN   Acute cholecystitis status post cholecystectomy.  Can complete 5 days with Augmentin.  MRSA skin and soft tissue infection.  On prior admission.  Healing well.  Complete plan as prior with doxycycline    Recommendations:  As above  Will sign off  Thank you

## 2020-01-19 VITALS
RESPIRATION RATE: 20 BRPM | OXYGEN SATURATION: 94 % | BODY MASS INDEX: 36.09 KG/M2 | SYSTOLIC BLOOD PRESSURE: 159 MMHG | HEART RATE: 83 BPM | WEIGHT: 229.94 LBS | DIASTOLIC BLOOD PRESSURE: 84 MMHG | TEMPERATURE: 99 F | HEIGHT: 67 IN

## 2020-01-19 LAB
ALBUMIN SERPL BCP-MCNC: 2.7 G/DL (ref 3.5–5.2)
ALP SERPL-CCNC: 116 U/L (ref 55–135)
ALT SERPL W/O P-5'-P-CCNC: 139 U/L (ref 10–44)
ANION GAP SERPL CALC-SCNC: 9 MMOL/L (ref 8–16)
AST SERPL-CCNC: 64 U/L (ref 10–40)
BASOPHILS # BLD AUTO: 0.04 K/UL (ref 0–0.2)
BASOPHILS NFR BLD: 0.4 % (ref 0–1.9)
BILIRUB SERPL-MCNC: 0.8 MG/DL (ref 0.1–1)
BUN SERPL-MCNC: 18 MG/DL (ref 8–23)
CALCIUM SERPL-MCNC: 9 MG/DL (ref 8.7–10.5)
CHLORIDE SERPL-SCNC: 100 MMOL/L (ref 95–110)
CO2 SERPL-SCNC: 29 MMOL/L (ref 23–29)
CREAT SERPL-MCNC: 1.2 MG/DL (ref 0.5–1.4)
DIFFERENTIAL METHOD: ABNORMAL
EOSINOPHIL # BLD AUTO: 0.1 K/UL (ref 0–0.5)
EOSINOPHIL NFR BLD: 1.3 % (ref 0–8)
ERYTHROCYTE [DISTWIDTH] IN BLOOD BY AUTOMATED COUNT: 16.9 % (ref 11.5–14.5)
EST. GFR  (AFRICAN AMERICAN): >60 ML/MIN/1.73 M^2
EST. GFR  (NON AFRICAN AMERICAN): >60 ML/MIN/1.73 M^2
GLUCOSE SERPL-MCNC: 92 MG/DL (ref 70–110)
HAV IGM SERPL QL IA: NEGATIVE
HBV CORE IGM SERPL QL IA: NEGATIVE
HBV SURFACE AG SERPL QL IA: NEGATIVE
HCT VFR BLD AUTO: 35.4 % (ref 40–54)
HCV AB S/CO SERPL IA: <0.1 S/CO RATIO (ref 0–0.9)
HGB BLD-MCNC: 11 G/DL (ref 14–18)
IMM GRANULOCYTES # BLD AUTO: 0.05 K/UL (ref 0–0.04)
IMM GRANULOCYTES NFR BLD AUTO: 0.6 % (ref 0–0.5)
LIPASE SERPL-CCNC: 32 U/L (ref 4–60)
LYMPHOCYTES # BLD AUTO: 1.6 K/UL (ref 1–4.8)
LYMPHOCYTES NFR BLD: 17.9 % (ref 18–48)
MCH RBC QN AUTO: 27.2 PG (ref 27–31)
MCHC RBC AUTO-ENTMCNC: 31.1 G/DL (ref 32–36)
MCV RBC AUTO: 87 FL (ref 82–98)
MONOCYTES # BLD AUTO: 1 K/UL (ref 0.3–1)
MONOCYTES NFR BLD: 11.4 % (ref 4–15)
NEUTROPHILS # BLD AUTO: 6.2 K/UL (ref 1.8–7.7)
NEUTROPHILS NFR BLD: 68.4 % (ref 38–73)
NRBC BLD-RTO: 0 /100 WBC
PLATELET # BLD AUTO: 261 K/UL (ref 150–350)
PMV BLD AUTO: 10.8 FL (ref 9.2–12.9)
POTASSIUM SERPL-SCNC: 3.9 MMOL/L (ref 3.5–5.1)
PROT SERPL-MCNC: 6.7 G/DL (ref 6–8.4)
RBC # BLD AUTO: 4.05 M/UL (ref 4.6–6.2)
SODIUM SERPL-SCNC: 138 MMOL/L (ref 136–145)
WBC # BLD AUTO: 9.06 K/UL (ref 3.9–12.7)

## 2020-01-19 PROCEDURE — 25000003 PHARM REV CODE 250: Performed by: STUDENT IN AN ORGANIZED HEALTH CARE EDUCATION/TRAINING PROGRAM

## 2020-01-19 PROCEDURE — G0378 HOSPITAL OBSERVATION PER HR: HCPCS

## 2020-01-19 PROCEDURE — 80053 COMPREHEN METABOLIC PANEL: CPT

## 2020-01-19 PROCEDURE — 25000003 PHARM REV CODE 250: Performed by: SURGERY

## 2020-01-19 PROCEDURE — 96376 TX/PRO/DX INJ SAME DRUG ADON: CPT

## 2020-01-19 PROCEDURE — 36415 COLL VENOUS BLD VENIPUNCTURE: CPT

## 2020-01-19 PROCEDURE — 83690 ASSAY OF LIPASE: CPT

## 2020-01-19 PROCEDURE — 85025 COMPLETE CBC W/AUTO DIFF WBC: CPT

## 2020-01-19 PROCEDURE — 63600175 PHARM REV CODE 636 W HCPCS: Performed by: SURGERY

## 2020-01-19 RX ORDER — AMOXICILLIN AND CLAVULANATE POTASSIUM 875; 125 MG/1; MG/1
1 TABLET, FILM COATED ORAL EVERY 12 HOURS
Qty: 10 TABLET | Refills: 0 | Status: SHIPPED | OUTPATIENT
Start: 2020-01-19 | End: 2020-01-24

## 2020-01-19 RX ADMIN — DOXYCYCLINE HYCLATE 100 MG: 100 CAPSULE ORAL at 09:01

## 2020-01-19 RX ADMIN — HYDRALAZINE HYDROCHLORIDE 50 MG: 25 TABLET, FILM COATED ORAL at 09:01

## 2020-01-19 RX ADMIN — AMOXICILLIN AND CLAVULANATE POTASSIUM 1 TABLET: 875; 125 TABLET, FILM COATED ORAL at 09:01

## 2020-01-19 RX ADMIN — PANTOPRAZOLE SODIUM 40 MG: 40 TABLET, DELAYED RELEASE ORAL at 06:01

## 2020-01-19 RX ADMIN — CEFOXITIN SODIUM 2 G: 2 INJECTION, SOLUTION INTRAVENOUS at 01:01

## 2020-01-19 RX ADMIN — OXYCODONE HYDROCHLORIDE 15 MG: 5 TABLET ORAL at 02:01

## 2020-01-19 RX ADMIN — CEFOXITIN SODIUM 2 G: 2 INJECTION, SOLUTION INTRAVENOUS at 09:01

## 2020-01-19 RX ADMIN — MORPHINE SULFATE 4 MG: 4 INJECTION INTRAVENOUS at 09:01

## 2020-01-19 RX ADMIN — MUPIROCIN 1 G: 20 OINTMENT TOPICAL at 09:01

## 2020-01-19 NOTE — PLAN OF CARE
Problem: Adult Inpatient Plan of Care  Goal: Plan of Care Review  Outcome: Ongoing, Progressing  Goal: Patient-Specific Goal (Individualization)  Outcome: Ongoing, Progressing  Goal: Absence of Hospital-Acquired Illness or Injury  Outcome: Ongoing, Progressing  Goal: Optimal Comfort and Wellbeing  Outcome: Ongoing, Progressing  Goal: Readiness for Transition of Care  Outcome: Ongoing, Progressing  Goal: Rounds/Family Conference  Outcome: Ongoing, Progressing     Problem: Fall Injury Risk  Goal: Absence of Fall and Fall-Related Injury  Outcome: Ongoing, Progressing     Problem: Infection  Goal: Infection Symptom Resolution  Outcome: Ongoing, Progressing     Problem: Pain Acute  Goal: Optimal Pain Control  Outcome: Ongoing, Progressing     Problem: Infection (Cholecystectomy)  Goal: Absence of Infection Signs/Symptoms  Outcome: Ongoing, Progressing     Problem: Pain (Cholecystectomy)  Goal: Acceptable Pain Control  Outcome: Ongoing, Progressing

## 2020-01-19 NOTE — NURSING
Discussed wound care orders with Dr. Bruce, stated its OK for family to do wound care as prior to admit.

## 2020-01-19 NOTE — DISCHARGE SUMMARY
[facility] Medicine  Discharge Summary      Patient Name: Orlando Treadwell  MRN: [id number]  Admission Date: 1/16/2020  Hospital Length of Stay: 0 days  Discharge Date and Time: 1/19/2020 12:34 PM  Attending Physician: Erna Bruce DO   Discharging Provider: Erna Bruce DO  Primary Care Provider: Kleber Worthy III, MD      HPI per NP Arlene on 1/16/2020  Mr Orlando Treadwell is a 68 yo male pt who was just discharged from here 2 days ago. He was in with MRSA to his right posterior back. Today he had lunch of [employer] chicken breast with skin removed and then he developed mid epigastric pain. He has a history of indigestion and thought that was the problem. But it did not resolve so came to the ED. On CT scan it was noted he had a gallbladder with wall thickening. Dr Jensen was contacted and he ordered a US of the gallbladder. It will be reviewed by surgery in am and determine if he needs surgery.  Past medical history of hypertension, pre diabetes, PTSD, chronic pain on opioids in outpatient setting, history of cellulitis and soft tissue infection with MRSA.       Procedure(s) (LRB):  CHOLECYSTECTOMY, LAPAROSCOPIC (N/A)      Hospital Course:   69-year-old male admitted for cholelithiasis with acute cholecystitis.  CT abdomen found marked distention of gallbladder with gallbladder wall thickening.  Ultrasound showed cholelithiasis with wall thickening measuring 0.4 cm with high index of suspicion for acute cholecystitis.  General surgery, Dr. leny cannon was consulted and patient was taken to OR for lap choly which he tolerated well.  Patient was returned to floor and monitored with decrease in LFTs and bilirubin.  Patient had known history of MRSA skin infection with abscess.  Infectious Disease was consulted who recommended continuation of home antibiotic and wound care regimen.  Id also recommended short 5 day course of Augmentin status post lap choly.  Patient was seen and  "examined on day of discharge was hemodynamically stable and appropriate for discharge home with recommendations and follow ups as listed below.    Physical exam on day of discharge  BP (!) 159/84   Pulse 83   Temp 98.8 °F (37.1 °C)   Resp 20   Ht 5' 7" (1.702 m)   Wt 104.3 kg (229 lb 15 oz)   SpO2 (!) 94%   BMI 36.01 kg/m²   Gen: nad, afvss  CV: rrr no mrg  Resp: CTA B/l  AB: +bs soft mild ttp  Skin: dry, warm      Consults:   Consults (From admission, onward)        Status Ordering Provider     Inpatient consult to General surgery  Once     Provider:  Armani Jensen MD    Completed DAMARIS GOODMAN     Inpatient consult to Infectious Diseases  Once     Provider:  Dahlia Hurst MD    Completed DAMARIS GOODMAN     Inpatient consult to Respiratory Care  Once     Provider:  (Not yet assigned)    Acknowledged EDGARD RUBIO III     Inpatient consult to Respiratory Care  Once     Provider:  (Not yet assigned)    Acknowledged EDGARD RUBIO III          No new Assessment & Plan notes have been filed under this hospital service since the last note was generated.  Service: Hospital Medicine    Final Active Diagnoses:    Diagnosis Date Noted POA    PRINCIPAL PROBLEM:  Cholelithiasis with acute cholecystitis [K80.00] 01/16/2020 Yes    Epigastric pain [R10.13] 01/17/2020 Yes    MRSA bacteremia [R78.81] 01/08/2020 Yes    Hypertension [I10] 01/08/2020 Yes    Osteoarthritis [M19.90] 09/23/2014 Yes      Problems Resolved During this Admission:    Diagnosis Date Noted Date Resolved POA    Epigastric pain [R10.13] 01/16/2020 01/17/2020 Yes       Discharged Condition: stable    Disposition: Home or Self Care    Follow Up:  Follow-up Information     Schedule an appointment as soon as possible for a visit with Kleber Worthy III, MD.    Specialty:  Family Medicine  Why:  As needed, hospital follow up  Contact information:  [address] [place] [address]  [address]  [facility] [address] [phone]             Edgard WOOD" Sergio PARKER MD. Schedule an appointment as soon as possible for a visit in 1 week.    Specialties:  General Surgery, Surgery  Why:  hospital follow up  Contact information:  [address] [facility]  [address]  [facility] [address]  [phone]                 Patient Instructions:      Notify your health care provider if you experience any of the following:  persistent nausea and vomiting or diarrhea     Notify your health care provider if you experience any of the following:  severe uncontrolled pain     Notify your health care provider if you experience any of the following:  redness, tenderness, or signs of infection (pain, swelling, redness, odor or green/yellow discharge around incision site)     Notify your health care provider if you experience any of the following:  difficulty breathing or increased cough     Notify your health care provider if you experience any of the following:  persistent dizziness, light-headedness, or visual disturbances     Activity as tolerated       Significant Diagnostic Studies: Labs: All labs within the past 24 hours have been reviewed    Pending Diagnostic Studies:     Procedure Component Value Units Date/Time    Hepatitis panel, acute [[id number]] Collected:  01/18/20 0512    Order Status:  Sent Lab Status:  In process Updated:  01/18/20 0530    Specimen:  Blood     Narrative:       Collection has been rescheduled by [facility]3 at 01/17/2020 15:01 Reason: pt   in surgery    Specimen to Pathology - Surgery [[id number]] Collected:  01/17/20 1439    Order Status:  Sent Lab Status:  No result     Specimen:  Tissue          Medications:  Reconciled Home Medications:      Medication List      START taking these medications    amoxicillin-clavulanate 875-125mg 875-125 mg per tablet  Commonly known as:  AUGMENTIN  Take 1 tablet by mouth every 12 (twelve) hours. for 5 days        CHANGE how you take these medications    venlafaxine 150 MG Cp24  Commonly known as:  EFFEXOR-XR  TAKE 1 CAPSULE BY MOUTH EVERY  DAY  What changed:  when to take this        CONTINUE taking these medications    aspirin 325 MG tablet  Take 325 mg by mouth once daily.     diclofenac sodium 1 % Gel  Commonly known as:  VOLTAREN  Apply 2 g topically 2 (two) times daily.     doxycycline 100 MG Cap  Commonly known as:  VIBRAMYCIN  Take 100 mg by mouth every 12 (twelve) hours.     esomeprazole 40 MG capsule  Commonly known as:  NEXIUM  Take 40 mg by mouth every evening.     gabapentin 300 MG capsule  Commonly known as:  NEURONTIN  Take 600 mg by mouth every evening.     hydrALAZINE 50 MG tablet  Commonly known as:  APRESOLINE  Take 50 mg by mouth 2 (two) times daily.     Lactobacillus rhamnosus GG 10 billion cell capsule  Commonly known as:  CULTURELLE  Take 2 capsules by mouth once daily.     metoprolol succinate 100 MG 24 hr tablet  Commonly known as:  TOPROL-XL  Take 50 mg by mouth every evening.     multivitamin tablet  Commonly known as:  THERAGRAN  Take 1 tablet by mouth once daily.     mupirocin 2 % ointment  Commonly known as:  BACTROBAN  1 g by Nasal route 2 (two) times daily.     olmesartan-hydrochlorothiazide 40-25 mg per tablet  Commonly known as:  BENICAR HCT  Take 1 tablet by mouth every evening.     oxyCODONE 15 MG Tab  Commonly known as:  ROXICODONE  Take 15 mg by mouth 4 (four) times daily as needed for Pain.     QUEtiapine 50 MG tablet  Commonly known as:  SEROQUEL  Take 50 mg by mouth every evening.     testosterone cypionate 200 mg/mL injection  Commonly known as:  DEPOTESTOTERONE CYPIONATE  Inject 200 mg into the muscle every 28 days.     traMADol 50 mg tablet  Commonly known as:  ULTRAM  Take 50 mg by mouth every 8 (eight) hours as needed for Pain.            Indwelling Lines/Drains at time of discharge:   Lines/Drains/Airways     None                 Time spent on the discharge of patient: 34 minutes  Patient was seen and examined on the date of discharge and determined to be suitable for discharge.         Erna Bruce,  DO  Department of Hospital Medicine  [facility]

## 2020-01-19 NOTE — NURSING
Messaged Dr. Bruce for clarification on Morphine, pt has listed allergy, although states it has been years since he had problems with Morphine and it was a headache.

## 2020-01-20 NOTE — PLAN OF CARE
01/20/20 0940   Final Note   Assessment Type Final Discharge Note   Anticipated Discharge Disposition Home

## 2020-01-22 LAB — BACTERIA BLD CULT: NORMAL

## 2020-01-23 ENCOUNTER — OFFICE VISIT (OUTPATIENT)
Dept: SURGERY | Facility: CLINIC | Age: 70
End: 2020-01-23
Payer: MEDICARE

## 2020-01-23 VITALS
BODY MASS INDEX: 35.94 KG/M2 | DIASTOLIC BLOOD PRESSURE: 94 MMHG | HEIGHT: 67 IN | TEMPERATURE: 98 F | WEIGHT: 229 LBS | SYSTOLIC BLOOD PRESSURE: 175 MMHG | HEART RATE: 85 BPM

## 2020-01-23 DIAGNOSIS — K81.2 ACUTE ON CHRONIC CHOLECYSTITIS: Primary | ICD-10-CM

## 2020-01-23 PROCEDURE — 99024 POSTOP FOLLOW-UP VISIT: CPT | Mod: S$GLB,,, | Performed by: SURGERY

## 2020-01-23 PROCEDURE — 99024 PR POST-OP FOLLOW-UP VISIT: ICD-10-PCS | Mod: S$GLB,,, | Performed by: SURGERY

## 2020-01-23 NOTE — PROGRESS NOTES
Subjective:       Patient ID: Orlando Treadwell is a 69 y.o. male.    Chief Complaint: Post-op Evaluation (FU DOS 1/17/20 Lap Jennifer )      HPI:  S/p lap jennifer as inpatient for acute cholecystitis. No immediate complication. Abdominal pain improving. No jaundice. Path returned acute on chronic cholecystitis.     Review of Systems    Objective:      Physical Exam   Constitutional: He is oriented to person, place, and time. He is cooperative. No distress.   Pulmonary/Chest: Effort normal. No respiratory distress.   Abdominal: Soft. He exhibits no distension. There is tenderness. There is no rebound and no guarding.   Appropriate tenderness    Neurological: He is alert and oriented to person, place, and time.   Skin:   Incisions are clean, dry and intact  There is no evidence of infection, hematoma or seroma     He has open wound over his upper back from I and D two weeks ago. They look appropriate.        Assessment/Plan:   Acute on chronic cholecystitis      S/p  Lap jennifer. Doing well. RTC PRN  Back wounds are healing. They are being packed. He is followed by different team.   Follow up if symptoms worsen or fail to improve.

## 2020-02-04 ENCOUNTER — OFFICE VISIT (OUTPATIENT)
Dept: SURGERY | Facility: CLINIC | Age: 70
End: 2020-02-04
Payer: MEDICARE

## 2020-02-04 VITALS
RESPIRATION RATE: 17 BRPM | SYSTOLIC BLOOD PRESSURE: 139 MMHG | HEIGHT: 67 IN | WEIGHT: 222.44 LBS | BODY MASS INDEX: 34.91 KG/M2 | HEART RATE: 85 BPM | TEMPERATURE: 99 F | DIASTOLIC BLOOD PRESSURE: 80 MMHG

## 2020-02-04 DIAGNOSIS — A41.02 SEPSIS DUE TO METHICILLIN RESISTANT STAPHYLOCOCCUS AUREUS (MRSA), UNSPECIFIED WHETHER ACUTE ORGAN DYSFUNCTION PRESENT: Primary | ICD-10-CM

## 2020-02-04 PROCEDURE — 1101F PR PT FALLS ASSESS DOC 0-1 FALLS W/OUT INJ PAST YR: ICD-10-PCS | Mod: CPTII,S$GLB,, | Performed by: SURGERY

## 2020-02-04 PROCEDURE — 1159F MED LIST DOCD IN RCRD: CPT | Mod: S$GLB,,, | Performed by: SURGERY

## 2020-02-04 PROCEDURE — 1159F PR MEDICATION LIST DOCUMENTED IN MEDICAL RECORD: ICD-10-PCS | Mod: S$GLB,,, | Performed by: SURGERY

## 2020-02-04 PROCEDURE — 1126F AMNT PAIN NOTED NONE PRSNT: CPT | Mod: S$GLB,,, | Performed by: SURGERY

## 2020-02-04 PROCEDURE — 99999 PR PBB SHADOW E&M-EST. PATIENT-LVL III: ICD-10-PCS | Mod: PBBFAC,,, | Performed by: SURGERY

## 2020-02-04 PROCEDURE — 1101F PT FALLS ASSESS-DOCD LE1/YR: CPT | Mod: CPTII,S$GLB,, | Performed by: SURGERY

## 2020-02-04 PROCEDURE — 1126F PR PAIN SEVERITY QUANTIFIED, NO PAIN PRESENT: ICD-10-PCS | Mod: S$GLB,,, | Performed by: SURGERY

## 2020-02-04 PROCEDURE — 99212 PR OFFICE/OUTPT VISIT, EST, LEVL II, 10-19 MIN: ICD-10-PCS | Mod: S$GLB,,, | Performed by: SURGERY

## 2020-02-04 PROCEDURE — 99999 PR PBB SHADOW E&M-EST. PATIENT-LVL III: CPT | Mod: PBBFAC,,, | Performed by: SURGERY

## 2020-02-04 PROCEDURE — 99212 OFFICE O/P EST SF 10 MIN: CPT | Mod: S$GLB,,, | Performed by: SURGERY

## 2020-02-04 NOTE — PROGRESS NOTES
Returns for follow-up of abscess to right shoulder.  This was seen by infectious disease and treated with antibiotics and incision and drainage at [facility].    Vitals:    02/04/20 0953   BP: 139/80   Pulse: 85   Resp: 17   Temp: 98.6 °F (37 °C)         Assessment plan  MRSA abscess status post incision and drainage    Doing well continue daily wound care  Follow up with infectious disease

## 2020-02-20 ENCOUNTER — OFFICE VISIT (OUTPATIENT)
Dept: ORTHOPEDICS | Facility: CLINIC | Age: 70
End: 2020-02-20
Payer: MEDICARE

## 2020-02-20 VITALS — WEIGHT: 222 LBS | RESPIRATION RATE: 19 BRPM | HEIGHT: 67 IN | BODY MASS INDEX: 34.84 KG/M2

## 2020-02-20 DIAGNOSIS — M19.011 OSTEOARTHRITIS OF RIGHT GLENOHUMERAL JOINT: Primary | ICD-10-CM

## 2020-02-20 PROCEDURE — 99999 PR PBB SHADOW E&M-EST. PATIENT-LVL III: ICD-10-PCS | Mod: PBBFAC,,, | Performed by: ORTHOPAEDIC SURGERY

## 2020-02-20 PROCEDURE — 1159F MED LIST DOCD IN RCRD: CPT | Mod: S$GLB,,, | Performed by: ORTHOPAEDIC SURGERY

## 2020-02-20 PROCEDURE — 20610 DRAIN/INJ JOINT/BURSA W/O US: CPT | Mod: RT,S$GLB,, | Performed by: ORTHOPAEDIC SURGERY

## 2020-02-20 PROCEDURE — 1159F PR MEDICATION LIST DOCUMENTED IN MEDICAL RECORD: ICD-10-PCS | Mod: S$GLB,,, | Performed by: ORTHOPAEDIC SURGERY

## 2020-02-20 PROCEDURE — 1101F PT FALLS ASSESS-DOCD LE1/YR: CPT | Mod: CPTII,S$GLB,, | Performed by: ORTHOPAEDIC SURGERY

## 2020-02-20 PROCEDURE — 99999 PR PBB SHADOW E&M-EST. PATIENT-LVL III: CPT | Mod: PBBFAC,,, | Performed by: ORTHOPAEDIC SURGERY

## 2020-02-20 PROCEDURE — 99213 PR OFFICE/OUTPT VISIT, EST, LEVL III, 20-29 MIN: ICD-10-PCS | Mod: 25,S$GLB,, | Performed by: ORTHOPAEDIC SURGERY

## 2020-02-20 PROCEDURE — 20610 LARGE JOINT ASPIRATION/INJECTION: R GLENOHUMERAL: ICD-10-PCS | Mod: RT,S$GLB,, | Performed by: ORTHOPAEDIC SURGERY

## 2020-02-20 PROCEDURE — 1101F PR PT FALLS ASSESS DOC 0-1 FALLS W/OUT INJ PAST YR: ICD-10-PCS | Mod: CPTII,S$GLB,, | Performed by: ORTHOPAEDIC SURGERY

## 2020-02-20 PROCEDURE — 1125F PR PAIN SEVERITY QUANTIFIED, PAIN PRESENT: ICD-10-PCS | Mod: S$GLB,,, | Performed by: ORTHOPAEDIC SURGERY

## 2020-02-20 PROCEDURE — 1125F AMNT PAIN NOTED PAIN PRSNT: CPT | Mod: S$GLB,,, | Performed by: ORTHOPAEDIC SURGERY

## 2020-02-20 PROCEDURE — 99213 OFFICE O/P EST LOW 20 MIN: CPT | Mod: 25,S$GLB,, | Performed by: ORTHOPAEDIC SURGERY

## 2020-02-20 RX ORDER — TRIAMCINOLONE ACETONIDE 40 MG/ML
40 INJECTION, SUSPENSION INTRA-ARTICULAR; INTRAMUSCULAR
Status: DISCONTINUED | OUTPATIENT
Start: 2020-02-20 | End: 2020-02-20 | Stop reason: HOSPADM

## 2020-02-20 RX ADMIN — TRIAMCINOLONE ACETONIDE 40 MG: 40 INJECTION, SUSPENSION INTRA-ARTICULAR; INTRAMUSCULAR at 11:02

## 2020-02-20 NOTE — PROCEDURES
Large Joint Aspiration/Injection: R glenohumeral  Performed by: Guille Ty MD  Authorized by: Guille Ty MD  Date/Time: 2/20/2020 11:30 AM    Consent Done?:  Yes (Verbal)  Indications:  pain  Timeout: Immediately prior to procedure a time out was called to verify the correct patient, procedure, equipment, support staff and site/side marked as required.  Procedure site marked: Yes     Anesthesia    Anesthetic: lidocaine 1% without epinephrine and bupivacaine 0.25% without epinephrine  Anesthetic total: 6mL    Details:   Needle size: 20 G    Ultrasonic Guidance for needle placement: No   Approach: posterior  Location:  Shoulder  Site:  R glenohumeral    Medications: 40 mg triamcinolone acetonide 40 mg/mL  Patient tolerance:  patient tolerated the procedure well with no immediate complications

## 2020-02-20 NOTE — PROGRESS NOTES
Past Medical History:   Diagnosis Date    Arthritis     Asbestos exposure     SOB     Back pain     Bulging discs     lumbar    Hypertension     Kidney stone     Prostatitis     Sepsis due to methicillin resistant Staphylococcus aureus (MRSA) 06/2019    Due to large right upper arm abscess    Wears glasses        Past Surgical History:   Procedure Laterality Date    APPENDECTOMY      HAND SURGERY      rt hand    INCISION AND DRAINAGE Bilateral 06/2019    Right upper arm and left upper arm abscesses    JOINT REPLACEMENT      right knee    KNEE LIGAMENT RECONSTRUCTION      left knee    LAPAROSCOPIC CHOLECYSTECTOMY N/A 1/17/2020    Procedure: CHOLECYSTECTOMY, LAPAROSCOPIC;  Surgeon: Edgard Hill III, MD;  Location: [facility];  Service: General;  Laterality: N/A;    LITHOTRIPSY      TONSILLECTOMY, ADENOIDECTOMY, BILATERAL MYRINGOTOMY AND TUBES      TOTAL KNEE ARTHROPLASTY  1971    rt knee    URETERAL STENT PLACEMENT         Current Outpatient Medications   Medication Sig    aspirin 325 MG tablet Take 325 mg by mouth once daily.    diclofenac sodium 1 % Gel Apply 2 g topically 2 (two) times daily.    esomeprazole (NEXIUM) 40 MG capsule Take 40 mg by mouth every evening.     gabapentin (NEURONTIN) 300 MG capsule Take 600 mg by mouth every evening.     hydrALAZINE (APRESOLINE) 50 MG tablet Take 50 mg by mouth 2 (two) times daily.    Lactobacillus rhamnosus GG (CULTURELLE) 10 billion cell capsule Take 2 capsules by mouth once daily.     metoprolol succinate (TOPROL-XL) 100 MG 24 hr tablet Take 50 mg by mouth every evening.     multivitamin (THERAGRAN) tablet Take 1 tablet by mouth once daily.    mupirocin (BACTROBAN) 2 % ointment 1 g by Nasal route 2 (two) times daily.     olmesartan-hydrochlorothiazide (BENICAR HCT) 40-25 mg per tablet Take 1 tablet by mouth every evening.     oxyCODONE (ROXICODONE) 15 MG Tab Take 15 mg by mouth 4 (four) times daily as needed for Pain.     QUEtiapine  (SEROQUEL) 50 MG tablet Take 50 mg by mouth every evening.    testosterone cypionate (DEPOTESTOTERONE CYPIONATE) 200 mg/mL injection Inject 200 mg into the muscle every 28 days.     tramadol (ULTRAM) 50 mg tablet Take 50 mg by mouth every 8 (eight) hours as needed for Pain.     venlafaxine (EFFEXOR-XR) 150 MG Cp24 TAKE 1 CAPSULE BY MOUTH EVERY DAY (Patient taking differently: Take 150 mg by mouth every evening. )     No current facility-administered medications for this visit.        Review of patient's allergies indicates:   Allergen Reactions    Morphine Nausea Only     Ok with nausea med.       Family History   Problem Relation Age of Onset    Cancer Mother     Stroke Father     Heart disease Father     Collagen disease Neg Hx     Melanoma Neg Hx     Psoriasis Neg Hx     Lupus Neg Hx     Eczema Neg Hx        Social History     Socioeconomic History    Marital status:      Spouse name: Not on file    Number of children: Not on file    Years of education: Not on file    Highest education level: Not on file   Occupational History    Not on file   Social Needs    Financial resource strain: Not on file    Food insecurity:     Worry: Not on file     Inability: Not on file    Transportation needs:     Medical: Not on file     Non-medical: Not on file   Tobacco Use    Smoking status: Never Smoker    Smokeless tobacco: Never Used   Substance and Sexual Activity    Alcohol use: No    Drug use: No    Sexual activity: Not on file   Lifestyle    Physical activity:     Days per week: Not on file     Minutes per session: Not on file    Stress: Not on file   Relationships    Social connections:     Talks on phone: Not on file     Gets together: Not on file     Attends Baptism service: Not on file     Active member of club or organization: Not on file     Attends meetings of clubs or organizations: Not on file     Relationship status: Not on file   Other Topics Concern    Not on file   Social  History Narrative    Not on file       Chief Complaint:   Chief Complaint   Patient presents with    Shoulder Pain     R shoulder mri results        Date of surgery:  Vandana 3, 2019    History of present illness:  69-year-old male who underwent right shoulder I&D for right shoulder abscess.  It did not involve the joint and was more superficial.  Patient has had recurrent staph infections in even had an I and D of his right back.  I got an MRI of his right shoulder which did not show any signs of osteomyelitis but severe glenohumeral arthritis.  Pain currently is an 8/10.  Recovering from cholecystectomy and I and D of the abscesses from his upper back.      Review of Systems:    Musculoskeletal:  See HPI        Physical Examination:    Vital Signs:    Vitals:    02/20/20 1112   Resp: 19       Body mass index is 34.77 kg/m².    This a well-developed, well nourished patient in no acute distress.  They are alert and oriented and cooperative to examination.  Pt. walks without an antalgic gait.      Examination of the right shoulder shows healed surgical incision. No erythema or drainage. Shoulder stiff and has a lot of pain in the joint.  Healing incisions over his upper back from more recent surgery    X-rays:  X-rays of the right shoulder  reviewed which show severe glenohumeral arthritis. No obvious signs of osteomyelitis.    MRI of the right shoulder:1.  Severe osteoarthrosis of the glenohumeral joint.  2.  Cystic structure in the humeral head suggestive of a large geode, with faint peripheral rim enhancement a solitary bone cyst, aneurysmal bone cyst and less likely abscess.  3.  No focal abnormal bone marrow signal intensity to specifically suggest osteomyelitis.  4.  Incidentally noted intramuscular cyst within the subscapularis with adjacent edema.  5.  Moderate osteoarthrosis at the acromioclavicular joint.     Assessment::  Status post incision and drainage of right shoulder abscess  Right glenohumeral  arthritis    Plan:  I reviewed the findings with him today.  I offered to inject his right shoulder joint today.  He agreed.  Follow-up as needed.    This note was created using [employer] voice recognition software that occasionally misinterpreted phrases or words.

## 2020-03-05 ENCOUNTER — EXTERNAL HOME HEALTH (OUTPATIENT)
Dept: HOME HEALTH SERVICES | Facility: HOSPITAL | Age: 70
End: 2020-03-05
Payer: MEDICARE

## 2020-04-06 ENCOUNTER — TELEPHONE (OUTPATIENT)
Dept: BARIATRICS | Facility: CLINIC | Age: 70
End: 2020-04-06

## 2020-04-14 ENCOUNTER — TELEPHONE (OUTPATIENT)
Dept: BARIATRICS | Facility: CLINIC | Age: 70
End: 2020-04-14

## 2020-04-14 NOTE — TELEPHONE ENCOUNTER
Pt reports no problem with wound and does not feel like he needs to come in for an appointment.  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

## 2020-04-21 RX ORDER — ESOMEPRAZOLE MAGNESIUM 40 MG/1
CAPSULE, DELAYED RELEASE ORAL
Qty: 90 CAPSULE | Refills: 1 | Status: ON HOLD | OUTPATIENT
Start: 2020-04-21 | End: 2020-10-23 | Stop reason: HOSPADM

## 2020-04-21 RX ORDER — ESOMEPRAZOLE MAGNESIUM 40 MG/1
CAPSULE, DELAYED RELEASE ORAL
Qty: 90 CAPSULE | Refills: 1 | Status: SHIPPED | OUTPATIENT
Start: 2020-04-21 | End: 2021-02-08 | Stop reason: ALTCHOICE

## 2020-05-20 DIAGNOSIS — R79.89 LOW SERUM TESTOSTERONE LEVEL: Primary | ICD-10-CM

## 2020-05-20 NOTE — TELEPHONE ENCOUNTER
Refill request sent from pharmacy pt requesting refill on Testosterone cyp 200mg/ml injection 1 ML sent to [place], [place]

## 2020-05-22 ENCOUNTER — HOSPITAL ENCOUNTER (OUTPATIENT)
Dept: RADIOLOGY | Facility: HOSPITAL | Age: 70
Discharge: HOME | End: 2020-05-22
Attending: FAMILY MEDICINE
Payer: MEDICARE

## 2020-05-22 PROCEDURE — 71046 X-RAY EXAM CHEST 2 VIEWS: CPT | Mod: TC

## 2020-05-25 RX ORDER — TESTOSTERONE CYPIONATE 200 MG/ML
200 INJECTION, SOLUTION INTRAMUSCULAR
Qty: 1 ML | Refills: 0 | OUTPATIENT
Start: 2020-05-25

## 2020-05-25 RX ORDER — TESTOSTERONE CYPIONATE 200 MG/ML
200 INJECTION, SOLUTION INTRAMUSCULAR
Qty: 10 ML | Refills: 0 | Status: SHIPPED | OUTPATIENT
Start: 2020-05-25 | End: 2020-12-18

## 2020-05-27 PROBLEM — Z95.5 STENTED CORONARY ARTERY: Status: ACTIVE | Noted: 2020-05-27

## 2020-05-27 PROBLEM — I25.10 CORONARY ARTERY DISEASE: Status: ACTIVE | Noted: 2020-05-27

## 2020-05-27 PROBLEM — Z51.81 VISIT FOR MONITORING PLAVIX THERAPY: Status: ACTIVE | Noted: 2020-05-27

## 2020-05-27 PROBLEM — N18.30 STAGE 3 CHRONIC KIDNEY DISEASE: Status: ACTIVE | Noted: 2020-05-27

## 2020-05-27 PROBLEM — Z79.02 VISIT FOR MONITORING PLAVIX THERAPY: Status: ACTIVE | Noted: 2020-05-27

## 2020-05-27 PROBLEM — Z79.82 ASPIRIN LONG-TERM USE: Status: ACTIVE | Noted: 2020-05-27

## 2020-07-26 PROBLEM — F32.A DEPRESSION: Status: ACTIVE | Noted: 2020-07-26

## 2020-07-26 PROBLEM — F41.9 ANXIETY: Status: ACTIVE | Noted: 2020-07-26

## 2020-10-22 ENCOUNTER — HOSPITAL ENCOUNTER (INPATIENT)
Facility: HOSPITAL | Age: 70
LOS: 1 days | Discharge: HOME | DRG: 871 | End: 2020-10-23
Attending: EMERGENCY MEDICINE | Admitting: INTERNAL MEDICINE
Payer: MEDICARE

## 2020-10-22 DIAGNOSIS — R41.82 ALTERED MENTAL STATUS: ICD-10-CM

## 2020-10-22 DIAGNOSIS — G93.41 ENCEPHALOPATHY, METABOLIC: Primary | ICD-10-CM

## 2020-10-22 PROBLEM — R65.20 SEVERE SEPSIS: Status: ACTIVE | Noted: 2020-10-22

## 2020-10-22 PROBLEM — A41.9 SEVERE SEPSIS: Status: ACTIVE | Noted: 2020-10-22

## 2020-10-22 PROBLEM — E87.20 LACTIC ACIDOSIS: Status: ACTIVE | Noted: 2020-10-22

## 2020-10-22 LAB
ALBUMIN SERPL BCP-MCNC: 3.9 G/DL (ref 3.5–5.2)
ALP SERPL-CCNC: 58 U/L (ref 55–135)
ALT SERPL W/O P-5'-P-CCNC: 53 U/L (ref 10–44)
ANION GAP SERPL CALC-SCNC: 13 MMOL/L (ref 8–16)
AST SERPL-CCNC: 47 U/L (ref 10–40)
BASOPHILS # BLD AUTO: 0.03 K/UL (ref 0–0.2)
BASOPHILS NFR BLD: 0.2 % (ref 0–1.9)
BILIRUB SERPL-MCNC: 0.9 MG/DL (ref 0.1–1)
BILIRUB UR QL STRIP: NEGATIVE
BUN SERPL-MCNC: 24 MG/DL (ref 8–23)
CALCIUM SERPL-MCNC: 9.3 MG/DL (ref 8.7–10.5)
CHLORIDE SERPL-SCNC: 97 MMOL/L (ref 95–110)
CLARITY UR: CLEAR
CO2 SERPL-SCNC: 27 MMOL/L (ref 23–29)
COLOR UR: YELLOW
CREAT SERPL-MCNC: 1.4 MG/DL (ref 0.5–1.4)
DIFFERENTIAL METHOD: ABNORMAL
EOSINOPHIL # BLD AUTO: 0 K/UL (ref 0–0.5)
EOSINOPHIL NFR BLD: 0.1 % (ref 0–8)
ERYTHROCYTE [DISTWIDTH] IN BLOOD BY AUTOMATED COUNT: 15.4 % (ref 11.5–14.5)
EST. GFR  (AFRICAN AMERICAN): 58 ML/MIN/1.73 M^2
EST. GFR  (NON AFRICAN AMERICAN): 50.2 ML/MIN/1.73 M^2
ETHANOL SERPL-MCNC: <5 MG/DL
GLUCOSE SERPL-MCNC: 168 MG/DL (ref 70–110)
GLUCOSE UR QL STRIP: NEGATIVE
HCT VFR BLD AUTO: 43.6 % (ref 40–54)
HGB BLD-MCNC: 13.4 G/DL (ref 14–18)
HGB UR QL STRIP: NEGATIVE
IMM GRANULOCYTES # BLD AUTO: 0.06 K/UL (ref 0–0.04)
IMM GRANULOCYTES NFR BLD AUTO: 0.4 % (ref 0–0.5)
KETONES UR QL STRIP: ABNORMAL
LACTATE SERPL-SCNC: 1.7 MMOL/L (ref 0.5–1.9)
LACTATE SERPL-SCNC: 2.3 MMOL/L (ref 0.5–1.9)
LEUKOCYTE ESTERASE UR QL STRIP: NEGATIVE
LYMPHOCYTES # BLD AUTO: 0.8 K/UL (ref 1–4.8)
LYMPHOCYTES NFR BLD: 5 % (ref 18–48)
MAGNESIUM SERPL-MCNC: 1.8 MG/DL (ref 1.6–2.6)
MCH RBC QN AUTO: 26.9 PG (ref 27–31)
MCHC RBC AUTO-ENTMCNC: 30.7 G/DL (ref 32–36)
MCV RBC AUTO: 88 FL (ref 82–98)
MONOCYTES # BLD AUTO: 1 K/UL (ref 0.3–1)
MONOCYTES NFR BLD: 6.4 % (ref 4–15)
NEUTROPHILS # BLD AUTO: 13.4 K/UL (ref 1.8–7.7)
NEUTROPHILS NFR BLD: 87.9 % (ref 38–73)
NITRITE UR QL STRIP: NEGATIVE
NRBC BLD-RTO: 0 /100 WBC
PH UR STRIP: 7 [PH] (ref 5–8)
PLATELET # BLD AUTO: 200 K/UL (ref 150–350)
PMV BLD AUTO: 10.5 FL (ref 9.2–12.9)
POTASSIUM SERPL-SCNC: 4 MMOL/L (ref 3.5–5.1)
PROT SERPL-MCNC: 8.1 G/DL (ref 6–8.4)
PROT UR QL STRIP: ABNORMAL
RBC # BLD AUTO: 4.98 M/UL (ref 4.6–6.2)
SARS-COV-2 RDRP RESP QL NAA+PROBE: NEGATIVE
SODIUM SERPL-SCNC: 137 MMOL/L (ref 136–145)
SP GR UR STRIP: 1.02 (ref 1–1.03)
TROPONIN I SERPL DL<=0.01 NG/ML-MCNC: 0.03 NG/ML
TSH SERPL DL<=0.005 MIU/L-ACNC: 1.79 UIU/ML (ref 0.34–5.6)
URN SPEC COLLECT METH UR: ABNORMAL
UROBILINOGEN UR STRIP-ACNC: NEGATIVE EU/DL
WBC # BLD AUTO: 15.27 K/UL (ref 3.9–12.7)

## 2020-10-22 PROCEDURE — 83605 ASSAY OF LACTIC ACID: CPT | Mod: 91

## 2020-10-22 PROCEDURE — 80053 COMPREHEN METABOLIC PANEL: CPT

## 2020-10-22 PROCEDURE — 93010 EKG 12-LEAD: ICD-10-PCS | Mod: ,,, | Performed by: INTERNAL MEDICINE

## 2020-10-22 PROCEDURE — 83735 ASSAY OF MAGNESIUM: CPT

## 2020-10-22 PROCEDURE — 63600175 PHARM REV CODE 636 W HCPCS: Performed by: EMERGENCY MEDICINE

## 2020-10-22 PROCEDURE — 80320 DRUG SCREEN QUANTALCOHOLS: CPT

## 2020-10-22 PROCEDURE — 99285 EMERGENCY DEPT VISIT HI MDM: CPT | Mod: 25

## 2020-10-22 PROCEDURE — 87040 BLOOD CULTURE FOR BACTERIA: CPT | Mod: 59

## 2020-10-22 PROCEDURE — 80347 BENZODIAZEPINES 13 OR MORE: CPT

## 2020-10-22 PROCEDURE — 36415 COLL VENOUS BLD VENIPUNCTURE: CPT

## 2020-10-22 PROCEDURE — 63600175 PHARM REV CODE 636 W HCPCS: Performed by: INTERNAL MEDICINE

## 2020-10-22 PROCEDURE — 25000003 PHARM REV CODE 250: Performed by: EMERGENCY MEDICINE

## 2020-10-22 PROCEDURE — 93005 ELECTROCARDIOGRAM TRACING: CPT | Performed by: INTERNAL MEDICINE

## 2020-10-22 PROCEDURE — 80365 DRUG SCREENING OXYCODONE: CPT

## 2020-10-22 PROCEDURE — 96365 THER/PROPH/DIAG IV INF INIT: CPT

## 2020-10-22 PROCEDURE — 94761 N-INVAS EAR/PLS OXIMETRY MLT: CPT

## 2020-10-22 PROCEDURE — 84484 ASSAY OF TROPONIN QUANT: CPT

## 2020-10-22 PROCEDURE — 80307 DRUG TEST PRSMV CHEM ANLYZR: CPT

## 2020-10-22 PROCEDURE — 25000003 PHARM REV CODE 250: Performed by: INTERNAL MEDICINE

## 2020-10-22 PROCEDURE — 84443 ASSAY THYROID STIM HORMONE: CPT

## 2020-10-22 PROCEDURE — 25000242 PHARM REV CODE 250 ALT 637 W/ HCPCS: Performed by: INTERNAL MEDICINE

## 2020-10-22 PROCEDURE — 81003 URINALYSIS AUTO W/O SCOPE: CPT

## 2020-10-22 PROCEDURE — 93010 ELECTROCARDIOGRAM REPORT: CPT | Mod: ,,, | Performed by: INTERNAL MEDICINE

## 2020-10-22 PROCEDURE — 83605 ASSAY OF LACTIC ACID: CPT

## 2020-10-22 PROCEDURE — 85025 COMPLETE CBC W/AUTO DIFF WBC: CPT

## 2020-10-22 PROCEDURE — 21400001 HC TELEMETRY ROOM

## 2020-10-22 PROCEDURE — U0002 COVID-19 LAB TEST NON-CDC: HCPCS

## 2020-10-22 RX ORDER — OXYCODONE 13.5 MG/1
1 CAPSULE, EXTENDED RELEASE ORAL EVERY 12 HOURS
COMMUNITY
End: 2023-07-27

## 2020-10-22 RX ORDER — GLUCAGON 1 MG
1 KIT INJECTION
Status: DISCONTINUED | OUTPATIENT
Start: 2020-10-22 | End: 2020-10-23 | Stop reason: HOSPADM

## 2020-10-22 RX ORDER — ACETAMINOPHEN 325 MG/1
650 TABLET ORAL EVERY 4 HOURS PRN
Status: DISCONTINUED | OUTPATIENT
Start: 2020-10-22 | End: 2020-10-23 | Stop reason: HOSPADM

## 2020-10-22 RX ORDER — FLUTICASONE PROPIONATE 50 MCG
1 SPRAY, SUSPENSION (ML) NASAL 2 TIMES DAILY
Status: DISCONTINUED | OUTPATIENT
Start: 2020-10-22 | End: 2020-10-23 | Stop reason: HOSPADM

## 2020-10-22 RX ORDER — HYDRALAZINE HYDROCHLORIDE 25 MG/1
50 TABLET, FILM COATED ORAL 2 TIMES DAILY
Status: DISCONTINUED | OUTPATIENT
Start: 2020-10-22 | End: 2020-10-23 | Stop reason: HOSPADM

## 2020-10-22 RX ORDER — SODIUM CHLORIDE 0.9 % (FLUSH) 0.9 %
10 SYRINGE (ML) INJECTION
Status: DISCONTINUED | OUTPATIENT
Start: 2020-10-22 | End: 2020-10-23 | Stop reason: HOSPADM

## 2020-10-22 RX ORDER — DICLOFENAC SODIUM 10 MG/G
2 GEL TOPICAL 2 TIMES DAILY
Status: DISCONTINUED | OUTPATIENT
Start: 2020-10-22 | End: 2020-10-23 | Stop reason: HOSPADM

## 2020-10-22 RX ORDER — OXYCODONE HYDROCHLORIDE 20 MG/1
20 TABLET ORAL 4 TIMES DAILY PRN
COMMUNITY

## 2020-10-22 RX ORDER — ZINC GLUCONATE 50 MG
50 TABLET ORAL DAILY
COMMUNITY

## 2020-10-22 RX ORDER — MULTIVIT WITH MINERALS/HERBS
1 TABLET ORAL DAILY
COMMUNITY

## 2020-10-22 RX ORDER — GABAPENTIN 300 MG/1
600 CAPSULE ORAL NIGHTLY
Status: DISCONTINUED | OUTPATIENT
Start: 2020-10-22 | End: 2020-10-23 | Stop reason: HOSPADM

## 2020-10-22 RX ORDER — MORPHINE SULFATE 15 MG/1
15 TABLET, FILM COATED, EXTENDED RELEASE ORAL EVERY 12 HOURS
Status: DISCONTINUED | OUTPATIENT
Start: 2020-10-22 | End: 2020-10-23 | Stop reason: HOSPADM

## 2020-10-22 RX ORDER — TRAMADOL HYDROCHLORIDE 50 MG/1
50 TABLET ORAL EVERY 8 HOURS PRN
Status: DISCONTINUED | OUTPATIENT
Start: 2020-10-22 | End: 2020-10-23 | Stop reason: HOSPADM

## 2020-10-22 RX ORDER — QUETIAPINE FUMARATE 100 MG/1
100 TABLET, FILM COATED ORAL NIGHTLY
Status: DISCONTINUED | OUTPATIENT
Start: 2020-10-22 | End: 2020-10-23 | Stop reason: HOSPADM

## 2020-10-22 RX ORDER — VENLAFAXINE HYDROCHLORIDE 150 MG/1
150 CAPSULE, EXTENDED RELEASE ORAL DAILY
Status: DISCONTINUED | OUTPATIENT
Start: 2020-10-22 | End: 2020-10-23 | Stop reason: HOSPADM

## 2020-10-22 RX ORDER — SODIUM CHLORIDE 9 MG/ML
INJECTION, SOLUTION INTRAVENOUS CONTINUOUS
Status: DISCONTINUED | OUTPATIENT
Start: 2020-10-22 | End: 2020-10-23 | Stop reason: HOSPADM

## 2020-10-22 RX ORDER — IBUPROFEN 200 MG
16 TABLET ORAL
Status: DISCONTINUED | OUTPATIENT
Start: 2020-10-22 | End: 2020-10-23 | Stop reason: HOSPADM

## 2020-10-22 RX ORDER — POLYETHYLENE GLYCOL 3350 17 G/17G
17 POWDER, FOR SOLUTION ORAL 2 TIMES DAILY PRN
Status: DISCONTINUED | OUTPATIENT
Start: 2020-10-22 | End: 2020-10-23 | Stop reason: HOSPADM

## 2020-10-22 RX ORDER — ONDANSETRON 2 MG/ML
4 INJECTION INTRAMUSCULAR; INTRAVENOUS EVERY 6 HOURS PRN
Status: DISCONTINUED | OUTPATIENT
Start: 2020-10-22 | End: 2020-10-23 | Stop reason: HOSPADM

## 2020-10-22 RX ORDER — QUETIAPINE FUMARATE 25 MG/1
50 TABLET, FILM COATED ORAL NIGHTLY
Status: DISCONTINUED | OUTPATIENT
Start: 2020-10-22 | End: 2020-10-23 | Stop reason: HOSPADM

## 2020-10-22 RX ORDER — QUETIAPINE 150 MG/1
150 TABLET, FILM COATED, EXTENDED RELEASE ORAL NIGHTLY
Status: DISCONTINUED | OUTPATIENT
Start: 2020-10-22 | End: 2020-10-22

## 2020-10-22 RX ORDER — IBUPROFEN 200 MG
24 TABLET ORAL
Status: DISCONTINUED | OUTPATIENT
Start: 2020-10-22 | End: 2020-10-23 | Stop reason: HOSPADM

## 2020-10-22 RX ORDER — SODIUM CHLORIDE 9 MG/ML
1000 INJECTION, SOLUTION INTRAVENOUS
Status: COMPLETED | OUTPATIENT
Start: 2020-10-22 | End: 2020-10-22

## 2020-10-22 RX ORDER — CLOPIDOGREL BISULFATE 75 MG/1
75 TABLET ORAL DAILY
Status: DISCONTINUED | OUTPATIENT
Start: 2020-10-22 | End: 2020-10-23 | Stop reason: HOSPADM

## 2020-10-22 RX ORDER — LANOLIN ALCOHOL/MO/W.PET/CERES
400 CREAM (GRAM) TOPICAL DAILY
Status: DISCONTINUED | OUTPATIENT
Start: 2020-10-22 | End: 2020-10-23 | Stop reason: HOSPADM

## 2020-10-22 RX ORDER — METOPROLOL SUCCINATE 50 MG/1
100 TABLET, EXTENDED RELEASE ORAL DAILY
Status: DISCONTINUED | OUTPATIENT
Start: 2020-10-22 | End: 2020-10-23 | Stop reason: HOSPADM

## 2020-10-22 RX ORDER — OXYCODONE HYDROCHLORIDE 5 MG/1
20 TABLET ORAL 4 TIMES DAILY PRN
Status: DISCONTINUED | OUTPATIENT
Start: 2020-10-22 | End: 2020-10-23 | Stop reason: HOSPADM

## 2020-10-22 RX ADMIN — QUETIAPINE 50 MG: 25 TABLET ORAL at 08:10

## 2020-10-22 RX ADMIN — THERA TABS 1 TABLET: TAB at 02:10

## 2020-10-22 RX ADMIN — VANCOMYCIN HYDROCHLORIDE 2000 MG: 1 INJECTION, POWDER, LYOPHILIZED, FOR SOLUTION INTRAVENOUS at 02:10

## 2020-10-22 RX ADMIN — CLOPIDOGREL BISULFATE 75 MG: 75 TABLET, FILM COATED ORAL at 02:10

## 2020-10-22 RX ADMIN — FLUTICASONE PROPIONATE 50 MCG: 50 SPRAY, METERED NASAL at 09:10

## 2020-10-22 RX ADMIN — VENLAFAXINE HYDROCHLORIDE 150 MG: 150 CAPSULE, EXTENDED RELEASE ORAL at 02:10

## 2020-10-22 RX ADMIN — PIPERACILLIN SODIUM AND TAZOBACTAM SODIUM 3.38 G: 3; .375 INJECTION, POWDER, LYOPHILIZED, FOR SOLUTION INTRAVENOUS at 06:10

## 2020-10-22 RX ADMIN — MAGNESIUM OXIDE 400 MG: 400 TABLET ORAL at 02:10

## 2020-10-22 RX ADMIN — QUETIAPINE 100 MG: 100 TABLET ORAL at 08:10

## 2020-10-22 RX ADMIN — PIPERACILLIN SODIUM AND TAZOBACTAM SODIUM 3.38 G: 3; .375 INJECTION, POWDER, LYOPHILIZED, FOR SOLUTION INTRAVENOUS at 10:10

## 2020-10-22 RX ADMIN — SODIUM CHLORIDE 1000 ML: 0.9 INJECTION, SOLUTION INTRAVENOUS at 10:10

## 2020-10-22 RX ADMIN — GABAPENTIN 600 MG: 300 CAPSULE ORAL at 08:10

## 2020-10-22 RX ADMIN — HYDRALAZINE HYDROCHLORIDE 50 MG: 25 TABLET, FILM COATED ORAL at 08:10

## 2020-10-22 RX ADMIN — SODIUM CHLORIDE 1000 ML: 0.9 INJECTION, SOLUTION INTRAVENOUS at 12:10

## 2020-10-22 RX ADMIN — MORPHINE SULFATE 15 MG: 15 TABLET, FILM COATED, EXTENDED RELEASE ORAL at 08:10

## 2020-10-22 RX ADMIN — OXYCODONE HYDROCHLORIDE 20 MG: 5 TABLET ORAL at 02:10

## 2020-10-22 RX ADMIN — METOPROLOL SUCCINATE 100 MG: 50 TABLET, FILM COATED, EXTENDED RELEASE ORAL at 02:10

## 2020-10-22 RX ADMIN — SODIUM CHLORIDE: 0.9 INJECTION, SOLUTION INTRAVENOUS at 02:10

## 2020-10-22 NOTE — ASSESSMENT & PLAN NOTE
Continuing his home medication despite the encephalopathy given his significant issues with pain and already starting to perseverate if he could have his home medication  Will monitor.  I have a low suspicion this is opioid mediated.

## 2020-10-22 NOTE — ED PROVIDER NOTES
Encounter Date: 10/22/2020       History     Chief Complaint   Patient presents with    Altered Mental Status     pt lost bladder and bowel control, trouble standing up straight, lethargic and confused as to where he is going in his house.     70-year-old male brought to emergency room by his wife from the residence who has a history of obstructive sleep apnea, kidney stones, bilateral total knee replacements, back surgery, cholecystectomy an MRSA infections who was noted at 5:00 a.m. this morning by his wife being confused.  The patient's sleep in separate rooms he normally sleeps in a recliner.  The patient apparently attempted to get into bed with his wife which she states is very unusual and she noted at that time he appeared somewhat confused.  He then got up and urinated on the floor and defecated on himself.  This is unusual behavior.  The wife stated that he had some bit of a stumbling gait.  He is on analgesics but last took them last night.  There is some complaints of dizziness and neck pain.  He was noted a to have had a temp of 100.6° upon presentation to the ED. No complaint of headache.  No prior history of a CVA.        Review of patient's allergies indicates:   Allergen Reactions    Codeine     Morphine Nausea Only     Ok with nausea med.     Past Medical History:   Diagnosis Date    Arthritis     Asbestos exposure     SOB     Back pain     Bulging discs     lumbar    Hypertension     Kidney stone     Prostatitis     Sepsis due to methicillin resistant Staphylococcus aureus (MRSA) 06/2019    Due to large right upper arm abscess    Wears glasses      Past Surgical History:   Procedure Laterality Date    APPENDECTOMY      HAND SURGERY      rt hand    INCISION AND DRAINAGE Bilateral 06/2019    Right upper arm and left upper arm abscesses    JOINT REPLACEMENT      right knee    KNEE LIGAMENT RECONSTRUCTION      left knee    LAPAROSCOPIC CHOLECYSTECTOMY N/A 1/17/2020    Procedure:  CHOLECYSTECTOMY, LAPAROSCOPIC;  Surgeon: Edgard Hill III, MD;  Location: [facility];  Service: General;  Laterality: N/A;    LITHOTRIPSY      TONSILLECTOMY, ADENOIDECTOMY, BILATERAL MYRINGOTOMY AND TUBES      TOTAL KNEE ARTHROPLASTY  1971    rt knee    URETERAL STENT PLACEMENT       Family History   Problem Relation Age of Onset    Cancer Mother     Stroke Father     Heart disease Father     Collagen disease Neg Hx     Melanoma Neg Hx     Psoriasis Neg Hx     Lupus Neg Hx     Eczema Neg Hx      Social History     Tobacco Use    Smoking status: Never Smoker    Smokeless tobacco: Never Used   Substance Use Topics    Alcohol use: No    Drug use: No     Review of Systems   Constitutional: Negative for activity change, appetite change, chills, diaphoresis and fever.   HENT: Negative for congestion, ear pain, rhinorrhea, sinus pain, sore throat and trouble swallowing.    Eyes: Negative for visual disturbance.   Respiratory: Negative for cough and shortness of breath.    Cardiovascular: Negative for chest pain.   Gastrointestinal: Negative for abdominal pain, constipation, diarrhea, nausea and vomiting.        Incontinence of stool   Genitourinary: Negative for flank pain and hematuria.        Urinary incontinence   Musculoskeletal: Negative for back pain, myalgias, neck pain and neck stiffness.   Skin: Negative.  Negative for pallor, rash and wound.   Neurological: Positive for dizziness. Negative for seizures, syncope, speech difficulty and headaches.   Psychiatric/Behavioral: Positive for confusion.   All other systems reviewed and are negative.      Physical Exam     Initial Vitals [10/22/20 0807]   BP Pulse Resp Temp SpO2   (!) 158/80 (!) 142 (!) 22 (!) 100.6 °F (38.1 °C) (!) 92 %      MAP       --         Physical Exam    Vitals reviewed.  Constitutional: He appears well-developed and well-nourished. He is not diaphoretic. No distress.   Alert, disoriented to month day.  Oriented to year and who  is the president   ELIN:   Head: Normocephalic and atraumatic.   Right Ear: External ear normal.   Left Ear: External ear normal.   Nose: Nose normal.   Mouth/Throat: Oropharynx is clear and moist.   Eyes: Conjunctivae and EOM are normal. Pupils are equal, round, and reactive to light. Right eye exhibits no discharge. Left eye exhibits no discharge. No scleral icterus.   Neck: Normal range of motion. Neck supple. No JVD present.   Cardiovascular: Normal rate, regular rhythm, normal heart sounds and intact distal pulses. Exam reveals no gallop and no friction rub.    No murmur heard.  Pulmonary/Chest: No respiratory distress. He has no wheezes. He has rhonchi. He has rales. He exhibits no tenderness.   Congestion in both bases   Abdominal: Soft. Bowel sounds are normal. He exhibits no distension. There is no abdominal tenderness. There is no rebound and no guarding.   Obese   Musculoskeletal: Normal range of motion. No tenderness or edema.   Lymphadenopathy:     He has no cervical adenopathy.   Neurological: He is alert and oriented to person, place, and time. He has normal strength and normal reflexes. No cranial nerve deficit or sensory deficit. GCS score is 15. GCS eye subscore is 4. GCS verbal subscore is 5. GCS motor subscore is 6.   No drift   Skin: Skin is warm and dry. Capillary refill takes less than 2 seconds. No rash noted. No erythema. No pallor.         ED Course   Procedures  Labs Reviewed   CULTURE, BLOOD   CULTURE, BLOOD   CBC W/ AUTO DIFFERENTIAL   COMPREHENSIVE METABOLIC PANEL   MAGNESIUM   LACTIC ACID, PLASMA   URINALYSIS, REFLEX TO URINE CULTURE   TSH   TROPONIN I          Imaging Results    None                       Attending Attestation:             Attending ED Notes:   70-year-old male who is brought to emergency room by his wife for an altered mental status noted this morning at about 5:00 a.m..  Last time he was seen normal was last night.  Workup at this time showed initial temperature  100.6° and without antipyretics defervesced.  Labs showed a white count of 15.3 and a lactate of 2.3.  Chemistries were remarkable for blood sugar 168 a BUN of 24.  COVID screen was negative.  Troponin is normal.  Chest x-ray showed no infiltrate.  CT scan of the head showed no evidence of an acute stroke.  During the ED course cultures are obtained.  He is empirically started on Zosyn and will be hydrated with 3300 cc of saline for an early septic picture.  At the present time the patient does not have any meningismus or complaint of headache I do not clinically think that he has any signs of meningitis.  Hospital Medicine is being consulted for admission.  Urinalysis is pending.  Dr. Roberts, [facility] is the accepting and admitting physician.                    Clinical Impression:       ICD-10-CM ICD-9-CM   1. Altered mental status  R41.82 780.97                                               Howie Cooper Jr., MD  10/22/20 1134

## 2020-10-22 NOTE — H&P
"[facility] Medicine  History & Physical    Patient Name: Orlando Treadwell  MRN: [id number]  Admission Date: 10/22/2020  Attending Physician: Lavelle Roberts MD  Primary Care Provider: Kleber Worthy III, MD         Patient information was obtained from patient and ER records.     Subjective:     Principal Problem:Encephalopathy, metabolic    Chief Complaint:   Chief Complaint   Patient presents with    Altered Mental Status     pt lost bladder and bowel control, trouble standing up straight, lethargic and confused as to where he is going in his house.        HPI: 70 year old male brought to the ED by his wife due to encephalopathy.  Patient has Hx of MRSA soft tissue infection and cholecystitis requiring cholecystectomy 1/2020.  He is on chronic opioids for chronic pain and is managed with an outpatient Pain MD. History provided by his wife as he has no memory of events. Patient went to bed last night seemingly in his usual state of health.  His wife reports they normally sleep separately and he sleeps in a recliner in the living room.  She awoke to him trying to get in bed with her.  She thought he was dreaming and thus directed him back to his recliner.  He was mumbling and told her he was trick or treating.  He went back into the living room and she found that he had urinated down the madera in his attempt to get to the rest room.  She also found that he had defecated on himself.  He tells me he had the sudden urge to urinate and could not get to the rest room in time. He does not recall defecating.  He reports some dysuria this AM.  Denies fevers or chills at home.  Denies cough, SOB, abdominal pain,loose stool, other urinary symptoms.  No recent rashes.  Denies headache, facial droop, focal weakness or sensation change. He is answering questions appropriately but his wife tells me he is not at his usual talkative baseline: "You usually cannot shut him up."  Patient had change in pain " medication approximately 2 weeks ago but no recent medication changes.  No new medication.  Denies taking any OTC substances and has been taking his medication as directed.     Past Medical History:   Diagnosis Date    Arthritis     Asbestos exposure     SOB     Back pain     Bulging discs     lumbar    Hypertension     Kidney stone     Prostatitis     Sepsis due to methicillin resistant Staphylococcus aureus (MRSA) 06/2019    Due to large right upper arm abscess    Wears glasses        Past Surgical History:   Procedure Laterality Date    APPENDECTOMY      HAND SURGERY      rt hand    INCISION AND DRAINAGE Bilateral 06/2019    Right upper arm and left upper arm abscesses    JOINT REPLACEMENT      right knee    KNEE LIGAMENT RECONSTRUCTION      left knee    LAPAROSCOPIC CHOLECYSTECTOMY N/A 1/17/2020    Procedure: CHOLECYSTECTOMY, LAPAROSCOPIC;  Surgeon: Edgard Hill III, MD;  Location: [facility];  Service: General;  Laterality: N/A;    LITHOTRIPSY      TONSILLECTOMY, ADENOIDECTOMY, BILATERAL MYRINGOTOMY AND TUBES      TOTAL KNEE ARTHROPLASTY  1971    rt knee    URETERAL STENT PLACEMENT         Review of patient's allergies indicates:   Allergen Reactions    Codeine     Morphine Nausea Only     Ok with nausea med.       No current facility-administered medications on file prior to encounter.      Current Outpatient Medications on File Prior to Encounter   Medication Sig    atorvastatin (LIPITOR) 20 MG tablet Take 20 mg by mouth once daily.    b complex vitamins tablet Take 1 tablet by mouth once daily.    beta-carotene,A,-vits C,E/mins (VISION ORAL) Take 1 tablet by mouth once daily.    clopidogreL (PLAVIX) 75 mg tablet Take 75 mg by mouth once daily.    diclofenac sodium 1 % Gel Apply 2 g topically 2 (two) times daily.    gabapentin (NEURONTIN) 300 MG capsule Take 600 mg by mouth every evening.     hydrALAZINE (APRESOLINE) 50 MG tablet Take 50 mg by mouth 2 (two) times daily.     LORazepam (ATIVAN) 1 MG tablet Take 1 mg by mouth every 8 (eight) hours as needed.     MAGNESIUM ORAL Take 500 mg by mouth once daily.    metoprolol succinate (TOPROL-XL) 100 MG 24 hr tablet Take 100 mg by mouth once daily.     multivitamin (THERAGRAN) tablet Take 1 tablet by mouth once daily.    olmesartan-hydrochlorothiazide (BENICAR HCT) 40-25 mg per tablet Take 1 tablet by mouth every evening.     oxyCODONE (ROXICODONE) 20 mg Tab immediate release tablet Take 20 mg by mouth 4 (four) times daily as needed for Pain.    oxyCODONE myristate (XTAMPZA ER) 13.5 mg CSpT Take 1 capsule by mouth every 12 (twelve) hours.    QUEtiapine (SEROQUEL XR) 150 mg Tb24 TAKE 1 TABLET BY MOUTH AT BEDTIME (Patient taking differently: Take 150 mg by mouth every evening. )    venlafaxine (EFFEXOR-XR) 150 MG Cp24 TAKE 1 CAPSULE BY MOUTH EVERY DAY (Patient taking differently: Take 150 mg by mouth every evening. )    zinc gluconate 50 mg tablet Take 50 mg by mouth once daily.    albuterol (PROVENTIL/VENTOLIN HFA) 90 mcg/actuation inhaler INL 2 PFS PO Q 6 H PRF SOB OR WHZ    aspirin 325 MG tablet Take 325 mg by mouth once daily.    esomeprazole (NEXIUM) 40 MG capsule TAKE 1 CAPSULE BY MOUTH EVERY DAY    esomeprazole (NEXIUM) 40 MG capsule TAKE 1 CAPSULE BY MOUTH EVERY DAY (Patient not taking: Reported on 7/20/2020)    fluticasone propionate (FLONASE) 50 mcg/actuation nasal spray 1 spray by Each Nostril route 2 (two) times a day.     Lactobacillus rhamnosus GG (CULTURELLE) 10 billion cell capsule Take 2 capsules by mouth once daily.     mupirocin (BACTROBAN) 2 % ointment 1 g by Nasal route 2 (two) times daily.     nitroGLYCERIN (NITROSTAT) 0.4 MG SL tablet DISSOLE 1 TABLET UNDER TONGUE AS NEED FOR CHEST PAIN EVERY 5 MIN MAX 3 TABS GO TO ER    oxyCODONE (ROXICODONE) 15 MG Tab Take 15 mg by mouth 4 (four) times daily as needed for Pain.     testosterone cypionate (DEPOTESTOTERONE CYPIONATE) 200 mg/mL injection Inject 1 mL  (200 mg total) into the muscle every 28 days. for 10 doses    tramadol (ULTRAM) 50 mg tablet Take 50 mg by mouth every 8 (eight) hours as needed for Pain.      Family History     Problem Relation (Age of Onset)    Cancer Mother    Heart disease Father    Stroke Father        Tobacco Use    Smoking status: Never Smoker    Smokeless tobacco: Never Used   Substance and Sexual Activity    Alcohol use: No    Drug use: No    Sexual activity: Yes     Partners: Female     Review of Systems   Constitutional: Negative.    HENT: Negative.    Eyes: Negative.    Respiratory: Negative.    Cardiovascular: Negative.    Gastrointestinal: Negative.    Endocrine: Negative.    Genitourinary: Positive for dysuria and urgency.   Musculoskeletal: Negative.         Diffuse  body pain that is unchanged in character from usual chronic pain   Skin: Negative.    Allergic/Immunologic: Negative.    Neurological: Negative.    Hematological: Negative.    Psychiatric/Behavioral: Negative.      Objective:     Vital Signs (Most Recent):  Temp: 98.6 °F (37 °C) (10/22/20 1548)  Pulse: 99 (10/22/20 1548)  Resp: 18 (10/22/20 1548)  BP: 133/60 (10/22/20 1548)  SpO2: 97 % (10/22/20 1548) Vital Signs (24h Range):  Temp:  [98.6 °F (37 °C)-100.6 °F (38.1 °C)] 98.6 °F (37 °C)  Pulse:  [] 99  Resp:  [14-22] 18  SpO2:  [92 %-99 %] 97 %  BP: (107-158)/(56-81) 133/60     Weight: 110.7 kg (244 lb)  Body mass index is 38.22 kg/m².    Physical Exam  Vitals signs and nursing note reviewed.   Constitutional:       General: He is not in acute distress.     Appearance: He is well-developed.      Comments: Flat affect   HENT:      Head: Normocephalic and atraumatic.   Eyes:      Pupils: Pupils are equal, round, and reactive to light.   Neck:      Musculoskeletal: Normal range of motion.   Cardiovascular:      Rate and Rhythm: Regular rhythm.      Heart sounds: No murmur.   Pulmonary:      Effort: Pulmonary effort is normal.      Breath sounds: Normal breath  sounds. No wheezing.   Abdominal:      General: There is no distension.      Palpations: Abdomen is soft.      Tenderness: There is no abdominal tenderness. There is no guarding or rebound.   Musculoskeletal: Normal range of motion.   Skin:     Findings: No rash.      Comments: No soft tissue infection but small superficial abrasions to legs and arms   Neurological:      Mental Status: He is alert and oriented to person, place, and time.      Cranial Nerves: No cranial nerve deficit.      Sensory: No sensory deficit.      Comments: Answers questions appropriately but his wife states not at his baseline  No memory of last night's events           CRANIAL NERVES     CN III, IV, VI   Pupils are equal, round, and reactive to light.       Significant Labs:   CBC:   Recent Labs   Lab 10/22/20  0853   WBC 15.27*   HGB 13.4*   HCT 43.6        CMP:   Recent Labs   Lab 10/22/20  0853      K 4.0   CL 97   CO2 27   *   BUN 24*   CREATININE 1.4   CALCIUM 9.3   PROT 8.1   ALBUMIN 3.9   BILITOT 0.9   ALKPHOS 58   AST 47*   ALT 53*   ANIONGAP 13   EGFRNONAA 50.2*       Significant Imaging: CXR: I have reviewed all pertinent results/findings within the past 24 hours and my personal findings are:  No consolidation     Assessment/Plan:     * Encephalopathy, metabolic  Unclear etiology with work up in progress  Possible Sepsis mediated.  At this time, I do not have a source.  Empiric IV abx.  Cultures in progress.  ? CVA. MRI of brain ordered.    TSH done and is normal  No significant electrolyte abnormalities to explain symptoms  Checking Utox and ETOH though I suspect to be normal  Per history, no new medication or recent medication changes to explain.  On multiple opioids and thus a possibility but not suggested by history.      Severe sepsis  Meets criteria with HR, WBC, elevated WBC, etc.  Unclear source at this time  Temp of 100.6 in the ED  UA did not suggest UtI despite urinary complaints  CXR with no  obvious pneumonia  No obvious soft tissue infection  Abd non tender and soft.  No loose stool  Empiric IV abx  Cultures of blood and urine in progress  Will monitor to determine if a source will declare itself      Lactic acidosis  Possibly due to sepsis.  IVFs and repeating lactic acid.      Anxiety  Holding his ativan at this time given encephalopathy      Depression  Chronic medical condition.  Continuing home medication.  Monitoring.        Stented coronary artery  Done 3/2020  Continuing home plavix.  Will clarify if on ASA.  No signs of acute cardiac issues      Coronary artery disease  Chronic medical condition.  Continuing home medication.  Monitoring.        Hypertension  Chronic medical condition.  Continuing home medication.  Monitoring.        Chronic narcotic use  Continuing his home medication despite the encephalopathy given his significant issues with pain and already starting to perseverate if he could have his home medication  Will monitor.  I have a low suspicion this is opioid mediated.      DDD (degenerative disc disease), lumbar          VTE Risk Mitigation (From admission, onward)         Ordered     IP VTE HIGH RISK PATIENT  Once      10/22/20 1317     Place sequential compression device  Until discontinued      10/22/20 1317                   Lavelle Roberts MD  Department of Hospital Medicine   Formerly [facility], later [facility]

## 2020-10-22 NOTE — ASSESSMENT & PLAN NOTE
Meets criteria with HR, WBC, elevated WBC, etc.  Unclear source at this time  Temp of 100.6 in the ED  UA did not suggest UtI despite urinary complaints  CXR with no obvious pneumonia  No obvious soft tissue infection  Abd non tender and soft.  No loose stool  Empiric IV abx  Cultures of blood and urine in progress  Will monitor to determine if a source will declare itself

## 2020-10-22 NOTE — HPI
"70 year old male brought to the ED by his wife due to encephalopathy.  Patient has Hx of MRSA soft tissue infection and cholecystitis requiring cholecystectomy 1/2020.  He is on chronic opioids for chronic pain and is managed with an outpatient Pain MD. History provided by his wife as he has no memory of events. Patient went to bed last night seemingly in his usual state of health.  His wife reports they normally sleep separately and he sleeps in a recliner in the living room.  She awoke to him trying to get in bed with her.  She thought he was dreaming and thus directed him back to his recliner.  He was mumbling and told her he was trick or treating.  He went back into the living room and she found that he had urinated down the madera in his attempt to get to the rest room.  She also found that he had defecated on himself.  He tells me he had the sudden urge to urinate and could not get to the rest room in time. He does not recall defecating.  He reports some dysuria this AM.  Denies fevers or chills at home.  Denies cough, SOB, abdominal pain,loose stool, other urinary symptoms.  No recent rashes.  Denies headache, facial droop, focal weakness or sensation change. He is answering questions appropriately but his wife tells me he is not at his usual talkative baseline: "You usually cannot shut him up."  Patient had change in pain medication approximately 2 weeks ago but no recent medication changes.  No new medication.  Denies taking any OTC substances and has been taking his medication as directed.   "

## 2020-10-22 NOTE — NURSING
Spoke with Dr. Roberts about MRI results, order to consult neurology. Will continue to monitor pt.

## 2020-10-22 NOTE — ASSESSMENT & PLAN NOTE
Unclear etiology with work up in progress  Possible Sepsis mediated.  At this time, I do not have a source.  Empiric IV abx.  Cultures in progress.  ? CVA. MRI of brain ordered.    TSH done and is normal  No significant electrolyte abnormalities to explain symptoms  Checking Utox and ETOH though I suspect to be normal  Per history, no new medication or recent medication changes to explain.  On multiple opioids and thus a possibility but not suggested by history.

## 2020-10-22 NOTE — SUBJECTIVE & OBJECTIVE
Past Medical History:   Diagnosis Date    Arthritis     Asbestos exposure     SOB     Back pain     Bulging discs     lumbar    Hypertension     Kidney stone     Prostatitis     Sepsis due to methicillin resistant Staphylococcus aureus (MRSA) 06/2019    Due to large right upper arm abscess    Wears glasses        Past Surgical History:   Procedure Laterality Date    APPENDECTOMY      HAND SURGERY      rt hand    INCISION AND DRAINAGE Bilateral 06/2019    Right upper arm and left upper arm abscesses    JOINT REPLACEMENT      right knee    KNEE LIGAMENT RECONSTRUCTION      left knee    LAPAROSCOPIC CHOLECYSTECTOMY N/A 1/17/2020    Procedure: CHOLECYSTECTOMY, LAPAROSCOPIC;  Surgeon: Edgard Hill III, MD;  Location: [facility];  Service: General;  Laterality: N/A;    LITHOTRIPSY      TONSILLECTOMY, ADENOIDECTOMY, BILATERAL MYRINGOTOMY AND TUBES      TOTAL KNEE ARTHROPLASTY  1971    rt knee    URETERAL STENT PLACEMENT         Review of patient's allergies indicates:   Allergen Reactions    Codeine     Morphine Nausea Only     Ok with nausea med.       No current facility-administered medications on file prior to encounter.      Current Outpatient Medications on File Prior to Encounter   Medication Sig    atorvastatin (LIPITOR) 20 MG tablet Take 20 mg by mouth once daily.    b complex vitamins tablet Take 1 tablet by mouth once daily.    beta-carotene,A,-vits C,E/mins (VISION ORAL) Take 1 tablet by mouth once daily.    clopidogreL (PLAVIX) 75 mg tablet Take 75 mg by mouth once daily.    diclofenac sodium 1 % Gel Apply 2 g topically 2 (two) times daily.    gabapentin (NEURONTIN) 300 MG capsule Take 600 mg by mouth every evening.     hydrALAZINE (APRESOLINE) 50 MG tablet Take 50 mg by mouth 2 (two) times daily.    LORazepam (ATIVAN) 1 MG tablet Take 1 mg by mouth every 8 (eight) hours as needed.     MAGNESIUM ORAL Take 500 mg by mouth once daily.    metoprolol succinate (TOPROL-XL) 100 MG  24 hr tablet Take 100 mg by mouth once daily.     multivitamin (THERAGRAN) tablet Take 1 tablet by mouth once daily.    olmesartan-hydrochlorothiazide (BENICAR HCT) 40-25 mg per tablet Take 1 tablet by mouth every evening.     oxyCODONE (ROXICODONE) 20 mg Tab immediate release tablet Take 20 mg by mouth 4 (four) times daily as needed for Pain.    oxyCODONE myristate (XTAMPZA ER) 13.5 mg CSpT Take 1 capsule by mouth every 12 (twelve) hours.    QUEtiapine (SEROQUEL XR) 150 mg Tb24 TAKE 1 TABLET BY MOUTH AT BEDTIME (Patient taking differently: Take 150 mg by mouth every evening. )    venlafaxine (EFFEXOR-XR) 150 MG Cp24 TAKE 1 CAPSULE BY MOUTH EVERY DAY (Patient taking differently: Take 150 mg by mouth every evening. )    zinc gluconate 50 mg tablet Take 50 mg by mouth once daily.    albuterol (PROVENTIL/VENTOLIN HFA) 90 mcg/actuation inhaler INL 2 PFS PO Q 6 H PRF SOB OR WHZ    aspirin 325 MG tablet Take 325 mg by mouth once daily.    esomeprazole (NEXIUM) 40 MG capsule TAKE 1 CAPSULE BY MOUTH EVERY DAY    esomeprazole (NEXIUM) 40 MG capsule TAKE 1 CAPSULE BY MOUTH EVERY DAY (Patient not taking: Reported on 7/20/2020)    fluticasone propionate (FLONASE) 50 mcg/actuation nasal spray 1 spray by Each Nostril route 2 (two) times a day.     Lactobacillus rhamnosus GG (CULTURELLE) 10 billion cell capsule Take 2 capsules by mouth once daily.     mupirocin (BACTROBAN) 2 % ointment 1 g by Nasal route 2 (two) times daily.     nitroGLYCERIN (NITROSTAT) 0.4 MG SL tablet DISSOLE 1 TABLET UNDER TONGUE AS NEED FOR CHEST PAIN EVERY 5 MIN MAX 3 TABS GO TO ER    oxyCODONE (ROXICODONE) 15 MG Tab Take 15 mg by mouth 4 (four) times daily as needed for Pain.     testosterone cypionate (DEPOTESTOTERONE CYPIONATE) 200 mg/mL injection Inject 1 mL (200 mg total) into the muscle every 28 days. for 10 doses    tramadol (ULTRAM) 50 mg tablet Take 50 mg by mouth every 8 (eight) hours as needed for Pain.      Family History      Problem Relation (Age of Onset)    Cancer Mother    Heart disease Father    Stroke Father        Tobacco Use    Smoking status: Never Smoker    Smokeless tobacco: Never Used   Substance and Sexual Activity    Alcohol use: No    Drug use: No    Sexual activity: Yes     Partners: Female     Review of Systems   Constitutional: Negative.    HENT: Negative.    Eyes: Negative.    Respiratory: Negative.    Cardiovascular: Negative.    Gastrointestinal: Negative.    Endocrine: Negative.    Genitourinary: Positive for dysuria and urgency.   Musculoskeletal: Negative.         Diffuse  body pain that is unchanged in character from usual chronic pain   Skin: Negative.    Allergic/Immunologic: Negative.    Neurological: Negative.    Hematological: Negative.    Psychiatric/Behavioral: Negative.      Objective:     Vital Signs (Most Recent):  Temp: 98.6 °F (37 °C) (10/22/20 1548)  Pulse: 99 (10/22/20 1548)  Resp: 18 (10/22/20 1548)  BP: 133/60 (10/22/20 1548)  SpO2: 97 % (10/22/20 1548) Vital Signs (24h Range):  Temp:  [98.6 °F (37 °C)-100.6 °F (38.1 °C)] 98.6 °F (37 °C)  Pulse:  [] 99  Resp:  [14-22] 18  SpO2:  [92 %-99 %] 97 %  BP: (107-158)/(56-81) 133/60     Weight: 110.7 kg (244 lb)  Body mass index is 38.22 kg/m².    Physical Exam  Vitals signs and nursing note reviewed.   Constitutional:       General: He is not in acute distress.     Appearance: He is well-developed.      Comments: Flat affect   HENT:      Head: Normocephalic and atraumatic.   Eyes:      Pupils: Pupils are equal, round, and reactive to light.   Neck:      Musculoskeletal: Normal range of motion.   Cardiovascular:      Rate and Rhythm: Regular rhythm.      Heart sounds: No murmur.   Pulmonary:      Effort: Pulmonary effort is normal.      Breath sounds: Normal breath sounds. No wheezing.   Abdominal:      General: There is no distension.      Palpations: Abdomen is soft.      Tenderness: There is no abdominal tenderness. There is no guarding or  rebound.   Musculoskeletal: Normal range of motion.   Skin:     Findings: No rash.      Comments: No soft tissue infection but small superficial abrasions to legs and arms   Neurological:      Mental Status: He is alert and oriented to person, place, and time.      Cranial Nerves: No cranial nerve deficit.      Sensory: No sensory deficit.      Comments: Answers questions appropriately but his wife states not at his baseline  No memory of last night's events           CRANIAL NERVES     CN III, IV, VI   Pupils are equal, round, and reactive to light.       Significant Labs:   CBC:   Recent Labs   Lab 10/22/20  0853   WBC 15.27*   HGB 13.4*   HCT 43.6        CMP:   Recent Labs   Lab 10/22/20  0853      K 4.0   CL 97   CO2 27   *   BUN 24*   CREATININE 1.4   CALCIUM 9.3   PROT 8.1   ALBUMIN 3.9   BILITOT 0.9   ALKPHOS 58   AST 47*   ALT 53*   ANIONGAP 13   EGFRNONAA 50.2*       Significant Imaging: CXR: I have reviewed all pertinent results/findings within the past 24 hours and my personal findings are:  No consolidation

## 2020-10-22 NOTE — ED NOTES
"Pt requesting his oxycodone. Pt states "My back, it gets really bad, I may not be able to stay in this bed and I don't want to mess with my pain medication". Dr Cooper aware and Pt and pts wife educated on complications of change in mental status and medicine that can cause lethargy.   "

## 2020-10-23 VITALS
BODY MASS INDEX: 34.53 KG/M2 | WEIGHT: 220 LBS | SYSTOLIC BLOOD PRESSURE: 126 MMHG | HEART RATE: 84 BPM | TEMPERATURE: 99 F | HEIGHT: 67 IN | OXYGEN SATURATION: 96 % | RESPIRATION RATE: 20 BRPM | DIASTOLIC BLOOD PRESSURE: 59 MMHG

## 2020-10-23 PROBLEM — R65.20 SEVERE SEPSIS: Status: RESOLVED | Noted: 2020-10-22 | Resolved: 2020-10-23

## 2020-10-23 PROBLEM — A41.9 SEVERE SEPSIS: Status: RESOLVED | Noted: 2020-10-22 | Resolved: 2020-10-23

## 2020-10-23 PROBLEM — R41.82 ALTERED MENTAL STATUS: Status: RESOLVED | Noted: 2020-10-22 | Resolved: 2020-10-23

## 2020-10-23 PROBLEM — E87.20 LACTIC ACIDOSIS: Status: RESOLVED | Noted: 2020-10-22 | Resolved: 2020-10-23

## 2020-10-23 PROBLEM — G93.41 ENCEPHALOPATHY, METABOLIC: Status: RESOLVED | Noted: 2020-10-22 | Resolved: 2020-10-23

## 2020-10-23 LAB
ALBUMIN SERPL BCP-MCNC: 3.3 G/DL (ref 3.5–5.2)
ALP SERPL-CCNC: 42 U/L (ref 55–135)
ALT SERPL W/O P-5'-P-CCNC: 39 U/L (ref 10–44)
ANION GAP SERPL CALC-SCNC: 8 MMOL/L (ref 8–16)
AST SERPL-CCNC: 29 U/L (ref 10–40)
BASOPHILS # BLD AUTO: 0.03 K/UL (ref 0–0.2)
BASOPHILS NFR BLD: 0.3 % (ref 0–1.9)
BILIRUB SERPL-MCNC: 1 MG/DL (ref 0.1–1)
BUN SERPL-MCNC: 28 MG/DL (ref 8–23)
CALCIUM SERPL-MCNC: 8.3 MG/DL (ref 8.7–10.5)
CHLORIDE SERPL-SCNC: 101 MMOL/L (ref 95–110)
CO2 SERPL-SCNC: 27 MMOL/L (ref 23–29)
CREAT SERPL-MCNC: 1.8 MG/DL (ref 0.5–1.4)
DIFFERENTIAL METHOD: ABNORMAL
EOSINOPHIL # BLD AUTO: 0.1 K/UL (ref 0–0.5)
EOSINOPHIL NFR BLD: 1.4 % (ref 0–8)
ERYTHROCYTE [DISTWIDTH] IN BLOOD BY AUTOMATED COUNT: 15.4 % (ref 11.5–14.5)
EST. GFR  (AFRICAN AMERICAN): 42.8 ML/MIN/1.73 M^2
EST. GFR  (NON AFRICAN AMERICAN): 37 ML/MIN/1.73 M^2
GLUCOSE SERPL-MCNC: 102 MG/DL (ref 70–110)
HCT VFR BLD AUTO: 37.5 % (ref 40–54)
HGB BLD-MCNC: 11.4 G/DL (ref 14–18)
IMM GRANULOCYTES # BLD AUTO: 0.03 K/UL (ref 0–0.04)
IMM GRANULOCYTES NFR BLD AUTO: 0.3 % (ref 0–0.5)
LYMPHOCYTES # BLD AUTO: 2 K/UL (ref 1–4.8)
LYMPHOCYTES NFR BLD: 19.3 % (ref 18–48)
MCH RBC QN AUTO: 26.4 PG (ref 27–31)
MCHC RBC AUTO-ENTMCNC: 30.4 G/DL (ref 32–36)
MCV RBC AUTO: 87 FL (ref 82–98)
MONOCYTES # BLD AUTO: 0.9 K/UL (ref 0.3–1)
MONOCYTES NFR BLD: 8.8 % (ref 4–15)
NEUTROPHILS # BLD AUTO: 7.2 K/UL (ref 1.8–7.7)
NEUTROPHILS NFR BLD: 69.9 % (ref 38–73)
NRBC BLD-RTO: 0 /100 WBC
PLATELET # BLD AUTO: 165 K/UL (ref 150–350)
PMV BLD AUTO: 10.5 FL (ref 9.2–12.9)
POTASSIUM SERPL-SCNC: 4 MMOL/L (ref 3.5–5.1)
PROT SERPL-MCNC: 6.8 G/DL (ref 6–8.4)
RBC # BLD AUTO: 4.32 M/UL (ref 4.6–6.2)
SODIUM SERPL-SCNC: 136 MMOL/L (ref 136–145)
WBC # BLD AUTO: 10.29 K/UL (ref 3.9–12.7)

## 2020-10-23 PROCEDURE — 63600175 PHARM REV CODE 636 W HCPCS: Performed by: INTERNAL MEDICINE

## 2020-10-23 PROCEDURE — 36415 COLL VENOUS BLD VENIPUNCTURE: CPT

## 2020-10-23 PROCEDURE — 25000003 PHARM REV CODE 250: Performed by: INTERNAL MEDICINE

## 2020-10-23 PROCEDURE — 99900035 HC TECH TIME PER 15 MIN (STAT)

## 2020-10-23 PROCEDURE — 97530 THERAPEUTIC ACTIVITIES: CPT

## 2020-10-23 PROCEDURE — 85025 COMPLETE CBC W/AUTO DIFF WBC: CPT

## 2020-10-23 PROCEDURE — 97162 PT EVAL MOD COMPLEX 30 MIN: CPT

## 2020-10-23 PROCEDURE — 94761 N-INVAS EAR/PLS OXIMETRY MLT: CPT

## 2020-10-23 PROCEDURE — 80053 COMPREHEN METABOLIC PANEL: CPT

## 2020-10-23 RX ADMIN — THERA TABS 1 TABLET: TAB at 09:10

## 2020-10-23 RX ADMIN — PIPERACILLIN SODIUM AND TAZOBACTAM SODIUM 3.38 G: 3; .375 INJECTION, POWDER, LYOPHILIZED, FOR SOLUTION INTRAVENOUS at 09:10

## 2020-10-23 RX ADMIN — MORPHINE SULFATE 15 MG: 15 TABLET, FILM COATED, EXTENDED RELEASE ORAL at 09:10

## 2020-10-23 RX ADMIN — OXYCODONE HYDROCHLORIDE 20 MG: 5 TABLET ORAL at 04:10

## 2020-10-23 RX ADMIN — MAGNESIUM OXIDE 400 MG: 400 TABLET ORAL at 09:10

## 2020-10-23 RX ADMIN — PIPERACILLIN SODIUM AND TAZOBACTAM SODIUM 3.38 G: 3; .375 INJECTION, POWDER, LYOPHILIZED, FOR SOLUTION INTRAVENOUS at 02:10

## 2020-10-23 RX ADMIN — SODIUM CHLORIDE: 0.9 INJECTION, SOLUTION INTRAVENOUS at 05:10

## 2020-10-23 RX ADMIN — CLOPIDOGREL BISULFATE 75 MG: 75 TABLET, FILM COATED ORAL at 09:10

## 2020-10-23 RX ADMIN — VENLAFAXINE HYDROCHLORIDE 150 MG: 150 CAPSULE, EXTENDED RELEASE ORAL at 09:10

## 2020-10-23 RX ADMIN — OXYCODONE HYDROCHLORIDE 20 MG: 5 TABLET ORAL at 11:10

## 2020-10-23 NOTE — DISCHARGE SUMMARY
"[facility] Medicine  Discharge Summary      Patient Name: Orlando Treadwell  MRN: [id number]  Admission Date: 10/22/2020  Hospital Length of Stay: 1 days  Discharge Date and Time: No discharge date for patient encounter.  Attending Physician: Lavelle Roberts MD   Discharging Provider: Lavelle Roberts MD  Primary Care Provider: Kleber Worthy III, MD      HPI:   70 year old male brought to the ED by his wife due to encephalopathy.  Patient has Hx of MRSA soft tissue infection and cholecystitis requiring cholecystectomy 1/2020.  He is on chronic opioids for chronic pain and is managed with an outpatient Pain MD. History provided by his wife as he has no memory of events. Patient went to bed last night seemingly in his usual state of health.  His wife reports they normally sleep separately and he sleeps in a recliner in the living room.  She awoke to him trying to get in bed with her.  She thought he was dreaming and thus directed him back to his recliner.  He was mumbling and told her he was trick or treating.  He went back into the living room and she found that he had urinated down the madera in his attempt to get to the rest room.  She also found that he had defecated on himself.  He tells me he had the sudden urge to urinate and could not get to the rest room in time. He does not recall defecating.  He reports some dysuria this AM.  Denies fevers or chills at home.  Denies cough, SOB, abdominal pain,loose stool, other urinary symptoms.  No recent rashes.  Denies headache, facial droop, focal weakness or sensation change. He is answering questions appropriately but his wife tells me he is not at his usual talkative baseline: "You usually cannot shut him up."  Patient had change in pain medication approximately 2 weeks ago but no recent medication changes.  No new medication.  Denies taking any OTC substances and has been taking his medication as directed.     * No surgery found *      Hospital " "Course:   Patient admitted with encephalopathy.  See HPI for details. Workup for Sepsis and Neurological was started.  Cultures have been negative.  UA and CXR did not reveal infection.  No soft tissue infection.  No loose stool or other exam finding to suggest an etiology.  No further temperatures than on presentation.  Lactic acid normalized with IVFs.  I have not found an infectious source and patient is currently back to his baseline.  Neurological workup with MRI done.  Head CT negative. MRI done and revealed "small foci of hemosiderin staining within the right frontal subcortical white matter and right cerebellar hemisphere compatible with foci of remote petechial microhemorrhages. 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 I discussed with patient and he is requesting discharge as he is already having issues with the hospital regarding prior bills and does not want to incur further bills.  I do think he is safe to discharge home as my initial plan was to stop antibiotics and monitor off to see if a process declares itself.  He and his wife assure me they can do this at home and promise to return or call their PCP immediately   I discussed with Dr. Harlan Mandujano who was okay with patient following up in office.  He did stress the the importance of patient following up with an Ophthalmologist.  This has been stressed to patient and a copy of the MRI report to be given to him.  Examined on day of discharge and No focal deficit, back to his cognitive baseline.  Return precautions given.     Consults:   Consults (From admission, onward)        Status Ordering Provider     Inpatient consult to Neurology  Once     Provider:  MD Nannette Hogue SILSBEE N.          No new Assessment & Plan notes have been filed under this hospital service since the last note was generated.  Service: Hospital Medicine    Final Active Diagnoses:    " Diagnosis Date Noted POA    Anxiety [F41.9] 07/26/2020 Yes    Depression [F32.9] 07/26/2020 Yes    Coronary artery disease [I25.10] 05/27/2020 Yes    Stented coronary artery [Z95.5] 05/27/2020 Not Applicable    Chronic narcotic use [F11.90] 01/08/2020 Yes    Hypertension [I10] 01/08/2020 Yes    DDD (degenerative disc disease), lumbar [M51.36] 07/15/2014 Yes      Problems Resolved During this Admission:    Diagnosis Date Noted Date Resolved POA    PRINCIPAL PROBLEM:  Encephalopathy, metabolic [G93.41] 10/22/2020 10/23/2020 Yes    Altered mental status [R41.82] 10/22/2020 10/23/2020 Yes    Lactic acidosis [E87.2] 10/22/2020 10/23/2020 Yes    Severe sepsis [A41.9, R65.20] 10/22/2020 10/23/2020 Yes       Discharged Condition: good    Disposition: Home or Self Care    Follow Up:  Follow-up Information     Kleber Worthy III, MD In 1 week.    Specialty: Family Medicine  Why: post hospital discharge follow up  Contact information:  [address] [place] [address]  [facility] [address]  [phone]             Luca Mandujano MD In 2 weeks.    Specialties: Vascular Neurology, Neurology  Why: post hospital discharge follow up for abnormal MRI  Contact information:  [address] [place] [id number]  [phone]             ophthalmologist of your choosing In 2 weeks.    Why: follow up for abnormal MRI finding           Kleber Worthy III, MD In 2 weeks.    Specialty: Family Medicine  Contact information:  [address] [facility]  [address]  [facility] [address]  [phone]                 Patient Instructions:      Diet Cardiac     Notify your health care provider if you experience any of the following:  temperature >100.4     Notify your health care provider if you experience any of the following:  persistent nausea and vomiting or diarrhea     Notify your health care provider if you experience any of the following:  redness, tenderness, or signs of infection (pain, swelling, redness, odor or  green/yellow discharge around incision site)     Notify your health care provider if you experience any of the following:  increased confusion or weakness     Activity as tolerated       Significant Diagnostic Studies: Labs:   CMP   Recent Labs   Lab 10/22/20  0853 10/23/20  0442    136   K 4.0 4.0   CL 97 101   CO2 27 27   * 102   BUN 24* 28*   CREATININE 1.4 1.8*   CALCIUM 9.3 8.3*   PROT 8.1 6.8   ALBUMIN 3.9 3.3*   BILITOT 0.9 1.0   ALKPHOS 58 42*   AST 47* 29   ALT 53* 39   ANIONGAP 13 8   ESTGFRAFRICA 58.0* 42.8*   EGFRNONAA 50.2* 37.0*    and CBC   Recent Labs   Lab 10/22/20  0853 10/23/20  0442   WBC 15.27* 10.29   HGB 13.4* 11.4*   HCT 43.6 37.5*    165       Pending Diagnostic Studies:     Procedure Component Value Units Date/Time    Drugs of Abuse Screen, Blood [[id number]] Collected: 10/22/20 1339    Order Status: Sent Lab Status: In process Updated: 10/22/20 1349    Specimen: Blood          Medications:  Reconciled Home Medications:      Medication List      CHANGE how you take these medications    esomeprazole 40 MG capsule  Commonly known as: NEXIUM  TAKE 1 CAPSULE BY MOUTH EVERY DAY  What changed: Another medication with the same name was removed. Continue taking this medication, and follow the directions you see here.     oxyCODONE 20 mg Tab immediate release tablet  Commonly known as: ROXICODONE  Take 20 mg by mouth 4 (four) times daily as needed for Pain.  What changed: Another medication with the same name was removed. Continue taking this medication, and follow the directions you see here.     venlafaxine 150 MG Cp24  Commonly known as: EFFEXOR-XR  TAKE 1 CAPSULE BY MOUTH EVERY DAY  What changed: when to take this        CONTINUE taking these medications    aspirin 325 MG tablet  Take 325 mg by mouth once daily.     atorvastatin 20 MG tablet  Commonly known as: LIPITOR  Take 20 mg by mouth once daily.     b complex vitamins tablet  Take 1 tablet by mouth once daily.      clopidogreL 75 mg tablet  Commonly known as: PLAVIX  Take 75 mg by mouth once daily.     diclofenac sodium 1 % Gel  Commonly known as: VOLTAREN  Apply 2 g topically 2 (two) times daily.     fluticasone propionate 50 mcg/actuation nasal spray  Commonly known as: FLONASE  1 spray by Each Nostril route 2 (two) times a day.     gabapentin 300 MG capsule  Commonly known as: NEURONTIN  Take 600 mg by mouth every evening.     hydrALAZINE 50 MG tablet  Commonly known as: APRESOLINE  Take 50 mg by mouth 2 (two) times daily.     LORazepam 1 MG tablet  Commonly known as: ATIVAN  Take 1 mg by mouth every 8 (eight) hours as needed.     MAGNESIUM ORAL  Take 500 mg by mouth once daily.     metoprolol succinate 100 MG 24 hr tablet  Commonly known as: TOPROL-XL  Take 100 mg by mouth once daily.     multivitamin tablet  Commonly known as: THERAGRAN  Take 1 tablet by mouth once daily.     mupirocin 2 % ointment  Commonly known as: BACTROBAN  1 g by Nasal route 2 (two) times daily.     nitroGLYCERIN 0.4 MG SL tablet  Commonly known as: NITROSTAT  DISSOLE 1 TABLET UNDER TONGUE AS NEED FOR CHEST PAIN EVERY 5 MIN MAX 3 TABS GO TO ER     olmesartan-hydrochlorothiazide 40-25 mg per tablet  Commonly known as: BENICAR HCT  Take 1 tablet by mouth every evening.     QUEtiapine 150 mg Tb24  Commonly known as: SEROQUEL XR  TAKE 1 TABLET BY MOUTH AT BEDTIME     testosterone cypionate 200 mg/mL injection  Commonly known as: DEPOTESTOTERONE CYPIONATE  Inject 1 mL (200 mg total) into the muscle every 28 days. for 10 doses     traMADoL 50 mg tablet  Commonly known as: ULTRAM  Take 50 mg by mouth every 8 (eight) hours as needed for Pain.     VISION ORAL  Take 1 tablet by mouth once daily.     XTAMPZA ER 13.5 mg Cspt  Generic drug: oxyCODONE myristate  Take 1 capsule by mouth every 12 (twelve) hours.     zinc gluconate 50 mg tablet  Take 50 mg by mouth once daily.        STOP taking these medications    albuterol 90 mcg/actuation inhaler  Commonly  known as: PROVENTIL/VENTOLIN HFA     Lactobacillus rhamnosus GG 10 billion cell capsule  Commonly known as: CULTURELLE            Indwelling Lines/Drains at time of discharge:   Lines/Drains/Airways     None                 Time spent on the discharge of patient: 31 minutes  Patient was seen and examined on the date of discharge and determined to be suitable for discharge.         Lavelle Roberts MD  Department of Hospital Medicine  [facility]

## 2020-10-23 NOTE — PLAN OF CARE
Assessment completed at bedside with Pt.  He is , has 2 adult children, and has not addressed POA / AD.  His PCP is Dr. Kleber Worthy III, and he uses [facility] on [address]  He confirmed insurance, and has PHN.  Plan for discharge at this time is home, with assistance from wife as needed.  Case Management to continue to follow.    Carol Pena [facility]  Case Management  Ext. [phone]     10/23/20 1437   Discharge Assessment   Assessment Type Discharge Planning Assessment   Confirmed/corrected address and phone number on facesheet? Yes   Assessment information obtained from? Patient;Caregiver;Medical Record   Communicated expected length of stay with patient/caregiver yes   Prior to hospitilization cognitive status: Alert/Oriented   Prior to hospitalization functional status: Independent   Current cognitive status: Alert/Oriented   Current Functional Status: Independent   Facility Arrived From: Home   Lives With spouse   Able to Return to Prior Arrangements yes   Is patient able to care for self after discharge? Yes   Who are your caregiver(s) and their phone number(s)? Marleny Treadwell, wife ([phone] / [phone])  Warner Treadwell, son ([phone])   Patient's perception of discharge disposition home or selfcare   Readmission Within the Last 30 Days no previous admission in last 30 days   Patient currently being followed by outpatient case management? No   Patient currently receives any other outside agency services? No   Equipment Currently Used at Home none   Do you have any problems affording any of your prescribed medications? No   Is the patient taking medications as prescribed? yes   Does the patient have transportation home? Yes   Transportation Anticipated car, drives self;family or friend will provide   Dialysis Name and Scheduled days N/A   Does the patient receive services at the [facility]? No   Discharge Plan A Home   Discharge Plan B Home   DME Needed Upon Discharge  none    Patient/Family in Agreement with Plan yes

## 2020-10-23 NOTE — PLAN OF CARE
10/23/20 0853   Patient Assessment/Suction   Level of Consciousness (AVPU) alert   Respiratory Effort Unlabored   PRE-TX-O2   O2 Device (Oxygen Therapy) room air   SpO2 (!) 94 %   Pulse Oximetry Type Intermittent   $ Pulse Oximetry - Multiple Charge Pulse Oximetry - Multiple   Pulse 73   Resp 20   Respiratory Evaluation   $ Care Plan Tech Time 15 min

## 2020-10-23 NOTE — CONSULTS
[facility]  Neurology  Consult Note    Patient Name: Orlando Treadwell  MRN: [id number]  Admission Date: 10/22/2020  Hospital Length of Stay: 1 days  Code Status: Full Code   Attending Provider: No att. providers found   Consulting Provider: Dr ALIX Mandujano  Primary Care Physician: Kleber Worthy III, MD  Principal Problem:Encephalopathy, metabolic    Consults  Subjective:     Principal Problem:Encephalopathy, metabolic     Chief Complaint:        Chief Complaint   Patient presents with    Altered Mental Status       pt lost bladder and bowel control, trouble standing up straight, lethargic and confused as to where he is going in his house.         HPI per EMR: 70 year old male brought to the ED by his wife due to encephalopathy.  Patient has Hx of MRSA soft tissue infection and cholecystitis requiring cholecystectomy 1/2020.  He is on chronic opioids for chronic pain and is managed with an outpatient Pain MD. History provided by his wife as he has no memory of events. Patient went to bed last night seemingly in his usual state of health.  His wife reports they normally sleep separately and he sleeps in a recliner in the living room.  She awoke to him trying to get in bed with her.  She thought he was dreaming and thus directed him back to his recliner.  He was mumbling and told her he was trick or treating.  He went back into the living room and she found that he had urinated down the madera in his attempt to get to the rest room.  She also found that he had defecated on himself.  He tells me he had the sudden urge to urinate and could not get to the rest room in time. He does not recall defecating.  He reports some dysuria this AM.  Denies fevers or chills at home.  Denies cough, SOB, abdominal pain,loose stool, other urinary symptoms.  No recent rashes.  Denies headache, facial droop, focal weakness or sensation change. He is answering questions appropriately but his wife tells me he is not at his usual  "talkative baseline: "You usually cannot shut him up."  Patient had change in pain medication approximately 2 weeks ago but no recent medication changes.  No new medication.  Denies taking any OTC substances and has been taking his medication as directed.      Neurological Consult:  Patient seen in exam with Dr. Harlan Mandujano.  Wife at bedside.  Patient is alert and oriented.  He is somewhat remembers the episode confusion last night.  He reports he did start taking lorazepam as needed and it could have triggered this event.  Patient does take multiple medications to control his pain that could interact.  Patient and wife both report that he is back to normal.  He denies a history of stroke, or history of seizures.  No focal deficits.  Neuro consult for abnormal MRI.  Which showed some orbital varix.  Patient denies any vision problems her chronic headaches.  Past Medical History:   Diagnosis Date    Arthritis     Asbestos exposure     SOB     Back pain     Bulging discs     lumbar    Hypertension     Kidney stone     Prostatitis     Sepsis due to methicillin resistant Staphylococcus aureus (MRSA) 06/2019    Due to large right upper arm abscess    Wears glasses        Past Surgical History:   Procedure Laterality Date    APPENDECTOMY      HAND SURGERY      rt hand    INCISION AND DRAINAGE Bilateral 06/2019    Right upper arm and left upper arm abscesses    JOINT REPLACEMENT      right knee    KNEE LIGAMENT RECONSTRUCTION      left knee    LAPAROSCOPIC CHOLECYSTECTOMY N/A 1/17/2020    Procedure: CHOLECYSTECTOMY, LAPAROSCOPIC;  Surgeon: Edgard Hill III, MD;  Location: [facility];  Service: General;  Laterality: N/A;    LITHOTRIPSY      TONSILLECTOMY, ADENOIDECTOMY, BILATERAL MYRINGOTOMY AND TUBES      TOTAL KNEE ARTHROPLASTY  1971    rt knee    URETERAL STENT PLACEMENT         Review of patient's allergies indicates:   Allergen Reactions    Codeine     Morphine Nausea Only     Ok with nausea med. "       Current Neurological Medications:    No current facility-administered medications on file prior to encounter.      Current Outpatient Medications on File Prior to Encounter   Medication Sig    atorvastatin (LIPITOR) 20 MG tablet Take 20 mg by mouth once daily.    b complex vitamins tablet Take 1 tablet by mouth once daily.    beta-carotene,A,-vits C,E/mins (VISION ORAL) Take 1 tablet by mouth once daily.    clopidogreL (PLAVIX) 75 mg tablet Take 75 mg by mouth once daily.    diclofenac sodium 1 % Gel Apply 2 g topically 2 (two) times daily.    gabapentin (NEURONTIN) 300 MG capsule Take 600 mg by mouth every evening.     hydrALAZINE (APRESOLINE) 50 MG tablet Take 50 mg by mouth 2 (two) times daily.    LORazepam (ATIVAN) 1 MG tablet Take 1 mg by mouth every 8 (eight) hours as needed.     MAGNESIUM ORAL Take 500 mg by mouth once daily.    metoprolol succinate (TOPROL-XL) 100 MG 24 hr tablet Take 100 mg by mouth once daily.     multivitamin (THERAGRAN) tablet Take 1 tablet by mouth once daily.    olmesartan-hydrochlorothiazide (BENICAR HCT) 40-25 mg per tablet Take 1 tablet by mouth every evening.     oxyCODONE (ROXICODONE) 20 mg Tab immediate release tablet Take 20 mg by mouth 4 (four) times daily as needed for Pain.    oxyCODONE myristate (XTAMPZA ER) 13.5 mg CSpT Take 1 capsule by mouth every 12 (twelve) hours.    QUEtiapine (SEROQUEL XR) 150 mg Tb24 TAKE 1 TABLET BY MOUTH AT BEDTIME (Patient taking differently: Take 150 mg by mouth every evening. )    venlafaxine (EFFEXOR-XR) 150 MG Cp24 TAKE 1 CAPSULE BY MOUTH EVERY DAY (Patient taking differently: Take 150 mg by mouth every evening. )    zinc gluconate 50 mg tablet Take 50 mg by mouth once daily.    albuterol (PROVENTIL/VENTOLIN HFA) 90 mcg/actuation inhaler INL 2 PFS PO Q 6 H PRF SOB OR WHZ    aspirin 325 MG tablet Take 325 mg by mouth once daily.    esomeprazole (NEXIUM) 40 MG capsule TAKE 1 CAPSULE BY MOUTH EVERY DAY    esomeprazole  (NEXIUM) 40 MG capsule TAKE 1 CAPSULE BY MOUTH EVERY DAY (Patient not taking: Reported on 7/20/2020)    fluticasone propionate (FLONASE) 50 mcg/actuation nasal spray 1 spray by Each Nostril route 2 (two) times a day.     Lactobacillus rhamnosus GG (CULTURELLE) 10 billion cell capsule Take 2 capsules by mouth once daily.     mupirocin (BACTROBAN) 2 % ointment 1 g by Nasal route 2 (two) times daily.     nitroGLYCERIN (NITROSTAT) 0.4 MG SL tablet DISSOLE 1 TABLET UNDER TONGUE AS NEED FOR CHEST PAIN EVERY 5 MIN MAX 3 TABS GO TO ER    oxyCODONE (ROXICODONE) 15 MG Tab Take 15 mg by mouth 4 (four) times daily as needed for Pain.     testosterone cypionate (DEPOTESTOTERONE CYPIONATE) 200 mg/mL injection Inject 1 mL (200 mg total) into the muscle every 28 days. for 10 doses    tramadol (ULTRAM) 50 mg tablet Take 50 mg by mouth every 8 (eight) hours as needed for Pain.       Family History     Problem Relation (Age of Onset)    Cancer Mother    Heart disease Father    Stroke Father        Tobacco Use    Smoking status: Never Smoker    Smokeless tobacco: Never Used   Substance and Sexual Activity    Alcohol use: No    Drug use: No    Sexual activity: Yes     Partners: Female     Review of Systems   Neurological: Negative for headaches.   Psychiatric/Behavioral: Positive for confusion.        Resolved confusion   All other systems reviewed and are negative.    Objective:     Vital Signs (Most Recent):  Temp: 97.6 °F (36.4 °C) (10/23/20 0737)  Pulse: 62 (10/23/20 0737)  Resp: 20 (10/23/20 0737)  BP: 95/60 (10/23/20 0737)  SpO2: 95 % (10/23/20 0737) Vital Signs (24h Range):  Temp:  [97.6 °F (36.4 °C)-98.6 °F (37 °C)] 97.6 °F (36.4 °C)  Pulse:  [] 62  Resp:  [14-22] 20  SpO2:  [92 %-99 %] 95 %  BP: ()/(51-81) 95/60     Weight: 99.8 kg (220 lb 0.3 oz)  Body mass index is 34.46 kg/m².    Physical Exam  HENT:      Head: Normocephalic.      Nose: Nose normal.      Mouth/Throat:      Mouth: Mucous membranes are  moist.   Eyes:      Pupils: Pupils are equal, round, and reactive to light.   Neck:      Musculoskeletal: Normal range of motion.   Cardiovascular:      Rate and Rhythm: Normal rate.   Pulmonary:      Effort: Pulmonary effort is normal.   Abdominal:      Palpations: Abdomen is soft.   Musculoskeletal: Normal range of motion.   Skin:     General: Skin is warm and dry.      Capillary Refill: Capillary refill takes less than 2 seconds.   Neurological:      General: No focal deficit present.      Mental Status: He is alert and oriented to person, place, and time.      Coordination: Finger-Nose-Finger Test normal.      Deep Tendon Reflexes: Strength normal.   Psychiatric:         Mood and Affect: Mood normal.         Speech: Speech normal.         Behavior: Behavior normal.         NEUROLOGICAL EXAMINATION:     MENTAL STATUS   Oriented to person, place, and time.   Follows 1 step commands.   Attention: normal. Concentration: normal.   Speech: speech is normal   Level of consciousness: alert  Able to repeat.     CRANIAL NERVES   Cranial nerves II through XII intact.     CN III, IV, VI   Pupils are equal, round, and reactive to light.    MOTOR EXAM     Strength   Strength 5/5 throughout.     SENSORY EXAM   Light touch normal.     GAIT AND COORDINATION      Coordination   Finger to nose coordination: normal    Tremor   Resting tremor: absent       lori       Significant Labs:   Lab Results   Component Value Date    WBC 10.29 10/23/2020    HGB 11.4 (L) 10/23/2020    HCT 37.5 (L) 10/23/2020    MCV 87 10/23/2020     10/23/2020       CMP  Sodium   Date Value Ref Range Status   10/23/2020 136 136 - 145 mmol/L Final   06/19/2019 138 134 - 144 mmol/L      Potassium   Date Value Ref Range Status   10/23/2020 4.0 3.5 - 5.1 mmol/L Final     Chloride   Date Value Ref Range Status   10/23/2020 101 95 - 110 mmol/L Final   06/19/2019 101 98 - 110 mmol/L      CO2   Date Value Ref Range Status   10/23/2020 27 23 - 29 mmol/L Final      Glucose   Date Value Ref Range Status   10/23/2020 102 70 - 110 mg/dL Final   06/19/2019 123 (H) 70 - 99 mg/dL      BUN, Bld   Date Value Ref Range Status   10/23/2020 28 (H) 8 - 23 mg/dL Final     Creatinine   Date Value Ref Range Status   10/23/2020 1.8 (H) 0.5 - 1.4 mg/dL Final   06/19/2019 1.10 0.60 - 1.40 mg/dL    06/14/2013 1.2 0.5 - 1.4 mg/dL Final     Calcium   Date Value Ref Range Status   10/23/2020 8.3 (L) 8.7 - 10.5 mg/dL Final   06/14/2013 10.2 8.7 - 10.5 mg/dL Final     Total Protein   Date Value Ref Range Status   10/23/2020 6.8 6.0 - 8.4 g/dL Final     Albumin   Date Value Ref Range Status   10/23/2020 3.3 (L) 3.5 - 5.2 g/dL Final   06/19/2019 3.8 3.1 - 4.7 g/dL      Total Bilirubin   Date Value Ref Range Status   10/23/2020 1.0 0.1 - 1.0 mg/dL Final     Comment:     For infants and newborns, interpretation of results should be based  on gestational age, weight and in agreement with clinical  observations.  Premature Infant recommended reference ranges:  Up to 24 hours.............<8.0 mg/dL  Up to 48 hours............<12.0 mg/dL  3-5 days..................<15.0 mg/dL  6-29 days.................<15.0 mg/dL       Alkaline Phosphatase   Date Value Ref Range Status   10/23/2020 42 (L) 55 - 135 U/L Final     AST   Date Value Ref Range Status   10/23/2020 29 10 - 40 U/L Final     ALT   Date Value Ref Range Status   10/23/2020 39 10 - 44 U/L Final     Anion Gap   Date Value Ref Range Status   10/23/2020 8 8 - 16 mmol/L Final   06/14/2013 10 5 - 15 meq/L Final     eGFR if    Date Value Ref Range Status   10/23/2020 42.8 (A) >60 mL/min/1.73 m^2 Final     eGFR if non    Date Value Ref Range Status   10/23/2020 37.0 (A) >60 mL/min/1.73 m^2 Final     Comment:     Calculation used to obtain the estimated glomerular filtration  rate (eGFR) is the CKD-EPI equation.            Significant Imaging: MRI Brain Without Contrast  MRI of the brain without contrast    HISTORY: Altered  mental status.    Multiplanar noncontrast imaging is performed.    There are no findings of acute hemorrhage or infarction.    Demonstrated on gradient echo sequence, small foci of hemosiderin  staining are observed within the right cerebellar hemisphere and  subcortical white matter of the right frontal lobe. These are  compatible with foci of remote petechial microhemorrhage and can be  seen in association with amyloid angiopathy, underlying cavernous  angioma or in association with remote diffuse axonal injury amongst  other etiologies.    There is no evidence of intra-axial mass, mass effect or shift of the  midline mild diffuse prominence of the ventricular system and cortical  sulci as a reflection of age-related involutional changes. There are  no extra-axial fluid collections.    Appropriate flow voids are observed within the major blood vessels.  The skull base and craniocervical junction appear unremarkable. There  are small amounts of fluid in the left mastoid air cells.    There is a small rounded mass within the medial aspect of the left  orbit lying between the medial rectus muscle and globe measuring 5 mm  which may reflect a small orbital varix. Correlation with pre and  postcontrast orbital MRI is recommended.    IMPRESSION:    Small foci of hemosiderin staining within the right frontal  subcortical white matter and right cerebellar hemisphere compatible  with foci of remote petechial microhemorrhages. See above.    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 Correlation with pre and postcontrast orbital MRI is  recommended.    Electronically Signed by Makayla Marcos M.D. on 10/22/2020 4:09 PM  X-Ray Chest AP Portable  Reason: Altered mental status disoriented    FINDINGS:  Portable chest at 936 compared with 5/22/2020 shows normal  cardiomediastinal silhouette.    Previous retrocardiac opacity has resolved. Lungs are now clear.  Pulmonary vasculature is  normal. No acute osseous abnormality.    IMPRESSION:  Negative chest with resolution of prior retrocardiac opacity.    Electronically Signed by Will Farias M.D. on 10/22/2020 9:40 AM  CT Head Without Contrast  CT HEAD WITHOUT CONTRAST    CMS MANDATED QUALITY DATA - CT RADIATION  436    All CT scans at this facility utilize dose modulation, iterative  reconstruction, and/or weight based dosing when appropriate to reduce  radiation dose to as low as reasonably achievable    Clinical data: Altered mental status.    FINDINGS: Noninfusion images were obtained from the skull base to the  vertex. There is no intracranial mass, hemorrhage, or midline shift.  Ventricles and sulci are mildly prominent. There are no pathologic  extra-axial fluid collections.    There is no evidence of cortical ischemic change. Changes of mild  chronic microvascular white matter disease are noted. Cerebellum and  brainstem are normal. The calvarium is intact.    IMPRESSION:    1. No acute intracranial abnormalities. Chronic findings as discussed  above.    Electronically Signed by Denver Bojorquez M.D. on 10/22/2020 9:35 AM        Assessment and Plan:      Encephalopathy- Resolved  -MRI brain: no acute abnormality, no acute hemorrhage or infarction:  Details above.  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  Follow-up MRI recommended.  -CT head:  Negative acute      Plan: Pt at baseline.  Recommend pharmacological evaluation due to his multiple medications for pain control that could be interacting.  Would like to to his follow-up MRI outpatient, also his GFR is 37 his creatinine is 1.8 so we will hold off on the contrast.  Patient to follow-up next week in the clinic.     [phone] within 3 days from discharge.     Stroke education was provided including stroke risk factors modification and any acute neurological changes including weakness,  confusion, visual changes to come straight to the ER.     All questions were answered.                                  Active Diagnoses:    Diagnosis Date Noted POA    PRINCIPAL PROBLEM:  Encephalopathy, metabolic [G93.41] 10/22/2020 Yes    Altered mental status [R41.82] 10/22/2020 Yes    Lactic acidosis [E87.2] 10/22/2020 Yes    Severe sepsis [A41.9, R65.20] 10/22/2020 Yes    Anxiety [F41.9] 07/26/2020 Yes    Depression [F32.9] 07/26/2020 Yes    Coronary artery disease [I25.10] 05/27/2020 Yes    Stented coronary artery [Z95.5] 05/27/2020 Not Applicable    Chronic narcotic use [F11.90] 01/08/2020 Yes    Hypertension [I10] 01/08/2020 Yes    DDD (degenerative disc disease), lumbar [M51.36] 07/15/2014 Yes      Problems Resolved During this Admission:       VTE Risk Mitigation (From admission, onward)         Ordered     IP VTE HIGH RISK PATIENT  Once      10/22/20 1317     Place sequential compression device  Until discontinued      10/22/20 1317                Thank you for your consult.    Natalia Silverman NP  Neurology  [facility]

## 2020-10-23 NOTE — PLAN OF CARE
10/23/20 1446   Final Note   Assessment Type Final Discharge Note   Anticipated Discharge Disposition Home     Discharge orders reviewed.  Patient to discharge home this date with no needs.    Carol Pena LCSW  Case Management  Ext. [phone]

## 2020-10-23 NOTE — HOSPITAL COURSE
"Patient admitted with encephalopathy.  See HPI for details. Workup for Sepsis and Neurological was started.  Cultures have been negative.  UA and CXR did not reveal infection.  No soft tissue infection.  No loose stool or other exam finding to suggest an etiology.  No further temperatures than on presentation.  Lactic acid normalized with IVFs.  I have not found an infectious source and patient is currently back to his baseline.  Neurological workup with MRI done.  Head CT negative. MRI done and revealed "small foci of hemosiderin staining within the right frontal subcortical white matter and right cerebellar hemisphere compatible with foci of remote petechial microhemorrhages. 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 I discussed with patient and he is requesting discharge as he is already having issues with the hospital regarding prior bills and does not want to incur further bills.  I do think he is safe to discharge home as my initial plan was to stop antibiotics and monitor off to see if a process declares itself.  He and his wife assure me they can do this at home and promise to return or call their PCP immediately   I discussed with Dr. Harlan Mandujano who was okay with patient following up in office.  He did stress the the importance of patient following up with an Ophthalmologist.  This has been stressed to patient and a copy of the MRI report to be given to him.  Examined on day of discharge and No focal deficit, back to his cognitive baseline.  Return precautions given.  "

## 2020-10-23 NOTE — PT/OT/SLP EVAL
Physical Therapy Evaluation and Discharge Note    Patient Name:  Orlando Treadwell   MRN:  [id number]    Recommendations:     Discharge Recommendations:  home(pt is at modified independent baseline for functional mobility for extra time due to chronic back pain. DC PT)   Discharge Equipment Recommendations: none   Barriers to discharge: None    Assessment:     Orlando Treadwell is a 70 y.o. male admitted with a medical diagnosis of Encephalopathy, metabolic. .  At this time, patient is functioning at their prior level of function and does not require further acute PT services.     Recent Surgery: * No surgery found *      Plan:     During this hospitalization, patient does not require further acute PT services.  Please re-consult if situation changes.      Subjective     Chief Complaint: back pain  Patient/Family Comments/goals: to go home   Pain/Comfort:  · Pain Rating 1: 6/10(increased with ambulation--this is chronic.)  · Location 1: back  · Pain Addressed 1: Nurse notified  · Pain Rating Post-Intervention 1: 7/10    Patients cultural, spiritual, Anabaptist conflicts given the current situation:      Living Environment:  Pt lives with wife, sleeps in recliner, drives, gardens, cooks.   Prior to admission, patients level of function was independent.  Equipment used at home: none.  DME owned (not currently used): none.  Upon discharge, patient will have assistance from wife.    Objective:     Communicated with rn prior to session.  Patient found supine with telemetry, SCD, bed alarm, peripheral IV upon PT entry to room.    General Precautions: Standard, fall   Orthopedic Precautions:N/A   Braces: N/A     Exams:  · Cognitive Exam:  Patient is oriented to Person, Place, Time and Situation  · Gross Motor Coordination:  wfl except heel shin unable to perform due to pain in back   · RUE Strength: WFL at shoulder height; poor AROM due to prior MRSA infection  · LUE Strength: WFL  · RLE ROM: WFL  · RLE Strength: WFL  · LLE ROM:  WFL  · LLE Strength: WFL    Functional Mobility:  · Bed Mobility:     · Rolling Left:  modified independence  · Scooting: modified independence  · Supine to Sit: modified independence  · Sit to Supine: modified independence  · Transfers:     · Sit to Stand:  supervision with no AD  · Gait: pt able to amb  150 ft no AD; mild lob x1 but pt able to recover, pt reports increased back pain and not on current pain management meds. Rn aware and notified. Wife reports pt appears back to his baseline mentally and functionally.     AM-PAC 6 CLICK MOBILITY  Total Score:23       Therapeutic Activities and Exercises:   education, fall prevention, poc, dc planning.     AM-PAC 6 CLICK MOBILITY  Total Score:23     Patient left HOB elevated with all lines intact, call button in reach, bed alarm on, rn notified and wife present.    GOALS:   Multidisciplinary Problems     Physical Therapy Goals     Not on file                History:     Past Medical History:   Diagnosis Date    Arthritis     Asbestos exposure     SOB     Back pain     Bulging discs     lumbar    Hypertension     Kidney stone     Prostatitis     Sepsis due to methicillin resistant Staphylococcus aureus (MRSA) 06/2019    Due to large right upper arm abscess    Wears glasses        Past Surgical History:   Procedure Laterality Date    APPENDECTOMY      HAND SURGERY      rt hand    INCISION AND DRAINAGE Bilateral 06/2019    Right upper arm and left upper arm abscesses    JOINT REPLACEMENT      right knee    KNEE LIGAMENT RECONSTRUCTION      left knee    LAPAROSCOPIC CHOLECYSTECTOMY N/A 1/17/2020    Procedure: CHOLECYSTECTOMY, LAPAROSCOPIC;  Surgeon: Edgard Hill III, MD;  Location[facility];  Service: General;  Laterality: N/A;    LITHOTRIPSY      TONSILLECTOMY, ADENOIDECTOMY, BILATERAL MYRINGOTOMY AND TUBES      TOTAL KNEE ARTHROPLASTY  1971    rt knee    URETERAL STENT PLACEMENT         Time Tracking:     PT Received On: 10/23/20  PT Start Time:  0930     PT Stop Time: 0950  PT Total Time (min): 20 min     Billable Minutes: Evaluation 6 and Therapeutic Activity 14      Moriah Thakkar, PT  10/23/2020

## 2020-10-27 LAB
BACTERIA BLD CULT: NORMAL
BACTERIA BLD CULT: NORMAL

## 2020-10-29 ENCOUNTER — OFFICE VISIT (OUTPATIENT)
Dept: FAMILY MEDICINE | Facility: CLINIC | Age: 70
End: 2020-10-29
Payer: MEDICARE

## 2020-10-29 VITALS
OXYGEN SATURATION: 97 % | SYSTOLIC BLOOD PRESSURE: 132 MMHG | WEIGHT: 235 LBS | TEMPERATURE: 97 F | DIASTOLIC BLOOD PRESSURE: 70 MMHG | HEART RATE: 73 BPM | HEIGHT: 67 IN | BODY MASS INDEX: 36.88 KG/M2

## 2020-10-29 DIAGNOSIS — N18.32 STAGE 3B CHRONIC KIDNEY DISEASE: ICD-10-CM

## 2020-10-29 DIAGNOSIS — Z79.899 CHRONIC PRESCRIPTION BENZODIAZEPINE USE: ICD-10-CM

## 2020-10-29 DIAGNOSIS — R41.0 DELIRIUM: Primary | ICD-10-CM

## 2020-10-29 DIAGNOSIS — F11.90 CHRONIC NARCOTIC USE: ICD-10-CM

## 2020-10-29 PROCEDURE — 99214 OFFICE O/P EST MOD 30 MIN: CPT | Mod: S$GLB,,, | Performed by: FAMILY MEDICINE

## 2020-10-29 PROCEDURE — 99214 PR OFFICE/OUTPT VISIT, EST, LEVL IV, 30-39 MIN: ICD-10-PCS | Mod: S$GLB,,, | Performed by: FAMILY MEDICINE

## 2020-10-29 RX ORDER — QUETIAPINE FUMARATE 100 MG/1
100 TABLET, FILM COATED ORAL NIGHTLY
Qty: 90 TABLET | Refills: 1 | Status: SHIPPED | OUTPATIENT
Start: 2020-10-29 | End: 2021-04-13

## 2020-10-29 NOTE — PROGRESS NOTES
Addition of care visit.  Hospitalized last week at [facility] for 2 days.  Says that she awakened during the night with him coming in bedroom confused.  Getting in the wound bed.  Stumbling urinating on the floor and having fecal incontinence.  Taken to the emergency room kept overnight seen by Neurology felt to have encephalopathy.  He is still slightly confused.  Wife says that he told her refilled the dog this morning when in fact he had not.  He has had some medications changed recently by his pain doctor Dr. Reyez.  Discontinue tramadol and start him on a a long-acting oxycodone twice a day in addition to 20 mg 3 to 4 times a day of short-acting oxycodone.  Tramadol was stopped.  He has also been getting his Seroquel  at night.  But he has been actually breaking it in half and only using 75 at a time.  Probably destroying the release mechanism.  Also he has been getting Ativan from his cardiologist at a dose of 1 mg up to t.i.d..  Denies taking that much better review of  site shows that he filled 30 pills and then filled another 90 within 6 or 7 days.  He was having a good bit of anxiety and probably took Ativan on the night of admission in addition to his pain medications.  He is to see a psychiatrist that works along with his neurologist.  Hemoglobin 11.4 GFR is at 37 TSH 1.79.    Physical examination overweight male no acute distress alert oriented.  Chest clear to auscultation heart regular rate rhythm without gallop or rub.  Extremities are without edema.    Impression delirium.  Chronic pain medication use.  Sedative hypnotic use.  Chronic kidney disease stage IIIB.    Plan recommend he quit using the Ativan especially with the narcotic analgesics.  Will get an ammonia level.  Changes Seroquel to 100 mg HS prescription for 90 with 1 refill.  Stop breaking the extended release in half.  See Psychiatry as planned.

## 2020-11-07 LAB — AMMONIA PLAS-SCNC: 68 UMOL/L

## 2020-11-09 ENCOUNTER — TELEPHONE (OUTPATIENT)
Dept: FAMILY MEDICINE | Facility: CLINIC | Age: 70
End: 2020-11-09

## 2020-11-09 NOTE — TELEPHONE ENCOUNTER
----- Message from Bogdan Payton sent at 11/9/2020 10:39 AM CST -----  Contact: Self  Type:  Sooner Apoointment Request    Caller is requesting a sooner appointment.  Caller declined first available appointment listed below.  Caller will not accept being placed on the waitlist and is requesting a message be sent to doctor.    Name of Caller:  Patient  When is the first available appointment?  11/10 11:10  Symptoms:  Anxiety, high bp  Best Call Back Number:  [phone] (home)     Additional Information:  Patient declined speaking to On-call nurse, would like to be seen asap today.       APT TOMORROW

## 2020-11-09 NOTE — TELEPHONE ENCOUNTER
----- Message from Kleber Worthy III, MD sent at 11/7/2020  9:54 PM CST -----  FOLLOW-UP AS RECOMMENDED upper limit of normal.  But not high.

## 2020-11-10 ENCOUNTER — OFFICE VISIT (OUTPATIENT)
Dept: FAMILY MEDICINE | Facility: CLINIC | Age: 70
End: 2020-11-10
Payer: MEDICARE

## 2020-11-10 ENCOUNTER — NURSE TRIAGE (OUTPATIENT)
Dept: ADMINISTRATIVE | Facility: CLINIC | Age: 70
End: 2020-11-10

## 2020-11-10 VITALS
OXYGEN SATURATION: 98 % | SYSTOLIC BLOOD PRESSURE: 132 MMHG | WEIGHT: 235 LBS | DIASTOLIC BLOOD PRESSURE: 80 MMHG | BODY MASS INDEX: 36.81 KG/M2 | HEART RATE: 119 BPM | TEMPERATURE: 97 F

## 2020-11-10 DIAGNOSIS — G89.29 OTHER CHRONIC PAIN: Primary | ICD-10-CM

## 2020-11-10 DIAGNOSIS — F41.9 ANXIETY: ICD-10-CM

## 2020-11-10 LAB
7AMINOCLONAZEPAM SERPL CFM-MCNC: NEGATIVE NG/ML
ALPRAZ SPEC-MCNC: NEGATIVE NG/ML
AMPHETAMINES SERPL QL SCN: NEGATIVE NG/ML
BARBITURATES SERPL QL SCN: NEGATIVE UG/ML
BENZODIAZ SERPL QL SCN: ABNORMAL NG/ML
BENZODIAZ SERPL QL SCN: NEGATIVE NG/ML
BENZODIAZ SPEC QL: POSITIVE
CANNABINOIDS SERPL QL SCN: NEGATIVE NG/ML
CHLORDIAZEP SPEC-MCNC: NEGATIVE NG/ML
CLONAZEPAM SERPL CFM-MCNC: NEGATIVE NG/ML
DESALKYLFLURAZ SERPL CFM-MCNC: NEGATIVE NG/ML
DIAZEPAM SPEC-MCNC: NEGATIVE NG/ML
FLURAZEPAM SPEC-MCNC: NEGATIVE NG/ML
LORAZEPAM SPEC-MCNC: 37.4 NG/ML
METHADONE SERPL QL SCN: NEGATIVE NG/ML
MIDAZOLAM SPEC-MCNC: NEGATIVE NG/ML
NORCHLORDIAZEP SERPL-MCNC: NEGATIVE NG/ML
NORDIAZEPAM SPEC-MCNC: NEGATIVE NG/ML
OPIATES SERPL QL SCN: NEGATIVE NG/ML
OXAZEPAM SPEC-MCNC: NEGATIVE NG/ML
OXYCODONE SERPLBLD CFM-MCNC: 24.3 NG/ML
OXYCODONE+OXYMORPHONE SERPLBLD QL SCN: ABNORMAL NG/ML
OXYCODONES CONFIRMATION: POSITIVE
OXYMORPHONE SERPLBLD CFM-MCNC: NEGATIVE NG/ML
PCP SERPL QL SCN: NEGATIVE NG/ML
PROPOXYPH SERPL QL SCN: NEGATIVE NG/ML
TEMAZEPAM SPEC-MCNC: NEGATIVE NG/ML
TRIAZOLAM: NEGATIVE NG/ML

## 2020-11-10 PROCEDURE — 3008F PR BODY MASS INDEX (BMI) DOCUMENTED: ICD-10-PCS | Mod: CPTII,S$GLB,, | Performed by: FAMILY MEDICINE

## 2020-11-10 PROCEDURE — 1159F MED LIST DOCD IN RCRD: CPT | Mod: S$GLB,,, | Performed by: FAMILY MEDICINE

## 2020-11-10 PROCEDURE — 1101F PT FALLS ASSESS-DOCD LE1/YR: CPT | Mod: CPTII,S$GLB,, | Performed by: FAMILY MEDICINE

## 2020-11-10 PROCEDURE — 1126F PR PAIN SEVERITY QUANTIFIED, NO PAIN PRESENT: ICD-10-PCS | Mod: S$GLB,,, | Performed by: FAMILY MEDICINE

## 2020-11-10 PROCEDURE — 1159F PR MEDICATION LIST DOCUMENTED IN MEDICAL RECORD: ICD-10-PCS | Mod: S$GLB,,, | Performed by: FAMILY MEDICINE

## 2020-11-10 PROCEDURE — 99213 PR OFFICE/OUTPT VISIT, EST, LEVL III, 20-29 MIN: ICD-10-PCS | Mod: S$GLB,,, | Performed by: FAMILY MEDICINE

## 2020-11-10 PROCEDURE — 99499 RISK ADDL DX/OHS AUDIT: ICD-10-PCS | Mod: S$GLB,,, | Performed by: FAMILY MEDICINE

## 2020-11-10 PROCEDURE — 99213 OFFICE O/P EST LOW 20 MIN: CPT | Mod: S$GLB,,, | Performed by: FAMILY MEDICINE

## 2020-11-10 PROCEDURE — 1126F AMNT PAIN NOTED NONE PRSNT: CPT | Mod: S$GLB,,, | Performed by: FAMILY MEDICINE

## 2020-11-10 PROCEDURE — 99499 UNLISTED E&M SERVICE: CPT | Mod: S$GLB,,, | Performed by: FAMILY MEDICINE

## 2020-11-10 PROCEDURE — 3288F FALL RISK ASSESSMENT DOCD: CPT | Mod: CPTII,S$GLB,, | Performed by: FAMILY MEDICINE

## 2020-11-10 PROCEDURE — 3008F BODY MASS INDEX DOCD: CPT | Mod: CPTII,S$GLB,, | Performed by: FAMILY MEDICINE

## 2020-11-10 PROCEDURE — 3288F PR FALLS RISK ASSESSMENT DOCUMENTED: ICD-10-PCS | Mod: CPTII,S$GLB,, | Performed by: FAMILY MEDICINE

## 2020-11-10 PROCEDURE — 1101F PR PT FALLS ASSESS DOC 0-1 FALLS W/OUT INJ PAST YR: ICD-10-PCS | Mod: CPTII,S$GLB,, | Performed by: FAMILY MEDICINE

## 2020-11-10 NOTE — TELEPHONE ENCOUNTER
Pt stated he is sick in his stomach. Pt is moaning in what appears to be pain and or discomfort. Pt can not concentrate or think struggles to spell his name.  Pt stated he can not think when asked what his bp was. Pt stated he was home alone and his wife was almost there. Pt instructed to call 911. Pt verbalized understanding.     Reason for Disposition   Sounds like a life-threatening emergency to the triager    Protocols used: HIGH BLOOD PRESSURE-A-OH

## 2020-11-10 NOTE — PROGRESS NOTES
Subjective:       Patient ID: Orlando Treadwell is a 70 y.o. male.    Chief Complaint: Follow-up    Mr. Treadwell is here today for anxiety and hypertension. Pt's wife states he mixed his Oxycodone 20 mg and Ativan 1 mg together in the same bottle, and he does not remember when he mixed them together. Two small white pills brought in together in 1 bottle, and both seem to be Ativan 1 mg. Pt states he is feeling terrible. He states he is unsure if he took his Oxycodone 20 mg this morning. Pt's wife states ambulance came to the house today and his blood pressure was high with systolic in 160s. EKG by EMS was ok, so he was instructed to take an Ativan. He did not to go to the ER. Pt's wife was unable to find any because they were in the Oxycodone bottle. She also states he becomes anxious daily, and he has an appointment with a psychiatrist (Dr. Chung) on Thursday. Pt's wife states he has 27 Ativan 1 mg left at home. She states she will manage his medications from now on. He recieved 120 Oxycodone on 11/7 and 60 Xtampza on 11/7. Pt unsure if he picked these up from [facility] front.     Review of Systems   Constitutional: Positive for diaphoresis.   HENT: Negative.    Respiratory: Negative.    Cardiovascular: Negative.    Musculoskeletal: Negative.    Psychiatric/Behavioral: The patient is nervous/anxious.          Objective:      Physical Exam  Constitutional:       Appearance: Normal appearance. He is diaphoretic.   HENT:      Head: Normocephalic and atraumatic.   Neck:      Musculoskeletal: Normal range of motion and neck supple.   Cardiovascular:      Rate and Rhythm: Normal rate and regular rhythm.      Pulses: Normal pulses.      Heart sounds: Normal heart sounds.   Pulmonary:      Breath sounds: Normal breath sounds.   Musculoskeletal: Normal range of motion.   Skin:     General: Skin is warm.   Neurological:      Mental Status: He is alert.         Assessment:       1. Other chronic pain    2. Anxiety       Plan:        ** he is probably having narcotic withdrawal.  We checked with the pharmacy he did  the prescriptions.  But does not know what he did with them.  He is to see Psychiatry as scheduled in couple of days.  Have recommended again that he stop the Ativan that was given to him by Cardiology.  And see psychiatry.  See psychiatry as scheduled. Recommended that he stops the Ativan with use of pain medications. Wife to manage his medications. Both medications were picked up on 11/7 from [facility], so he will have to call Dr. Arias.*

## 2020-12-08 ENCOUNTER — TELEPHONE (OUTPATIENT)
Dept: UROLOGY | Facility: CLINIC | Age: 70
End: 2020-12-08

## 2020-12-08 NOTE — TELEPHONE ENCOUNTER
BOBBYM informing pt of [place] contact number and names of Dr. Murillo and Dr. [place]     ----- Message from Holley Reddy sent at 12/8/2020 11:56 AM CST -----  Regarding: advise  Contact: Patient/[phone] (home)  Type: Needs Medical Advice  Who Called:  Patient/[phone] (home)       Additional Information: Needs to get a referral for a urologist in [place]. He has [facility]. Please call to advise.

## 2020-12-09 ENCOUNTER — PATIENT OUTREACH (OUTPATIENT)
Dept: ADMINISTRATIVE | Facility: HOSPITAL | Age: 70
End: 2020-12-09

## 2020-12-09 NOTE — PROGRESS NOTES
Patient on [facility] DM Non-compliance report.   Immunizations reviewed. [facility] reviewed. Care Everywhere reviewed.  [facility], [facility], and [facility] Provider Portal reviewed w/ no applicable results yielded.  Attempted to contact patient to discuss/schedule all outstanding  metrics.   LMOM for return call.   Portal message sent to patient.   Chart review completed.               GABBY

## 2020-12-18 ENCOUNTER — OFFICE VISIT (OUTPATIENT)
Dept: UROLOGY | Facility: CLINIC | Age: 70
End: 2020-12-18
Payer: MEDICARE

## 2020-12-18 VITALS
DIASTOLIC BLOOD PRESSURE: 82 MMHG | WEIGHT: 230.94 LBS | TEMPERATURE: 98 F | BODY MASS INDEX: 36.25 KG/M2 | HEART RATE: 76 BPM | HEIGHT: 67 IN | RESPIRATION RATE: 18 BRPM | SYSTOLIC BLOOD PRESSURE: 155 MMHG

## 2020-12-18 DIAGNOSIS — R39.9 LOWER URINARY TRACT SYMPTOMS (LUTS): Primary | ICD-10-CM

## 2020-12-18 DIAGNOSIS — R79.89 LOW TESTOSTERONE: ICD-10-CM

## 2020-12-18 DIAGNOSIS — Z12.5 SCREENING FOR PROSTATE CANCER: ICD-10-CM

## 2020-12-18 DIAGNOSIS — N20.0 NEPHROLITHIASIS: ICD-10-CM

## 2020-12-18 PROCEDURE — 99999 PR PBB SHADOW E&M-EST. PATIENT-LVL V: ICD-10-PCS | Mod: PBBFAC,,, | Performed by: UROLOGY

## 2020-12-18 PROCEDURE — 3288F FALL RISK ASSESSMENT DOCD: CPT | Mod: CPTII,S$GLB,, | Performed by: UROLOGY

## 2020-12-18 PROCEDURE — 1101F PR PT FALLS ASSESS DOC 0-1 FALLS W/OUT INJ PAST YR: ICD-10-PCS | Mod: CPTII,S$GLB,, | Performed by: UROLOGY

## 2020-12-18 PROCEDURE — 99999 PR PBB SHADOW E&M-EST. PATIENT-LVL V: CPT | Mod: PBBFAC,,, | Performed by: UROLOGY

## 2020-12-18 PROCEDURE — 3008F BODY MASS INDEX DOCD: CPT | Mod: CPTII,S$GLB,, | Performed by: UROLOGY

## 2020-12-18 PROCEDURE — 1126F AMNT PAIN NOTED NONE PRSNT: CPT | Mod: S$GLB,,, | Performed by: UROLOGY

## 2020-12-18 PROCEDURE — 3008F PR BODY MASS INDEX (BMI) DOCUMENTED: ICD-10-PCS | Mod: CPTII,S$GLB,, | Performed by: UROLOGY

## 2020-12-18 PROCEDURE — 99214 OFFICE O/P EST MOD 30 MIN: CPT | Mod: S$GLB,,, | Performed by: UROLOGY

## 2020-12-18 PROCEDURE — 99214 PR OFFICE/OUTPT VISIT, EST, LEVL IV, 30-39 MIN: ICD-10-PCS | Mod: S$GLB,,, | Performed by: UROLOGY

## 2020-12-18 PROCEDURE — 1159F MED LIST DOCD IN RCRD: CPT | Mod: S$GLB,,, | Performed by: UROLOGY

## 2020-12-18 PROCEDURE — 1159F PR MEDICATION LIST DOCUMENTED IN MEDICAL RECORD: ICD-10-PCS | Mod: S$GLB,,, | Performed by: UROLOGY

## 2020-12-18 PROCEDURE — 1126F PR PAIN SEVERITY QUANTIFIED, NO PAIN PRESENT: ICD-10-PCS | Mod: S$GLB,,, | Performed by: UROLOGY

## 2020-12-18 PROCEDURE — 3288F PR FALLS RISK ASSESSMENT DOCUMENTED: ICD-10-PCS | Mod: CPTII,S$GLB,, | Performed by: UROLOGY

## 2020-12-18 PROCEDURE — 1101F PT FALLS ASSESS-DOCD LE1/YR: CPT | Mod: CPTII,S$GLB,, | Performed by: UROLOGY

## 2020-12-18 RX ORDER — OLMESARTAN MEDOXOMIL AND HYDROCHLOROTHIAZIDE 40/25 40; 25 MG/1; MG/1
1 TABLET ORAL DAILY
COMMUNITY
Start: 2020-10-29

## 2020-12-18 RX ORDER — TRAZODONE HYDROCHLORIDE 100 MG/1
TABLET ORAL
COMMUNITY
Start: 2020-12-03 | End: 2021-07-15

## 2020-12-18 NOTE — PROGRESS NOTES
[facility]rology New Patient/H&P:    Orlando Treadwell is a 70 y.o. male who presents for lower urinary tract symptoms and low testosterone.    Patient previously managed by Dr. Stark for lower urinary tract symptoms and low testosterone. He has a history of nocturia x 1 - 2 that is not significantly bothersome and has been managed well conservatively.     Regarding his low testosterone, he has been managed well on Testosterone 200 mg every months.     Of note, his CT abd/pelvis on 1/16/20 revealed a 5 mm left distal ureteral stone. No imaging since. States he has had ESWL several years ago.     Denies any flank pain, gross hematuria, urinary tract infection,  trauma or history of  malignancy.       PSA  1.6  12/2/19  1.3  11/6/18  3.8  7/18/18  1.9  12/16/16  2.3  12/7/15  2.2  12/10/14    Testosterone  221  12/16/16  733  12/7/15  121  5/16/14    IPSS QoL   2 0 12/18/20    Past Medical History:   Diagnosis Date    Arthritis     Asbestos exposure     SOB     Back pain     Bulging discs     lumbar    Hypertension     Kidney stone     Prostatitis     Sepsis due to methicillin resistant Staphylococcus aureus (MRSA) 06/2019    Due to large right upper arm abscess    Wears glasses        Past Surgical History:   Procedure Laterality Date    APPENDECTOMY      HAND SURGERY      rt hand    INCISION AND DRAINAGE Bilateral 06/2019    Right upper arm and left upper arm abscesses    JOINT REPLACEMENT      right knee    KNEE LIGAMENT RECONSTRUCTION      left knee    LAPAROSCOPIC CHOLECYSTECTOMY N/A 1/17/2020    Procedure: CHOLECYSTECTOMY, LAPAROSCOPIC;  Surgeon: Edgard Hill III, MD;  Location: [facility];  Service: General;  Laterality: N/A;    LITHOTRIPSY      TONSILLECTOMY, ADENOIDECTOMY, BILATERAL MYRINGOTOMY AND TUBES      TOTAL KNEE ARTHROPLASTY  1971    rt knee    URETERAL STENT PLACEMENT         Family History   Problem Relation Age of Onset    Cancer Mother     Stroke Father      Heart disease Father     Collagen disease Neg Hx     Melanoma Neg Hx     Psoriasis Neg Hx     Lupus Neg Hx     Eczema Neg Hx        Social History     Socioeconomic History    Marital status:      Spouse name: Not on file    Number of children: Not on file    Years of education: Not on file    Highest education level: Not on file   Occupational History    Occupation: RETIRED   Social Needs    Financial resource strain: Not on file    Food insecurity     Worry: Not on file     Inability: Not on file    Transportation needs     Medical: Not on file     Non-medical: Not on file   Tobacco Use    Smoking status: Never Smoker    Smokeless tobacco: Never Used   Substance and Sexual Activity    Alcohol use: No    Drug use: No    Sexual activity: Yes     Partners: Female   Lifestyle    Physical activity     Days per week: Not on file     Minutes per session: Not on file    Stress: Not at all   Relationships    Social connections     Talks on phone: Not on file     Gets together: Not on file     Attends [employer] service: Not on file     Active member of club or organization: Not on file     Attends meetings of clubs or organizations: Not on file     Relationship status: Not on file   Other Topics Concern    Not on file   Social History Narrative    Not on file       Review of patient's allergies indicates:   Allergen Reactions    Codeine     Morphine Nausea Only     Ok with nausea med.       Medications Reviewed: see MAR    ROS:    Constitutional: denies fevers, chills, night sweats, fatigue, malaise  Respiratory: negative for cough, shortness of breath, wheezing, dyspnea.  Cardiovascular: + for high blood pressure, negative for chest pain, varicose veins, ankle swelling, palpitations, syncope.  GI: negative for abdominal pain, heartburn, indigestion, nausea, vomiting, constipation, diarrhea, blood in stool.   Urology: as noted above in HPI  Endocrinology: negative for cold intolerance,  "excessive thirst, not feeling tired/sluggish, no heat intolerance.   Hematology/Lymph: negative for easy bleeding, easy bruising, swollen glands.  Musculoskeletal: negative for back pain, joint pain, joint swelling, neck pain.  Allergy-Immunology: negative for seasonal allergies, negative for unusual infections.   Skin: negative for boils, breast lumps, hives, itching, rash.   Neurology: negative for, dizziness, headache, tingling/numbness, tremors.   Psych: satisfied with life; negative for, anxiety, depression, suicidal thoughts.     PHYSICAL EXAM:    Vitals:    12/18/20 1053   BP: (!) 155/82   Pulse: 76   Resp: 18   Temp: 98 °F (36.7 °C)     Body mass index is 36.17 kg/m². Weight: 104.8 kg (230 lb 14.9 oz) Height: 5' 7" (170.2 cm)       General: Alert, cooperative, no distress, appears stated age  Head: Normocephalic, without obvious abnormality, atraumatic  Neck: no masses, no thyromegaly, no lymphadenopathy  Eyes: PERRL, conjunctiva/corneas clear  Lungs: Respirations unlabored, normal effort, no accessory muscle use  CV: Warm and well perfused extremities  Abdomen: Soft, non-tender, no CVA tenderness, no hepatosplenomegaly, no hernia  : Patient declined TJ stating he had a normal exam per the  2 months ago  Extremities: Extremities normal, atraumatic, no cyanosis or edema  Skin: Normal color, texture, and turgor, no rashes or lesions  Psych: Appropriate, well oriented, normal affect, normal mood  Neuro: Non-focal      LABS:    No results found for this or any previous visit (from the past 336 hour(s)).    IMAGING:    CT abd/pelvis reviewed in HPI independently      Assessment/Diagnosis:    1. Lower urinary tract symptoms (LUTS)     2. Low testosterone  testosterone cypionate 200 mg/mL Kit    Testosterone Panel   3. Nephrolithiasis  US Retroperitoneal Complete (Kidney and    X-Ray Abdomen AP 1 View   4. Screening for prostate cancer  PSA, Screening       Plans:    - Extensive discussion with patient " regarding the etiology and management of his lower urinary tract symptoms. Explained that LUTS are multifactorial and can be secondary to an enlarged prostate, PO intake of bladder irritants, overactive bladder, constipation, malignancy, trauma, infection, stones or medications. - Continue conservative management for mild LUTS.   - Testosterone 200 mg every 28 days - refills ordered.   - KUB and renal ultrasound to ensure patient passed his previous left ureteral stone next available.   - PSA and Testosterone level in 2 months.  - RTC in 1 year.

## 2020-12-22 ENCOUNTER — HOSPITAL ENCOUNTER (OUTPATIENT)
Dept: RADIOLOGY | Facility: HOSPITAL | Age: 70
Discharge: HOME | End: 2020-12-22
Attending: UROLOGY
Payer: MEDICARE

## 2020-12-22 ENCOUNTER — TELEPHONE (OUTPATIENT)
Dept: FAMILY MEDICINE | Facility: CLINIC | Age: 70
End: 2020-12-22

## 2020-12-22 DIAGNOSIS — N20.0 NEPHROLITHIASIS: ICD-10-CM

## 2020-12-22 PROCEDURE — 74018 RADEX ABDOMEN 1 VIEW: CPT | Mod: 26,,, | Performed by: RADIOLOGY

## 2020-12-22 PROCEDURE — 76770 US RETROPERITONEAL COMPLETE: ICD-10-PCS | Mod: 26,,, | Performed by: RADIOLOGY

## 2020-12-22 PROCEDURE — 74018 RADEX ABDOMEN 1 VIEW: CPT | Mod: TC,FY

## 2020-12-22 PROCEDURE — 76770 US EXAM ABDO BACK WALL COMP: CPT | Mod: 26,,, | Performed by: RADIOLOGY

## 2020-12-22 PROCEDURE — 76770 US EXAM ABDO BACK WALL COMP: CPT | Mod: TC

## 2020-12-22 PROCEDURE — 74018 XR ABDOMEN AP 1 VIEW: ICD-10-PCS | Mod: 26,,, | Performed by: RADIOLOGY

## 2020-12-22 NOTE — TELEPHONE ENCOUNTER
----- Message from Paz Guardado Patient Care Assistant sent at 12/22/2020 12:41 PM CST -----  Regarding: refill  Contact: patient  Type:  RX Refill Request    Who Called:  patient  Refill or New Rx:  refill  RX Name and Strength:  mupirocin 2 %  How is the patient currently taking it? (ex. 1XDay):  ?  Is this a 30 day or 90 day RX:  ?  Preferred Pharmacy with phone number:    [id number] [address] & [address] [address]  Phone: [phone] Fax: [phone]        Local or Mail Order:  local  Ordering Provider:  same  Best Call Back Number:  [phone]'  Additional Information:  patient needs refilled before xmas , medication not on list , please call to advise thanks.     [facility] #[id number]/5 REFILLS BID

## 2020-12-30 ENCOUNTER — OFFICE VISIT (OUTPATIENT)
Dept: FAMILY MEDICINE | Facility: CLINIC | Age: 70
End: 2020-12-30
Payer: MEDICARE

## 2020-12-30 VITALS
BODY MASS INDEX: 36.34 KG/M2 | WEIGHT: 232 LBS | SYSTOLIC BLOOD PRESSURE: 133 MMHG | HEART RATE: 84 BPM | OXYGEN SATURATION: 96 % | TEMPERATURE: 98 F | DIASTOLIC BLOOD PRESSURE: 74 MMHG

## 2020-12-30 DIAGNOSIS — L21.9 SEBORRHEIC DERMATITIS: ICD-10-CM

## 2020-12-30 DIAGNOSIS — Z86.14 HISTORY OF METHICILLIN RESISTANT STAPHYLOCOCCUS AUREUS (MRSA): ICD-10-CM

## 2020-12-30 DIAGNOSIS — F31.9 BIPOLAR 1 DISORDER: Primary | ICD-10-CM

## 2020-12-30 PROCEDURE — 3008F PR BODY MASS INDEX (BMI) DOCUMENTED: ICD-10-PCS | Mod: CPTII,S$GLB,, | Performed by: FAMILY MEDICINE

## 2020-12-30 PROCEDURE — 3008F BODY MASS INDEX DOCD: CPT | Mod: CPTII,S$GLB,, | Performed by: FAMILY MEDICINE

## 2020-12-30 PROCEDURE — 3288F FALL RISK ASSESSMENT DOCD: CPT | Mod: CPTII,S$GLB,, | Performed by: FAMILY MEDICINE

## 2020-12-30 PROCEDURE — 99213 OFFICE O/P EST LOW 20 MIN: CPT | Mod: S$GLB,,, | Performed by: FAMILY MEDICINE

## 2020-12-30 PROCEDURE — 1159F MED LIST DOCD IN RCRD: CPT | Mod: S$GLB,,, | Performed by: FAMILY MEDICINE

## 2020-12-30 PROCEDURE — 1159F PR MEDICATION LIST DOCUMENTED IN MEDICAL RECORD: ICD-10-PCS | Mod: S$GLB,,, | Performed by: FAMILY MEDICINE

## 2020-12-30 PROCEDURE — 99213 PR OFFICE/OUTPT VISIT, EST, LEVL III, 20-29 MIN: ICD-10-PCS | Mod: S$GLB,,, | Performed by: FAMILY MEDICINE

## 2020-12-30 PROCEDURE — 1101F PT FALLS ASSESS-DOCD LE1/YR: CPT | Mod: CPTII,S$GLB,, | Performed by: FAMILY MEDICINE

## 2020-12-30 PROCEDURE — 1126F PR PAIN SEVERITY QUANTIFIED, NO PAIN PRESENT: ICD-10-PCS | Mod: S$GLB,,, | Performed by: FAMILY MEDICINE

## 2020-12-30 PROCEDURE — 1126F AMNT PAIN NOTED NONE PRSNT: CPT | Mod: S$GLB,,, | Performed by: FAMILY MEDICINE

## 2020-12-30 PROCEDURE — 1101F PR PT FALLS ASSESS DOC 0-1 FALLS W/OUT INJ PAST YR: ICD-10-PCS | Mod: CPTII,S$GLB,, | Performed by: FAMILY MEDICINE

## 2020-12-30 PROCEDURE — 3288F PR FALLS RISK ASSESSMENT DOCUMENTED: ICD-10-PCS | Mod: CPTII,S$GLB,, | Performed by: FAMILY MEDICINE

## 2020-12-30 RX ORDER — MUPIROCIN 20 MG/G
OINTMENT TOPICAL 2 TIMES DAILY
COMMUNITY
Start: 2020-12-22 | End: 2021-11-23 | Stop reason: SDUPTHER

## 2020-12-30 RX ORDER — HYDROCORTISONE VALERATE CREAM 2 MG/G
CREAM TOPICAL DAILY
Qty: 15 G | Refills: 0 | Status: SHIPPED | OUTPATIENT
Start: 2020-12-30 | End: 2021-02-13

## 2020-12-30 RX ORDER — TESTOSTERONE CYPIONATE 200 MG/ML
100 INJECTION, SOLUTION INTRAMUSCULAR
COMMUNITY
Start: 2020-12-18 | End: 2021-06-03

## 2020-12-30 RX ORDER — VENLAFAXINE HYDROCHLORIDE 37.5 MG/1
37.5 CAPSULE, EXTENDED RELEASE ORAL DAILY
Qty: 14 CAPSULE | Refills: 0 | Status: SHIPPED | OUTPATIENT
Start: 2020-12-30 | End: 2021-02-08

## 2020-12-30 RX ORDER — VENLAFAXINE HYDROCHLORIDE 75 MG/1
75 CAPSULE, EXTENDED RELEASE ORAL DAILY
Qty: 14 CAPSULE | Refills: 0 | Status: SHIPPED | OUTPATIENT
Start: 2020-12-30 | End: 2021-02-08

## 2020-12-30 RX ORDER — LUBIPROSTONE 24 UG/1
CAPSULE, GELATIN COATED ORAL
Status: ON HOLD | COMMUNITY
Start: 2020-12-21 | End: 2021-03-16 | Stop reason: HOSPADM
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
## 2021-01-29 ENCOUNTER — PATIENT MESSAGE (OUTPATIENT)
Dept: ADMINISTRATIVE | Facility: HOSPITAL | Age: 71
End: 2021-01-29

## 2021-02-08 ENCOUNTER — OFFICE VISIT (OUTPATIENT)
Dept: FAMILY MEDICINE | Facility: CLINIC | Age: 71
End: 2021-02-08
Payer: MEDICARE

## 2021-02-08 VITALS
TEMPERATURE: 98 F | WEIGHT: 236 LBS | DIASTOLIC BLOOD PRESSURE: 84 MMHG | SYSTOLIC BLOOD PRESSURE: 139 MMHG | HEART RATE: 82 BPM | BODY MASS INDEX: 36.96 KG/M2 | OXYGEN SATURATION: 97 %

## 2021-02-08 DIAGNOSIS — K21.9 GASTROESOPHAGEAL REFLUX DISEASE, UNSPECIFIED WHETHER ESOPHAGITIS PRESENT: Primary | ICD-10-CM

## 2021-02-08 DIAGNOSIS — S91.311A LACERATION OF RIGHT HEEL, INITIAL ENCOUNTER: ICD-10-CM

## 2021-02-08 DIAGNOSIS — E66.09 CLASS 2 OBESITY DUE TO EXCESS CALORIES WITHOUT SERIOUS COMORBIDITY WITH BODY MASS INDEX (BMI) OF 37.0 TO 37.9 IN ADULT: ICD-10-CM

## 2021-02-08 PROCEDURE — 99213 OFFICE O/P EST LOW 20 MIN: CPT | Mod: 25,S$GLB,, | Performed by: FAMILY MEDICINE

## 2021-02-08 PROCEDURE — 99213 PR OFFICE/OUTPT VISIT, EST, LEVL III, 20-29 MIN: ICD-10-PCS | Mod: 25,S$GLB,, | Performed by: FAMILY MEDICINE

## 2021-02-08 PROCEDURE — 1101F PR PT FALLS ASSESS DOC 0-1 FALLS W/OUT INJ PAST YR: ICD-10-PCS | Mod: CPTII,S$GLB,, | Performed by: FAMILY MEDICINE

## 2021-02-08 PROCEDURE — 90471 IMMUNIZATION ADMIN: CPT | Mod: S$GLB,,, | Performed by: FAMILY MEDICINE

## 2021-02-08 PROCEDURE — 1101F PT FALLS ASSESS-DOCD LE1/YR: CPT | Mod: CPTII,S$GLB,, | Performed by: FAMILY MEDICINE

## 2021-02-08 PROCEDURE — 1126F AMNT PAIN NOTED NONE PRSNT: CPT | Mod: S$GLB,,, | Performed by: FAMILY MEDICINE

## 2021-02-08 PROCEDURE — 1159F PR MEDICATION LIST DOCUMENTED IN MEDICAL RECORD: ICD-10-PCS | Mod: S$GLB,,, | Performed by: FAMILY MEDICINE

## 2021-02-08 PROCEDURE — 90471 TD VACCINE GREATER THAN OR EQUAL TO 7YO PRESERVATIVE FREE IM: ICD-10-PCS | Mod: S$GLB,,, | Performed by: FAMILY MEDICINE

## 2021-02-08 PROCEDURE — 90714 TD VACCINE GREATER THAN OR EQUAL TO 7YO PRESERVATIVE FREE IM: ICD-10-PCS | Mod: S$GLB,,, | Performed by: FAMILY MEDICINE

## 2021-02-08 PROCEDURE — 90714 TD VACC NO PRESV 7 YRS+ IM: CPT | Mod: S$GLB,,, | Performed by: FAMILY MEDICINE

## 2021-02-08 PROCEDURE — 1126F PR PAIN SEVERITY QUANTIFIED, NO PAIN PRESENT: ICD-10-PCS | Mod: S$GLB,,, | Performed by: FAMILY MEDICINE

## 2021-02-08 PROCEDURE — 3008F PR BODY MASS INDEX (BMI) DOCUMENTED: ICD-10-PCS | Mod: CPTII,S$GLB,, | Performed by: FAMILY MEDICINE

## 2021-02-08 PROCEDURE — 3288F FALL RISK ASSESSMENT DOCD: CPT | Mod: CPTII,S$GLB,, | Performed by: FAMILY MEDICINE

## 2021-02-08 PROCEDURE — 3008F BODY MASS INDEX DOCD: CPT | Mod: CPTII,S$GLB,, | Performed by: FAMILY MEDICINE

## 2021-02-08 PROCEDURE — 3288F PR FALLS RISK ASSESSMENT DOCUMENTED: ICD-10-PCS | Mod: CPTII,S$GLB,, | Performed by: FAMILY MEDICINE

## 2021-02-08 PROCEDURE — 1159F MED LIST DOCD IN RCRD: CPT | Mod: S$GLB,,, | Performed by: FAMILY MEDICINE

## 2021-02-08 RX ORDER — PANTOPRAZOLE SODIUM 40 MG/1
40 TABLET, DELAYED RELEASE ORAL DAILY
Qty: 90 TABLET | Refills: 0 | Status: SHIPPED | OUTPATIENT
Start: 2021-02-08 | End: 2021-04-13 | Stop reason: SDUPTHER

## 2021-02-11 ENCOUNTER — IMMUNIZATION (OUTPATIENT)
Dept: PRIMARY CARE CLINIC | Facility: CLINIC | Age: 71
End: 2021-02-11
Payer: MEDICARE

## 2021-02-11 DIAGNOSIS — Z23 NEED FOR VACCINATION: Primary | ICD-10-CM

## 2021-02-11 PROCEDURE — 0001A COVID-19, MRNA, LNP-S, PF, 30 MCG/0.3 ML DOSE VACCINE: CPT | Mod: CV19,S$GLB,, | Performed by: FAMILY MEDICINE

## 2021-02-11 PROCEDURE — 91300 COVID-19, MRNA, LNP-S, PF, 30 MCG/0.3 ML DOSE VACCINE: ICD-10-PCS | Mod: S$GLB,,, | Performed by: FAMILY MEDICINE

## 2021-02-11 PROCEDURE — 0001A COVID-19, MRNA, LNP-S, PF, 30 MCG/0.3 ML DOSE VACCINE: ICD-10-PCS | Mod: CV19,S$GLB,, | Performed by: FAMILY MEDICINE

## 2021-02-11 PROCEDURE — 91300 COVID-19, MRNA, LNP-S, PF, 30 MCG/0.3 ML DOSE VACCINE: CPT | Mod: S$GLB,,, | Performed by: FAMILY MEDICINE

## 2021-02-18 ENCOUNTER — LAB VISIT (OUTPATIENT)
Dept: LAB | Facility: HOSPITAL | Age: 71
End: 2021-02-18
Attending: UROLOGY
Payer: MEDICARE

## 2021-02-18 DIAGNOSIS — Z12.5 SCREENING FOR PROSTATE CANCER: ICD-10-CM

## 2021-02-18 DIAGNOSIS — R79.89 LOW TESTOSTERONE: ICD-10-CM

## 2021-02-18 LAB — COMPLEXED PSA SERPL-MCNC: 1.9 NG/ML (ref 0–4)

## 2021-02-18 PROCEDURE — 84153 ASSAY OF PSA TOTAL: CPT

## 2021-02-18 PROCEDURE — 36415 COLL VENOUS BLD VENIPUNCTURE: CPT

## 2021-02-18 PROCEDURE — 84403 ASSAY OF TOTAL TESTOSTERONE: CPT

## 2021-02-26 LAB
ALBUMIN SERPL-MCNC: 4 G/DL (ref 3.6–5.1)
SHBG SERPL-SCNC: 29 NMOL/L (ref 22–77)
TESTOST FREE SERPL-MCNC: 12.7 PG/ML (ref 6–73)
TESTOST SERPL-MCNC: 94 NG/DL (ref 250–1100)
TESTOSTERONE.FREE+WB SERPL-MCNC: 23.3 NG/DL (ref 15–150)

## 2021-03-04 ENCOUNTER — IMMUNIZATION (OUTPATIENT)
Dept: PRIMARY CARE CLINIC | Facility: CLINIC | Age: 71
End: 2021-03-04
Payer: MEDICARE

## 2021-03-04 DIAGNOSIS — Z23 NEED FOR VACCINATION: Primary | ICD-10-CM

## 2021-03-04 PROCEDURE — 0002A COVID-19, MRNA, LNP-S, PF, 30 MCG/0.3 ML DOSE VACCINE: ICD-10-PCS | Mod: CV19,S$GLB,, | Performed by: FAMILY MEDICINE

## 2021-03-04 PROCEDURE — 91300 COVID-19, MRNA, LNP-S, PF, 30 MCG/0.3 ML DOSE VACCINE: CPT | Mod: S$GLB,,, | Performed by: FAMILY MEDICINE

## 2021-03-04 PROCEDURE — 0002A COVID-19, MRNA, LNP-S, PF, 30 MCG/0.3 ML DOSE VACCINE: CPT | Mod: CV19,S$GLB,, | Performed by: FAMILY MEDICINE

## 2021-03-04 PROCEDURE — 91300 COVID-19, MRNA, LNP-S, PF, 30 MCG/0.3 ML DOSE VACCINE: ICD-10-PCS | Mod: S$GLB,,, | Performed by: FAMILY MEDICINE

## 2021-03-09 NOTE — PROGRESS NOTES
Results noted. Pt needs appt to discuss results and treatment options.  
[FreeTextEntry1] : EKG: NSR AT 80 BPM, NO ACUTE ST-T WAVE CHANGES; NO SIGNIFICANT CHANGE FROM PRIOR\par DISCUSSED MEDICATION MANAGEMENT AND ADJUSTMENT AS BLOOD PRESSURE ELEVATED AT MULTIPLE SPECIALISTS VISITS, HOME AND TODAY\par DOES NOT WANT TO TAKE NEW MEDICATION\par WILL TAKE NORVASC 5 MG TWO TABLETS DAILY\par FOLLOW-UP IN 1 - 2 WEEKS FOR BLOOD PRESSURE CHECK\par DISCUSSED POTENTIAL SIDE EFFECTS FROM NORVASC INCLUDING EDEMA\par TO MONITOR BP AND CALL WITH ANY CONCERNS\par CALL WITH ANY QUESTIONS, CONCERNS OR CHANGES

## 2021-03-13 ENCOUNTER — HOSPITAL ENCOUNTER (INPATIENT)
Facility: HOSPITAL | Age: 71
LOS: 3 days | Discharge: HOME | DRG: 871 | End: 2021-03-16
Attending: EMERGENCY MEDICINE | Admitting: INTERNAL MEDICINE
Payer: MEDICARE

## 2021-03-13 DIAGNOSIS — R07.9 CHEST PAIN: ICD-10-CM

## 2021-03-13 DIAGNOSIS — R41.82 ALTERED MENTAL STATUS, UNSPECIFIED ALTERED MENTAL STATUS TYPE: ICD-10-CM

## 2021-03-13 DIAGNOSIS — R50.9 FEVER, UNSPECIFIED FEVER CAUSE: ICD-10-CM

## 2021-03-13 DIAGNOSIS — R50.9 FEVER 41 DEGREES C OR OVER: ICD-10-CM

## 2021-03-13 DIAGNOSIS — R19.7 DIARRHEA, UNSPECIFIED TYPE: ICD-10-CM

## 2021-03-13 DIAGNOSIS — G93.40 ACUTE ENCEPHALOPATHY: ICD-10-CM

## 2021-03-13 DIAGNOSIS — A02.0 SALMONELLA DYSENTERY: Primary | ICD-10-CM

## 2021-03-13 DIAGNOSIS — A09 DIARRHEA, INFECTIOUS, ADULT: ICD-10-CM

## 2021-03-13 DIAGNOSIS — R00.0 TACHYCARDIA: ICD-10-CM

## 2021-03-13 PROBLEM — Z86.14 HISTORY OF MRSA INFECTION: Chronic | Status: ACTIVE | Noted: 2021-03-13

## 2021-03-13 PROBLEM — A41.9 SEPSIS: Status: ACTIVE | Noted: 2021-03-13

## 2021-03-13 PROBLEM — G89.29 CHRONIC BACK PAIN: Chronic | Status: ACTIVE | Noted: 2021-03-13

## 2021-03-13 PROBLEM — E87.1 HYPONATREMIA: Status: ACTIVE | Noted: 2021-03-13

## 2021-03-13 PROBLEM — E66.811 CLASS 1 OBESITY IN ADULT: Chronic | Status: ACTIVE | Noted: 2021-03-13

## 2021-03-13 PROBLEM — E66.9 CLASS 1 OBESITY IN ADULT: Chronic | Status: ACTIVE | Noted: 2021-03-13

## 2021-03-13 PROBLEM — M54.9 CHRONIC BACK PAIN: Chronic | Status: ACTIVE | Noted: 2021-03-13

## 2021-03-13 LAB
ALBUMIN SERPL BCP-MCNC: 4.1 G/DL (ref 3.5–5.2)
ALP SERPL-CCNC: 45 U/L (ref 55–135)
ALT SERPL W/O P-5'-P-CCNC: 27 U/L (ref 10–44)
ANION GAP SERPL CALC-SCNC: 12 MMOL/L (ref 8–16)
AST SERPL-CCNC: 29 U/L (ref 10–40)
BACTERIA #/AREA URNS HPF: NEGATIVE /HPF
BASOPHILS # BLD AUTO: 0.03 K/UL (ref 0–0.2)
BASOPHILS NFR BLD: 0.2 % (ref 0–1.9)
BILIRUB SERPL-MCNC: 0.9 MG/DL (ref 0.1–1)
BILIRUB UR QL STRIP: NEGATIVE
BNP SERPL-MCNC: 52 PG/ML (ref 0–99)
BUN SERPL-MCNC: 26 MG/DL (ref 8–23)
C DIFF GDH STL QL: NEGATIVE
C DIFF TOX A+B STL QL IA: NEGATIVE
CALCIUM SERPL-MCNC: 9.5 MG/DL (ref 8.7–10.5)
CHLORIDE SERPL-SCNC: 100 MMOL/L (ref 95–110)
CLARITY UR: CLEAR
CO2 SERPL-SCNC: 22 MMOL/L (ref 23–29)
COLOR UR: YELLOW
CREAT SERPL-MCNC: 1.5 MG/DL (ref 0.5–1.4)
DIFFERENTIAL METHOD: ABNORMAL
EOSINOPHIL # BLD AUTO: 0 K/UL (ref 0–0.5)
EOSINOPHIL NFR BLD: 0 % (ref 0–8)
ERYTHROCYTE [DISTWIDTH] IN BLOOD BY AUTOMATED COUNT: 14.6 % (ref 11.5–14.5)
EST. GFR  (AFRICAN AMERICAN): 53 ML/MIN/1.73 M^2
EST. GFR  (NON AFRICAN AMERICAN): 45.9 ML/MIN/1.73 M^2
GLUCOSE SERPL-MCNC: 166 MG/DL (ref 70–110)
GLUCOSE UR QL STRIP: NEGATIVE
HCT VFR BLD AUTO: 44 % (ref 40–54)
HGB BLD-MCNC: 14.1 G/DL (ref 14–18)
HGB UR QL STRIP: ABNORMAL
HYALINE CASTS #/AREA URNS LPF: 2 /LPF
IMM GRANULOCYTES # BLD AUTO: 0.07 K/UL (ref 0–0.04)
IMM GRANULOCYTES NFR BLD AUTO: 0.5 % (ref 0–0.5)
INFLUENZA A, MOLECULAR: NEGATIVE
INFLUENZA B, MOLECULAR: NEGATIVE
INR PPP: 1.1
KETONES UR QL STRIP: ABNORMAL
LACTATE SERPL-SCNC: 1.9 MMOL/L (ref 0.5–1.9)
LEUKOCYTE ESTERASE UR QL STRIP: NEGATIVE
LIPASE SERPL-CCNC: 20 U/L (ref 4–60)
LYMPHOCYTES # BLD AUTO: 0.6 K/UL (ref 1–4.8)
LYMPHOCYTES NFR BLD: 4.3 % (ref 18–48)
MCH RBC QN AUTO: 28.5 PG (ref 27–31)
MCHC RBC AUTO-ENTMCNC: 32 G/DL (ref 32–36)
MCV RBC AUTO: 89 FL (ref 82–98)
MICROSCOPIC COMMENT: ABNORMAL
MONOCYTES # BLD AUTO: 1 K/UL (ref 0.3–1)
MONOCYTES NFR BLD: 7.1 % (ref 4–15)
NEUTROPHILS # BLD AUTO: 12.7 K/UL (ref 1.8–7.7)
NEUTROPHILS NFR BLD: 87.9 % (ref 38–73)
NITRITE UR QL STRIP: NEGATIVE
NRBC BLD-RTO: 0 /100 WBC
OB PNL STL: POSITIVE
PH UR STRIP: 6 [PH] (ref 5–8)
PLATELET # BLD AUTO: 215 K/UL (ref 150–350)
PMV BLD AUTO: 10.7 FL (ref 9.2–12.9)
POTASSIUM SERPL-SCNC: 4.2 MMOL/L (ref 3.5–5.1)
PROT SERPL-MCNC: 8.4 G/DL (ref 6–8.4)
PROT UR QL STRIP: ABNORMAL
PROTHROMBIN TIME: 13.8 SEC (ref 10.6–14.8)
RBC # BLD AUTO: 4.94 M/UL (ref 4.6–6.2)
RBC #/AREA URNS HPF: 1 /HPF (ref 0–4)
SARS-COV-2 RDRP RESP QL NAA+PROBE: NEGATIVE
SODIUM SERPL-SCNC: 134 MMOL/L (ref 136–145)
SP GR UR STRIP: 1.02 (ref 1–1.03)
SPECIMEN SOURCE: NORMAL
SQUAMOUS #/AREA URNS HPF: 1 /HPF
TROPONIN I SERPL DL<=0.01 NG/ML-MCNC: <0.03 NG/ML
TSH SERPL DL<=0.005 MIU/L-ACNC: 1.64 UIU/ML (ref 0.34–5.6)
URN SPEC COLLECT METH UR: ABNORMAL
UROBILINOGEN UR STRIP-ACNC: NEGATIVE EU/DL
WBC # BLD AUTO: 14.43 K/UL (ref 3.9–12.7)
WBC #/AREA STL HPF: NORMAL /[HPF]
WBC #/AREA URNS HPF: 1 /HPF (ref 0–5)

## 2021-03-13 PROCEDURE — 87324 CLOSTRIDIUM AG IA: CPT | Performed by: EMERGENCY MEDICINE

## 2021-03-13 PROCEDURE — 25000003 PHARM REV CODE 250: Performed by: EMERGENCY MEDICINE

## 2021-03-13 PROCEDURE — 81001 URINALYSIS AUTO W/SCOPE: CPT | Performed by: EMERGENCY MEDICINE

## 2021-03-13 PROCEDURE — 96361 HYDRATE IV INFUSION ADD-ON: CPT

## 2021-03-13 PROCEDURE — 63600175 PHARM REV CODE 636 W HCPCS: Performed by: INTERNAL MEDICINE

## 2021-03-13 PROCEDURE — 80053 COMPREHEN METABOLIC PANEL: CPT | Performed by: EMERGENCY MEDICINE

## 2021-03-13 PROCEDURE — 87040 BLOOD CULTURE FOR BACTERIA: CPT | Performed by: EMERGENCY MEDICINE

## 2021-03-13 PROCEDURE — 89055 LEUKOCYTE ASSESSMENT FECAL: CPT | Performed by: EMERGENCY MEDICINE

## 2021-03-13 PROCEDURE — 84443 ASSAY THYROID STIM HORMONE: CPT | Performed by: EMERGENCY MEDICINE

## 2021-03-13 PROCEDURE — 63600175 PHARM REV CODE 636 W HCPCS: Performed by: EMERGENCY MEDICINE

## 2021-03-13 PROCEDURE — 87015 SPECIMEN INFECT AGNT CONCNTJ: CPT | Performed by: EMERGENCY MEDICINE

## 2021-03-13 PROCEDURE — U0002 COVID-19 LAB TEST NON-CDC: HCPCS | Performed by: EMERGENCY MEDICINE

## 2021-03-13 PROCEDURE — 83605 ASSAY OF LACTIC ACID: CPT | Performed by: EMERGENCY MEDICINE

## 2021-03-13 PROCEDURE — 82272 OCCULT BLD FECES 1-3 TESTS: CPT | Performed by: EMERGENCY MEDICINE

## 2021-03-13 PROCEDURE — 93005 ELECTROCARDIOGRAM TRACING: CPT | Performed by: GENERAL PRACTICE

## 2021-03-13 PROCEDURE — 85025 COMPLETE CBC W/AUTO DIFF WBC: CPT | Performed by: EMERGENCY MEDICINE

## 2021-03-13 PROCEDURE — S0030 INJECTION, METRONIDAZOLE: HCPCS | Performed by: INTERNAL MEDICINE

## 2021-03-13 PROCEDURE — 87186 SC STD MICRODIL/AGAR DIL: CPT | Performed by: EMERGENCY MEDICINE

## 2021-03-13 PROCEDURE — 21400001 HC TELEMETRY ROOM

## 2021-03-13 PROCEDURE — 87449 NOS EACH ORGANISM AG IA: CPT | Performed by: EMERGENCY MEDICINE

## 2021-03-13 PROCEDURE — 87045 FECES CULTURE AEROBIC BACT: CPT | Performed by: EMERGENCY MEDICINE

## 2021-03-13 PROCEDURE — 84484 ASSAY OF TROPONIN QUANT: CPT | Performed by: EMERGENCY MEDICINE

## 2021-03-13 PROCEDURE — 83690 ASSAY OF LIPASE: CPT | Performed by: EMERGENCY MEDICINE

## 2021-03-13 PROCEDURE — 87147 CULTURE TYPE IMMUNOLOGIC: CPT | Performed by: EMERGENCY MEDICINE

## 2021-03-13 PROCEDURE — 25000003 PHARM REV CODE 250: Performed by: INTERNAL MEDICINE

## 2021-03-13 PROCEDURE — 87077 CULTURE AEROBIC IDENTIFY: CPT | Mod: 59 | Performed by: EMERGENCY MEDICINE

## 2021-03-13 PROCEDURE — 85610 PROTHROMBIN TIME: CPT | Performed by: EMERGENCY MEDICINE

## 2021-03-13 PROCEDURE — 87046 STOOL CULTR AEROBIC BACT EA: CPT | Performed by: EMERGENCY MEDICINE

## 2021-03-13 PROCEDURE — 87209 SMEAR COMPLEX STAIN: CPT | Performed by: EMERGENCY MEDICINE

## 2021-03-13 PROCEDURE — 96374 THER/PROPH/DIAG INJ IV PUSH: CPT

## 2021-03-13 PROCEDURE — 99285 EMERGENCY DEPT VISIT HI MDM: CPT | Mod: 25

## 2021-03-13 PROCEDURE — 83880 ASSAY OF NATRIURETIC PEPTIDE: CPT | Performed by: EMERGENCY MEDICINE

## 2021-03-13 PROCEDURE — 87502 INFLUENZA DNA AMP PROBE: CPT | Performed by: EMERGENCY MEDICINE

## 2021-03-13 RX ORDER — QUETIAPINE FUMARATE 25 MG/1
50 TABLET, FILM COATED ORAL NIGHTLY
Status: DISCONTINUED | OUTPATIENT
Start: 2021-03-13 | End: 2021-03-14

## 2021-03-13 RX ORDER — VANCOMYCIN HCL IN 5 % DEXTROSE 1G/250ML
1000 PLASTIC BAG, INJECTION (ML) INTRAVENOUS
Status: DISCONTINUED | OUTPATIENT
Start: 2021-03-13 | End: 2021-03-13

## 2021-03-13 RX ORDER — METRONIDAZOLE 500 MG/100ML
500 INJECTION, SOLUTION INTRAVENOUS
Status: DISPENSED | OUTPATIENT
Start: 2021-03-13 | End: 2021-03-14

## 2021-03-13 RX ORDER — ZINC SULFATE 50(220)MG
220 CAPSULE ORAL DAILY
Status: DISCONTINUED | OUTPATIENT
Start: 2021-03-14 | End: 2021-03-14

## 2021-03-13 RX ORDER — LEVOFLOXACIN 5 MG/ML
750 INJECTION, SOLUTION INTRAVENOUS
Status: COMPLETED | OUTPATIENT
Start: 2021-03-13 | End: 2021-03-13

## 2021-03-13 RX ORDER — IBUPROFEN 200 MG
16 TABLET ORAL
Status: DISCONTINUED | OUTPATIENT
Start: 2021-03-13 | End: 2021-03-16 | Stop reason: HOSPADM

## 2021-03-13 RX ORDER — TRAMADOL HYDROCHLORIDE 50 MG/1
50 TABLET ORAL 3 TIMES DAILY PRN
COMMUNITY
Start: 2021-02-10 | End: 2021-09-22 | Stop reason: CLARIF

## 2021-03-13 RX ORDER — ENOXAPARIN SODIUM 100 MG/ML
40 INJECTION SUBCUTANEOUS EVERY 24 HOURS
Status: DISCONTINUED | OUTPATIENT
Start: 2021-03-13 | End: 2021-03-16 | Stop reason: HOSPADM

## 2021-03-13 RX ORDER — ACETAMINOPHEN 500 MG
1000 TABLET ORAL
Status: COMPLETED | OUTPATIENT
Start: 2021-03-13 | End: 2021-03-13

## 2021-03-13 RX ORDER — SODIUM CHLORIDE 9 MG/ML
INJECTION, SOLUTION INTRAVENOUS
Status: COMPLETED | OUTPATIENT
Start: 2021-03-13 | End: 2021-03-13

## 2021-03-13 RX ORDER — ONDANSETRON 2 MG/ML
4 INJECTION INTRAMUSCULAR; INTRAVENOUS EVERY 8 HOURS PRN
Status: DISCONTINUED | OUTPATIENT
Start: 2021-03-13 | End: 2021-03-16 | Stop reason: HOSPADM

## 2021-03-13 RX ORDER — METRONIDAZOLE 500 MG/100ML
500 INJECTION, SOLUTION INTRAVENOUS
Status: DISCONTINUED | OUTPATIENT
Start: 2021-03-13 | End: 2021-03-16 | Stop reason: HOSPADM

## 2021-03-13 RX ORDER — SODIUM CHLORIDE 9 MG/ML
INJECTION, SOLUTION INTRAVENOUS CONTINUOUS
Status: DISCONTINUED | OUTPATIENT
Start: 2021-03-13 | End: 2021-03-16 | Stop reason: HOSPADM

## 2021-03-13 RX ORDER — GABAPENTIN 300 MG/1
600 CAPSULE ORAL NIGHTLY
Status: DISCONTINUED | OUTPATIENT
Start: 2021-03-13 | End: 2021-03-14

## 2021-03-13 RX ORDER — TRAMADOL HYDROCHLORIDE 50 MG/1
50 TABLET ORAL 3 TIMES DAILY PRN
Status: DISCONTINUED | OUTPATIENT
Start: 2021-03-13 | End: 2021-03-15

## 2021-03-13 RX ORDER — GLUCAGON 1 MG
1 KIT INJECTION
Status: DISCONTINUED | OUTPATIENT
Start: 2021-03-13 | End: 2021-03-16 | Stop reason: HOSPADM

## 2021-03-13 RX ORDER — LEVOFLOXACIN 5 MG/ML
500 INJECTION, SOLUTION INTRAVENOUS
Status: DISCONTINUED | OUTPATIENT
Start: 2021-03-13 | End: 2021-03-13

## 2021-03-13 RX ORDER — LEVOFLOXACIN 5 MG/ML
750 INJECTION, SOLUTION INTRAVENOUS
Status: DISCONTINUED | OUTPATIENT
Start: 2021-03-15 | End: 2021-03-16 | Stop reason: HOSPADM

## 2021-03-13 RX ORDER — SODIUM CHLORIDE 0.9 % (FLUSH) 0.9 %
10 SYRINGE (ML) INJECTION EVERY 6 HOURS PRN
Status: DISCONTINUED | OUTPATIENT
Start: 2021-03-13 | End: 2021-03-16 | Stop reason: HOSPADM

## 2021-03-13 RX ORDER — IBUPROFEN 200 MG
24 TABLET ORAL
Status: DISCONTINUED | OUTPATIENT
Start: 2021-03-13 | End: 2021-03-16 | Stop reason: HOSPADM

## 2021-03-13 RX ORDER — TALC
6 POWDER (GRAM) TOPICAL NIGHTLY PRN
Status: DISCONTINUED | OUTPATIENT
Start: 2021-03-13 | End: 2021-03-15

## 2021-03-13 RX ORDER — ACETAMINOPHEN 500 MG
1000 TABLET ORAL EVERY 8 HOURS
Status: COMPLETED | OUTPATIENT
Start: 2021-03-13 | End: 2021-03-14

## 2021-03-13 RX ORDER — METOPROLOL SUCCINATE 25 MG/1
100 TABLET, EXTENDED RELEASE ORAL DAILY
Status: DISCONTINUED | OUTPATIENT
Start: 2021-03-13 | End: 2021-03-14

## 2021-03-13 RX ORDER — ACETAMINOPHEN 325 MG/1
650 TABLET ORAL EVERY 4 HOURS PRN
Status: DISCONTINUED | OUTPATIENT
Start: 2021-03-13 | End: 2021-03-16 | Stop reason: HOSPADM

## 2021-03-13 RX ORDER — CLOPIDOGREL BISULFATE 75 MG/1
75 TABLET ORAL DAILY
Status: DISCONTINUED | OUTPATIENT
Start: 2021-03-14 | End: 2021-03-16 | Stop reason: HOSPADM

## 2021-03-13 RX ORDER — OXYCODONE HYDROCHLORIDE 5 MG/1
20 TABLET ORAL EVERY 6 HOURS PRN
Status: DISCONTINUED | OUTPATIENT
Start: 2021-03-13 | End: 2021-03-15

## 2021-03-13 RX ORDER — QUETIAPINE FUMARATE 50 MG/1
50 TABLET, FILM COATED ORAL NIGHTLY
COMMUNITY
End: 2021-04-13 | Stop reason: SDUPTHER

## 2021-03-13 RX ADMIN — ACETAMINOPHEN 1000 MG: 500 TABLET, FILM COATED ORAL at 01:03

## 2021-03-13 RX ADMIN — SODIUM CHLORIDE: 0.9 INJECTION, SOLUTION INTRAVENOUS at 05:03

## 2021-03-13 RX ADMIN — SODIUM CHLORIDE: 0.9 INJECTION, SOLUTION INTRAVENOUS at 02:03

## 2021-03-13 RX ADMIN — GABAPENTIN 600 MG: 300 CAPSULE ORAL at 10:03

## 2021-03-13 RX ADMIN — OXYCODONE HYDROCHLORIDE 20 MG: 5 TABLET ORAL at 04:03

## 2021-03-13 RX ADMIN — ENOXAPARIN SODIUM 40 MG: 40 INJECTION SUBCUTANEOUS at 05:03

## 2021-03-13 RX ADMIN — SODIUM CHLORIDE 1000 ML: 0.9 INJECTION, SOLUTION INTRAVENOUS at 01:03

## 2021-03-13 RX ADMIN — ACETAMINOPHEN 1000 MG: 500 TABLET, FILM COATED ORAL at 10:03

## 2021-03-13 RX ADMIN — VANCOMYCIN HYDROCHLORIDE 1500 MG: 1.5 INJECTION, POWDER, LYOPHILIZED, FOR SOLUTION INTRAVENOUS at 10:03

## 2021-03-13 RX ADMIN — METOPROLOL SUCCINATE 100 MG: 25 TABLET, FILM COATED, EXTENDED RELEASE ORAL at 05:03

## 2021-03-13 RX ADMIN — LEVOFLOXACIN 750 MG: 750 INJECTION, SOLUTION INTRAVENOUS at 03:03

## 2021-03-13 RX ADMIN — OXYCODONE HYDROCHLORIDE 20 MG: 5 TABLET ORAL at 10:03

## 2021-03-13 RX ADMIN — METRONIDAZOLE 500 MG: 500 INJECTION, SOLUTION INTRAVENOUS at 05:03

## 2021-03-13 RX ADMIN — QUETIAPINE 50 MG: 25 TABLET ORAL at 10:03

## 2021-03-13 RX ADMIN — IBUPROFEN 600 MG: 400 TABLET ORAL at 03:03

## 2021-03-14 ENCOUNTER — CLINICAL SUPPORT (OUTPATIENT)
Dept: CARDIOLOGY | Facility: HOSPITAL | Age: 71
DRG: 871 | End: 2021-03-14
Attending: INTERNAL MEDICINE
Payer: MEDICARE

## 2021-03-14 VITALS — BODY MASS INDEX: 31.39 KG/M2 | WEIGHT: 200 LBS | HEIGHT: 67 IN

## 2021-03-14 PROBLEM — N17.9 AKI (ACUTE KIDNEY INJURY): Status: ACTIVE | Noted: 2021-03-14

## 2021-03-14 LAB
ALBUMIN SERPL BCP-MCNC: 3.2 G/DL (ref 3.5–5.2)
ALP SERPL-CCNC: 37 U/L (ref 55–135)
ALT SERPL W/O P-5'-P-CCNC: 29 U/L (ref 10–44)
ANION GAP SERPL CALC-SCNC: 12 MMOL/L (ref 8–16)
AORTIC ROOT ANNULUS: 3.1 CM
AORTIC VALVE CUSP SEPERATION: 1.89 CM
AST SERPL-CCNC: 32 U/L (ref 10–40)
AV INDEX (PROSTH): 0.79
AV MEAN GRADIENT: 4 MMHG
AV PEAK GRADIENT: 7 MMHG
AV VALVE AREA: 2.54 CM2
AV VELOCITY RATIO: 64.94
BASOPHILS NFR BLD: 0 % (ref 0–1.9)
BILIRUB SERPL-MCNC: 0.7 MG/DL (ref 0.1–1)
BSA FOR ECHO PROCEDURE: 2.07 M2
BUN SERPL-MCNC: 35 MG/DL (ref 8–23)
CALCIUM SERPL-MCNC: 8.8 MG/DL (ref 8.7–10.5)
CHLORIDE SERPL-SCNC: 102 MMOL/L (ref 95–110)
CO2 SERPL-SCNC: 21 MMOL/L (ref 23–29)
CREAT SERPL-MCNC: 3 MG/DL (ref 0.5–1.4)
CRP SERPL-MCNC: 17.3 MG/DL
CV ECHO LV RWT: 0.49 CM
DIFFERENTIAL METHOD: ABNORMAL
DOP CALC AO PEAK VEL: 1.31 M/S
DOP CALC AO VTI: 21.18 CM
DOP CALC LVOT AREA: 3.2 CM2
DOP CALC LVOT DIAMETER: 2.02 CM
DOP CALC LVOT PEAK VEL: 85.07 M/S
DOP CALC LVOT STROKE VOLUME: 53.81 CM3
DOP CALCLVOT PEAK VEL VTI: 16.8 CM
E WAVE DECELERATION TIME: 377.09 MSEC
E/A RATIO: 0.75
E/E' RATIO: 5.79 M/S
ECHO LV POSTERIOR WALL: 0.92 CM (ref 0.6–1.1)
EOSINOPHIL NFR BLD: 0 % (ref 0–8)
ERYTHROCYTE [DISTWIDTH] IN BLOOD BY AUTOMATED COUNT: 14.9 % (ref 11.5–14.5)
EST. GFR  (AFRICAN AMERICAN): 22.9 ML/MIN/1.73 M^2
EST. GFR  (NON AFRICAN AMERICAN): 19.8 ML/MIN/1.73 M^2
FRACTIONAL SHORTENING: 41 % (ref 28–44)
GLUCOSE SERPL-MCNC: 125 MG/DL (ref 70–110)
HCT VFR BLD AUTO: 38 % (ref 40–54)
HGB BLD-MCNC: 12 G/DL (ref 14–18)
IMM GRANULOCYTES # BLD AUTO: ABNORMAL K/UL (ref 0–0.04)
IMM GRANULOCYTES NFR BLD AUTO: ABNORMAL % (ref 0–0.5)
INTERVENTRICULAR SEPTUM: 0.88 CM (ref 0.6–1.1)
LACTATE SERPL-SCNC: 1.3 MMOL/L (ref 0.5–1.9)
LEFT ATRIUM SIZE: 3.68 CM
LEFT INTERNAL DIMENSION IN SYSTOLE: 2.22 CM (ref 2.1–4)
LEFT VENTRICLE MASS INDEX: 49 G/M2
LEFT VENTRICULAR INTERNAL DIMENSION IN DIASTOLE: 3.75 CM (ref 3.5–6)
LEFT VENTRICULAR MASS: 98.96 G
LV LATERAL E/E' RATIO: 5.5 M/S
LV SEPTAL E/E' RATIO: 6.11 M/S
LYMPHOCYTES NFR BLD: 11 % (ref 18–48)
MAGNESIUM SERPL-MCNC: 1.7 MG/DL (ref 1.6–2.6)
MCH RBC QN AUTO: 28.4 PG (ref 27–31)
MCHC RBC AUTO-ENTMCNC: 31.6 G/DL (ref 32–36)
MCV RBC AUTO: 90 FL (ref 82–98)
MONOCYTES NFR BLD: 8 % (ref 4–15)
MV PEAK A VEL: 0.73 M/S
MV PEAK E VEL: 0.55 M/S
NEUTROPHILS NFR BLD: 49 % (ref 38–73)
NEUTS BAND NFR BLD MANUAL: 32 %
NRBC BLD-RTO: 0 /100 WBC
PISA TR MAX VEL: 2.54 M/S
PLATELET # BLD AUTO: 190 K/UL (ref 150–350)
PLATELET BLD QL SMEAR: ABNORMAL
PMV BLD AUTO: 10.6 FL (ref 9.2–12.9)
POTASSIUM SERPL-SCNC: 3.8 MMOL/L (ref 3.5–5.1)
PROCALCITONIN SERPL IA-MCNC: 2.94 NG/ML (ref 0–0.5)
PROT SERPL-MCNC: 7 G/DL (ref 6–8.4)
PV PEAK VELOCITY: 101.44 CM/S
RA PRESSURE: 3 MMHG
RBC # BLD AUTO: 4.22 M/UL (ref 4.6–6.2)
RETIRED EF AND QEF - SEE NOTES: 70 %
RIGHT VENTRICULAR END-DIASTOLIC DIMENSION: 238 CM
SODIUM SERPL-SCNC: 135 MMOL/L (ref 136–145)
TDI LATERAL: 0.1 M/S
TDI SEPTAL: 0.09 M/S
TDI: 0.1 M/S
TR MAX PG: 26 MMHG
TV REST PULMONARY ARTERY PRESSURE: 29 MMHG
VANCOMYCIN SERPL-MCNC: 13.2 UG/ML
WBC # BLD AUTO: 9.5 K/UL (ref 3.9–12.7)

## 2021-03-14 PROCEDURE — 83605 ASSAY OF LACTIC ACID: CPT | Performed by: INTERNAL MEDICINE

## 2021-03-14 PROCEDURE — 25000003 PHARM REV CODE 250: Performed by: INTERNAL MEDICINE

## 2021-03-14 PROCEDURE — 86140 C-REACTIVE PROTEIN: CPT | Performed by: INTERNAL MEDICINE

## 2021-03-14 PROCEDURE — 85027 COMPLETE CBC AUTOMATED: CPT | Performed by: INTERNAL MEDICINE

## 2021-03-14 PROCEDURE — 85007 BL SMEAR W/DIFF WBC COUNT: CPT | Performed by: INTERNAL MEDICINE

## 2021-03-14 PROCEDURE — 80053 COMPREHEN METABOLIC PANEL: CPT | Performed by: INTERNAL MEDICINE

## 2021-03-14 PROCEDURE — 83735 ASSAY OF MAGNESIUM: CPT | Performed by: INTERNAL MEDICINE

## 2021-03-14 PROCEDURE — S0030 INJECTION, METRONIDAZOLE: HCPCS | Performed by: INTERNAL MEDICINE

## 2021-03-14 PROCEDURE — 87040 BLOOD CULTURE FOR BACTERIA: CPT | Performed by: INTERNAL MEDICINE

## 2021-03-14 PROCEDURE — 36415 COLL VENOUS BLD VENIPUNCTURE: CPT | Performed by: INTERNAL MEDICINE

## 2021-03-14 PROCEDURE — 93306 TTE W/DOPPLER COMPLETE: CPT

## 2021-03-14 PROCEDURE — 99233 PR SUBSEQUENT HOSPITAL CARE,LEVL III: ICD-10-PCS | Mod: ,,, | Performed by: INTERNAL MEDICINE

## 2021-03-14 PROCEDURE — 63600175 PHARM REV CODE 636 W HCPCS: Performed by: INTERNAL MEDICINE

## 2021-03-14 PROCEDURE — 80202 ASSAY OF VANCOMYCIN: CPT | Performed by: INTERNAL MEDICINE

## 2021-03-14 PROCEDURE — 99233 SBSQ HOSP IP/OBS HIGH 50: CPT | Mod: ,,, | Performed by: INTERNAL MEDICINE

## 2021-03-14 PROCEDURE — 21400001 HC TELEMETRY ROOM

## 2021-03-14 PROCEDURE — 84145 PROCALCITONIN (PCT): CPT | Performed by: INTERNAL MEDICINE

## 2021-03-14 RX ORDER — MAGNESIUM SULFATE HEPTAHYDRATE 40 MG/ML
2 INJECTION, SOLUTION INTRAVENOUS
Status: DISCONTINUED | OUTPATIENT
Start: 2021-03-14 | End: 2021-03-16 | Stop reason: HOSPADM

## 2021-03-14 RX ORDER — POTASSIUM CHLORIDE 7.45 MG/ML
20 INJECTION INTRAVENOUS
Status: DISCONTINUED | OUTPATIENT
Start: 2021-03-14 | End: 2021-03-16 | Stop reason: HOSPADM

## 2021-03-14 RX ORDER — LANOLIN ALCOHOL/MO/W.PET/CERES
800 CREAM (GRAM) TOPICAL
Status: DISCONTINUED | OUTPATIENT
Start: 2021-03-14 | End: 2021-03-16 | Stop reason: HOSPADM

## 2021-03-14 RX ORDER — POTASSIUM CHLORIDE 20 MEQ/1
40 TABLET, EXTENDED RELEASE ORAL
Status: DISCONTINUED | OUTPATIENT
Start: 2021-03-14 | End: 2021-03-16 | Stop reason: HOSPADM

## 2021-03-14 RX ORDER — LIDOCAINE 50 MG/G
1 PATCH TOPICAL
Status: DISCONTINUED | OUTPATIENT
Start: 2021-03-14 | End: 2021-03-16 | Stop reason: HOSPADM

## 2021-03-14 RX ORDER — HYDROMORPHONE HYDROCHLORIDE 1 MG/ML
1 INJECTION, SOLUTION INTRAMUSCULAR; INTRAVENOUS; SUBCUTANEOUS EVERY 6 HOURS PRN
Status: DISCONTINUED | OUTPATIENT
Start: 2021-03-14 | End: 2021-03-15

## 2021-03-14 RX ORDER — POTASSIUM CHLORIDE 20 MEQ/1
20 TABLET, EXTENDED RELEASE ORAL
Status: DISCONTINUED | OUTPATIENT
Start: 2021-03-14 | End: 2021-03-16 | Stop reason: HOSPADM

## 2021-03-14 RX ORDER — CALCIUM CHLORIDE IN 0.9 % NACL 1 G/100 ML
1 INTRAVENOUS SOLUTION, PIGGYBACK (ML) INTRAVENOUS
Status: DISCONTINUED | OUTPATIENT
Start: 2021-03-14 | End: 2021-03-16 | Stop reason: HOSPADM

## 2021-03-14 RX ORDER — CHOLESTYRAMINE 4 G/4.8G
4 POWDER, FOR SUSPENSION ORAL 2 TIMES DAILY
Status: DISCONTINUED | OUTPATIENT
Start: 2021-03-14 | End: 2021-03-16 | Stop reason: HOSPADM

## 2021-03-14 RX ORDER — MAGNESIUM SULFATE 1 G/100ML
1 INJECTION INTRAVENOUS
Status: DISCONTINUED | OUTPATIENT
Start: 2021-03-14 | End: 2021-03-16 | Stop reason: HOSPADM

## 2021-03-14 RX ORDER — POTASSIUM CHLORIDE 7.45 MG/ML
40 INJECTION INTRAVENOUS
Status: DISCONTINUED | OUTPATIENT
Start: 2021-03-14 | End: 2021-03-16 | Stop reason: HOSPADM

## 2021-03-14 RX ORDER — HYDROMORPHONE HYDROCHLORIDE 1 MG/ML
0.5 INJECTION, SOLUTION INTRAMUSCULAR; INTRAVENOUS; SUBCUTANEOUS EVERY 6 HOURS PRN
Status: DISCONTINUED | OUTPATIENT
Start: 2021-03-14 | End: 2021-03-14

## 2021-03-14 RX ORDER — MAGNESIUM SULFATE HEPTAHYDRATE 40 MG/ML
4 INJECTION, SOLUTION INTRAVENOUS
Status: DISCONTINUED | OUTPATIENT
Start: 2021-03-14 | End: 2021-03-16 | Stop reason: HOSPADM

## 2021-03-14 RX ADMIN — CHOLESTYRAMINE 4 G: 4 POWDER, FOR SUSPENSION ORAL at 09:03

## 2021-03-14 RX ADMIN — HYDROMORPHONE HYDROCHLORIDE 0.5 MG: 1 INJECTION, SOLUTION INTRAMUSCULAR; INTRAVENOUS; SUBCUTANEOUS at 01:03

## 2021-03-14 RX ADMIN — METRONIDAZOLE 500 MG: 500 INJECTION, SOLUTION INTRAVENOUS at 09:03

## 2021-03-14 RX ADMIN — TRAMADOL HYDROCHLORIDE 50 MG: 50 TABLET, FILM COATED ORAL at 10:03

## 2021-03-14 RX ADMIN — ENOXAPARIN SODIUM 40 MG: 40 INJECTION SUBCUTANEOUS at 05:03

## 2021-03-14 RX ADMIN — OXYCODONE HYDROCHLORIDE 20 MG: 5 TABLET ORAL at 06:03

## 2021-03-14 RX ADMIN — METRONIDAZOLE 500 MG: 500 INJECTION, SOLUTION INTRAVENOUS at 03:03

## 2021-03-14 RX ADMIN — SODIUM CHLORIDE: 0.9 INJECTION, SOLUTION INTRAVENOUS at 09:03

## 2021-03-14 RX ADMIN — SODIUM CHLORIDE: 0.9 INJECTION, SOLUTION INTRAVENOUS at 01:03

## 2021-03-14 RX ADMIN — OXYCODONE HYDROCHLORIDE 20 MG: 5 TABLET ORAL at 03:03

## 2021-03-14 RX ADMIN — HYDROMORPHONE HYDROCHLORIDE 1 MG: 1 INJECTION, SOLUTION INTRAMUSCULAR; INTRAVENOUS; SUBCUTANEOUS at 05:03

## 2021-03-14 RX ADMIN — LACTOBACILLUS TAB 4 TABLET: TAB at 05:03

## 2021-03-14 RX ADMIN — OXYCODONE HYDROCHLORIDE 20 MG: 5 TABLET ORAL at 09:03

## 2021-03-14 RX ADMIN — ACETAMINOPHEN 1000 MG: 500 TABLET, FILM COATED ORAL at 06:03

## 2021-03-14 RX ADMIN — METRONIDAZOLE 500 MG: 500 INJECTION, SOLUTION INTRAVENOUS at 05:03

## 2021-03-14 RX ADMIN — LIDOCAINE 1 PATCH: 50 PATCH TOPICAL at 05:03

## 2021-03-14 RX ADMIN — ACETAMINOPHEN 1000 MG: 500 TABLET, FILM COATED ORAL at 01:03

## 2021-03-14 RX ADMIN — CLOPIDOGREL BISULFATE 75 MG: 75 TABLET, FILM COATED ORAL at 09:03

## 2021-03-14 RX ADMIN — SODIUM CHLORIDE: 0.9 INJECTION, SOLUTION INTRAVENOUS at 04:03

## 2021-03-15 LAB
ALBUMIN SERPL BCP-MCNC: 3.4 G/DL (ref 3.5–5.2)
ALP SERPL-CCNC: 35 U/L (ref 55–135)
ALT SERPL W/O P-5'-P-CCNC: 29 U/L (ref 10–44)
ANION GAP SERPL CALC-SCNC: 10 MMOL/L (ref 8–16)
AST SERPL-CCNC: 36 U/L (ref 10–40)
BILIRUB SERPL-MCNC: 0.6 MG/DL (ref 0.1–1)
BUN SERPL-MCNC: 29 MG/DL (ref 8–23)
CALCIUM SERPL-MCNC: 8.7 MG/DL (ref 8.7–10.5)
CHLORIDE SERPL-SCNC: 107 MMOL/L (ref 95–110)
CO2 SERPL-SCNC: 19 MMOL/L (ref 23–29)
CREAT SERPL-MCNC: 1.7 MG/DL (ref 0.5–1.4)
ERYTHROCYTE [DISTWIDTH] IN BLOOD BY AUTOMATED COUNT: 14.9 % (ref 11.5–14.5)
EST. GFR  (AFRICAN AMERICAN): 45.6 ML/MIN/1.73 M^2
EST. GFR  (NON AFRICAN AMERICAN): 39.4 ML/MIN/1.73 M^2
GLUCOSE SERPL-MCNC: 94 MG/DL (ref 70–110)
HCT VFR BLD AUTO: 37.9 % (ref 40–54)
HGB BLD-MCNC: 12 G/DL (ref 14–18)
MCH RBC QN AUTO: 28.3 PG (ref 27–31)
MCHC RBC AUTO-ENTMCNC: 31.7 G/DL (ref 32–36)
MCV RBC AUTO: 89 FL (ref 82–98)
MRSA SCREEN BY PCR: POSITIVE
PLATELET # BLD AUTO: 183 K/UL (ref 150–350)
PMV BLD AUTO: 10.7 FL (ref 9.2–12.9)
POTASSIUM SERPL-SCNC: 3.7 MMOL/L (ref 3.5–5.1)
PROCALCITONIN SERPL IA-MCNC: 0.88 NG/ML (ref 0–0.5)
PROT SERPL-MCNC: 7.4 G/DL (ref 6–8.4)
RBC # BLD AUTO: 4.24 M/UL (ref 4.6–6.2)
SODIUM SERPL-SCNC: 136 MMOL/L (ref 136–145)
WBC # BLD AUTO: 8.13 K/UL (ref 3.9–12.7)

## 2021-03-15 PROCEDURE — 25000003 PHARM REV CODE 250: Performed by: INTERNAL MEDICINE

## 2021-03-15 PROCEDURE — 99232 PR SUBSEQUENT HOSPITAL CARE,LEVL II: ICD-10-PCS | Mod: ,,, | Performed by: INTERNAL MEDICINE

## 2021-03-15 PROCEDURE — 36415 COLL VENOUS BLD VENIPUNCTURE: CPT | Performed by: INTERNAL MEDICINE

## 2021-03-15 PROCEDURE — S0030 INJECTION, METRONIDAZOLE: HCPCS | Performed by: INTERNAL MEDICINE

## 2021-03-15 PROCEDURE — 12000002 HC ACUTE/MED SURGE SEMI-PRIVATE ROOM

## 2021-03-15 PROCEDURE — 87641 MR-STAPH DNA AMP PROBE: CPT | Performed by: INTERNAL MEDICINE

## 2021-03-15 PROCEDURE — 86200 CCP ANTIBODY: CPT | Performed by: INTERNAL MEDICINE

## 2021-03-15 PROCEDURE — 85027 COMPLETE CBC AUTOMATED: CPT | Performed by: INTERNAL MEDICINE

## 2021-03-15 PROCEDURE — 63600175 PHARM REV CODE 636 W HCPCS: Performed by: INTERNAL MEDICINE

## 2021-03-15 PROCEDURE — 84145 PROCALCITONIN (PCT): CPT | Performed by: INTERNAL MEDICINE

## 2021-03-15 PROCEDURE — 86431 RHEUMATOID FACTOR QUANT: CPT | Performed by: INTERNAL MEDICINE

## 2021-03-15 PROCEDURE — 86038 ANTINUCLEAR ANTIBODIES: CPT | Performed by: INTERNAL MEDICINE

## 2021-03-15 PROCEDURE — 80053 COMPREHEN METABOLIC PANEL: CPT | Performed by: INTERNAL MEDICINE

## 2021-03-15 PROCEDURE — 87040 BLOOD CULTURE FOR BACTERIA: CPT | Performed by: INTERNAL MEDICINE

## 2021-03-15 PROCEDURE — 99232 SBSQ HOSP IP/OBS MODERATE 35: CPT | Mod: ,,, | Performed by: INTERNAL MEDICINE

## 2021-03-15 RX ORDER — MUPIROCIN 20 MG/G
OINTMENT TOPICAL 2 TIMES DAILY
Status: DISCONTINUED | OUTPATIENT
Start: 2021-03-15 | End: 2021-03-16 | Stop reason: HOSPADM

## 2021-03-15 RX ORDER — CHLORHEXIDINE GLUCONATE ORAL RINSE 1.2 MG/ML
15 SOLUTION DENTAL 2 TIMES DAILY
Status: DISCONTINUED | OUTPATIENT
Start: 2021-03-15 | End: 2021-03-16 | Stop reason: HOSPADM

## 2021-03-15 RX ORDER — POTASSIUM CHLORIDE 20 MEQ/1
40 TABLET, EXTENDED RELEASE ORAL ONCE
Status: COMPLETED | OUTPATIENT
Start: 2021-03-15 | End: 2021-03-15

## 2021-03-15 RX ORDER — MAGNESIUM SULFATE HEPTAHYDRATE 40 MG/ML
2 INJECTION, SOLUTION INTRAVENOUS ONCE
Status: COMPLETED | OUTPATIENT
Start: 2021-03-15 | End: 2021-03-15

## 2021-03-15 RX ORDER — OXYCODONE HYDROCHLORIDE 5 MG/1
20 TABLET ORAL EVERY 4 HOURS PRN
Status: DISCONTINUED | OUTPATIENT
Start: 2021-03-15 | End: 2021-03-16 | Stop reason: HOSPADM

## 2021-03-15 RX ADMIN — CHOLESTYRAMINE 4 G: 4 POWDER, FOR SUSPENSION ORAL at 07:03

## 2021-03-15 RX ADMIN — METRONIDAZOLE 500 MG: 500 INJECTION, SOLUTION INTRAVENOUS at 10:03

## 2021-03-15 RX ADMIN — MUPIROCIN: 20 OINTMENT TOPICAL at 09:03

## 2021-03-15 RX ADMIN — ENOXAPARIN SODIUM 40 MG: 40 INJECTION SUBCUTANEOUS at 04:03

## 2021-03-15 RX ADMIN — LACTOBACILLUS TAB 4 TABLET: TAB at 08:03

## 2021-03-15 RX ADMIN — HYDROMORPHONE HYDROCHLORIDE 1 MG: 1 INJECTION, SOLUTION INTRAMUSCULAR; INTRAVENOUS; SUBCUTANEOUS at 12:03

## 2021-03-15 RX ADMIN — POTASSIUM CHLORIDE 40 MEQ: 20 TABLET, EXTENDED RELEASE ORAL at 04:03

## 2021-03-15 RX ADMIN — LACTOBACILLUS TAB 4 TABLET: TAB at 04:03

## 2021-03-15 RX ADMIN — LEVOFLOXACIN 750 MG: 750 INJECTION, SOLUTION INTRAVENOUS at 02:03

## 2021-03-15 RX ADMIN — CHLORHEXIDINE GLUCONATE 15 ML: 1.2 RINSE ORAL at 09:03

## 2021-03-15 RX ADMIN — OXYCODONE HYDROCHLORIDE 20 MG: 5 TABLET ORAL at 01:03

## 2021-03-15 RX ADMIN — LIDOCAINE 1 PATCH: 50 PATCH TOPICAL at 06:03

## 2021-03-15 RX ADMIN — OXYCODONE HYDROCHLORIDE 20 MG: 5 TABLET ORAL at 03:03

## 2021-03-15 RX ADMIN — METRONIDAZOLE 500 MG: 500 INJECTION, SOLUTION INTRAVENOUS at 06:03

## 2021-03-15 RX ADMIN — OXYCODONE HYDROCHLORIDE 20 MG: 5 TABLET ORAL at 09:03

## 2021-03-15 RX ADMIN — MAGNESIUM SULFATE 2 G: 2 INJECTION INTRAVENOUS at 05:03

## 2021-03-15 RX ADMIN — CHOLESTYRAMINE 4 G: 4 POWDER, FOR SUSPENSION ORAL at 08:03

## 2021-03-15 RX ADMIN — METRONIDAZOLE 500 MG: 500 INJECTION, SOLUTION INTRAVENOUS at 02:03

## 2021-03-15 RX ADMIN — OXYCODONE HYDROCHLORIDE 20 MG: 5 TABLET ORAL at 08:03

## 2021-03-15 RX ADMIN — OXYCODONE HYDROCHLORIDE 20 MG: 5 TABLET ORAL at 05:03

## 2021-03-15 RX ADMIN — CLOPIDOGREL BISULFATE 75 MG: 75 TABLET, FILM COATED ORAL at 08:03

## 2021-03-15 RX ADMIN — VANCOMYCIN HYDROCHLORIDE 1500 MG: 1.5 INJECTION, POWDER, LYOPHILIZED, FOR SOLUTION INTRAVENOUS at 03:03

## 2021-03-15 RX ADMIN — SODIUM CHLORIDE: 0.9 INJECTION, SOLUTION INTRAVENOUS at 02:03

## 2021-03-15 RX ADMIN — LACTOBACILLUS TAB 4 TABLET: TAB at 12:03

## 2021-03-16 VITALS
RESPIRATION RATE: 16 BRPM | BODY MASS INDEX: 31.39 KG/M2 | SYSTOLIC BLOOD PRESSURE: 149 MMHG | DIASTOLIC BLOOD PRESSURE: 77 MMHG | WEIGHT: 200 LBS | OXYGEN SATURATION: 96 % | TEMPERATURE: 98 F | HEART RATE: 84 BPM | HEIGHT: 67 IN

## 2021-03-16 PROBLEM — A02.0 SALMONELLA DYSENTERY: Status: ACTIVE | Noted: 2021-03-16

## 2021-03-16 LAB
ALBUMIN SERPL BCP-MCNC: 3.1 G/DL (ref 3.5–5.2)
ALP SERPL-CCNC: 33 U/L (ref 55–135)
ALT SERPL W/O P-5'-P-CCNC: 24 U/L (ref 10–44)
ANION GAP SERPL CALC-SCNC: 9 MMOL/L (ref 8–16)
AST SERPL-CCNC: 28 U/L (ref 10–40)
BILIRUB SERPL-MCNC: 0.6 MG/DL (ref 0.1–1)
BUN SERPL-MCNC: 17 MG/DL (ref 8–23)
CALCIUM SERPL-MCNC: 8.8 MG/DL (ref 8.7–10.5)
CHLORIDE SERPL-SCNC: 112 MMOL/L (ref 95–110)
CO2 SERPL-SCNC: 20 MMOL/L (ref 23–29)
CREAT SERPL-MCNC: 1.1 MG/DL (ref 0.5–1.4)
ERYTHROCYTE [DISTWIDTH] IN BLOOD BY AUTOMATED COUNT: 14.8 % (ref 11.5–14.5)
EST. GFR  (AFRICAN AMERICAN): >60 ML/MIN/1.73 M^2
EST. GFR  (NON AFRICAN AMERICAN): >60 ML/MIN/1.73 M^2
GLUCOSE SERPL-MCNC: 90 MG/DL (ref 70–110)
HCT VFR BLD AUTO: 36.3 % (ref 40–54)
HGB BLD-MCNC: 11.5 G/DL (ref 14–18)
MCH RBC QN AUTO: 28.5 PG (ref 27–31)
MCHC RBC AUTO-ENTMCNC: 31.7 G/DL (ref 32–36)
MCV RBC AUTO: 90 FL (ref 82–98)
PLATELET # BLD AUTO: 184 K/UL (ref 150–350)
PMV BLD AUTO: 10.2 FL (ref 9.2–12.9)
POTASSIUM SERPL-SCNC: 3.8 MMOL/L (ref 3.5–5.1)
PROT SERPL-MCNC: 6.6 G/DL (ref 6–8.4)
RBC # BLD AUTO: 4.04 M/UL (ref 4.6–6.2)
RHEUMATOID FACT SERPL-ACNC: 11 IU/ML (ref 0–13.9)
SODIUM SERPL-SCNC: 141 MMOL/L (ref 136–145)
WBC # BLD AUTO: 6.03 K/UL (ref 3.9–12.7)

## 2021-03-16 PROCEDURE — S0030 INJECTION, METRONIDAZOLE: HCPCS | Performed by: INTERNAL MEDICINE

## 2021-03-16 PROCEDURE — 25000003 PHARM REV CODE 250: Performed by: INTERNAL MEDICINE

## 2021-03-16 PROCEDURE — 85027 COMPLETE CBC AUTOMATED: CPT | Performed by: INTERNAL MEDICINE

## 2021-03-16 PROCEDURE — 36415 COLL VENOUS BLD VENIPUNCTURE: CPT | Performed by: INTERNAL MEDICINE

## 2021-03-16 PROCEDURE — 80053 COMPREHEN METABOLIC PANEL: CPT | Performed by: INTERNAL MEDICINE

## 2021-03-16 RX ORDER — LEVOFLOXACIN 500 MG/1
500 TABLET, FILM COATED ORAL DAILY
Qty: 4 TABLET | Refills: 0 | Status: SHIPPED | OUTPATIENT
Start: 2021-03-16 | End: 2021-03-20

## 2021-03-16 RX ORDER — BUTYROSPERMUM PARKII(SHEA BUTTER), SIMMONDSIA CHINENSIS (JOJOBA) SEED OIL, ALOE BARBADENSIS LEAF EXTRACT .01; 1; 3.5 G/100G; G/100G; G/100G
250 LIQUID TOPICAL 2 TIMES DAILY
Qty: 60 CAPSULE | Refills: 1 | COMMUNITY
Start: 2021-03-16 | End: 2021-04-15

## 2021-03-16 RX ADMIN — OXYCODONE HYDROCHLORIDE 20 MG: 5 TABLET ORAL at 01:03

## 2021-03-16 RX ADMIN — OXYCODONE HYDROCHLORIDE 20 MG: 5 TABLET ORAL at 09:03

## 2021-03-16 RX ADMIN — METRONIDAZOLE 500 MG: 500 INJECTION, SOLUTION INTRAVENOUS at 01:03

## 2021-03-16 RX ADMIN — CHOLESTYRAMINE 4 G: 4 POWDER, FOR SUSPENSION ORAL at 07:03

## 2021-03-16 RX ADMIN — CLOPIDOGREL BISULFATE 75 MG: 75 TABLET, FILM COATED ORAL at 09:03

## 2021-03-16 RX ADMIN — LACTOBACILLUS TAB 4 TABLET: TAB at 09:03

## 2021-03-16 RX ADMIN — LACTOBACILLUS TAB 4 TABLET: TAB at 11:03

## 2021-03-16 RX ADMIN — SODIUM CHLORIDE: 0.9 INJECTION, SOLUTION INTRAVENOUS at 05:03

## 2021-03-16 RX ADMIN — OXYCODONE HYDROCHLORIDE 20 MG: 5 TABLET ORAL at 05:03

## 2021-03-17 LAB
ANA TITR SER IF: NEGATIVE {TITER}
CCP IGA+IGG SERPL IA-ACNC: 2 UNITS (ref 0–19)

## 2021-03-18 LAB
BACTERIA BLD CULT: ABNORMAL
BACTERIA BLD CULT: NORMAL

## 2021-03-19 LAB
BACTERIA BLD CULT: NORMAL
O+P STL TRI STN: NORMAL

## 2021-03-20 LAB — BACTERIA BLD CULT: NORMAL

## 2021-03-23 ENCOUNTER — OFFICE VISIT (OUTPATIENT)
Dept: FAMILY MEDICINE | Facility: CLINIC | Age: 71
End: 2021-03-23
Payer: MEDICARE

## 2021-03-23 VITALS
DIASTOLIC BLOOD PRESSURE: 77 MMHG | WEIGHT: 228 LBS | HEART RATE: 78 BPM | HEIGHT: 67 IN | OXYGEN SATURATION: 95 % | SYSTOLIC BLOOD PRESSURE: 139 MMHG | TEMPERATURE: 98 F | BODY MASS INDEX: 35.79 KG/M2

## 2021-03-23 DIAGNOSIS — K52.9 ENTERITIS: ICD-10-CM

## 2021-03-23 DIAGNOSIS — A02.0 SALMONELLA DYSENTERY: Primary | ICD-10-CM

## 2021-03-23 LAB
STOOL CULTURE: ABNORMAL
STOOL CULTURE: ABNORMAL

## 2021-03-23 PROCEDURE — 3288F FALL RISK ASSESSMENT DOCD: CPT | Mod: CPTII,S$GLB,, | Performed by: FAMILY MEDICINE

## 2021-03-23 PROCEDURE — 1126F PR PAIN SEVERITY QUANTIFIED, NO PAIN PRESENT: ICD-10-PCS | Mod: S$GLB,,, | Performed by: FAMILY MEDICINE

## 2021-03-23 PROCEDURE — 1101F PR PT FALLS ASSESS DOC 0-1 FALLS W/OUT INJ PAST YR: ICD-10-PCS | Mod: CPTII,S$GLB,, | Performed by: FAMILY MEDICINE

## 2021-03-23 PROCEDURE — 3288F PR FALLS RISK ASSESSMENT DOCUMENTED: ICD-10-PCS | Mod: CPTII,S$GLB,, | Performed by: FAMILY MEDICINE

## 2021-03-23 PROCEDURE — 99213 OFFICE O/P EST LOW 20 MIN: CPT | Mod: S$GLB,,, | Performed by: FAMILY MEDICINE

## 2021-03-23 PROCEDURE — 3008F BODY MASS INDEX DOCD: CPT | Mod: CPTII,S$GLB,, | Performed by: FAMILY MEDICINE

## 2021-03-23 PROCEDURE — 1126F AMNT PAIN NOTED NONE PRSNT: CPT | Mod: S$GLB,,, | Performed by: FAMILY MEDICINE

## 2021-03-23 PROCEDURE — 99213 PR OFFICE/OUTPT VISIT, EST, LEVL III, 20-29 MIN: ICD-10-PCS | Mod: S$GLB,,, | Performed by: FAMILY MEDICINE

## 2021-03-23 PROCEDURE — 1101F PT FALLS ASSESS-DOCD LE1/YR: CPT | Mod: CPTII,S$GLB,, | Performed by: FAMILY MEDICINE

## 2021-03-23 PROCEDURE — 3008F PR BODY MASS INDEX (BMI) DOCUMENTED: ICD-10-PCS | Mod: CPTII,S$GLB,, | Performed by: FAMILY MEDICINE

## 2021-03-25 LAB
ALBUMIN SERPL-MCNC: 3.7 G/DL (ref 3.6–5.1)
ALBUMIN/GLOB SERPL: 1.1 (CALC) (ref 1–2.5)
ALP SERPL-CCNC: 40 U/L (ref 35–144)
ALT SERPL-CCNC: 20 U/L (ref 9–46)
AST SERPL-CCNC: 19 U/L (ref 10–35)
BASOPHILS # BLD AUTO: 65 CELLS/UL (ref 0–200)
BASOPHILS NFR BLD AUTO: 0.6 %
BILIRUB SERPL-MCNC: 0.3 MG/DL (ref 0.2–1.2)
BUN SERPL-MCNC: 16 MG/DL (ref 7–25)
BUN/CREAT SERPL: 13 (CALC) (ref 6–22)
CALCIUM SERPL-MCNC: 9.5 MG/DL (ref 8.6–10.3)
CHLORIDE SERPL-SCNC: 97 MMOL/L (ref 98–110)
CO2 SERPL-SCNC: 33 MMOL/L (ref 20–32)
CREAT SERPL-MCNC: 1.28 MG/DL (ref 0.7–1.18)
EOSINOPHIL # BLD AUTO: 174 CELLS/UL (ref 15–500)
EOSINOPHIL NFR BLD AUTO: 1.6 %
ERYTHROCYTE [DISTWIDTH] IN BLOOD BY AUTOMATED COUNT: 14.1 % (ref 11–15)
GFRSERPLBLD MDRD-ARVRAT: 56 ML/MIN/1.73M2
GLOBULIN SER CALC-MCNC: 3.4 G/DL (CALC) (ref 1.9–3.7)
GLUCOSE SERPL-MCNC: 97 MG/DL (ref 65–99)
HCT VFR BLD AUTO: 40.7 % (ref 38.5–50)
HGB BLD-MCNC: 12.7 G/DL (ref 13.2–17.1)
LYMPHOCYTES # BLD AUTO: 1777 CELLS/UL (ref 850–3900)
LYMPHOCYTES NFR BLD AUTO: 16.3 %
MCH RBC QN AUTO: 27.6 PG (ref 27–33)
MCHC RBC AUTO-ENTMCNC: 31.2 G/DL (ref 32–36)
MCV RBC AUTO: 88.5 FL (ref 80–100)
MONOCYTES # BLD AUTO: 905 CELLS/UL (ref 200–950)
MONOCYTES NFR BLD AUTO: 8.3 %
NEUTROPHILS # BLD AUTO: 7979 CELLS/UL (ref 1500–7800)
NEUTROPHILS NFR BLD AUTO: 73.2 %
PLATELET # BLD AUTO: 290 THOUSAND/UL (ref 140–400)
PMV BLD REES-ECKER: 10.5 FL (ref 7.5–12.5)
POTASSIUM SERPL-SCNC: 4.4 MMOL/L (ref 3.5–5.3)
PROT SERPL-MCNC: 7.1 G/DL (ref 6.1–8.1)
RBC # BLD AUTO: 4.6 MILLION/UL (ref 4.2–5.8)
SODIUM SERPL-SCNC: 140 MMOL/L (ref 135–146)
WBC # BLD AUTO: 10.9 THOUSAND/UL (ref 3.8–10.8)

## 2021-03-29 LAB
CAMPYLOBACTER STL CULT: NORMAL
E COLI SXT STL QL IA: NORMAL
SALM + SHIG STL CULT: NORMAL

## 2021-04-13 ENCOUNTER — OFFICE VISIT (OUTPATIENT)
Dept: FAMILY MEDICINE | Facility: CLINIC | Age: 71
End: 2021-04-13
Payer: MEDICARE

## 2021-04-13 VITALS
OXYGEN SATURATION: 96 % | HEART RATE: 74 BPM | WEIGHT: 227 LBS | BODY MASS INDEX: 35.63 KG/M2 | HEIGHT: 67 IN | DIASTOLIC BLOOD PRESSURE: 81 MMHG | TEMPERATURE: 98 F | SYSTOLIC BLOOD PRESSURE: 132 MMHG

## 2021-04-13 DIAGNOSIS — I25.10 CORONARY ARTERY DISEASE, ANGINA PRESENCE UNSPECIFIED, UNSPECIFIED VESSEL OR LESION TYPE, UNSPECIFIED WHETHER NATIVE OR TRANSPLANTED HEART: ICD-10-CM

## 2021-04-13 DIAGNOSIS — Z95.5 STENTED CORONARY ARTERY: Primary | ICD-10-CM

## 2021-04-13 DIAGNOSIS — F41.9 ANXIETY: ICD-10-CM

## 2021-04-13 DIAGNOSIS — Z86.14 HISTORY OF MRSA INFECTION: ICD-10-CM

## 2021-04-13 PROBLEM — K80.00 CHOLELITHIASIS WITH ACUTE CHOLECYSTITIS: Status: RESOLVED | Noted: 2020-01-16 | Resolved: 2021-04-13

## 2021-04-13 PROBLEM — Z79.899 CHRONIC PRESCRIPTION BENZODIAZEPINE USE: Status: RESOLVED | Noted: 2020-10-29 | Resolved: 2021-04-13

## 2021-04-13 PROCEDURE — 3288F FALL RISK ASSESSMENT DOCD: CPT | Mod: CPTII,S$GLB,, | Performed by: FAMILY MEDICINE

## 2021-04-13 PROCEDURE — 1159F MED LIST DOCD IN RCRD: CPT | Mod: S$GLB,,, | Performed by: FAMILY MEDICINE

## 2021-04-13 PROCEDURE — 1101F PT FALLS ASSESS-DOCD LE1/YR: CPT | Mod: CPTII,S$GLB,, | Performed by: FAMILY MEDICINE

## 2021-04-13 PROCEDURE — 99214 PR OFFICE/OUTPT VISIT, EST, LEVL IV, 30-39 MIN: ICD-10-PCS | Mod: S$GLB,,, | Performed by: FAMILY MEDICINE

## 2021-04-13 PROCEDURE — 99214 OFFICE O/P EST MOD 30 MIN: CPT | Mod: S$GLB,,, | Performed by: FAMILY MEDICINE

## 2021-04-13 PROCEDURE — 1101F PR PT FALLS ASSESS DOC 0-1 FALLS W/OUT INJ PAST YR: ICD-10-PCS | Mod: CPTII,S$GLB,, | Performed by: FAMILY MEDICINE

## 2021-04-13 PROCEDURE — 1126F AMNT PAIN NOTED NONE PRSNT: CPT | Mod: S$GLB,,, | Performed by: FAMILY MEDICINE

## 2021-04-13 PROCEDURE — 3008F BODY MASS INDEX DOCD: CPT | Mod: CPTII,S$GLB,, | Performed by: FAMILY MEDICINE

## 2021-04-13 PROCEDURE — 3288F PR FALLS RISK ASSESSMENT DOCUMENTED: ICD-10-PCS | Mod: CPTII,S$GLB,, | Performed by: FAMILY MEDICINE

## 2021-04-13 PROCEDURE — 1159F PR MEDICATION LIST DOCUMENTED IN MEDICAL RECORD: ICD-10-PCS | Mod: S$GLB,,, | Performed by: FAMILY MEDICINE

## 2021-04-13 PROCEDURE — 1126F PR PAIN SEVERITY QUANTIFIED, NO PAIN PRESENT: ICD-10-PCS | Mod: S$GLB,,, | Performed by: FAMILY MEDICINE

## 2021-04-13 PROCEDURE — 3008F PR BODY MASS INDEX (BMI) DOCUMENTED: ICD-10-PCS | Mod: CPTII,S$GLB,, | Performed by: FAMILY MEDICINE

## 2021-04-13 RX ORDER — PANTOPRAZOLE SODIUM 40 MG/1
40 TABLET, DELAYED RELEASE ORAL DAILY
Qty: 90 TABLET | Refills: 1 | Status: SHIPPED | OUTPATIENT
Start: 2021-04-13 | End: 2022-05-16 | Stop reason: SDUPTHER

## 2021-04-13 RX ORDER — QUETIAPINE FUMARATE 50 MG/1
50 TABLET, FILM COATED ORAL NIGHTLY
Qty: 90 TABLET | Refills: 0 | Status: SHIPPED | OUTPATIENT
Start: 2021-04-13 | End: 2021-05-18

## 2021-04-13 RX ORDER — DOXYCYCLINE 100 MG/1
100 CAPSULE ORAL EVERY 12 HOURS
Qty: 20 CAPSULE | Refills: 0 | Status: SHIPPED | OUTPATIENT
Start: 2021-04-13 | End: 2021-05-18

## 2021-05-18 ENCOUNTER — OFFICE VISIT (OUTPATIENT)
Dept: FAMILY MEDICINE | Facility: CLINIC | Age: 71
End: 2021-05-18
Payer: MEDICARE

## 2021-05-18 VITALS
SYSTOLIC BLOOD PRESSURE: 132 MMHG | OXYGEN SATURATION: 97 % | TEMPERATURE: 98 F | WEIGHT: 223 LBS | BODY MASS INDEX: 35 KG/M2 | DIASTOLIC BLOOD PRESSURE: 89 MMHG | HEIGHT: 67 IN | HEART RATE: 91 BPM

## 2021-05-18 DIAGNOSIS — G47.33 OBSTRUCTIVE SLEEP APNEA: ICD-10-CM

## 2021-05-18 DIAGNOSIS — K21.9 GASTROESOPHAGEAL REFLUX DISEASE, UNSPECIFIED WHETHER ESOPHAGITIS PRESENT: ICD-10-CM

## 2021-05-18 DIAGNOSIS — F41.9 ANXIETY: Primary | ICD-10-CM

## 2021-05-18 DIAGNOSIS — K22.2 ESOPHAGEAL STRICTURE: ICD-10-CM

## 2021-05-18 DIAGNOSIS — Z12.11 COLON CANCER SCREENING: ICD-10-CM

## 2021-05-18 DIAGNOSIS — R13.10 DYSPHAGIA, UNSPECIFIED TYPE: ICD-10-CM

## 2021-05-18 DIAGNOSIS — G47.33 OSA (OBSTRUCTIVE SLEEP APNEA): ICD-10-CM

## 2021-05-18 PROCEDURE — 3008F BODY MASS INDEX DOCD: CPT | Mod: CPTII,S$GLB,, | Performed by: FAMILY MEDICINE

## 2021-05-18 PROCEDURE — 1101F PR PT FALLS ASSESS DOC 0-1 FALLS W/OUT INJ PAST YR: ICD-10-PCS | Mod: CPTII,S$GLB,, | Performed by: FAMILY MEDICINE

## 2021-05-18 PROCEDURE — 3008F PR BODY MASS INDEX (BMI) DOCUMENTED: ICD-10-PCS | Mod: CPTII,S$GLB,, | Performed by: FAMILY MEDICINE

## 2021-05-18 PROCEDURE — 99214 OFFICE O/P EST MOD 30 MIN: CPT | Mod: S$GLB,,, | Performed by: FAMILY MEDICINE

## 2021-05-18 PROCEDURE — 3288F PR FALLS RISK ASSESSMENT DOCUMENTED: ICD-10-PCS | Mod: CPTII,S$GLB,, | Performed by: FAMILY MEDICINE

## 2021-05-18 PROCEDURE — 99214 PR OFFICE/OUTPT VISIT, EST, LEVL IV, 30-39 MIN: ICD-10-PCS | Mod: S$GLB,,, | Performed by: FAMILY MEDICINE

## 2021-05-18 PROCEDURE — 1159F PR MEDICATION LIST DOCUMENTED IN MEDICAL RECORD: ICD-10-PCS | Mod: S$GLB,,, | Performed by: FAMILY MEDICINE

## 2021-05-18 PROCEDURE — 3288F FALL RISK ASSESSMENT DOCD: CPT | Mod: CPTII,S$GLB,, | Performed by: FAMILY MEDICINE

## 2021-05-18 PROCEDURE — 1126F AMNT PAIN NOTED NONE PRSNT: CPT | Mod: S$GLB,,, | Performed by: FAMILY MEDICINE

## 2021-05-18 PROCEDURE — 1159F MED LIST DOCD IN RCRD: CPT | Mod: S$GLB,,, | Performed by: FAMILY MEDICINE

## 2021-05-18 PROCEDURE — 1126F PR PAIN SEVERITY QUANTIFIED, NO PAIN PRESENT: ICD-10-PCS | Mod: S$GLB,,, | Performed by: FAMILY MEDICINE

## 2021-05-18 PROCEDURE — 1101F PT FALLS ASSESS-DOCD LE1/YR: CPT | Mod: CPTII,S$GLB,, | Performed by: FAMILY MEDICINE

## 2021-05-18 RX ORDER — QUETIAPINE 150 MG/1
150 TABLET, FILM COATED, EXTENDED RELEASE ORAL DAILY
Qty: 30 EACH | Refills: 2 | Status: SHIPPED | OUTPATIENT
Start: 2021-05-18 | End: 2021-08-05 | Stop reason: ALTCHOICE

## 2021-05-18 RX ORDER — QUETIAPINE 150 MG/1
TABLET, FILM COATED, EXTENDED RELEASE ORAL DAILY
COMMUNITY
End: 2021-05-18 | Stop reason: SDUPTHER

## 2021-05-25 ENCOUNTER — TELEPHONE (OUTPATIENT)
Dept: FAMILY MEDICINE | Facility: CLINIC | Age: 71
End: 2021-05-25

## 2021-05-26 ENCOUNTER — TELEPHONE (OUTPATIENT)
Dept: FAMILY MEDICINE | Facility: CLINIC | Age: 71
End: 2021-05-26

## 2021-06-03 ENCOUNTER — OFFICE VISIT (OUTPATIENT)
Dept: UROLOGY | Facility: CLINIC | Age: 71
End: 2021-06-03
Payer: MEDICARE

## 2021-06-03 VITALS
SYSTOLIC BLOOD PRESSURE: 128 MMHG | BODY MASS INDEX: 36.04 KG/M2 | DIASTOLIC BLOOD PRESSURE: 83 MMHG | HEIGHT: 67 IN | HEART RATE: 81 BPM | WEIGHT: 229.63 LBS

## 2021-06-03 DIAGNOSIS — R39.9 LOWER URINARY TRACT SYMPTOMS (LUTS): ICD-10-CM

## 2021-06-03 DIAGNOSIS — R79.89 LOW TESTOSTERONE: Primary | ICD-10-CM

## 2021-06-03 DIAGNOSIS — N20.0 NEPHROLITHIASIS: ICD-10-CM

## 2021-06-03 PROCEDURE — 1159F MED LIST DOCD IN RCRD: CPT | Mod: S$GLB,,, | Performed by: UROLOGY

## 2021-06-03 PROCEDURE — 1159F PR MEDICATION LIST DOCUMENTED IN MEDICAL RECORD: ICD-10-PCS | Mod: S$GLB,,, | Performed by: UROLOGY

## 2021-06-03 PROCEDURE — 3008F PR BODY MASS INDEX (BMI) DOCUMENTED: ICD-10-PCS | Mod: CPTII,S$GLB,, | Performed by: UROLOGY

## 2021-06-03 PROCEDURE — 3008F BODY MASS INDEX DOCD: CPT | Mod: CPTII,S$GLB,, | Performed by: UROLOGY

## 2021-06-03 PROCEDURE — 99999 PR PBB SHADOW E&M-EST. PATIENT-LVL IV: ICD-10-PCS | Mod: PBBFAC,,, | Performed by: UROLOGY

## 2021-06-03 PROCEDURE — 99214 PR OFFICE/OUTPT VISIT, EST, LEVL IV, 30-39 MIN: ICD-10-PCS | Mod: S$GLB,,, | Performed by: UROLOGY

## 2021-06-03 PROCEDURE — 99214 OFFICE O/P EST MOD 30 MIN: CPT | Mod: S$GLB,,, | Performed by: UROLOGY

## 2021-06-03 PROCEDURE — 99999 PR PBB SHADOW E&M-EST. PATIENT-LVL IV: CPT | Mod: PBBFAC,,, | Performed by: UROLOGY

## 2021-06-06 ENCOUNTER — PATIENT OUTREACH (OUTPATIENT)
Dept: ADMINISTRATIVE | Facility: HOSPITAL | Age: 71
End: 2021-06-06

## 2021-06-07 ENCOUNTER — TELEPHONE (OUTPATIENT)
Dept: FAMILY MEDICINE | Facility: CLINIC | Age: 71
End: 2021-06-07

## 2021-06-08 ENCOUNTER — TELEPHONE (OUTPATIENT)
Dept: FAMILY MEDICINE | Facility: CLINIC | Age: 71
End: 2021-06-08

## 2021-07-03 ENCOUNTER — PATIENT MESSAGE (OUTPATIENT)
Dept: ADMINISTRATIVE | Facility: HOSPITAL | Age: 71
End: 2021-07-03

## 2021-07-03 LAB — HEMOCCULT STL QL IA: NORMAL

## 2021-07-15 ENCOUNTER — OFFICE VISIT (OUTPATIENT)
Dept: FAMILY MEDICINE | Facility: CLINIC | Age: 71
End: 2021-07-15
Payer: MEDICARE

## 2021-07-15 VITALS
SYSTOLIC BLOOD PRESSURE: 113 MMHG | HEIGHT: 67 IN | OXYGEN SATURATION: 95 % | DIASTOLIC BLOOD PRESSURE: 66 MMHG | HEART RATE: 62 BPM | WEIGHT: 224 LBS | BODY MASS INDEX: 35.16 KG/M2 | TEMPERATURE: 98 F

## 2021-07-15 DIAGNOSIS — G47.33 OSA ON CPAP: ICD-10-CM

## 2021-07-15 DIAGNOSIS — F32.A DEPRESSION, UNSPECIFIED DEPRESSION TYPE: Primary | ICD-10-CM

## 2021-07-15 DIAGNOSIS — Z51.81 VISIT FOR MONITORING PLAVIX THERAPY: ICD-10-CM

## 2021-07-15 DIAGNOSIS — Z79.82 ASPIRIN LONG-TERM USE: ICD-10-CM

## 2021-07-15 DIAGNOSIS — F11.90 CHRONIC NARCOTIC USE: ICD-10-CM

## 2021-07-15 DIAGNOSIS — I10 HYPERTENSION, ESSENTIAL: ICD-10-CM

## 2021-07-15 DIAGNOSIS — Z12.11 COLON CANCER SCREENING: ICD-10-CM

## 2021-07-15 DIAGNOSIS — Z79.02 VISIT FOR MONITORING PLAVIX THERAPY: ICD-10-CM

## 2021-07-15 PROCEDURE — 3074F SYST BP LT 130 MM HG: CPT | Mod: CPTII,S$GLB,, | Performed by: FAMILY MEDICINE

## 2021-07-15 PROCEDURE — 1101F PR PT FALLS ASSESS DOC 0-1 FALLS W/OUT INJ PAST YR: ICD-10-PCS | Mod: CPTII,S$GLB,, | Performed by: FAMILY MEDICINE

## 2021-07-15 PROCEDURE — 1159F PR MEDICATION LIST DOCUMENTED IN MEDICAL RECORD: ICD-10-PCS | Mod: CPTII,S$GLB,, | Performed by: FAMILY MEDICINE

## 2021-07-15 PROCEDURE — 3074F PR MOST RECENT SYSTOLIC BLOOD PRESSURE < 130 MM HG: ICD-10-PCS | Mod: CPTII,S$GLB,, | Performed by: FAMILY MEDICINE

## 2021-07-15 PROCEDURE — 3008F BODY MASS INDEX DOCD: CPT | Mod: CPTII,S$GLB,, | Performed by: FAMILY MEDICINE

## 2021-07-15 PROCEDURE — 1159F MED LIST DOCD IN RCRD: CPT | Mod: CPTII,S$GLB,, | Performed by: FAMILY MEDICINE

## 2021-07-15 PROCEDURE — 3078F DIAST BP <80 MM HG: CPT | Mod: CPTII,S$GLB,, | Performed by: FAMILY MEDICINE

## 2021-07-15 PROCEDURE — 3288F FALL RISK ASSESSMENT DOCD: CPT | Mod: CPTII,S$GLB,, | Performed by: FAMILY MEDICINE

## 2021-07-15 PROCEDURE — 1126F AMNT PAIN NOTED NONE PRSNT: CPT | Mod: CPTII,S$GLB,, | Performed by: FAMILY MEDICINE

## 2021-07-15 PROCEDURE — 99499 RISK ADDL DX/OHS AUDIT: ICD-10-PCS | Mod: S$GLB,,, | Performed by: FAMILY MEDICINE

## 2021-07-15 PROCEDURE — 3078F PR MOST RECENT DIASTOLIC BLOOD PRESSURE < 80 MM HG: ICD-10-PCS | Mod: CPTII,S$GLB,, | Performed by: FAMILY MEDICINE

## 2021-07-15 PROCEDURE — 3008F PR BODY MASS INDEX (BMI) DOCUMENTED: ICD-10-PCS | Mod: CPTII,S$GLB,, | Performed by: FAMILY MEDICINE

## 2021-07-15 PROCEDURE — 1101F PT FALLS ASSESS-DOCD LE1/YR: CPT | Mod: CPTII,S$GLB,, | Performed by: FAMILY MEDICINE

## 2021-07-15 PROCEDURE — 3288F PR FALLS RISK ASSESSMENT DOCUMENTED: ICD-10-PCS | Mod: CPTII,S$GLB,, | Performed by: FAMILY MEDICINE

## 2021-07-15 PROCEDURE — 99499 UNLISTED E&M SERVICE: CPT | Mod: S$GLB,,, | Performed by: FAMILY MEDICINE

## 2021-07-15 PROCEDURE — 99214 PR OFFICE/OUTPT VISIT, EST, LEVL IV, 30-39 MIN: ICD-10-PCS | Mod: S$GLB,,, | Performed by: FAMILY MEDICINE

## 2021-07-15 PROCEDURE — 1126F PR PAIN SEVERITY QUANTIFIED, NO PAIN PRESENT: ICD-10-PCS | Mod: CPTII,S$GLB,, | Performed by: FAMILY MEDICINE

## 2021-07-15 PROCEDURE — 99214 OFFICE O/P EST MOD 30 MIN: CPT | Mod: S$GLB,,, | Performed by: FAMILY MEDICINE

## 2021-07-15 RX ORDER — SERTRALINE HYDROCHLORIDE 50 MG/1
50 TABLET, FILM COATED ORAL DAILY
COMMUNITY
End: 2021-07-15 | Stop reason: SDUPTHER

## 2021-07-15 RX ORDER — SERTRALINE HYDROCHLORIDE 50 MG/1
50 TABLET, FILM COATED ORAL DAILY
Qty: 90 TABLET | Refills: 0 | Status: SHIPPED | OUTPATIENT
Start: 2021-07-15 | End: 2021-08-05 | Stop reason: SDUPTHER

## 2021-07-20 PROBLEM — A41.02 SEPSIS DUE TO METHICILLIN RESISTANT STAPHYLOCOCCUS AUREUS (MRSA): Status: RESOLVED | Noted: 2019-06-01 | Resolved: 2021-07-20

## 2021-07-20 PROBLEM — B95.62 MRSA BACTEREMIA: Status: RESOLVED | Noted: 2020-01-08 | Resolved: 2021-07-20

## 2021-07-20 PROBLEM — A41.9 SEPSIS: Status: RESOLVED | Noted: 2021-03-13 | Resolved: 2021-07-20

## 2021-07-20 PROBLEM — N17.9 AKI (ACUTE KIDNEY INJURY): Status: RESOLVED | Noted: 2021-03-14 | Resolved: 2021-07-20

## 2021-07-20 PROBLEM — G93.40 ACUTE ENCEPHALOPATHY: Status: RESOLVED | Noted: 2021-03-13 | Resolved: 2021-07-20

## 2021-07-20 PROBLEM — E66.811 CLASS 1 OBESITY IN ADULT: Chronic | Status: RESOLVED | Noted: 2021-03-13 | Resolved: 2021-07-20

## 2021-07-20 PROBLEM — E87.1 HYPONATREMIA: Status: RESOLVED | Noted: 2021-03-13 | Resolved: 2021-07-20

## 2021-07-20 PROBLEM — R78.81 MRSA BACTEREMIA: Status: RESOLVED | Noted: 2020-01-08 | Resolved: 2021-07-20

## 2021-07-20 PROBLEM — A02.0 SALMONELLA DYSENTERY: Status: RESOLVED | Noted: 2021-03-16 | Resolved: 2021-07-20

## 2021-07-20 PROBLEM — G47.33 OSA ON CPAP: Status: ACTIVE | Noted: 2021-07-20

## 2021-07-20 PROBLEM — R10.13 EPIGASTRIC PAIN: Status: RESOLVED | Noted: 2020-01-17 | Resolved: 2021-07-20

## 2021-07-20 PROBLEM — E66.9 CLASS 1 OBESITY IN ADULT: Chronic | Status: RESOLVED | Noted: 2021-03-13 | Resolved: 2021-07-20

## 2021-08-05 ENCOUNTER — OFFICE VISIT (OUTPATIENT)
Dept: FAMILY MEDICINE | Facility: CLINIC | Age: 71
End: 2021-08-05
Payer: MEDICARE

## 2021-08-05 VITALS
HEIGHT: 67 IN | HEART RATE: 75 BPM | TEMPERATURE: 97 F | SYSTOLIC BLOOD PRESSURE: 132 MMHG | BODY MASS INDEX: 35 KG/M2 | OXYGEN SATURATION: 96 % | DIASTOLIC BLOOD PRESSURE: 75 MMHG | WEIGHT: 223 LBS

## 2021-08-05 DIAGNOSIS — F32.A DEPRESSION, UNSPECIFIED DEPRESSION TYPE: ICD-10-CM

## 2021-08-05 DIAGNOSIS — F41.9 ANXIETY: Primary | ICD-10-CM

## 2021-08-05 DIAGNOSIS — G47.33 OSA ON CPAP: ICD-10-CM

## 2021-08-05 PROCEDURE — 1101F PR PT FALLS ASSESS DOC 0-1 FALLS W/OUT INJ PAST YR: ICD-10-PCS | Mod: CPTII,S$GLB,, | Performed by: FAMILY MEDICINE

## 2021-08-05 PROCEDURE — 3008F PR BODY MASS INDEX (BMI) DOCUMENTED: ICD-10-PCS | Mod: CPTII,S$GLB,, | Performed by: FAMILY MEDICINE

## 2021-08-05 PROCEDURE — 3008F BODY MASS INDEX DOCD: CPT | Mod: CPTII,S$GLB,, | Performed by: FAMILY MEDICINE

## 2021-08-05 PROCEDURE — 3288F FALL RISK ASSESSMENT DOCD: CPT | Mod: CPTII,S$GLB,, | Performed by: FAMILY MEDICINE

## 2021-08-05 PROCEDURE — 1126F PR PAIN SEVERITY QUANTIFIED, NO PAIN PRESENT: ICD-10-PCS | Mod: CPTII,S$GLB,, | Performed by: FAMILY MEDICINE

## 2021-08-05 PROCEDURE — 1126F AMNT PAIN NOTED NONE PRSNT: CPT | Mod: CPTII,S$GLB,, | Performed by: FAMILY MEDICINE

## 2021-08-05 PROCEDURE — 1101F PT FALLS ASSESS-DOCD LE1/YR: CPT | Mod: CPTII,S$GLB,, | Performed by: FAMILY MEDICINE

## 2021-08-05 PROCEDURE — 3075F SYST BP GE 130 - 139MM HG: CPT | Mod: CPTII,S$GLB,, | Performed by: FAMILY MEDICINE

## 2021-08-05 PROCEDURE — 99213 PR OFFICE/OUTPT VISIT, EST, LEVL III, 20-29 MIN: ICD-10-PCS | Mod: S$GLB,,, | Performed by: FAMILY MEDICINE

## 2021-08-05 PROCEDURE — 1160F PR REVIEW ALL MEDS BY PRESCRIBER/CLIN PHARMACIST DOCUMENTED: ICD-10-PCS | Mod: CPTII,S$GLB,, | Performed by: FAMILY MEDICINE

## 2021-08-05 PROCEDURE — 3288F PR FALLS RISK ASSESSMENT DOCUMENTED: ICD-10-PCS | Mod: CPTII,S$GLB,, | Performed by: FAMILY MEDICINE

## 2021-08-05 PROCEDURE — 3075F PR MOST RECENT SYSTOLIC BLOOD PRESS GE 130-139MM HG: ICD-10-PCS | Mod: CPTII,S$GLB,, | Performed by: FAMILY MEDICINE

## 2021-08-05 PROCEDURE — 1159F MED LIST DOCD IN RCRD: CPT | Mod: CPTII,S$GLB,, | Performed by: FAMILY MEDICINE

## 2021-08-05 PROCEDURE — 3078F PR MOST RECENT DIASTOLIC BLOOD PRESSURE < 80 MM HG: ICD-10-PCS | Mod: CPTII,S$GLB,, | Performed by: FAMILY MEDICINE

## 2021-08-05 PROCEDURE — 99213 OFFICE O/P EST LOW 20 MIN: CPT | Mod: S$GLB,,, | Performed by: FAMILY MEDICINE

## 2021-08-05 PROCEDURE — 1159F PR MEDICATION LIST DOCUMENTED IN MEDICAL RECORD: ICD-10-PCS | Mod: CPTII,S$GLB,, | Performed by: FAMILY MEDICINE

## 2021-08-05 PROCEDURE — 3078F DIAST BP <80 MM HG: CPT | Mod: CPTII,S$GLB,, | Performed by: FAMILY MEDICINE

## 2021-08-05 PROCEDURE — 1160F RVW MEDS BY RX/DR IN RCRD: CPT | Mod: CPTII,S$GLB,, | Performed by: FAMILY MEDICINE

## 2021-08-05 RX ORDER — QUETIAPINE FUMARATE 100 MG/1
100 TABLET, FILM COATED ORAL 2 TIMES DAILY
Qty: 60 TABLET | Refills: 0 | Status: SHIPPED | OUTPATIENT
Start: 2021-08-05 | End: 2021-09-02

## 2021-08-05 RX ORDER — SERTRALINE HYDROCHLORIDE 100 MG/1
100 TABLET, FILM COATED ORAL DAILY
Qty: 90 TABLET | Refills: 0 | Status: SHIPPED | OUTPATIENT
Start: 2021-08-05 | End: 2021-09-14 | Stop reason: SDUPTHER

## 2021-08-06 LAB
T4 FREE SERPL-MCNC: 0.7 NG/DL (ref 0.8–1.8)
TSH SERPL-ACNC: 3.43 MIU/L (ref 0.4–4.5)

## 2021-09-14 ENCOUNTER — OFFICE VISIT (OUTPATIENT)
Dept: FAMILY MEDICINE | Facility: CLINIC | Age: 71
End: 2021-09-14
Payer: MEDICARE

## 2021-09-14 VITALS
OXYGEN SATURATION: 97 % | HEART RATE: 56 BPM | BODY MASS INDEX: 34.69 KG/M2 | WEIGHT: 221 LBS | SYSTOLIC BLOOD PRESSURE: 122 MMHG | DIASTOLIC BLOOD PRESSURE: 70 MMHG | HEIGHT: 67 IN | TEMPERATURE: 98 F

## 2021-09-14 DIAGNOSIS — G89.29 OTHER CHRONIC PAIN: ICD-10-CM

## 2021-09-14 DIAGNOSIS — Z79.82 ASPIRIN LONG-TERM USE: ICD-10-CM

## 2021-09-14 DIAGNOSIS — G47.33 OSA ON CPAP: ICD-10-CM

## 2021-09-14 DIAGNOSIS — F32.A DEPRESSION, UNSPECIFIED DEPRESSION TYPE: ICD-10-CM

## 2021-09-14 DIAGNOSIS — F41.9 ANXIETY: Primary | ICD-10-CM

## 2021-09-14 PROCEDURE — 3074F SYST BP LT 130 MM HG: CPT | Mod: CPTII,S$GLB,, | Performed by: FAMILY MEDICINE

## 2021-09-14 PROCEDURE — 3078F DIAST BP <80 MM HG: CPT | Mod: CPTII,S$GLB,, | Performed by: FAMILY MEDICINE

## 2021-09-14 PROCEDURE — 3008F PR BODY MASS INDEX (BMI) DOCUMENTED: ICD-10-PCS | Mod: CPTII,S$GLB,, | Performed by: FAMILY MEDICINE

## 2021-09-14 PROCEDURE — 99213 PR OFFICE/OUTPT VISIT, EST, LEVL III, 20-29 MIN: ICD-10-PCS | Mod: S$GLB,,, | Performed by: FAMILY MEDICINE

## 2021-09-14 PROCEDURE — 3078F PR MOST RECENT DIASTOLIC BLOOD PRESSURE < 80 MM HG: ICD-10-PCS | Mod: CPTII,S$GLB,, | Performed by: FAMILY MEDICINE

## 2021-09-14 PROCEDURE — 3074F PR MOST RECENT SYSTOLIC BLOOD PRESSURE < 130 MM HG: ICD-10-PCS | Mod: CPTII,S$GLB,, | Performed by: FAMILY MEDICINE

## 2021-09-14 PROCEDURE — 3288F FALL RISK ASSESSMENT DOCD: CPT | Mod: CPTII,S$GLB,, | Performed by: FAMILY MEDICINE

## 2021-09-14 PROCEDURE — 1126F PR PAIN SEVERITY QUANTIFIED, NO PAIN PRESENT: ICD-10-PCS | Mod: CPTII,S$GLB,, | Performed by: FAMILY MEDICINE

## 2021-09-14 PROCEDURE — 1101F PT FALLS ASSESS-DOCD LE1/YR: CPT | Mod: CPTII,S$GLB,, | Performed by: FAMILY MEDICINE

## 2021-09-14 PROCEDURE — 1101F PR PT FALLS ASSESS DOC 0-1 FALLS W/OUT INJ PAST YR: ICD-10-PCS | Mod: CPTII,S$GLB,, | Performed by: FAMILY MEDICINE

## 2021-09-14 PROCEDURE — 1126F AMNT PAIN NOTED NONE PRSNT: CPT | Mod: CPTII,S$GLB,, | Performed by: FAMILY MEDICINE

## 2021-09-14 PROCEDURE — 3288F PR FALLS RISK ASSESSMENT DOCUMENTED: ICD-10-PCS | Mod: CPTII,S$GLB,, | Performed by: FAMILY MEDICINE

## 2021-09-14 PROCEDURE — 99213 OFFICE O/P EST LOW 20 MIN: CPT | Mod: S$GLB,,, | Performed by: FAMILY MEDICINE

## 2021-09-14 PROCEDURE — 3008F BODY MASS INDEX DOCD: CPT | Mod: CPTII,S$GLB,, | Performed by: FAMILY MEDICINE

## 2021-09-14 RX ORDER — BUPROPION HYDROCHLORIDE 150 MG/1
150 TABLET ORAL EVERY MORNING
Qty: 30 TABLET | Refills: 2 | Status: SHIPPED | OUTPATIENT
Start: 2021-09-14 | End: 2021-11-23 | Stop reason: SDUPTHER

## 2021-09-14 RX ORDER — SERTRALINE HYDROCHLORIDE 100 MG/1
100 TABLET, FILM COATED ORAL DAILY
Qty: 90 TABLET | Refills: 1 | Status: SHIPPED | OUTPATIENT
Start: 2021-09-14 | End: 2022-02-15 | Stop reason: SDUPTHER

## 2021-09-14 RX ORDER — BUPROPION HYDROCHLORIDE 150 MG/1
150 TABLET ORAL DAILY
COMMUNITY
End: 2021-09-14 | Stop reason: SDUPTHER

## 2021-09-22 ENCOUNTER — HOSPITAL ENCOUNTER (OUTPATIENT)
Dept: RADIOLOGY | Facility: HOSPITAL | Age: 71
Discharge: HOME | End: 2021-09-22
Attending: INTERNAL MEDICINE
Payer: MEDICARE

## 2021-09-22 ENCOUNTER — HOSPITAL ENCOUNTER (OUTPATIENT)
Dept: PREADMISSION TESTING | Facility: HOSPITAL | Age: 71
Discharge: HOME | End: 2021-09-22
Attending: INTERNAL MEDICINE
Payer: MEDICARE

## 2021-09-22 VITALS
DIASTOLIC BLOOD PRESSURE: 69 MMHG | TEMPERATURE: 97 F | WEIGHT: 221 LBS | HEART RATE: 64 BPM | BODY MASS INDEX: 34.69 KG/M2 | SYSTOLIC BLOOD PRESSURE: 124 MMHG | RESPIRATION RATE: 17 BRPM | OXYGEN SATURATION: 98 % | HEIGHT: 67 IN

## 2021-09-22 DIAGNOSIS — Z01.818 PRE-OP TESTING: ICD-10-CM

## 2021-09-22 DIAGNOSIS — Z01.818 PREOP TESTING: ICD-10-CM

## 2021-09-22 DIAGNOSIS — Z01.818 PREOP TESTING: Primary | ICD-10-CM

## 2021-09-22 LAB — SARS-COV-2 RDRP RESP QL NAA+PROBE: NEGATIVE

## 2021-09-22 PROCEDURE — 71046 X-RAY EXAM CHEST 2 VIEWS: CPT | Mod: TC

## 2021-09-22 PROCEDURE — 93010 ELECTROCARDIOGRAM REPORT: CPT | Mod: ,,, | Performed by: INTERNAL MEDICINE

## 2021-09-22 PROCEDURE — 93010 EKG 12-LEAD: ICD-10-PCS | Mod: ,,, | Performed by: INTERNAL MEDICINE

## 2021-09-22 PROCEDURE — 93005 ELECTROCARDIOGRAM TRACING: CPT | Performed by: INTERNAL MEDICINE

## 2021-09-22 PROCEDURE — U0002 COVID-19 LAB TEST NON-CDC: HCPCS | Performed by: INTERNAL MEDICINE

## 2021-09-23 ENCOUNTER — ANESTHESIA EVENT (OUTPATIENT)
Dept: SURGERY | Facility: HOSPITAL | Age: 71
End: 2021-09-23
Payer: MEDICARE

## 2021-09-23 ENCOUNTER — HOSPITAL ENCOUNTER (OUTPATIENT)
Facility: HOSPITAL | Age: 71
Discharge: HOME | End: 2021-09-23
Attending: INTERNAL MEDICINE | Admitting: INTERNAL MEDICINE
Payer: MEDICARE

## 2021-09-23 ENCOUNTER — ANESTHESIA (OUTPATIENT)
Dept: SURGERY | Facility: HOSPITAL | Age: 71
End: 2021-09-23
Payer: MEDICARE

## 2021-09-23 VITALS
RESPIRATION RATE: 16 BRPM | HEIGHT: 67 IN | DIASTOLIC BLOOD PRESSURE: 62 MMHG | WEIGHT: 216 LBS | BODY MASS INDEX: 33.9 KG/M2 | SYSTOLIC BLOOD PRESSURE: 101 MMHG | OXYGEN SATURATION: 94 % | HEART RATE: 57 BPM | TEMPERATURE: 99 F

## 2021-09-23 DIAGNOSIS — Z12.11 COLON CANCER SCREENING: ICD-10-CM

## 2021-09-23 PROCEDURE — 37000009 HC ANESTHESIA EA ADD 15 MINS: Performed by: INTERNAL MEDICINE

## 2021-09-23 PROCEDURE — 27000671 HC TUBING MICROBORE EXT: Performed by: ANESTHESIOLOGY

## 2021-09-23 PROCEDURE — 27000675 HC TUBING MICRODRIP: Performed by: ANESTHESIOLOGY

## 2021-09-23 PROCEDURE — 27201089 HC SNARE, DISP (ANY): Performed by: INTERNAL MEDICINE

## 2021-09-23 PROCEDURE — 27202103: Performed by: ANESTHESIOLOGY

## 2021-09-23 PROCEDURE — 25000003 PHARM REV CODE 250: Performed by: NURSE ANESTHETIST, CERTIFIED REGISTERED

## 2021-09-23 PROCEDURE — 37000008 HC ANESTHESIA 1ST 15 MINUTES: Performed by: INTERNAL MEDICINE

## 2021-09-23 PROCEDURE — 63600175 PHARM REV CODE 636 W HCPCS: Performed by: NURSE ANESTHETIST, CERTIFIED REGISTERED

## 2021-09-23 PROCEDURE — 25000003 PHARM REV CODE 250: Performed by: INTERNAL MEDICINE

## 2021-09-23 PROCEDURE — 45385 COLONOSCOPY W/LESION REMOVAL: CPT | Performed by: INTERNAL MEDICINE

## 2021-09-23 PROCEDURE — 27000014 HC INFLATION DEVICE: Performed by: INTERNAL MEDICINE

## 2021-09-23 PROCEDURE — 88305 TISSUE EXAM BY PATHOLOGIST: CPT | Mod: TC,59

## 2021-09-23 PROCEDURE — 27100019 HC AMBU BAG ADULT/PED: Performed by: ANESTHESIOLOGY

## 2021-09-23 PROCEDURE — 27200043 HC FORCEPS, BIOPSY: Performed by: INTERNAL MEDICINE

## 2021-09-23 PROCEDURE — 27200997: Performed by: INTERNAL MEDICINE

## 2021-09-23 PROCEDURE — 43239 EGD BIOPSY SINGLE/MULTIPLE: CPT | Performed by: INTERNAL MEDICINE

## 2021-09-23 PROCEDURE — 27201114 HC TRAP (ANY): Performed by: INTERNAL MEDICINE

## 2021-09-23 PROCEDURE — 43249 ESOPH EGD DILATION <30 MM: CPT | Performed by: INTERNAL MEDICINE

## 2021-09-23 PROCEDURE — C1726 CATH, BAL DIL, NON-VASCULAR: HCPCS | Performed by: INTERNAL MEDICINE

## 2021-09-23 RX ORDER — PHENYLEPHRINE HYDROCHLORIDE 10 MG/ML
INJECTION INTRAVENOUS
Status: DISCONTINUED | OUTPATIENT
Start: 2021-09-23 | End: 2021-09-23

## 2021-09-23 RX ORDER — PROPOFOL 10 MG/ML
VIAL (ML) INTRAVENOUS
Status: DISCONTINUED | OUTPATIENT
Start: 2021-09-23 | End: 2021-09-23

## 2021-09-23 RX ADMIN — PROPOFOL 50 MG: 10 INJECTION, EMULSION INTRAVENOUS at 10:09

## 2021-09-23 RX ADMIN — PHENYLEPHRINE HYDROCHLORIDE 100 MCG: 10 INJECTION INTRAVENOUS at 11:09

## 2021-09-23 RX ADMIN — PROPOFOL 50 MG: 10 INJECTION, EMULSION INTRAVENOUS at 11:09

## 2021-09-23 RX ADMIN — PHENYLEPHRINE HYDROCHLORIDE 200 MCG: 10 INJECTION INTRAVENOUS at 11:09

## 2021-09-23 RX ADMIN — SODIUM CHLORIDE: 0.9 INJECTION, SOLUTION INTRAVENOUS at 10:09

## 2021-09-23 RX ADMIN — SODIUM CHLORIDE: 0.9 INJECTION, SOLUTION INTRAVENOUS at 11:09

## 2021-09-23 RX ADMIN — SIMETHICONE 40 MG: 20 SUSPENSION/ DROPS ORAL at 10:09

## 2021-09-25 PROBLEM — Z12.11 COLON CANCER SCREENING: Status: RESOLVED | Noted: 2021-09-23 | Resolved: 2021-09-25

## 2021-09-25 PROBLEM — G89.29 OTHER CHRONIC PAIN: Status: ACTIVE | Noted: 2021-03-13

## 2021-11-16 LAB
T4 FREE SERPL-MCNC: 0.8 NG/DL (ref 0.8–1.8)
TSH SERPL-ACNC: 3.51 MIU/L (ref 0.4–4.5)

## 2021-11-18 ENCOUNTER — PES CALL (OUTPATIENT)
Dept: ADMINISTRATIVE | Facility: CLINIC | Age: 71
End: 2021-11-18
Payer: MEDICARE

## 2021-11-23 ENCOUNTER — OFFICE VISIT (OUTPATIENT)
Dept: FAMILY MEDICINE | Facility: CLINIC | Age: 71
End: 2021-11-23
Payer: MEDICARE

## 2021-11-23 VITALS
DIASTOLIC BLOOD PRESSURE: 82 MMHG | SYSTOLIC BLOOD PRESSURE: 134 MMHG | HEIGHT: 67 IN | OXYGEN SATURATION: 97 % | WEIGHT: 229 LBS | HEART RATE: 60 BPM | TEMPERATURE: 97 F | BODY MASS INDEX: 35.94 KG/M2

## 2021-11-23 DIAGNOSIS — K59.00 CONSTIPATION, UNSPECIFIED CONSTIPATION TYPE: ICD-10-CM

## 2021-11-23 DIAGNOSIS — Z77.090 ASBESTOS EXPOSURE: ICD-10-CM

## 2021-11-23 DIAGNOSIS — K63.5 POLYP OF COLON, UNSPECIFIED PART OF COLON, UNSPECIFIED TYPE: ICD-10-CM

## 2021-11-23 DIAGNOSIS — F41.9 ANXIETY: ICD-10-CM

## 2021-11-23 DIAGNOSIS — Z86.14 HISTORY OF MRSA INFECTION: Primary | ICD-10-CM

## 2021-11-23 DIAGNOSIS — F32.A DEPRESSION, UNSPECIFIED DEPRESSION TYPE: ICD-10-CM

## 2021-11-23 DIAGNOSIS — K64.9 HEMORRHOIDS, UNSPECIFIED HEMORRHOID TYPE: ICD-10-CM

## 2021-11-23 PROCEDURE — G0008 ADMIN INFLUENZA VIRUS VAC: HCPCS | Mod: S$GLB,,, | Performed by: FAMILY MEDICINE

## 2021-11-23 PROCEDURE — 99499 UNLISTED E&M SERVICE: CPT | Mod: S$GLB,,, | Performed by: FAMILY MEDICINE

## 2021-11-23 PROCEDURE — 99214 OFFICE O/P EST MOD 30 MIN: CPT | Mod: S$GLB,,, | Performed by: FAMILY MEDICINE

## 2021-11-23 PROCEDURE — 90694 VACC AIIV4 NO PRSRV 0.5ML IM: CPT | Mod: S$GLB,,, | Performed by: FAMILY MEDICINE

## 2021-11-23 PROCEDURE — 99214 PR OFFICE/OUTPT VISIT, EST, LEVL IV, 30-39 MIN: ICD-10-PCS | Mod: S$GLB,,, | Performed by: FAMILY MEDICINE

## 2021-11-23 PROCEDURE — G0008 FLU VACCINE - QUADRIVALENT - ADJUVANTED: ICD-10-PCS | Mod: S$GLB,,, | Performed by: FAMILY MEDICINE

## 2021-11-23 PROCEDURE — 90694 FLU VACCINE - QUADRIVALENT - ADJUVANTED: ICD-10-PCS | Mod: S$GLB,,, | Performed by: FAMILY MEDICINE

## 2021-11-23 PROCEDURE — 99499 RISK ADDL DX/OHS AUDIT: ICD-10-PCS | Mod: S$GLB,,, | Performed by: FAMILY MEDICINE

## 2021-11-23 RX ORDER — LINACLOTIDE 290 UG/1
290 CAPSULE, GELATIN COATED ORAL DAILY
COMMUNITY
Start: 2021-11-15 | End: 2022-02-15

## 2021-11-23 RX ORDER — VENLAFAXINE HYDROCHLORIDE 150 MG/1
CAPSULE, EXTENDED RELEASE ORAL
COMMUNITY
End: 2021-11-23

## 2021-11-23 RX ORDER — KETOCONAZOLE 20 MG/ML
SHAMPOO, SUSPENSION TOPICAL
COMMUNITY
Start: 2021-09-14 | End: 2023-07-27

## 2021-11-23 RX ORDER — BUPROPION HYDROCHLORIDE 300 MG/1
300 TABLET ORAL EVERY MORNING
Qty: 90 TABLET | Refills: 0 | Status: SHIPPED | OUTPATIENT
Start: 2021-11-23 | End: 2022-02-15 | Stop reason: SDUPTHER

## 2021-11-23 RX ORDER — TRAZODONE HYDROCHLORIDE 100 MG/1
TABLET ORAL
COMMUNITY
Start: 2020-12-03 | End: 2021-11-23

## 2021-11-23 RX ORDER — GABAPENTIN 100 MG/1
CAPSULE ORAL
COMMUNITY
End: 2021-11-23

## 2021-11-23 RX ORDER — OLMESARTAN MEDOXOMIL AND HYDROCHLOROTHIAZIDE 40/25 40; 25 MG/1; MG/1
TABLET ORAL
COMMUNITY
End: 2021-11-23

## 2021-11-23 RX ORDER — OXYCODONE HYDROCHLORIDE 20 MG/1
TABLET ORAL
COMMUNITY
End: 2021-11-23

## 2021-11-23 RX ORDER — CLOPIDOGREL BISULFATE 75 MG/1
TABLET ORAL
COMMUNITY
End: 2021-11-29

## 2021-11-23 RX ORDER — OLMESARTAN MEDOXOMIL, AMLODIPINE AND HYDROCHLOROTHIAZIDE TABLET 40/10/25 MG 40; 10; 25 MG/1; MG/1; MG/1
1 TABLET ORAL DAILY
COMMUNITY
End: 2023-07-27

## 2021-11-23 RX ORDER — MUPIROCIN 20 MG/G
OINTMENT TOPICAL 2 TIMES DAILY
Qty: 22 G | Refills: 5 | Status: SHIPPED | OUTPATIENT
Start: 2021-11-23 | End: 2023-07-27

## 2021-11-23 RX ORDER — QUETIAPINE FUMARATE 50 MG/1
TABLET, FILM COATED ORAL
COMMUNITY
Start: 2020-12-03 | End: 2023-07-27

## 2021-11-23 RX ORDER — HYDRALAZINE HYDROCHLORIDE 50 MG/1
TABLET, FILM COATED ORAL
COMMUNITY
End: 2021-11-23

## 2021-11-29 PROBLEM — K63.5 POLYP OF COLON: Status: ACTIVE | Noted: 2021-11-29

## 2021-11-29 PROBLEM — Z77.090 ASBESTOS EXPOSURE: Status: ACTIVE | Noted: 2021-11-29

## 2022-02-15 ENCOUNTER — OFFICE VISIT (OUTPATIENT)
Dept: FAMILY MEDICINE | Facility: CLINIC | Age: 72
End: 2022-02-15
Payer: MEDICARE

## 2022-02-15 DIAGNOSIS — Z86.14 HISTORY OF MRSA INFECTION: ICD-10-CM

## 2022-02-15 DIAGNOSIS — I10 HYPERTENSION, ESSENTIAL: Primary | ICD-10-CM

## 2022-02-15 DIAGNOSIS — G47.33 OSA ON CPAP: ICD-10-CM

## 2022-02-15 DIAGNOSIS — F32.A DEPRESSION, UNSPECIFIED DEPRESSION TYPE: ICD-10-CM

## 2022-02-15 DIAGNOSIS — F11.90 CHRONIC NARCOTIC USE: ICD-10-CM

## 2022-02-15 PROCEDURE — 1101F PT FALLS ASSESS-DOCD LE1/YR: CPT | Mod: CPTII,S$GLB,, | Performed by: FAMILY MEDICINE

## 2022-02-15 PROCEDURE — 3075F SYST BP GE 130 - 139MM HG: CPT | Mod: CPTII,S$GLB,, | Performed by: FAMILY MEDICINE

## 2022-02-15 PROCEDURE — 3288F FALL RISK ASSESSMENT DOCD: CPT | Mod: CPTII,S$GLB,, | Performed by: FAMILY MEDICINE

## 2022-02-15 PROCEDURE — 99214 OFFICE O/P EST MOD 30 MIN: CPT | Mod: S$GLB,,, | Performed by: FAMILY MEDICINE

## 2022-02-15 PROCEDURE — 1160F PR REVIEW ALL MEDS BY PRESCRIBER/CLIN PHARMACIST DOCUMENTED: ICD-10-PCS | Mod: CPTII,S$GLB,, | Performed by: FAMILY MEDICINE

## 2022-02-15 PROCEDURE — 3075F PR MOST RECENT SYSTOLIC BLOOD PRESS GE 130-139MM HG: ICD-10-PCS | Mod: CPTII,S$GLB,, | Performed by: FAMILY MEDICINE

## 2022-02-15 PROCEDURE — 99499 RISK ADDL DX/OHS AUDIT: ICD-10-PCS | Mod: S$GLB,,, | Performed by: FAMILY MEDICINE

## 2022-02-15 PROCEDURE — 1160F RVW MEDS BY RX/DR IN RCRD: CPT | Mod: CPTII,S$GLB,, | Performed by: FAMILY MEDICINE

## 2022-02-15 PROCEDURE — 3008F PR BODY MASS INDEX (BMI) DOCUMENTED: ICD-10-PCS | Mod: CPTII,S$GLB,, | Performed by: FAMILY MEDICINE

## 2022-02-15 PROCEDURE — 1159F PR MEDICATION LIST DOCUMENTED IN MEDICAL RECORD: ICD-10-PCS | Mod: CPTII,S$GLB,, | Performed by: FAMILY MEDICINE

## 2022-02-15 PROCEDURE — 99214 PR OFFICE/OUTPT VISIT, EST, LEVL IV, 30-39 MIN: ICD-10-PCS | Mod: S$GLB,,, | Performed by: FAMILY MEDICINE

## 2022-02-15 PROCEDURE — 3079F PR MOST RECENT DIASTOLIC BLOOD PRESSURE 80-89 MM HG: ICD-10-PCS | Mod: CPTII,S$GLB,, | Performed by: FAMILY MEDICINE

## 2022-02-15 PROCEDURE — 3288F PR FALLS RISK ASSESSMENT DOCUMENTED: ICD-10-PCS | Mod: CPTII,S$GLB,, | Performed by: FAMILY MEDICINE

## 2022-02-15 PROCEDURE — 1101F PR PT FALLS ASSESS DOC 0-1 FALLS W/OUT INJ PAST YR: ICD-10-PCS | Mod: CPTII,S$GLB,, | Performed by: FAMILY MEDICINE

## 2022-02-15 PROCEDURE — 1159F MED LIST DOCD IN RCRD: CPT | Mod: CPTII,S$GLB,, | Performed by: FAMILY MEDICINE

## 2022-02-15 PROCEDURE — 99499 UNLISTED E&M SERVICE: CPT | Mod: S$GLB,,, | Performed by: FAMILY MEDICINE

## 2022-02-15 PROCEDURE — 3079F DIAST BP 80-89 MM HG: CPT | Mod: CPTII,S$GLB,, | Performed by: FAMILY MEDICINE

## 2022-02-15 PROCEDURE — 3008F BODY MASS INDEX DOCD: CPT | Mod: CPTII,S$GLB,, | Performed by: FAMILY MEDICINE

## 2022-02-15 RX ORDER — BUPROPION HYDROCHLORIDE 300 MG/1
300 TABLET ORAL EVERY MORNING
Qty: 90 TABLET | Refills: 1 | Status: SHIPPED | OUTPATIENT
Start: 2022-02-15 | End: 2022-02-15

## 2022-02-15 RX ORDER — SERTRALINE HYDROCHLORIDE 100 MG/1
TABLET, FILM COATED ORAL
Qty: 135 TABLET | Refills: 1 | Status: SHIPPED | OUTPATIENT
Start: 2022-02-15 | End: 2022-02-15

## 2022-02-15 RX ORDER — BUPROPION HYDROCHLORIDE 300 MG/1
300 TABLET ORAL EVERY MORNING
Qty: 90 TABLET | Refills: 1 | Status: SHIPPED | OUTPATIENT
Start: 2022-02-15 | End: 2022-08-05

## 2022-02-15 RX ORDER — SERTRALINE HYDROCHLORIDE 100 MG/1
TABLET, FILM COATED ORAL
Qty: 135 TABLET | Refills: 1 | Status: SHIPPED | OUTPATIENT
Start: 2022-02-15 | End: 2022-08-22

## 2022-02-15 RX ORDER — DOXYCYCLINE 100 MG/1
100 CAPSULE ORAL 2 TIMES DAILY
Qty: 20 CAPSULE | Refills: 2 | Status: SHIPPED | OUTPATIENT
Start: 2022-02-15 | End: 2022-02-15

## 2022-02-15 RX ORDER — DOXYCYCLINE 100 MG/1
100 CAPSULE ORAL 2 TIMES DAILY
Qty: 20 CAPSULE | Refills: 2 | Status: SHIPPED | OUTPATIENT
Start: 2022-02-15 | End: 2022-06-24 | Stop reason: SDUPTHER

## 2022-02-15 RX ORDER — LINACLOTIDE 145 UG/1
145 CAPSULE, GELATIN COATED ORAL DAILY
COMMUNITY
Start: 2022-01-18

## 2022-02-16 VITALS
OXYGEN SATURATION: 94 % | HEART RATE: 66 BPM | WEIGHT: 232 LBS | SYSTOLIC BLOOD PRESSURE: 136 MMHG | TEMPERATURE: 98 F | HEIGHT: 67 IN | DIASTOLIC BLOOD PRESSURE: 86 MMHG | BODY MASS INDEX: 36.41 KG/M2

## 2022-02-17 NOTE — PROGRESS NOTES
Depression doing well.  Wellbutrin has helped him.  Sleeping better.  Better on the 150 Zoloft than the 100 also.  He feels that he is almost back to normal.  Calmer.  Three doses of COVID vaccine.  Hypertension is doing well.  No chest pain or palpitations.  Chronic pain medication from Dr. Rikki huff.  Back pain and shoulder pain.   check done.  Obstructive sleep apnea using his CPAP regularly benefitting from it.  MRSA nasal infection seems to be flaring up again their dog was found to have Staph.  May have been source of there infections.  BMI is at 36 stable.    Physical examination vital signs noted.  Slight redness around the nose.  Chest clear.  Heart regular rate rhythm.  Abdomen nontender.  Extremities without edema positive pulses.    Impression.  MRSA nasal infection.  Obstructive sleep apnea on CPAP.  Chronic pain.  BMI of 36.  Hypertension controlled.  Depression.    Plan Wellbutrin  daily 90 with a refill.  Zoloft 100 mg 1-1/2 daily 135 with a refill.  Vibramycin 100 b.i.d. 20 with 2 refills.  Documentation of his 3rd COVID vaccine.  Recommend Prevnar 13 patient refuses.

## 2022-02-24 PROBLEM — Z51.81 VISIT FOR MONITORING PLAVIX THERAPY: Status: RESOLVED | Noted: 2020-05-27 | Resolved: 2022-02-24

## 2022-02-24 PROBLEM — Z79.02 VISIT FOR MONITORING PLAVIX THERAPY: Status: RESOLVED | Noted: 2020-05-27 | Resolved: 2022-02-24

## 2022-02-24 PROBLEM — F41.9 ANXIETY: Status: RESOLVED | Noted: 2020-07-26 | Resolved: 2022-02-24

## 2022-02-25 NOTE — PROGRESS NOTES
Patient, Orlando Treadwell (MRN #[id number]), presented with a recorded BMI of 36.34 kg/m^2 and a documented comorbidity(s):  - Obstructive Sleep Apnea  - Hypertension  to which the severe obesity is a contributing factor. This is consistent with the definition of severe obesity (BMI 35.0-39.9) with comorbidity (ICD-10 E66.01, Z68.35). The patient's severe obesity was monitored, evaluated, addressed and/or treated. This addendum to the medical record is made on 02/24/2022.

## 2022-02-28 ENCOUNTER — OFFICE VISIT (OUTPATIENT)
Dept: PODIATRY | Facility: CLINIC | Age: 72
End: 2022-02-28
Payer: MEDICARE

## 2022-02-28 ENCOUNTER — HOSPITAL ENCOUNTER (OUTPATIENT)
Dept: RADIOLOGY | Facility: CLINIC | Age: 72
Discharge: HOME | End: 2022-02-28
Attending: PODIATRIST
Payer: MEDICARE

## 2022-02-28 VITALS — WEIGHT: 227 LBS | RESPIRATION RATE: 16 BRPM | HEIGHT: 67 IN | BODY MASS INDEX: 35.63 KG/M2

## 2022-02-28 DIAGNOSIS — M79.671 RIGHT FOOT PAIN: ICD-10-CM

## 2022-02-28 DIAGNOSIS — M79.671 PAIN OF RIGHT HEEL: ICD-10-CM

## 2022-02-28 DIAGNOSIS — M72.2 PLANTAR FASCIITIS: Primary | ICD-10-CM

## 2022-02-28 PROCEDURE — 20550 PR INJECT TENDON SHEATH/LIGAMENT: ICD-10-PCS | Mod: RT,S$GLB,, | Performed by: PODIATRIST

## 2022-02-28 PROCEDURE — 73630 X-RAY EXAM OF FOOT: CPT | Mod: RT,S$GLB,, | Performed by: RADIOLOGY

## 2022-02-28 PROCEDURE — 3008F BODY MASS INDEX DOCD: CPT | Mod: CPTII,S$GLB,, | Performed by: PODIATRIST

## 2022-02-28 PROCEDURE — 1125F PR PAIN SEVERITY QUANTIFIED, PAIN PRESENT: ICD-10-PCS | Mod: CPTII,S$GLB,, | Performed by: PODIATRIST

## 2022-02-28 PROCEDURE — 3288F PR FALLS RISK ASSESSMENT DOCUMENTED: ICD-10-PCS | Mod: CPTII,S$GLB,, | Performed by: PODIATRIST

## 2022-02-28 PROCEDURE — 1160F PR REVIEW ALL MEDS BY PRESCRIBER/CLIN PHARMACIST DOCUMENTED: ICD-10-PCS | Mod: CPTII,S$GLB,, | Performed by: PODIATRIST

## 2022-02-28 PROCEDURE — 99203 OFFICE O/P NEW LOW 30 MIN: CPT | Mod: 25,S$GLB,, | Performed by: PODIATRIST

## 2022-02-28 PROCEDURE — 1125F AMNT PAIN NOTED PAIN PRSNT: CPT | Mod: CPTII,S$GLB,, | Performed by: PODIATRIST

## 2022-02-28 PROCEDURE — 73630 XR FOOT COMPLETE 3 VIEW RIGHT: ICD-10-PCS | Mod: RT,S$GLB,, | Performed by: RADIOLOGY

## 2022-02-28 PROCEDURE — 1159F MED LIST DOCD IN RCRD: CPT | Mod: CPTII,S$GLB,, | Performed by: PODIATRIST

## 2022-02-28 PROCEDURE — 3288F FALL RISK ASSESSMENT DOCD: CPT | Mod: CPTII,S$GLB,, | Performed by: PODIATRIST

## 2022-02-28 PROCEDURE — 1101F PR PT FALLS ASSESS DOC 0-1 FALLS W/OUT INJ PAST YR: ICD-10-PCS | Mod: CPTII,S$GLB,, | Performed by: PODIATRIST

## 2022-02-28 PROCEDURE — 1160F RVW MEDS BY RX/DR IN RCRD: CPT | Mod: CPTII,S$GLB,, | Performed by: PODIATRIST

## 2022-02-28 PROCEDURE — 3008F PR BODY MASS INDEX (BMI) DOCUMENTED: ICD-10-PCS | Mod: CPTII,S$GLB,, | Performed by: PODIATRIST

## 2022-02-28 PROCEDURE — 99203 PR OFFICE/OUTPT VISIT, NEW, LEVL III, 30-44 MIN: ICD-10-PCS | Mod: 25,S$GLB,, | Performed by: PODIATRIST

## 2022-02-28 PROCEDURE — 20550 NJX 1 TENDON SHEATH/LIGAMENT: CPT | Mod: RT,S$GLB,, | Performed by: PODIATRIST

## 2022-02-28 PROCEDURE — 1159F PR MEDICATION LIST DOCUMENTED IN MEDICAL RECORD: ICD-10-PCS | Mod: CPTII,S$GLB,, | Performed by: PODIATRIST

## 2022-02-28 PROCEDURE — 1101F PT FALLS ASSESS-DOCD LE1/YR: CPT | Mod: CPTII,S$GLB,, | Performed by: PODIATRIST

## 2022-02-28 RX ORDER — DEXAMETHASONE SODIUM PHOSPHATE 4 MG/ML
4 INJECTION, SOLUTION INTRA-ARTICULAR; INTRALESIONAL; INTRAMUSCULAR; INTRAVENOUS; SOFT TISSUE
Status: COMPLETED | OUTPATIENT
Start: 2022-02-28 | End: 2022-02-28

## 2022-02-28 RX ORDER — AMMONIUM LACTATE 12 G/100G
1 CREAM TOPICAL 2 TIMES DAILY
Qty: 140 G | Refills: 11 | Status: SHIPPED | OUTPATIENT
Start: 2022-02-28 | End: 2024-03-21

## 2022-02-28 RX ORDER — BUPIVACAINE HYDROCHLORIDE 5 MG/ML
1.5 INJECTION, SOLUTION PERINEURAL
Status: COMPLETED | OUTPATIENT
Start: 2022-02-28 | End: 2022-02-28

## 2022-02-28 RX ORDER — METHYLPREDNISOLONE ACETATE 40 MG/ML
20 INJECTION, SUSPENSION INTRA-ARTICULAR; INTRALESIONAL; INTRAMUSCULAR; SOFT TISSUE
Status: COMPLETED | OUTPATIENT
Start: 2022-02-28 | End: 2022-02-28

## 2022-02-28 RX ORDER — MELOXICAM 15 MG/1
15 TABLET ORAL DAILY
Qty: 30 TABLET | Refills: 3 | Status: SHIPPED | OUTPATIENT
Start: 2022-02-28 | End: 2023-07-27

## 2022-02-28 RX ADMIN — DEXAMETHASONE SODIUM PHOSPHATE 4 MG: 4 INJECTION, SOLUTION INTRA-ARTICULAR; INTRALESIONAL; INTRAMUSCULAR; INTRAVENOUS; SOFT TISSUE at 04:02

## 2022-02-28 RX ADMIN — METHYLPREDNISOLONE ACETATE 20 MG: 40 INJECTION, SUSPENSION INTRA-ARTICULAR; INTRALESIONAL; INTRAMUSCULAR; SOFT TISSUE at 04:02

## 2022-02-28 RX ADMIN — BUPIVACAINE HYDROCHLORIDE 7.5 MG: 5 INJECTION, SOLUTION PERINEURAL at 04:02

## 2022-02-28 NOTE — PATIENT INSTRUCTIONS

## 2022-02-28 NOTE — PROGRESS NOTES
"  [address] [facility] [address] [place] [address]  Phone: [phone]   [phone]    Patient's PCP:Kleber Worthy III, MD  Referring Provider: Aaareferral Self    Subjective:      Chief Complaint:: Heel Pain (Right heel/arch pain)    VISHNU Treadwell is a 72 y.o. male who presents today with a complaint of right heel/arch pain lasting for about 4 months.  He was cutting wood and got wood chips in his shoe.  He pulled off his shoe and stepped back on his trailer and stepped on a piece of metal.  Stated the heel started to bleed, and was painful for about 2-3 days.  He has a paramedic neighbor who came over to deaden the foot and got some of the chips out of his heel.  Since then, his right heel has gotten worse.  He feels tightness in the heel/arch.  When flexing the foot upward, feels tightness in the arch. The pain has transitioned from the wood chips to the pain in his right heel.  He also states he has been favoring his right heel due to the pain, now c/o back pain.  He has tried wearing a pad/cushion support on right arch which has seemed to help.   Prolong walking/standing causes pain.  Also sitting for a period of time, then getting back up causes pain until he gets moving a little bit to work it out.  Takes pain medication for shoulder, which helps ease the pain in the foot/arch also.    Also mentioned about 1 month ago, also dropped a piece of wood on his right 1st toe.  Looks like toenail is about to fall off.  Nail is discolored.      Vitals:    02/28/22 1521   Resp: 16   Weight: 103 kg (227 lb)   Height: 5' 7" (1.702 m)   PainSc:   3      Shoe Size: 8 wide    Past Surgical History:   Procedure Laterality Date    APPENDECTOMY      COLONOSCOPY N/A 9/23/2021    Procedure: COLONOSCOPY;  Surgeon: Ashvin Batista MD;  Location: [facility];  Service: Endoscopy;  Laterality: N/A;    ESOPHAGOGASTRODUODENOSCOPY N/A 9/23/2021    Procedure: EGD (ESOPHAGOGASTRODUODENOSCOPY);  Surgeon: Ashvin Batista, " MD;  Location: [facility] ENDO;  Service: Endoscopy;  Laterality: N/A;    HAND SURGERY      rt hand    INCISION AND DRAINAGE Bilateral 06/2019    Right upper arm and left upper arm abscesses    JOINT REPLACEMENT      right knee    KNEE LIGAMENT RECONSTRUCTION      left knee    LAPAROSCOPIC CHOLECYSTECTOMY N/A 1/17/2020    Procedure: CHOLECYSTECTOMY, LAPAROSCOPIC;  Surgeon: Edgard Hill III, MD;  Location: [facility] OR;  Service: General;  Laterality: N/A;    LITHOTRIPSY      TONSILLECTOMY, ADENOIDECTOMY, BILATERAL MYRINGOTOMY AND TUBES      TOTAL KNEE ARTHROPLASTY  1971    rt knee    URETERAL STENT PLACEMENT       Past Medical History:   Diagnosis Date    Abdominal hernia     Arthritis     Asbestos exposure     SOB     Back pain     Bulging discs     lumbar    Chronic back pain     Coronary artery disease     Hypertension     Kidney stone     Prostatitis     Sepsis due to methicillin resistant Staphylococcus aureus (MRSA) 06/2019    Due to large right upper arm abscess    Wears glasses      Family History   Problem Relation Age of Onset    Cancer Mother     Stroke Father     Heart disease Father     Collagen disease Neg Hx     Melanoma Neg Hx     Psoriasis Neg Hx     Lupus Neg Hx     Eczema Neg Hx         Social History:   Marital Status:   Alcohol History:  reports no history of alcohol use.  Tobacco History:  reports that he has never smoked. He has never used smokeless tobacco.  Drug History:  reports no history of drug use.    Review of patient's allergies indicates:   Allergen Reactions    Codeine     Morphine Nausea Only     Ok with nausea med.       Current Outpatient Medications   Medication Sig Dispense Refill    b complex vitamins tablet Take 1 tablet by mouth once daily.      beta-carotene,A,-vits C,E/mins (VISION ORAL) Take 1 tablet by mouth once daily.      buPROPion (WELLBUTRIN XL) 300 MG 24 hr tablet Take 1 tablet (300 mg total) by mouth every morning. 90 tablet 1     diclofenac sodium 1 % Gel Apply 2 g topically 2 (two) times daily. 1 Tube 2    doxycycline (VIBRAMYCIN) 100 MG Cap Take 1 capsule (100 mg total) by mouth 2 (two) times a day. 20 capsule 2    gabapentin (NEURONTIN) 300 MG capsule Take 600 mg by mouth every evening.       hydrALAZINE (APRESOLINE) 50 MG tablet Take 50 mg by mouth 2 (two) times daily.      hydrocortisone (WESTCORT) 0.2 % cream APPLY EXTERNALLY TO SCALY AREAS ONCE DAILY FOR 7 TO 10 DAYS 15 g 0    ketoconazole (NIZORAL) 2 % shampoo WASH FACE AND EARS AREA TWICE WEEKLY      LINZESS 145 mcg Cap capsule Take 145 mcg by mouth once daily.      MAGNESIUM ORAL Take 500 mg by mouth once daily.      metoprolol succinate (TOPROL-XL) 100 MG 24 hr tablet Take 100 mg by mouth once daily.       multivitamin (THERAGRAN) tablet Take 1 tablet by mouth once daily.      mupirocin (BACTROBAN) 2 % ointment Apply topically 2 (two) times daily. 22 g 5    nitroGLYCERIN (NITROSTAT) 0.4 MG SL tablet Place 0.4 mg under the tongue every 5 (five) minutes as needed.       olmesartan-amLODIPin-hcthiazid 40-10-25 mg Tab olmesartan 40 mg-amlodipine 10 mg-hydrochlorothiazide 25 mg tablet   Take 1 tablet every day by oral route.      olmesartan-hydrochlorothiazide (BENICAR HCT) 40-25 mg per tablet Take 1 tablet by mouth once daily.      oxyCODONE (ROXICODONE) 20 mg Tab immediate release tablet Take 20 mg by mouth 4 (four) times daily as needed for Pain.      oxyCODONE myristate (XTAMPZA ER) 13.5 mg CSpT Take 1 capsule by mouth every 12 (twelve) hours.      pantoprazole (PROTONIX) 40 MG tablet Take 1 tablet (40 mg total) by mouth once daily. 90 tablet 1    QUEtiapine (SEROQUEL) 50 MG tablet 1 tablet at bedtime      sertraline (ZOLOFT) 100 MG tablet TAKE 1 1/2 TABLET PO  tablet 1    zinc gluconate 50 mg tablet Take 50 mg by mouth once daily.      ammonium lactate 12 % Crea Apply 1 application topically 2 (two) times daily. 140 g 11    meloxicam (MOBIC) 15 MG  tablet Take 1 tablet (15 mg total) by mouth once daily. 30 tablet 3    testosterone cypionate (DEPOTESTOTERONE CYPIONATE) 200 mg/mL injection Inject 2 mLs (400 mg total) into the muscle every 28 days. 6 mL 1     Current Facility-Administered Medications   Medication Dose Route Frequency Provider Last Rate Last Admin    BUPivacaine injection 7.5 mg  1.5 mL Intra-articular 1 time in Clinic/HOD Joseph Young DPM        dexamethasone injection 4 mg  4 mg Other 1 time in Clinic/HOD Joseph Young DPM        methylPREDNISolone acetate injection 20 mg  20 mg Other 1 time in Clinic/HOD Joseph Young DPM           Review of Systems   Constitutional: Negative for chills, fatigue, fever and unexpected weight change.   HENT: Negative for hearing loss and trouble swallowing.    Eyes: Negative for photophobia and visual disturbance.   Respiratory: Negative for cough, shortness of breath and wheezing.    Cardiovascular: Negative for chest pain, palpitations and leg swelling.   Gastrointestinal: Negative for abdominal pain and nausea.   Genitourinary: Negative for dysuria and frequency.   Musculoskeletal: Positive for arthralgias, back pain, gait problem, joint swelling and myalgias.   Skin: Negative for rash and wound.   Neurological: Negative for tremors, seizures, weakness, numbness and headaches.   Hematological: Does not bruise/bleed easily.         Objective:        Physical Exam:   Foot Exam    General  General Appearance: appears stated age and healthy   Orientation: alert and oriented to person, place, and time   Affect: appropriate   Gait: antalgic       Right Foot/Ankle     Inspection and Palpation  Ecchymosis: none  Tenderness: calcaneus tenderness and plantar fascia   Swelling: none   Arch: pes cavus  Hallux limitus: yes (Mild deformity)  Skin Exam: callus (Plantar heel) and fissure (Plantar heel with no infection and superficial);   Neurovascular  Dorsalis pedis: 2+  Posterior tibial:  2+  Capillary Refill: 2+  Saphenous nerve sensation: normal  Tibial nerve sensation: normal  Superficial peroneal nerve sensation: normal  Deep peroneal nerve sensation: normal  Sural nerve sensation: normal    Muscle Strength  Ankle dorsiflexion: 5  Ankle plantar flexion: 5  Ankle inversion: 5  Ankle eversion: 5  Great toe extension: 5  Great toe flexion: 5    Range of Motion    Normal right ankle ROM    Tests  Calcaneal squeeze: negative   Paresthesia: negative  Comments  Pain on palpation of the infeior medial heel and central plantar heel. No pain present  with side to side compression of the calcaneus. Negative tinnel's sign  at the tarsal tunnel. Negative Grant's nerve pain. Negative Calcaneal nerve pain. No soft tissue masses. Pain present with dorsiflexion of the ankle. No edema, erythema, or ecchymosis noted.         Physical Exam  Cardiovascular:      Pulses:           Dorsalis pedis pulses are 2+ on the right side.        Posterior tibial pulses are 2+ on the right side.   Musculoskeletal:        Feet:    Feet:      Right foot:      Skin integrity: Callus (Plantar heel) and fissure (Plantar heel with no infection and superficial) present.         Imaging:   AP, lateral, lateral oblique and axial calcaneal weight-bearing x-rays and medial oblique weight-bearing x-ray right foot:  No acute fractures, no bone tumors, no soft tissue masses seen.  Inferior posterior calcaneal exostosis present.  Mild hallux limitus.  Pes cavus foot structure.         Assessment:       1. Plantar fasciitis    2. Pain of right heel    3. Right foot pain      Plan:   Plantar fasciitis  -     meloxicam (MOBIC) 15 MG tablet; Take 1 tablet (15 mg total) by mouth once daily.  Dispense: 30 tablet; Refill: 3  -     methylPREDNISolone acetate injection 20 mg  -     BUPivacaine injection 7.5 mg  -     dexamethasone injection 4 mg  -     CROSS  FOR HOME USE    Pain of right heel  -     X-Ray Foot Complete Right  -     meloxicam  (MOBIC) 15 MG tablet; Take 1 tablet (15 mg total) by mouth once daily.  Dispense: 30 tablet; Refill: 3  -     methylPREDNISolone acetate injection 20 mg  -     BUPivacaine injection 7.5 mg  -     dexamethasone injection 4 mg  -     CROSS  FOR HOME USE    Right foot pain  -     X-Ray Foot Complete Right  -     meloxicam (MOBIC) 15 MG tablet; Take 1 tablet (15 mg total) by mouth once daily.  Dispense: 30 tablet; Refill: 3  -     methylPREDNISolone acetate injection 20 mg  -     BUPivacaine injection 7.5 mg  -     dexamethasone injection 4 mg  -     CROSS  FOR HOME USE    Other orders  -     ammonium lactate 12 % Crea; Apply 1 application topically 2 (two) times daily.  Dispense: 140 g; Refill: 11    I evaluated patient had discussion with him about the plantar fasciitis heel spur syndrome callus on the plantar aspect of his right foot.  I recommending Lac-Hydrin 12% 1st callus.  Within 2 plantar fasciitis level to treatment.    Plantar Fasciitis Level II Treatment:   Continue the conservative treatment of Level I, which is:   Anti-inflammatory Mobic 15 mg 1 pill daily with food  No bare feet  Over the counter insert cross trainers by [employer]  Restrict activity level   Use running shoes at full time refer to varsity sports  No impact exercise and stretch gastrocnemius muscle    Steroid Injection:  Informed consent was obtained.  Time-out was called.  The area was prepped with alcohol, and a steroid injection was performed at plantar medial and central plantar fascial insertion to right calcaneus using 1.5 cc of 0.5% Marcaine w/out epi, 1 cc Dexamethasone, 0.5 cc Methylprednisolone. Patient tolerated the procedure well.     If not at least 40% improvement 1 month return to see me    Procedures          Counseling:     I provided patient education verbally regarding:   Patient diagnosis, treatment options, as well as alternatives, risks, and benefits.     Discussed different treatment options for heel pain.  I gave written and verbal instructions on heel cord stretching and this was demonstrated for the patient. Patient expressed understanding. Discussed wearing appropriate shoe gear and avoiding flats, slippers, sandals, barefoot, and sockfeet. Recommended arch supports. My recommendation for OTC supports is Spenco polysorb replacement insoles or patient may elect more aggressive treatment with prescription arch supports. We also discussed cortisone injections and NSAID therapy.   This note was created using Dragon voice recognition software that occasionally misinterpreted phrases or words.

## 2022-03-07 ENCOUNTER — OFFICE VISIT (OUTPATIENT)
Dept: UROLOGY | Facility: CLINIC | Age: 72
End: 2022-03-07
Payer: MEDICARE

## 2022-03-07 VITALS
HEART RATE: 68 BPM | DIASTOLIC BLOOD PRESSURE: 64 MMHG | HEIGHT: 67 IN | RESPIRATION RATE: 18 BRPM | WEIGHT: 227 LBS | BODY MASS INDEX: 35.63 KG/M2 | SYSTOLIC BLOOD PRESSURE: 102 MMHG

## 2022-03-07 DIAGNOSIS — R79.89 LOW TESTOSTERONE: Primary | ICD-10-CM

## 2022-03-07 DIAGNOSIS — R39.9 LOWER URINARY TRACT SYMPTOMS (LUTS): ICD-10-CM

## 2022-03-07 DIAGNOSIS — Z12.5 SCREENING FOR PROSTATE CANCER: ICD-10-CM

## 2022-03-07 PROCEDURE — 3008F PR BODY MASS INDEX (BMI) DOCUMENTED: ICD-10-PCS | Mod: CPTII,S$GLB,, | Performed by: UROLOGY

## 2022-03-07 PROCEDURE — 1160F PR REVIEW ALL MEDS BY PRESCRIBER/CLIN PHARMACIST DOCUMENTED: ICD-10-PCS | Mod: CPTII,S$GLB,, | Performed by: UROLOGY

## 2022-03-07 PROCEDURE — 1101F PR PT FALLS ASSESS DOC 0-1 FALLS W/OUT INJ PAST YR: ICD-10-PCS | Mod: CPTII,S$GLB,, | Performed by: UROLOGY

## 2022-03-07 PROCEDURE — 3078F DIAST BP <80 MM HG: CPT | Mod: CPTII,S$GLB,, | Performed by: UROLOGY

## 2022-03-07 PROCEDURE — 3078F PR MOST RECENT DIASTOLIC BLOOD PRESSURE < 80 MM HG: ICD-10-PCS | Mod: CPTII,S$GLB,, | Performed by: UROLOGY

## 2022-03-07 PROCEDURE — 99999 PR PBB SHADOW E&M-EST. PATIENT-LVL IV: CPT | Mod: PBBFAC,,, | Performed by: UROLOGY

## 2022-03-07 PROCEDURE — 1126F PR PAIN SEVERITY QUANTIFIED, NO PAIN PRESENT: ICD-10-PCS | Mod: CPTII,S$GLB,, | Performed by: UROLOGY

## 2022-03-07 PROCEDURE — 3288F FALL RISK ASSESSMENT DOCD: CPT | Mod: CPTII,S$GLB,, | Performed by: UROLOGY

## 2022-03-07 PROCEDURE — 99214 OFFICE O/P EST MOD 30 MIN: CPT | Mod: S$GLB,,, | Performed by: UROLOGY

## 2022-03-07 PROCEDURE — 3288F PR FALLS RISK ASSESSMENT DOCUMENTED: ICD-10-PCS | Mod: CPTII,S$GLB,, | Performed by: UROLOGY

## 2022-03-07 PROCEDURE — 1159F MED LIST DOCD IN RCRD: CPT | Mod: CPTII,S$GLB,, | Performed by: UROLOGY

## 2022-03-07 PROCEDURE — 1126F AMNT PAIN NOTED NONE PRSNT: CPT | Mod: CPTII,S$GLB,, | Performed by: UROLOGY

## 2022-03-07 PROCEDURE — 3074F SYST BP LT 130 MM HG: CPT | Mod: CPTII,S$GLB,, | Performed by: UROLOGY

## 2022-03-07 PROCEDURE — 3008F BODY MASS INDEX DOCD: CPT | Mod: CPTII,S$GLB,, | Performed by: UROLOGY

## 2022-03-07 PROCEDURE — 1159F PR MEDICATION LIST DOCUMENTED IN MEDICAL RECORD: ICD-10-PCS | Mod: CPTII,S$GLB,, | Performed by: UROLOGY

## 2022-03-07 PROCEDURE — 99214 PR OFFICE/OUTPT VISIT, EST, LEVL IV, 30-39 MIN: ICD-10-PCS | Mod: S$GLB,,, | Performed by: UROLOGY

## 2022-03-07 PROCEDURE — 1101F PT FALLS ASSESS-DOCD LE1/YR: CPT | Mod: CPTII,S$GLB,, | Performed by: UROLOGY

## 2022-03-07 PROCEDURE — 3074F PR MOST RECENT SYSTOLIC BLOOD PRESSURE < 130 MM HG: ICD-10-PCS | Mod: CPTII,S$GLB,, | Performed by: UROLOGY

## 2022-03-07 PROCEDURE — 1160F RVW MEDS BY RX/DR IN RCRD: CPT | Mod: CPTII,S$GLB,, | Performed by: UROLOGY

## 2022-03-07 PROCEDURE — 99999 PR PBB SHADOW E&M-EST. PATIENT-LVL IV: ICD-10-PCS | Mod: PBBFAC,,, | Performed by: UROLOGY

## 2022-03-07 RX ORDER — TESTOSTERONE CYPIONATE 200 MG/ML
400 INJECTION, SOLUTION INTRAMUSCULAR
Qty: 6 ML | Refills: 1 | Status: SHIPPED | OUTPATIENT
Start: 2022-03-07 | End: 2022-11-03

## 2022-03-07 NOTE — PROGRESS NOTES
[facility] Established Patient/H&P:    Orlando Treadwell is a 72 y.o. male who presents for follow up for lower urinary tract symptoms and low testosterone.     Patient previously managed by Dr. Stark for lower urinary tract symptoms and low testosterone. He has a history of nocturia x 1 - 2 that is not significantly bothersome and has been managed well conservatively.      Regarding his low testosterone, he has been managed well on Testosterone 200 mg every month per Dr. Stark.      Of note, his CT abd/pelvis on 1/16/20 revealed a 5 mm left distal ureteral stone. No imaging since. States he has had ESWL several years ago.        Interval History     6/3/21: Testosterone 94 on 2/18/21. Remains on Testosterone 200 mg every month - but reports hot flashes and low energy recently. CT abd pelvis wo contrast on 3/13/21 with only a 3 mm non-obstructing renal stone. Recently hospitalized with MRSA and Salmonella.     3/7/22: Patient had his Testosterone increased to 400 mg every month during his last visit, but states that the pharmacy instructions did not change so he never started the increased dosage. Continues to report symptoms of hypogonadism - low energy,  fatigue and depression.      Denies any flank pain, gross hematuria, urinary tract infection,  trauma or history of  malignancy.         PSA  1.9                   2/18/21  1.6                   12/2/19  1.3                   11/6/18  3.8                   7/18/18  1.9                   12/16/16  2.3                   12/7/15  2.2                   12/10/14     Testosterone  94  2/18/21  221                  12/16/16  733                  12/7/15  121                  5/16/14     IPSS    QoL       2          0          12/18/20      Past Medical History:   Diagnosis Date    Abdominal hernia     Arthritis     Asbestos exposure     SOB     Back pain     Bulging discs     lumbar    Chronic back pain     Coronary artery disease      "Hypertension     Kidney stone     Prostatitis     Sepsis due to methicillin resistant Staphylococcus aureus (MRSA) 06/2019    Due to large right upper arm abscess    Wears glasses        Past Surgical History:   Procedure Laterality Date    APPENDECTOMY      COLONOSCOPY N/A 9/23/2021    Procedure: COLONOSCOPY;  Surgeon: Ashvin Batista MD;  Location: [facility] ENDO;  Service: Endoscopy;  Laterality: N/A;    ESOPHAGOGASTRODUODENOSCOPY N/A 9/23/2021    Procedure: EGD (ESOPHAGOGASTRODUODENOSCOPY);  Surgeon: Ashvin Batista MD;  Location: [facility] ENDO;  Service: Endoscopy;  Laterality: N/A;    HAND SURGERY      rt hand    INCISION AND DRAINAGE Bilateral 06/2019    Right upper arm and left upper arm abscesses    JOINT REPLACEMENT      right knee    KNEE LIGAMENT RECONSTRUCTION      left knee    LAPAROSCOPIC CHOLECYSTECTOMY N/A 1/17/2020    Procedure: CHOLECYSTECTOMY, LAPAROSCOPIC;  Surgeon: Edgard Hill III, MD;  Location: [facility] OR;  Service: General;  Laterality: N/A;    LITHOTRIPSY      TONSILLECTOMY, ADENOIDECTOMY, BILATERAL MYRINGOTOMY AND TUBES      TOTAL KNEE ARTHROPLASTY  1971    rt knee    URETERAL STENT PLACEMENT         Review of patient's allergies indicates:   Allergen Reactions    Codeine     Morphine Nausea Only     Ok with nausea med.       Medications Reviewed: see MAR    FOCUSED PHYSICAL EXAM:    Vitals:    03/07/22 0958   BP: 102/64   Pulse: 68   Resp: 18     Body mass index is 35.55 kg/m². Weight: 103 kg (227 lb) Height: 5' 7" (170.2 cm)       General: Alert, cooperative, no distress, appears stated age  Abdomen: Soft, non-tender, no CVA tenderness, non-distended  TJ: Declined     LABS:    No results found for this or any previous visit (from the past 336 hour(s)).        Assessment/Diagnosis:    1. Low testosterone  testosterone cypionate (DEPOTESTOTERONE CYPIONATE) 200 mg/mL injection    CBC Auto Differential    Testosterone Panel   2. Lower urinary tract symptoms (LUTS)     3. " Screening for prostate cancer  PSA, Screening       Plans:    - I spent 30 minutes of the day of this encounter preparing for, treating and managing this patient.   - Continue conservative management for mild LUTS.   - Testosterone once again increased to 400 mg every 28 days - refills ordered. Extensively reviewed instructions.  - CBC, PSA and Testosterone in 3 months.   - RTC in 1 year.

## 2022-03-09 DIAGNOSIS — M75.51 BURSITIS OF RIGHT SHOULDER: ICD-10-CM

## 2022-03-09 DIAGNOSIS — G89.4 CHRONIC PAIN SYNDROME: ICD-10-CM

## 2022-03-09 DIAGNOSIS — F11.20 OPIOID TYPE DEPENDENCE, CONTINUOUS: Primary | ICD-10-CM

## 2022-03-09 DIAGNOSIS — M25.511 RIGHT SHOULDER PAIN: ICD-10-CM

## 2022-03-09 DIAGNOSIS — M54.2 CERVICALGIA: ICD-10-CM

## 2022-03-09 DIAGNOSIS — M54.16 LUMBAR RADICULOPATHY: ICD-10-CM

## 2022-03-09 DIAGNOSIS — M48.061 LUMBAR SPINAL STENOSIS: ICD-10-CM

## 2022-03-09 DIAGNOSIS — M25.532 LEFT WRIST PAIN: ICD-10-CM

## 2022-03-15 ENCOUNTER — PES CALL (OUTPATIENT)
Dept: ADMINISTRATIVE | Facility: CLINIC | Age: 72
End: 2022-03-15
Payer: MEDICARE

## 2022-03-23 ENCOUNTER — HOSPITAL ENCOUNTER (OUTPATIENT)
Dept: RADIOLOGY | Facility: HOSPITAL | Age: 72
Discharge: HOME | End: 2022-03-23
Attending: PAIN MEDICINE
Payer: MEDICARE

## 2022-03-23 DIAGNOSIS — F11.20 OPIOID TYPE DEPENDENCE, CONTINUOUS: ICD-10-CM

## 2022-03-23 DIAGNOSIS — G89.4 CHRONIC PAIN SYNDROME: ICD-10-CM

## 2022-03-23 DIAGNOSIS — M54.2 CERVICALGIA: ICD-10-CM

## 2022-03-23 DIAGNOSIS — M25.511 RIGHT SHOULDER PAIN: ICD-10-CM

## 2022-03-23 DIAGNOSIS — M25.532 LEFT WRIST PAIN: ICD-10-CM

## 2022-03-23 DIAGNOSIS — M75.51 BURSITIS OF RIGHT SHOULDER: ICD-10-CM

## 2022-03-23 DIAGNOSIS — M54.16 LUMBAR RADICULOPATHY: ICD-10-CM

## 2022-03-23 DIAGNOSIS — M48.061 LUMBAR SPINAL STENOSIS: ICD-10-CM

## 2022-03-23 LAB
CREAT SERPL-MCNC: 1.6 MG/DL (ref 0.5–1.4)
SAMPLE: ABNORMAL

## 2022-03-23 PROCEDURE — 25500020 PHARM REV CODE 255: Mod: PO | Performed by: PAIN MEDICINE

## 2022-03-23 PROCEDURE — A9585 GADOBUTROL INJECTION: HCPCS | Mod: PO | Performed by: PAIN MEDICINE

## 2022-03-23 PROCEDURE — 72114 X-RAY EXAM L-S SPINE BENDING: CPT | Mod: TC,PO

## 2022-03-23 PROCEDURE — 72158 MRI LUMBAR SPINE W/O & W/DYE: CPT | Mod: TC,PO

## 2022-03-23 RX ORDER — GADOBUTROL 604.72 MG/ML
10 INJECTION INTRAVENOUS
Status: COMPLETED | OUTPATIENT
Start: 2022-03-23 | End: 2022-03-23

## 2022-03-23 RX ADMIN — GADOBUTROL 10 ML: 604.72 INJECTION INTRAVENOUS at 11:03

## 2022-05-16 RX ORDER — PANTOPRAZOLE SODIUM 40 MG/1
40 TABLET, DELAYED RELEASE ORAL DAILY
Qty: 90 TABLET | Refills: 1 | Status: SHIPPED | OUTPATIENT
Start: 2022-05-16 | End: 2022-06-30 | Stop reason: SDUPTHER

## 2022-05-20 ENCOUNTER — LAB VISIT (OUTPATIENT)
Dept: LAB | Facility: HOSPITAL | Age: 72
End: 2022-05-20
Attending: UROLOGY
Payer: MEDICARE

## 2022-05-20 DIAGNOSIS — R79.89 LOW TESTOSTERONE: ICD-10-CM

## 2022-05-20 DIAGNOSIS — Z12.5 SCREENING FOR PROSTATE CANCER: ICD-10-CM

## 2022-05-20 LAB — COMPLEXED PSA SERPL-MCNC: 2 NG/ML (ref 0–4)

## 2022-05-20 PROCEDURE — 82040 ASSAY OF SERUM ALBUMIN: CPT | Performed by: UROLOGY

## 2022-05-20 PROCEDURE — 84153 ASSAY OF PSA TOTAL: CPT | Performed by: UROLOGY

## 2022-05-25 LAB
ALBUMIN SERPL-MCNC: 4 G/DL (ref 3.6–5.1)
SHBG SERPL-SCNC: 27 NMOL/L (ref 22–77)
TESTOST FREE SERPL-MCNC: 4.3 PG/ML (ref 6–73)
TESTOST SERPL-MCNC: 31 NG/DL (ref 250–1100)
TESTOSTERONE.FREE+WB SERPL-MCNC: 8 NG/DL (ref 15–150)

## 2022-06-22 ENCOUNTER — OFFICE VISIT (OUTPATIENT)
Dept: UROLOGY | Facility: CLINIC | Age: 72
End: 2022-06-22
Payer: MEDICARE

## 2022-06-22 VITALS
HEART RATE: 53 BPM | HEIGHT: 67 IN | SYSTOLIC BLOOD PRESSURE: 126 MMHG | BODY MASS INDEX: 35.55 KG/M2 | DIASTOLIC BLOOD PRESSURE: 74 MMHG

## 2022-06-22 DIAGNOSIS — R39.9 LOWER URINARY TRACT SYMPTOMS (LUTS): ICD-10-CM

## 2022-06-22 DIAGNOSIS — R79.89 LOW TESTOSTERONE: Primary | ICD-10-CM

## 2022-06-22 PROCEDURE — 1101F PT FALLS ASSESS-DOCD LE1/YR: CPT | Mod: CPTII,S$GLB,, | Performed by: UROLOGY

## 2022-06-22 PROCEDURE — 3078F PR MOST RECENT DIASTOLIC BLOOD PRESSURE < 80 MM HG: ICD-10-PCS | Mod: CPTII,S$GLB,, | Performed by: UROLOGY

## 2022-06-22 PROCEDURE — 1160F PR REVIEW ALL MEDS BY PRESCRIBER/CLIN PHARMACIST DOCUMENTED: ICD-10-PCS | Mod: CPTII,S$GLB,, | Performed by: UROLOGY

## 2022-06-22 PROCEDURE — 3288F PR FALLS RISK ASSESSMENT DOCUMENTED: ICD-10-PCS | Mod: CPTII,S$GLB,, | Performed by: UROLOGY

## 2022-06-22 PROCEDURE — 99214 PR OFFICE/OUTPT VISIT, EST, LEVL IV, 30-39 MIN: ICD-10-PCS | Mod: S$GLB,,, | Performed by: UROLOGY

## 2022-06-22 PROCEDURE — 1126F AMNT PAIN NOTED NONE PRSNT: CPT | Mod: CPTII,S$GLB,, | Performed by: UROLOGY

## 2022-06-22 PROCEDURE — 99999 PR PBB SHADOW E&M-EST. PATIENT-LVL IV: ICD-10-PCS | Mod: PBBFAC,,, | Performed by: UROLOGY

## 2022-06-22 PROCEDURE — 99999 PR PBB SHADOW E&M-EST. PATIENT-LVL IV: CPT | Mod: PBBFAC,,, | Performed by: UROLOGY

## 2022-06-22 PROCEDURE — 1126F PR PAIN SEVERITY QUANTIFIED, NO PAIN PRESENT: ICD-10-PCS | Mod: CPTII,S$GLB,, | Performed by: UROLOGY

## 2022-06-22 PROCEDURE — 1101F PR PT FALLS ASSESS DOC 0-1 FALLS W/OUT INJ PAST YR: ICD-10-PCS | Mod: CPTII,S$GLB,, | Performed by: UROLOGY

## 2022-06-22 PROCEDURE — 3008F BODY MASS INDEX DOCD: CPT | Mod: CPTII,S$GLB,, | Performed by: UROLOGY

## 2022-06-22 PROCEDURE — 1160F RVW MEDS BY RX/DR IN RCRD: CPT | Mod: CPTII,S$GLB,, | Performed by: UROLOGY

## 2022-06-22 PROCEDURE — 3008F PR BODY MASS INDEX (BMI) DOCUMENTED: ICD-10-PCS | Mod: CPTII,S$GLB,, | Performed by: UROLOGY

## 2022-06-22 PROCEDURE — 3078F DIAST BP <80 MM HG: CPT | Mod: CPTII,S$GLB,, | Performed by: UROLOGY

## 2022-06-22 PROCEDURE — 3288F FALL RISK ASSESSMENT DOCD: CPT | Mod: CPTII,S$GLB,, | Performed by: UROLOGY

## 2022-06-22 PROCEDURE — 99214 OFFICE O/P EST MOD 30 MIN: CPT | Mod: S$GLB,,, | Performed by: UROLOGY

## 2022-06-22 PROCEDURE — 1159F MED LIST DOCD IN RCRD: CPT | Mod: CPTII,S$GLB,, | Performed by: UROLOGY

## 2022-06-22 PROCEDURE — 1159F PR MEDICATION LIST DOCUMENTED IN MEDICAL RECORD: ICD-10-PCS | Mod: CPTII,S$GLB,, | Performed by: UROLOGY

## 2022-06-22 PROCEDURE — 3074F SYST BP LT 130 MM HG: CPT | Mod: CPTII,S$GLB,, | Performed by: UROLOGY

## 2022-06-22 PROCEDURE — 3074F PR MOST RECENT SYSTOLIC BLOOD PRESSURE < 130 MM HG: ICD-10-PCS | Mod: CPTII,S$GLB,, | Performed by: UROLOGY

## 2022-06-22 NOTE — PROGRESS NOTES
[facility] Established Patient/H&P:    Orlando Treadwell is a 72 y.o. male who presents for follow up for lower urinary tract symptoms and low testosterone.     Patient previously managed by Dr. Stark for lower urinary tract symptoms and low testosterone. He has a history of nocturia x 1 - 2 that is not significantly bothersome and has been managed well conservatively.      Regarding his low testosterone, he has been managed well on Testosterone 200 mg every month per Dr. Stark.      Of note, his CT abd/pelvis on 1/16/20 revealed a 5 mm left distal ureteral stone. No imaging since. States he has had ESWL several years ago.        Interval History     6/3/21: Testosterone 94 on 2/18/21. Remains on Testosterone 200 mg every month - but reports hot flashes and low energy recently. CT abd pelvis wo contrast on 3/13/21 with only a 3 mm non-obstructing renal stone. Recently hospitalized with MRSA and Salmonella.      3/7/22: Patient had his Testosterone increased to 400 mg every month during his last visit, but states that the pharmacy instructions did not change so he never started the increased dosage. Continues to report symptoms of hypogonadism - low energy,  fatigue and depression.     6/22/22: Patient once again has not increased his dosage of his testosterone stating that he still did not understand. He has been taking 200 mg monthly. As expected, testosterone remains low at 31. Continues to have low energy, fatigue and depression. States that he got the new RX, but didn't understand he needed to increase to 400 mg.      Denies any flank pain, gross hematuria, urinary tract infection,  trauma or history of   malignancy.         PSA  2.0  5/20/22  1.9                   2/18/21  1.6                   12/2/19  1.3                   11/6/18  3.8                   7/18/18  1.9                   12/16/16  2.3                   12/7/15  2.2                   12/10/14     Testosterone  31  5/20/22  94                    2/18/21  221                  12/16/16  733                  12/7/15  121                  5/16/14     IPSS    QoL       2          0          12/18/20    Past Medical History:   Diagnosis Date    Abdominal hernia     Arthritis     Asbestos exposure     SOB     Back pain     Bulging discs     lumbar    Chronic back pain     Coronary artery disease     Hypertension     Kidney stone     Prostatitis     Sepsis due to methicillin resistant Staphylococcus aureus (MRSA) 06/2019    Due to large right upper arm abscess    Wears glasses        Past Surgical History:   Procedure Laterality Date    APPENDECTOMY      COLONOSCOPY N/A 9/23/2021    Procedure: COLONOSCOPY;  Surgeon: Ashvin Batista MD;  Location: [facility];  Service: Endoscopy;  Laterality: N/A;    ESOPHAGOGASTRODUODENOSCOPY N/A 9/23/2021    Procedure: EGD (ESOPHAGOGASTRODUODENOSCOPY);  Surgeon: Ashvin Batista MD;  Location: [facility];  Service: Endoscopy;  Laterality: N/A;    HAND SURGERY      rt hand    INCISION AND DRAINAGE Bilateral 06/2019    Right upper arm and left upper arm abscesses    JOINT REPLACEMENT      right knee    KNEE LIGAMENT RECONSTRUCTION      left knee    LAPAROSCOPIC CHOLECYSTECTOMY N/A 1/17/2020    Procedure: CHOLECYSTECTOMY, LAPAROSCOPIC;  Surgeon: Edgard Hill III, MD;  Location: [facility];  Service: General;  Laterality: N/A;    LITHOTRIPSY      TONSILLECTOMY, ADENOIDECTOMY, BILATERAL MYRINGOTOMY AND TUBES      TOTAL KNEE ARTHROPLASTY  1971    rt knee    URETERAL STENT PLACEMENT         Review of patient's allergies indicates:   Allergen Reactions    Codeine     Morphine Nausea Only     Ok with nausea  "med.       Medications Reviewed: see MAR    FOCUSED PHYSICAL EXAM:    Vitals:    06/22/22 1043   BP: 126/74   Pulse: (!) 53     Body mass index is 35.55 kg/m².   Height: 5' 7" (170.2 cm)       General: Alert, cooperative, no distress, appears stated age  Abdomen: Soft, non-tender, no CVA tenderness, non-distended      LABS:    No results found for this or any previous visit (from the past 336 hour(s)).        Assessment/Diagnosis:    1. Low testosterone  CBC Auto Differential    Testosterone Panel   2. Lower urinary tract symptoms (LUTS)         Plans:    - I spent 30 minutes of the day of this encounter preparing for, treating and managing this patient.   - Continue conservative management for mild LUTS.   - Stressed the importance of actually increasing Testosterone to 400 mg every 28 days. Extensively reviewed instructions again with patient.  - CBC and Testosterone in 6 months. If his levels remain low, can consider 400 mg ever 2 or 3 weeks.   - RTC in 1 year.      "

## 2022-06-24 ENCOUNTER — HOSPITAL ENCOUNTER (EMERGENCY)
Facility: HOSPITAL | Age: 72
Discharge: HOME | End: 2022-06-24
Attending: EMERGENCY MEDICINE
Payer: MEDICARE

## 2022-06-24 VITALS
HEART RATE: 77 BPM | TEMPERATURE: 99 F | OXYGEN SATURATION: 96 % | DIASTOLIC BLOOD PRESSURE: 59 MMHG | BODY MASS INDEX: 36.02 KG/M2 | WEIGHT: 230 LBS | RESPIRATION RATE: 20 BRPM | SYSTOLIC BLOOD PRESSURE: 118 MMHG

## 2022-06-24 DIAGNOSIS — R50.9 FEVER: ICD-10-CM

## 2022-06-24 LAB
ALBUMIN SERPL BCP-MCNC: 4 G/DL (ref 3.5–5.2)
ALP SERPL-CCNC: 56 U/L (ref 55–135)
ALT SERPL W/O P-5'-P-CCNC: 99 U/L (ref 10–44)
ANION GAP SERPL CALC-SCNC: 10 MMOL/L (ref 8–16)
AST SERPL-CCNC: 95 U/L (ref 10–40)
BASOPHILS # BLD AUTO: 0.03 K/UL (ref 0–0.2)
BASOPHILS NFR BLD: 0.2 % (ref 0–1.9)
BILIRUB SERPL-MCNC: 0.8 MG/DL (ref 0.1–1)
BILIRUB UR QL STRIP: NEGATIVE
BUN SERPL-MCNC: 22 MG/DL (ref 8–23)
CALCIUM SERPL-MCNC: 9.6 MG/DL (ref 8.7–10.5)
CHLORIDE SERPL-SCNC: 101 MMOL/L (ref 95–110)
CLARITY UR: CLEAR
CO2 SERPL-SCNC: 25 MMOL/L (ref 23–29)
COLOR UR: YELLOW
CREAT SERPL-MCNC: 1.4 MG/DL (ref 0.5–1.4)
CRP SERPL-MCNC: 1.39 MG/DL
DIFFERENTIAL METHOD: ABNORMAL
EOSINOPHIL # BLD AUTO: 0.1 K/UL (ref 0–0.5)
EOSINOPHIL NFR BLD: 0.5 % (ref 0–8)
ERYTHROCYTE [DISTWIDTH] IN BLOOD BY AUTOMATED COUNT: 16.1 % (ref 11.5–14.5)
ERYTHROCYTE [SEDIMENTATION RATE] IN BLOOD BY WESTERGREN METHOD: 45 MM/HR (ref 0–10)
EST. GFR  (AFRICAN AMERICAN): 57.2 ML/MIN/1.73 M^2
EST. GFR  (NON AFRICAN AMERICAN): 49.5 ML/MIN/1.73 M^2
GLUCOSE SERPL-MCNC: 199 MG/DL (ref 70–110)
GLUCOSE UR QL STRIP: NEGATIVE
HCT VFR BLD AUTO: 45.7 % (ref 40–54)
HGB BLD-MCNC: 14.6 G/DL (ref 14–18)
HGB UR QL STRIP: NEGATIVE
IMM GRANULOCYTES # BLD AUTO: 0.07 K/UL (ref 0–0.04)
IMM GRANULOCYTES NFR BLD AUTO: 0.5 % (ref 0–0.5)
INFLUENZA A, MOLECULAR: NEGATIVE
INFLUENZA B, MOLECULAR: NEGATIVE
KETONES UR QL STRIP: NEGATIVE
LACTATE SERPL-SCNC: 2.2 MMOL/L (ref 0.5–1.9)
LEUKOCYTE ESTERASE UR QL STRIP: NEGATIVE
LYMPHOCYTES # BLD AUTO: 0.4 K/UL (ref 1–4.8)
LYMPHOCYTES NFR BLD: 2.5 % (ref 18–48)
MCH RBC QN AUTO: 27.4 PG (ref 27–31)
MCHC RBC AUTO-ENTMCNC: 31.9 G/DL (ref 32–36)
MCV RBC AUTO: 86 FL (ref 82–98)
MONOCYTES # BLD AUTO: 0.5 K/UL (ref 0.3–1)
MONOCYTES NFR BLD: 3.6 % (ref 4–15)
NEUTROPHILS # BLD AUTO: 13.6 K/UL (ref 1.8–7.7)
NEUTROPHILS NFR BLD: 92.7 % (ref 38–73)
NITRITE UR QL STRIP: NEGATIVE
NRBC BLD-RTO: 0 /100 WBC
PH UR STRIP: 6 [PH] (ref 5–8)
PLATELET # BLD AUTO: 203 K/UL (ref 150–450)
PMV BLD AUTO: 10.7 FL (ref 9.2–12.9)
POTASSIUM SERPL-SCNC: 4.4 MMOL/L (ref 3.5–5.1)
PROT SERPL-MCNC: 8 G/DL (ref 6–8.4)
PROT UR QL STRIP: ABNORMAL
RBC # BLD AUTO: 5.33 M/UL (ref 4.6–6.2)
SARS-COV-2 RDRP RESP QL NAA+PROBE: NEGATIVE
SODIUM SERPL-SCNC: 136 MMOL/L (ref 136–145)
SP GR UR STRIP: 1.02 (ref 1–1.03)
SPECIMEN SOURCE: NORMAL
URN SPEC COLLECT METH UR: ABNORMAL
UROBILINOGEN UR STRIP-ACNC: NEGATIVE EU/DL
WBC # BLD AUTO: 14.64 K/UL (ref 3.9–12.7)

## 2022-06-24 PROCEDURE — 81003 URINALYSIS AUTO W/O SCOPE: CPT | Performed by: EMERGENCY MEDICINE

## 2022-06-24 PROCEDURE — 85025 COMPLETE CBC W/AUTO DIFF WBC: CPT | Performed by: EMERGENCY MEDICINE

## 2022-06-24 PROCEDURE — 83605 ASSAY OF LACTIC ACID: CPT | Performed by: EMERGENCY MEDICINE

## 2022-06-24 PROCEDURE — 85651 RBC SED RATE NONAUTOMATED: CPT | Performed by: EMERGENCY MEDICINE

## 2022-06-24 PROCEDURE — 87502 INFLUENZA DNA AMP PROBE: CPT | Performed by: EMERGENCY MEDICINE

## 2022-06-24 PROCEDURE — 93010 ELECTROCARDIOGRAM REPORT: CPT | Mod: ,,, | Performed by: SPECIALIST

## 2022-06-24 PROCEDURE — 96367 TX/PROPH/DG ADDL SEQ IV INF: CPT

## 2022-06-24 PROCEDURE — 36415 COLL VENOUS BLD VENIPUNCTURE: CPT | Performed by: EMERGENCY MEDICINE

## 2022-06-24 PROCEDURE — 96368 THER/DIAG CONCURRENT INF: CPT

## 2022-06-24 PROCEDURE — 99285 EMERGENCY DEPT VISIT HI MDM: CPT | Mod: 25

## 2022-06-24 PROCEDURE — 86140 C-REACTIVE PROTEIN: CPT | Performed by: EMERGENCY MEDICINE

## 2022-06-24 PROCEDURE — S0030 INJECTION, METRONIDAZOLE: HCPCS | Performed by: EMERGENCY MEDICINE

## 2022-06-24 PROCEDURE — 93010 EKG 12-LEAD: ICD-10-PCS | Mod: ,,, | Performed by: SPECIALIST

## 2022-06-24 PROCEDURE — 25500020 PHARM REV CODE 255: Performed by: EMERGENCY MEDICINE

## 2022-06-24 PROCEDURE — 93005 ELECTROCARDIOGRAM TRACING: CPT | Performed by: SPECIALIST

## 2022-06-24 PROCEDURE — 25000003 PHARM REV CODE 250: Performed by: EMERGENCY MEDICINE

## 2022-06-24 PROCEDURE — 80053 COMPREHEN METABOLIC PANEL: CPT | Performed by: EMERGENCY MEDICINE

## 2022-06-24 PROCEDURE — 87040 BLOOD CULTURE FOR BACTERIA: CPT | Mod: 59 | Performed by: EMERGENCY MEDICINE

## 2022-06-24 PROCEDURE — U0002 COVID-19 LAB TEST NON-CDC: HCPCS | Performed by: EMERGENCY MEDICINE

## 2022-06-24 PROCEDURE — 96365 THER/PROPH/DIAG IV INF INIT: CPT | Mod: 59

## 2022-06-24 PROCEDURE — A9585 GADOBUTROL INJECTION: HCPCS | Performed by: EMERGENCY MEDICINE

## 2022-06-24 PROCEDURE — 63600175 PHARM REV CODE 636 W HCPCS: Performed by: EMERGENCY MEDICINE

## 2022-06-24 RX ORDER — ASPIRIN 325 MG
325 TABLET ORAL DAILY
COMMUNITY

## 2022-06-24 RX ORDER — DOXYCYCLINE 100 MG/1
100 CAPSULE ORAL 2 TIMES DAILY
Qty: 20 CAPSULE | Refills: 2 | Status: SHIPPED | OUTPATIENT
Start: 2022-06-24 | End: 2022-11-03

## 2022-06-24 RX ORDER — METRONIDAZOLE 500 MG/100ML
500 INJECTION, SOLUTION INTRAVENOUS
Status: COMPLETED | OUTPATIENT
Start: 2022-06-24 | End: 2022-06-24

## 2022-06-24 RX ORDER — VANCOMYCIN HCL IN 5 % DEXTROSE 1G/250ML
1000 PLASTIC BAG, INJECTION (ML) INTRAVENOUS
Status: COMPLETED | OUTPATIENT
Start: 2022-06-24 | End: 2022-06-24

## 2022-06-24 RX ORDER — ACETAMINOPHEN 500 MG
1000 TABLET ORAL
Status: COMPLETED | OUTPATIENT
Start: 2022-06-24 | End: 2022-06-24

## 2022-06-24 RX ORDER — GADOBUTROL 604.72 MG/ML
10 INJECTION INTRAVENOUS
Status: COMPLETED | OUTPATIENT
Start: 2022-06-24 | End: 2022-06-24

## 2022-06-24 RX ORDER — AMOXICILLIN AND CLAVULANATE POTASSIUM 875; 125 MG/1; MG/1
1 TABLET, FILM COATED ORAL 2 TIMES DAILY
Qty: 14 TABLET | Refills: 0 | Status: SHIPPED | OUTPATIENT
Start: 2022-06-24 | End: 2022-06-30

## 2022-06-24 RX ADMIN — GADOBUTROL 10 ML: 604.72 INJECTION INTRAVENOUS at 08:06

## 2022-06-24 RX ADMIN — VANCOMYCIN HYDROCHLORIDE 1000 MG: 1 INJECTION, POWDER, LYOPHILIZED, FOR SOLUTION INTRAVENOUS at 06:06

## 2022-06-24 RX ADMIN — ACETAMINOPHEN 1000 MG: 500 TABLET ORAL at 03:06

## 2022-06-24 RX ADMIN — METRONIDAZOLE 500 MG: 500 INJECTION, SOLUTION INTRAVENOUS at 04:06

## 2022-06-24 RX ADMIN — CEFTRIAXONE 2 G: 2 INJECTION, SOLUTION INTRAVENOUS at 04:06

## 2022-06-24 RX ADMIN — SODIUM CHLORIDE, SODIUM LACTATE, POTASSIUM CHLORIDE, AND CALCIUM CHLORIDE 3129 ML: .6; .31; .03; .02 INJECTION, SOLUTION INTRAVENOUS at 04:06

## 2022-06-24 NOTE — ED PROVIDER NOTES
Encounter Date: 6/24/2022       History     Chief Complaint   Patient presents with    Fever    Fatigue     72-year-old male with generalized malaise and fever and fatigue.  Patient has chronic low back pain.  Patient has high fever and slightly confused from that.  Denies chest pain or shortness of breath or abdominal pain or any focal weakness or numbness.  Denies dysuria or hematuria.  Patient has a very slight headache.  Denies photophobia or neck stiffness.  Denies nausea vomiting or chest pain or any focal weakness.        Review of patient's allergies indicates:   Allergen Reactions    Codeine     Morphine Nausea Only     Ok with nausea med.     Past Medical History:   Diagnosis Date    Abdominal hernia     Arthritis     Asbestos exposure     SOB     Back pain     Bulging discs     lumbar    Chronic back pain     Coronary artery disease     Hypertension     Kidney stone     Prostatitis     Sepsis due to methicillin resistant Staphylococcus aureus (MRSA) 06/2019    Due to large right upper arm abscess    Wears glasses      Past Surgical History:   Procedure Laterality Date    APPENDECTOMY      COLONOSCOPY N/A 9/23/2021    Procedure: COLONOSCOPY;  Surgeon: Ashvin Batista MD;  Location: [facility];  Service: Endoscopy;  Laterality: N/A;    ESOPHAGOGASTRODUODENOSCOPY N/A 9/23/2021    Procedure: EGD (ESOPHAGOGASTRODUODENOSCOPY);  Surgeon: Ashvin Batista MD;  Location: [facility];  Service: Endoscopy;  Laterality: N/A;    HAND SURGERY      rt hand    INCISION AND DRAINAGE Bilateral 06/2019    Right upper arm and left upper arm abscesses    JOINT REPLACEMENT      right knee    KNEE LIGAMENT RECONSTRUCTION      left knee    LAPAROSCOPIC CHOLECYSTECTOMY N/A 1/17/2020    Procedure: CHOLECYSTECTOMY, LAPAROSCOPIC;  Surgeon: Edgard Hill III, MD;  Location: [facility];  Service: General;  Laterality: N/A;    LITHOTRIPSY      TONSILLECTOMY, ADENOIDECTOMY, BILATERAL MYRINGOTOMY AND TUBES       TOTAL KNEE ARTHROPLASTY  1971    rt knee    URETERAL STENT PLACEMENT       Family History   Problem Relation Age of Onset    Cancer Mother     Stroke Father     Heart disease Father     Collagen disease Neg Hx     Melanoma Neg Hx     Psoriasis Neg Hx     Lupus Neg Hx     Eczema Neg Hx      Social History     Tobacco Use    Smoking status: Never Smoker    Smokeless tobacco: Never Used   Substance Use Topics    Alcohol use: No    Drug use: No     Review of Systems   Constitutional: Positive for fatigue and fever.   HENT: Negative.    Eyes: Negative.    Respiratory: Negative.    Cardiovascular: Negative.    Gastrointestinal: Negative.    Endocrine: Negative.    Genitourinary: Negative.    Musculoskeletal: Positive for back pain and myalgias.   Skin: Negative.    Allergic/Immunologic: Negative.    Neurological: Negative.    Hematological: Negative.    Psychiatric/Behavioral: Negative.    All other systems reviewed and are negative.      Physical Exam     Initial Vitals [06/24/22 1531]   BP Pulse Resp Temp SpO2   117/85 (!) 117 20 (!) 103.2 °F (39.6 °C) (!) 93 %      MAP       --         Physical Exam    Nursing note and vitals reviewed.  Constitutional: He appears well-developed and well-nourished.   HENT:   Head: Normocephalic and atraumatic.   Nose: Nose normal.   Eyes: Conjunctivae and EOM are normal.   Neck: No tracheal deviation present.   Normal range of motion.  Cardiovascular: Normal rate, regular rhythm, normal heart sounds and intact distal pulses. Exam reveals no friction rub.    No murmur heard.  Pulmonary/Chest: Breath sounds normal. No respiratory distress. He has no wheezes. He has no rales.   Abdominal: Abdomen is soft. He exhibits no distension. There is no abdominal tenderness.   Musculoskeletal:         General: Tenderness present. Normal range of motion.      Cervical back: Normal range of motion.      Comments: Mild diffuse back pain which patient states is chronic     Neurological: He  is oriented to person, place, and time. He has normal strength. He displays normal reflexes. No cranial nerve deficit or sensory deficit. GCS score is 15. GCS eye subscore is 4. GCS verbal subscore is 5. GCS motor subscore is 6.   Awake and slightly slow to respond to questions however answering questions and following commands appropriately.   Skin: Skin is warm and dry. Capillary refill takes less than 2 seconds.   Psychiatric: He has a normal mood and affect. Thought content normal.         ED Course   Critical Care    Date/Time: 6/24/2022 4:17 PM  Performed by: Annika Saunders MD  Authorized by: Annika Saunders MD   Direct patient critical care time: 20 minutes  Ordering / reviewing critical care time: 5 minutes  Documentation critical care time: 5 minutes  Total critical care time (exclusive of procedural time) : 30 minutes        Labs Reviewed   CBC W/ AUTO DIFFERENTIAL - Abnormal; Notable for the following components:       Result Value    WBC 14.64 (*)     MCHC 31.9 (*)     RDW 16.1 (*)     Gran # (ANC) 13.6 (*)     Immature Grans (Abs) 0.07 (*)     Lymph # 0.4 (*)     Gran % 92.7 (*)     Lymph % 2.5 (*)     Mono % 3.6 (*)     All other components within normal limits   COMPREHENSIVE METABOLIC PANEL - Abnormal; Notable for the following components:    Glucose 199 (*)     AST 95 (*)     ALT 99 (*)     eGFR if  57.2 (*)     eGFR if non  49.5 (*)     All other components within normal limits   LACTIC ACID, PLASMA - Abnormal; Notable for the following components:    Lactate (Lactic Acid) 2.2 (*)     All other components within normal limits    Narrative:     Lactic Acid critical result(s) repeated. Called and verbal readback   obtained from [facility] Samson ED, RN by SLT1 06/24/2022 17:32   URINALYSIS, REFLEX TO URINE CULTURE - Abnormal; Notable for the following components:    Protein, UA Trace (*)     All other components within normal limits    Narrative:     Specimen Source->Urine    C-REACTIVE PROTEIN - Abnormal; Notable for the following components:    CRP 1.39 (*)     All other components within normal limits   SEDIMENTATION RATE - Abnormal; Notable for the following components:    Sed Rate 45 (*)     All other components within normal limits   CULTURE, BLOOD   CULTURE, BLOOD   INFLUENZA A AND B ANTIGEN    Narrative:     Specimen Source->Nasopharyngeal Swab   SARS-COV-2 RNA AMPLIFICATION, QUAL   C-REACTIVE PROTEIN   SEDIMENTATION RATE   LACTIC ACID, PLASMA          Imaging Results          MRI Lumbar Spine W WO Cont (Final result)  Result time 06/24/22 21:48:16   Procedure changed from MRI Lumbar Spine With Contrast     Final result by Michele Alvarado MD (06/24/22 21:48:16)                 Narrative:    MRI LUMBAR SPINE W WO CONTRAST  RPID: MR LUMBAR SPINE WITHOUT THEN WITH IV CONTRAST    CLINICAL HISTORY:  72 years old  Male  with a history of Paraspinal mass/tumor; Rule out epidural abscess    TECHNIQUE:  Multiplanar multisequence non-contrast MRI lumbar spine. This was followed by contrast-enhanced imaging, 10 mL of Gadavist  COMPARISON: 3/23/2022    FINDINGS:  There are multilevel degenerative changes throughout the lumbar spine. These appear unchanged relative to the recent MRI of 3/23/2022. These findings are most severe at L4-5.        The conus remains normal..    No marrow edema or increased signal in the disc spaces or vertebral body. The conus remains normal.    No pathologic enhancement is seen within the epidural space.    IMPRESSION:    1. No imaging findings supportive of an epidural abscess are observed.  2. Diffuse lumbar spondylosis. No marrow edema or progression of this finding.  3. Degenerative changes appear greatest at L4-5.  The degenerative changes appear greatest at L4-5. The right neural foramina appears narrowed and there is mild central canal stenosis.    Electronically signed by:  Michele Alvarado MD  6/24/2022 9:48 PM CDT Workstation: [id number]                              X-Ray Chest AP Portable (Final result)  Result time 06/24/22 16:33:53    Final result by Joseph Anand MD (06/24/22 16:33:53)                 Narrative:    CLINICAL HISTORY:  72 years (1950) Male Sepsis Fever, fatigue, sepsis    TECHNIQUE:  Portable AP radiograph the chest.    COMPARISON:  Most recent radiograph from September 22, 2021.    FINDINGS:  The lungs are clear, except for some vascular crowding bilaterally, likely related to a suboptimal inspiration.  Costophrenic angles are seen without effusion. No pneumothorax is identified. The heart is top normal in size. The mediastinum is within normal limits. Osseous structures show degenerative changes in the spine. The visualized upper abdomen is unremarkable.    IMPRESSION:  No acute cardiac or pulmonary process.                  .            Electronically signed by:  Joseph Anand MD  6/24/2022 4:33 PM CDT Workstation: [id number]                               Medications   cefTRIAXone (ROCEPHIN) 2 g/50 mL D5W IVPB (0 g Intravenous Stopped 6/24/22 1756)   vancomycin - pharmacy to dose (has no administration in time range)   lactated ringers bolus 3,129 mL (0 mLs Intravenous Stopped 6/24/22 1752)   acetaminophen tablet 1,000 mg (1,000 mg Oral Given 6/24/22 1554)   metronidazole IVPB 500 mg (0 mg Intravenous Stopped 6/24/22 1756)   vancomycin in dextrose 5 % 1 gram/250 mL IVPB 1,000 mg (0 mg Intravenous Stopped 6/24/22 2007)     And   vancomycin in dextrose 5 % 1 gram/250 mL IVPB 1,000 mg (0 mg Intravenous Stopped 6/24/22 2006)   gadobutroL (GADAVIST) injection 10 mL (10 mLs Intravenous Given 6/24/22 2044)     Medical Decision Making:   Differential Diagnosis:   72-year-old presented to emergency department with fever and generalized malaise and weakness and fatigue.  Patient has low back pain and patient states 8 is chronic.  Offered to do MRI with contrast and patient is adamantly refusing getting an MRI of lumbar spine and saying his  "pain in the lower back is chronic and has nothing to do with infection and refusing MRI at this time.  Patient had CT done at another hospital recently which was not read and screening labs and workup for sepsis done in the emergency department.  Will look for other causes of infection at this time.  Clinical Tests:   Lab Tests: Reviewed  Radiological Study: Reviewed  Medical Tests: Reviewed  Sepsis Perfusion Assessment: "I attest a sepsis perfusion exam was performed within 6 hours of sepsis, severe sepsis, or septic shock presentation, following fluid resuscitation."  ED Management:  Labs reviewed and elevated white count and CRP and years or noted.  No clear source of infection noted and patient agreed to have MRI of lumbar spine.  Patient's fever improved.  Patient hemodynamically stable and antibiotics given.  MRI of lumbar spine ordered.  MRI of lumbar spine did not show evidence of epidural abscess.  Patient's symptoms almost completely resolved and patient feels better and back at baseline.  Fever improved.  Patient currently hemodynamically stable.  Patient feeling well and wants to go home.  Will discharge with antibiotics as patient known to respond to doxycycline in the past.  Return precautions given.                      Clinical Impression:   Final diagnoses:  [R50.9] Fever          ED Disposition Condition    Discharge Stable        ED Prescriptions     Medication Sig Dispense Start Date End Date Auth. Provider    doxycycline (VIBRAMYCIN) 100 MG Cap Take 1 capsule (100 mg total) by mouth 2 (two) times a day. 20 capsule 6/24/2022  Annika Saunders MD    amoxicillin-clavulanate 875-125mg (AUGMENTIN) 875-125 mg per tablet Take 1 tablet by mouth 2 (two) times daily. 14 tablet 6/24/2022  Annika Saunders MD        Follow-up Information     Follow up With Specialties Details Why Contact Info    Kleber Worthy III, MD Family Medicine In 1 day  [address]  [address]  [place] [address]  [phone]           "   Annika Saunders MD  06/24/22 8064

## 2022-06-25 NOTE — DISCHARGE INSTRUCTIONS
Drink lots of fluids.  Rest.  Return to emergency department for worsening symptoms or any problems.  Follow-up with your primary care provider in 1-2 days.  Tylenol or ibuprofen for fever

## 2022-06-27 ENCOUNTER — TELEPHONE (OUTPATIENT)
Dept: FAMILY MEDICINE | Facility: CLINIC | Age: 72
End: 2022-06-27
Payer: MEDICARE

## 2022-06-27 NOTE — TELEPHONE ENCOUNTER
----- Message from Danilo Vyas sent at 6/27/2022 11:06 AM CDT -----  Regarding: sooner appointment  Contact: patient  Type:  Sooner Appointment Request    Caller is requesting a sooner appointment.  Caller declined first available appointment listed below.  Caller will not accept being placed on the waitlist and is requesting a message be sent to doctor.    Name of Caller:  patient   When is the first available appointment?  July 7th  Symptoms:  follow up   Best Call Back Number:  [phone] (home)     Case number [id number]

## 2022-06-29 LAB
BACTERIA BLD CULT: NORMAL
BACTERIA BLD CULT: NORMAL

## 2022-06-30 ENCOUNTER — OFFICE VISIT (OUTPATIENT)
Dept: FAMILY MEDICINE | Facility: CLINIC | Age: 72
End: 2022-06-30
Payer: MEDICARE

## 2022-06-30 VITALS
TEMPERATURE: 98 F | WEIGHT: 232 LBS | HEART RATE: 80 BPM | DIASTOLIC BLOOD PRESSURE: 80 MMHG | SYSTOLIC BLOOD PRESSURE: 135 MMHG | BODY MASS INDEX: 36.41 KG/M2 | HEIGHT: 67 IN | OXYGEN SATURATION: 95 %

## 2022-06-30 DIAGNOSIS — F32.A DEPRESSION, UNSPECIFIED DEPRESSION TYPE: Primary | ICD-10-CM

## 2022-06-30 DIAGNOSIS — R50.9 FEVER, UNSPECIFIED FEVER CAUSE: ICD-10-CM

## 2022-06-30 DIAGNOSIS — K21.9 GASTROESOPHAGEAL REFLUX DISEASE, UNSPECIFIED WHETHER ESOPHAGITIS PRESENT: ICD-10-CM

## 2022-06-30 DIAGNOSIS — M51.9 LUMBAR DISC DISEASE: ICD-10-CM

## 2022-06-30 DIAGNOSIS — R79.89 LOW TESTOSTERONE: ICD-10-CM

## 2022-06-30 DIAGNOSIS — G47.33 OSA ON CPAP: ICD-10-CM

## 2022-06-30 PROCEDURE — 1159F PR MEDICATION LIST DOCUMENTED IN MEDICAL RECORD: ICD-10-PCS | Mod: CPTII,S$GLB,, | Performed by: FAMILY MEDICINE

## 2022-06-30 PROCEDURE — 1101F PR PT FALLS ASSESS DOC 0-1 FALLS W/OUT INJ PAST YR: ICD-10-PCS | Mod: CPTII,S$GLB,, | Performed by: FAMILY MEDICINE

## 2022-06-30 PROCEDURE — 3079F PR MOST RECENT DIASTOLIC BLOOD PRESSURE 80-89 MM HG: ICD-10-PCS | Mod: CPTII,S$GLB,, | Performed by: FAMILY MEDICINE

## 2022-06-30 PROCEDURE — 3288F PR FALLS RISK ASSESSMENT DOCUMENTED: ICD-10-PCS | Mod: CPTII,S$GLB,, | Performed by: FAMILY MEDICINE

## 2022-06-30 PROCEDURE — 1160F RVW MEDS BY RX/DR IN RCRD: CPT | Mod: CPTII,S$GLB,, | Performed by: FAMILY MEDICINE

## 2022-06-30 PROCEDURE — 99214 PR OFFICE/OUTPT VISIT, EST, LEVL IV, 30-39 MIN: ICD-10-PCS | Mod: S$GLB,,, | Performed by: FAMILY MEDICINE

## 2022-06-30 PROCEDURE — 99214 OFFICE O/P EST MOD 30 MIN: CPT | Mod: S$GLB,,, | Performed by: FAMILY MEDICINE

## 2022-06-30 PROCEDURE — 3288F FALL RISK ASSESSMENT DOCD: CPT | Mod: CPTII,S$GLB,, | Performed by: FAMILY MEDICINE

## 2022-06-30 PROCEDURE — 3008F PR BODY MASS INDEX (BMI) DOCUMENTED: ICD-10-PCS | Mod: CPTII,S$GLB,, | Performed by: FAMILY MEDICINE

## 2022-06-30 PROCEDURE — 1159F MED LIST DOCD IN RCRD: CPT | Mod: CPTII,S$GLB,, | Performed by: FAMILY MEDICINE

## 2022-06-30 PROCEDURE — 3075F PR MOST RECENT SYSTOLIC BLOOD PRESS GE 130-139MM HG: ICD-10-PCS | Mod: CPTII,S$GLB,, | Performed by: FAMILY MEDICINE

## 2022-06-30 PROCEDURE — 3075F SYST BP GE 130 - 139MM HG: CPT | Mod: CPTII,S$GLB,, | Performed by: FAMILY MEDICINE

## 2022-06-30 PROCEDURE — 1126F PR PAIN SEVERITY QUANTIFIED, NO PAIN PRESENT: ICD-10-PCS | Mod: CPTII,S$GLB,, | Performed by: FAMILY MEDICINE

## 2022-06-30 PROCEDURE — 1160F PR REVIEW ALL MEDS BY PRESCRIBER/CLIN PHARMACIST DOCUMENTED: ICD-10-PCS | Mod: CPTII,S$GLB,, | Performed by: FAMILY MEDICINE

## 2022-06-30 PROCEDURE — 1101F PT FALLS ASSESS-DOCD LE1/YR: CPT | Mod: CPTII,S$GLB,, | Performed by: FAMILY MEDICINE

## 2022-06-30 PROCEDURE — 1126F AMNT PAIN NOTED NONE PRSNT: CPT | Mod: CPTII,S$GLB,, | Performed by: FAMILY MEDICINE

## 2022-06-30 PROCEDURE — 3008F BODY MASS INDEX DOCD: CPT | Mod: CPTII,S$GLB,, | Performed by: FAMILY MEDICINE

## 2022-06-30 PROCEDURE — 3079F DIAST BP 80-89 MM HG: CPT | Mod: CPTII,S$GLB,, | Performed by: FAMILY MEDICINE

## 2022-06-30 RX ORDER — PANTOPRAZOLE SODIUM 40 MG/1
40 TABLET, DELAYED RELEASE ORAL 2 TIMES DAILY
Qty: 180 TABLET | Refills: 1 | Status: SHIPPED | OUTPATIENT
Start: 2022-06-30 | End: 2022-12-20

## 2022-06-30 RX ORDER — TESTOSTERONE CYPIONATE 200 MG/ML
INJECTION, SOLUTION INTRAMUSCULAR
Qty: 200 ML | Refills: 2 | Status: CANCELLED | OUTPATIENT
Start: 2022-06-30

## 2022-07-03 NOTE — PROGRESS NOTES
Subjective:       Patient ID: Orlando Treadwell is a 72 y.o. male.    Chief Complaint: Follow-up    Fever spike 103.2°.  Back pain went to the emergency room.  MRI done.  Had a little small sore on his nose.  Probable recurrent staph.  Had taken doxycycline.  The MRI the back did not show any abscess.  White count was elevated 14,600. Sed rate was 45 CRP 1.39 lactic acid normal 2.2.  Glucose was 199 AST and ALT were elevated at 95 and 97 blood cultures have returned negative.  Three steroid shots last month.  He is to see Dr. Vega about the  back.  Depression is doing well now.  Obstructive sleep apnea using his CPAP regularly.  No urinary symptoms no dysuria frequency urgency or hematuria.  Chest x-ray was negative does have some reflux on his Protonix but no known aspiration to cause the fever.  A a a a does have some fever blisters on his lower lip.    Physical examination vital signs noted.  Neck without bruit.  No adenopathy.  One small scrape on the upper lip area and couple of fever blisters on the lower lip.  Chest clear no wheezes or crackles.  Heart regular rate rhythm.  Abdomen bowel sounds positive soft and nontender.  Extremities without edema no other areas of skin      Objective:        Assessment:       1. Depression, unspecified depression type    2. TROY on CPAP    3. Fever, unspecified fever cause    4. Gastroesophageal reflux disease, unspecified whether esophagitis present    5. Lumbar disc disease    6. BMI 36.0-36.9,adult    7. Low testosterone        Plan:       Depression, unspecified depression type    TROY on CPAP    Fever, unspecified fever cause    Gastroesophageal reflux disease, unspecified whether esophagitis present    Lumbar disc disease    BMI 36.0-36.9,adult    Low testosterone    Other orders  -     pantoprazole (PROTONIX) 40 MG tablet; Take 1 tablet (40 mg total) by mouth 2 (two) times daily.  Dispense: 180 tablet; Refill: 1    Continue his testosterone 1 cc every 2 weeks.   Increase his Protonix to 40 b.i.d. monitor temperature.  Complete the doxycycline for the skin infection.  See neuro surgery as planned.  Diet and weight reduction.  Continue same antidepressant medications.

## 2022-08-22 RX ORDER — SERTRALINE HYDROCHLORIDE 100 MG/1
TABLET, FILM COATED ORAL
Qty: 135 TABLET | Refills: 3 | Status: SHIPPED | OUTPATIENT
Start: 2022-08-22 | End: 2023-07-27 | Stop reason: SDUPTHER

## 2022-08-25 RX ORDER — BENZONATATE 100 MG/1
100 CAPSULE ORAL 4 TIMES DAILY PRN
Qty: 20 CAPSULE | Refills: 0 | Status: SHIPPED | OUTPATIENT
Start: 2022-08-25 | End: 2022-09-04

## 2022-08-25 NOTE — TELEPHONE ENCOUNTER
Done  ----- Message from Michele Dias sent at 8/25/2022 10:08 AM CDT -----  Type: Needs Medical Advice  Who Called:  Pt  Symptoms (please be specific):  Covid + (home test), headache, congestion, wheezing, coughing, dizzy, no fever, no energy    How long has patient had these symptoms:  since 2 am    Pharmacy name and phone #:      [id number] - [place], [place] - [address]  [address] [address]  Phone: [phone] Fax: [phone]      Best Call Back Number: [phone]     Additional Information: Pt sts his wife had it prior he just started with it.  Wants to know if there is anything he can take.  Please advise -- Thank you

## 2022-11-03 ENCOUNTER — PES CALL (OUTPATIENT)
Dept: ADMINISTRATIVE | Facility: CLINIC | Age: 72
End: 2022-11-03
Payer: MEDICARE

## 2022-12-12 ENCOUNTER — OFFICE VISIT (OUTPATIENT)
Dept: PODIATRY | Facility: CLINIC | Age: 72
End: 2022-12-12
Payer: MEDICARE

## 2022-12-12 ENCOUNTER — HOSPITAL ENCOUNTER (OUTPATIENT)
Dept: RADIOLOGY | Facility: CLINIC | Age: 72
Discharge: HOME | End: 2022-12-12
Attending: PODIATRIST
Payer: MEDICARE

## 2022-12-12 VITALS — HEIGHT: 67 IN | HEART RATE: 127 BPM | OXYGEN SATURATION: 97 % | BODY MASS INDEX: 36.34 KG/M2

## 2022-12-12 DIAGNOSIS — M79.671 RIGHT FOOT PAIN: ICD-10-CM

## 2022-12-12 DIAGNOSIS — M72.2 PLANTAR FASCIAL FIBROMATOSIS OF RIGHT FOOT: Primary | ICD-10-CM

## 2022-12-12 DIAGNOSIS — M79.671 ARCH PAIN OF RIGHT FOOT: ICD-10-CM

## 2022-12-12 PROCEDURE — 1160F RVW MEDS BY RX/DR IN RCRD: CPT | Mod: CPTII,S$GLB,, | Performed by: PODIATRIST

## 2022-12-12 PROCEDURE — 1160F PR REVIEW ALL MEDS BY PRESCRIBER/CLIN PHARMACIST DOCUMENTED: ICD-10-PCS | Mod: CPTII,S$GLB,, | Performed by: PODIATRIST

## 2022-12-12 PROCEDURE — 73630 XR FOOT COMPLETE 3 VIEW RIGHT: ICD-10-PCS | Mod: RT,S$GLB,, | Performed by: RADIOLOGY

## 2022-12-12 PROCEDURE — 1100F PR PT FALLS ASSESS DOC 2+ FALLS/FALL W/INJURY/YR: ICD-10-PCS | Mod: CPTII,S$GLB,, | Performed by: PODIATRIST

## 2022-12-12 PROCEDURE — 99213 OFFICE O/P EST LOW 20 MIN: CPT | Mod: S$GLB,,, | Performed by: PODIATRIST

## 2022-12-12 PROCEDURE — 73630 X-RAY EXAM OF FOOT: CPT | Mod: RT,S$GLB,, | Performed by: RADIOLOGY

## 2022-12-12 PROCEDURE — 3288F PR FALLS RISK ASSESSMENT DOCUMENTED: ICD-10-PCS | Mod: CPTII,S$GLB,, | Performed by: PODIATRIST

## 2022-12-12 PROCEDURE — 1159F MED LIST DOCD IN RCRD: CPT | Mod: CPTII,S$GLB,, | Performed by: PODIATRIST

## 2022-12-12 PROCEDURE — 3008F BODY MASS INDEX DOCD: CPT | Mod: CPTII,S$GLB,, | Performed by: PODIATRIST

## 2022-12-12 PROCEDURE — 3288F FALL RISK ASSESSMENT DOCD: CPT | Mod: CPTII,S$GLB,, | Performed by: PODIATRIST

## 2022-12-12 PROCEDURE — 1159F PR MEDICATION LIST DOCUMENTED IN MEDICAL RECORD: ICD-10-PCS | Mod: CPTII,S$GLB,, | Performed by: PODIATRIST

## 2022-12-12 PROCEDURE — 1100F PTFALLS ASSESS-DOCD GE2>/YR: CPT | Mod: CPTII,S$GLB,, | Performed by: PODIATRIST

## 2022-12-12 PROCEDURE — 3008F PR BODY MASS INDEX (BMI) DOCUMENTED: ICD-10-PCS | Mod: CPTII,S$GLB,, | Performed by: PODIATRIST

## 2022-12-12 PROCEDURE — 99213 PR OFFICE/OUTPT VISIT, EST, LEVL III, 20-29 MIN: ICD-10-PCS | Mod: S$GLB,,, | Performed by: PODIATRIST

## 2022-12-12 RX ORDER — TRAMADOL HYDROCHLORIDE 50 MG/1
50 TABLET ORAL 4 TIMES DAILY PRN
COMMUNITY
Start: 2022-11-17

## 2022-12-12 RX ORDER — SULFAMETHOXAZOLE AND TRIMETHOPRIM 800; 160 MG/1; MG/1
1 TABLET ORAL 3 TIMES DAILY
COMMUNITY
Start: 2022-10-03 | End: 2022-12-12

## 2022-12-12 RX ORDER — OXYCODONE AND ACETAMINOPHEN 10; 325 MG/1; MG/1
1 TABLET ORAL EVERY 4 HOURS PRN
COMMUNITY
Start: 2022-08-26 | End: 2023-07-27

## 2022-12-12 NOTE — PROGRESS NOTES
"  [address]  [place] [address]  Phone: [phone]   [phone]    Patient's PCP:Kleber Worthy III, MD  Referring Provider: No ref. provider found    Subjective:      Chief Complaint:: Foot Problem (Knot in arch of right foot)    VISHNU Treadwell is a 72 y.o. male who presents today with a complaint of knot in right arch of foot lasting for about 1-2 weeks that he noticed. Onset of symptoms unknown and reports no trauma.  Current symptoms include pain.  Aggravating factors are bending the foot and stretching the ligament in the arch, prolong walking/standing, prolong walking/standing then resting then getting back up to weight-bear again.  Treatment to date have included Voltaren gel/cream, inserts, different shoes.  He was seen in 02/2022 for plantar fasciitis and had this pain, but the lump/knot is new.     Vitals:    12/12/22 1142   Pulse: (!) 127   SpO2: 97%   Height: 5' 7" (1.702 m)      Shoe Size: 8 wide    Past Surgical History:   Procedure Laterality Date    APPENDECTOMY      BACK SURGERY  09/23/2022    COLONOSCOPY N/A 09/23/2021    Procedure: COLONOSCOPY;  Surgeon: Ashvin Batista MD;  Location: [facility];  Service: Endoscopy;  Laterality: N/A;    ESOPHAGOGASTRODUODENOSCOPY N/A 09/23/2021    Procedure: EGD (ESOPHAGOGASTRODUODENOSCOPY);  Surgeon: Ashvin Batista MD;  Location: [facility];  Service: Endoscopy;  Laterality: N/A;    HAND SURGERY      rt hand    INCISION AND DRAINAGE Bilateral 06/2019    Right upper arm and left upper arm abscesses    JOINT REPLACEMENT      right knee    KNEE LIGAMENT RECONSTRUCTION      left knee    LAPAROSCOPIC CHOLECYSTECTOMY N/A 01/17/2020    Procedure: CHOLECYSTECTOMY, LAPAROSCOPIC;  Surgeon: Edgard Hill III, MD;  Location: [facility];  Service: General;  Laterality: N/A;    LITHOTRIPSY      TONSILLECTOMY, ADENOIDECTOMY, BILATERAL MYRINGOTOMY AND TUBES      TOTAL KNEE ARTHROPLASTY  1971    rt knee    URETERAL STENT PLACEMENT       Past " Medical History:   Diagnosis Date    Abdominal hernia     Arthritis     Asbestos exposure     SOB     Back pain     Bulging discs     lumbar    Chronic back pain     Coronary artery disease     Hypertension     Kidney stone     Prostatitis     Sepsis due to methicillin resistant Staphylococcus aureus (MRSA) 06/2019    Due to large right upper arm abscess    Wears glasses      Family History   Problem Relation Age of Onset    Cancer Mother     Stroke Father     Heart disease Father     Collagen disease Neg Hx     Melanoma Neg Hx     Psoriasis Neg Hx     Lupus Neg Hx     Eczema Neg Hx         Social History:   Marital Status:   Alcohol History:  reports no history of alcohol use.  Tobacco History:  reports that he has never smoked. He has never used smokeless tobacco.  Drug History:  reports no history of drug use.    Review of patient's allergies indicates:   Allergen Reactions    Bactrim [sulfamethoxazole-trimethoprim]      dizziness    Codeine     Morphine Nausea Only     Ok with nausea med.       Current Outpatient Medications   Medication Sig Dispense Refill    ammonium lactate 12 % Crea Apply 1 application topically 2 (two) times daily. 140 g 11    aspirin 325 MG tablet Take 325 mg by mouth once daily.      b complex vitamins tablet Take 1 tablet by mouth once daily.      beta-carotene,A,-vits C,E/mins (VISION ORAL) Take 1 tablet by mouth once daily.      buPROPion (WELLBUTRIN XL) 300 MG 24 hr tablet TAKE 1 TABLET (300 MG TOTAL) BY MOUTH EVERY MORNING. 90 tablet 3    diclofenac sodium 1 % Gel Apply 2 g topically 2 (two) times daily. 1 Tube 2    doxycycline (VIBRAMYCIN) 100 MG Cap TAKE 1 CAPSULE (100 MG TOTAL) BY MOUTH 2 (TWO) TIMES A DAY. 20 capsule 2    gabapentin (NEURONTIN) 300 MG capsule Take 600 mg by mouth every evening.       hydrALAZINE (APRESOLINE) 50 MG tablet Take 50 mg by mouth 2 (two) times daily.      hydrocortisone (WESTCORT) 0.2 % cream APPLY EXTERNALLY TO SCALY AREAS ONCE DAILY FOR 7 TO  10 DAYS 15 g 0    ketoconazole (NIZORAL) 2 % shampoo WASH FACE AND EARS AREA TWICE WEEKLY      LINZESS 145 mcg Cap capsule Take 145 mcg by mouth once daily.      MAGNESIUM ORAL Take 500 mg by mouth once daily.      meloxicam (MOBIC) 15 MG tablet Take 1 tablet (15 mg total) by mouth once daily. 30 tablet 3    metoprolol succinate (TOPROL-XL) 100 MG 24 hr tablet Take 100 mg by mouth once daily.       multivitamin (THERAGRAN) tablet Take 1 tablet by mouth once daily.      mupirocin (BACTROBAN) 2 % ointment Apply topically 2 (two) times daily. 22 g 5    nitroGLYCERIN (NITROSTAT) 0.4 MG SL tablet Place 0.4 mg under the tongue every 5 (five) minutes as needed.       olmesartan-amLODIPin-hcthiazid 40-10-25 mg Tab Take 1 tablet by mouth once daily.      olmesartan-hydrochlorothiazide (BENICAR HCT) 40-25 mg per tablet Take 1 tablet by mouth once daily.      oxyCODONE (ROXICODONE) 20 mg Tab immediate release tablet Take 20 mg by mouth 4 (four) times daily as needed for Pain.      oxyCODONE myristate (XTAMPZA ER) 13.5 mg CSpT Take 1 capsule by mouth every 12 (twelve) hours.      oxyCODONE-acetaminophen (PERCOCET)  mg per tablet Take 1 tablet by mouth every 4 (four) hours as needed.      pantoprazole (PROTONIX) 40 MG tablet Take 1 tablet (40 mg total) by mouth 2 (two) times daily. 180 tablet 1    QUEtiapine (SEROQUEL) 50 MG tablet 1 tablet at bedtime      sertraline (ZOLOFT) 100 MG tablet TAKE 1 & 1/2 TABLETS BY MOUTH ONCE DAILY 135 tablet 3    testosterone cypionate (DEPOTESTOTERONE CYPIONATE) 200 mg/mL injection INJECT 2 MLS (400 MG TOTAL) INTO THE MUSCLE EVERY 28 DAYS. 6 mL 1    traMADoL (ULTRAM) 50 mg tablet Take 50 mg by mouth 4 (four) times daily as needed.      zinc gluconate 50 mg tablet Take 50 mg by mouth once daily.       No current facility-administered medications for this visit.       Review of Systems   Constitutional:  Negative for chills, fatigue, fever and unexpected weight change.   HENT:  Negative for  hearing loss and trouble swallowing.    Eyes:  Negative for photophobia and visual disturbance.   Respiratory:  Negative for cough, shortness of breath and wheezing.    Cardiovascular:  Negative for chest pain, palpitations and leg swelling.   Gastrointestinal:  Negative for abdominal pain and nausea.   Genitourinary:  Negative for dysuria and frequency.   Musculoskeletal:  Positive for arthralgias, back pain, gait problem, joint swelling and myalgias.   Skin:  Negative for rash and wound.   Neurological:  Negative for tremors, seizures, weakness, numbness and headaches.   Hematological:  Bruises/bleeds easily.       Objective:        Physical Exam:   Foot Exam    General  General Appearance: appears stated age and healthy   Orientation: alert and oriented to person, place, and time   Affect: appropriate   Gait: antalgic       Right Foot/Ankle     Inspection and Palpation  Ecchymosis: none  Tenderness: plantar fascia (Still medial band of plantar fascia by plantar fibroma)  Swelling: plantar fascia (Swelling by plantar fibroma)  Skin Exam: (Soft tissue mass medial band of plantar fascia proximal to 1st metatarsal head consistent with fibroma non adhered to deep structures with mild pain and swelling and negative Tinel sign)  Neurovascular  Dorsalis pedis: 2+  Posterior tibial: 2+  Capillary Refill: 2+  Saphenous nerve sensation: normal  Tibial nerve sensation: normal  Superficial peroneal nerve sensation: normal  Deep peroneal nerve sensation: normal  Sural nerve sensation: normal    Muscle Strength  Ankle dorsiflexion: 5  Ankle plantar flexion: 5  Ankle inversion: 5  Ankle eversion: 5  Great toe extension: 5  Great toe flexion: 5    Range of Motion    Normal right ankle ROM  Passive  Ankle dorsiflexion: pain  Passive dorsiflexion: Pain by plantar fibroma.  Active  Ankle dorsiflexion: pain  Right ankle active dorsiflexion: Pain by plantar fibroma.    Physical Exam  Cardiovascular:      Pulses:           Dorsalis  pedis pulses are 2+ on the right side.        Posterior tibial pulses are 2+ on the right side.             Right Ankle/Foot Exam     Range of Motion   The patient has normal right ankle ROM.        Muscle Strength   Right Lower Extremity   Ankle Dorsiflexion:  5   Plantar flexion:  5/5    Vascular Exam     Right Pulses  Dorsalis Pedis:      2+  Posterior Tibial:      2+         Imaging:   AP, lateral, lateral oblique weight-bearing x-rays right foot:  No acute fractures, no bone tumors, no calcified or soft tissue masses seen.  Metal marker by area plantar fibroma.  Pes cavus foot structure.  Stage II hallux limitus.  Inferior posterior calcaneal exostosis present.         Assessment:       1. Plantar fascial fibromatosis of right foot    2. Right foot pain    3. Arch pain of right foot      Plan:   Plantar fascial fibromatosis of right foot    Right foot pain  -     X-Ray Foot Complete Right    Arch pain of right foot  -     X-Ray Foot Complete Right    Evaluated patient described clinical findings of plantar fibroma also radiological findings of no obvious calcified mass or foreign body seen in the area of the fibroma.  Patient is not having enough symptoms warrant removal or injection he will just continue shoe gear that is comfortable ice as needed anti-inflammatories as needed.  If the nodule becomes larger more painful he return for injection possible excision of the mass.    I discussed plantar fibroma and that the lesion is benign and usually does not need surgery. Conservative treatment is ice, good shoes, topical medications, steroid shots.  If it becomes larger, more painful causing difficulty with normal gait and wearing she gear or if there is rapid growth then surgical excision is indicated.       Procedures          Counseling:     I provided patient education verbally regarding:   Patient diagnosis, treatment options, as well as alternatives, risks, and benefits.     This note was created using  Dragon voice recognition software that occasionally misinterpreted phrases or words.

## 2022-12-14 ENCOUNTER — PES CALL (OUTPATIENT)
Dept: ADMINISTRATIVE | Facility: CLINIC | Age: 72
End: 2022-12-14
Payer: MEDICARE

## 2022-12-22 ENCOUNTER — LAB VISIT (OUTPATIENT)
Dept: LAB | Facility: HOSPITAL | Age: 72
End: 2022-12-22
Attending: UROLOGY
Payer: MEDICARE

## 2022-12-22 DIAGNOSIS — R79.89 LOW TESTOSTERONE: ICD-10-CM

## 2022-12-22 LAB
BASOPHILS # BLD AUTO: 0.05 K/UL (ref 0–0.2)
BASOPHILS NFR BLD: 0.7 % (ref 0–1.9)
DIFFERENTIAL METHOD: ABNORMAL
EOSINOPHIL # BLD AUTO: 0.2 K/UL (ref 0–0.5)
EOSINOPHIL NFR BLD: 2.9 % (ref 0–8)
ERYTHROCYTE [DISTWIDTH] IN BLOOD BY AUTOMATED COUNT: 17 % (ref 11.5–14.5)
HCT VFR BLD AUTO: 44.6 % (ref 40–54)
HGB BLD-MCNC: 13.5 G/DL (ref 14–18)
IMM GRANULOCYTES # BLD AUTO: 0.03 K/UL (ref 0–0.04)
IMM GRANULOCYTES NFR BLD AUTO: 0.4 % (ref 0–0.5)
LYMPHOCYTES # BLD AUTO: 1.7 K/UL (ref 1–4.8)
LYMPHOCYTES NFR BLD: 22 % (ref 18–48)
MCH RBC QN AUTO: 25.5 PG (ref 27–31)
MCHC RBC AUTO-ENTMCNC: 30.3 G/DL (ref 32–36)
MCV RBC AUTO: 84 FL (ref 82–98)
MONOCYTES # BLD AUTO: 1 K/UL (ref 0.3–1)
MONOCYTES NFR BLD: 12.4 % (ref 4–15)
NEUTROPHILS # BLD AUTO: 4.7 K/UL (ref 1.8–7.7)
NEUTROPHILS NFR BLD: 61.6 % (ref 38–73)
NRBC BLD-RTO: 0 /100 WBC
PLATELET # BLD AUTO: 250 K/UL (ref 150–450)
PMV BLD AUTO: 10.8 FL (ref 9.2–12.9)
RBC # BLD AUTO: 5.29 M/UL (ref 4.6–6.2)
WBC # BLD AUTO: 7.68 K/UL (ref 3.9–12.7)

## 2022-12-22 PROCEDURE — 85025 COMPLETE CBC W/AUTO DIFF WBC: CPT | Performed by: UROLOGY

## 2022-12-22 PROCEDURE — 84403 ASSAY OF TOTAL TESTOSTERONE: CPT | Performed by: UROLOGY

## 2022-12-22 PROCEDURE — 36415 COLL VENOUS BLD VENIPUNCTURE: CPT | Performed by: UROLOGY

## 2022-12-22 PROCEDURE — 84270 ASSAY OF SEX HORMONE GLOBUL: CPT | Performed by: UROLOGY

## 2023-01-01 LAB
ALBUMIN SERPL-MCNC: 4 G/DL (ref 3.6–5.1)
SHBG SERPL-SCNC: 28 NMOL/L (ref 22–77)
TESTOST FREE SERPL-MCNC: 7.3 PG/ML (ref 6–73)
TESTOST SERPL-MCNC: 53 NG/DL (ref 250–1100)
TESTOSTERONE.FREE+WB SERPL-MCNC: 13.3 NG/DL (ref 15–150)

## 2023-01-15 NOTE — ASSESSMENT & PLAN NOTE
Reports has been taking metoprolol only and is not taking his almost heart and HCTZ and hydralazine as his blood pressure has been running low for the last 5 days  Metoprolol continued  Hydralazine 50 mg b.i.d. restarted as blood pressure 157/92  Will hold off on losartan HCTZ as 2 days ago patient had a KI     105

## 2023-02-06 ENCOUNTER — HOSPITAL ENCOUNTER (OUTPATIENT)
Dept: RADIOLOGY | Facility: HOSPITAL | Age: 73
Discharge: HOME | End: 2023-02-06
Attending: PHYSICIAN ASSISTANT
Payer: MEDICARE

## 2023-02-06 DIAGNOSIS — M54.16 LUMBAR RADICULOPATHY: Primary | ICD-10-CM

## 2023-02-06 DIAGNOSIS — M54.16 LUMBAR RADICULOPATHY: ICD-10-CM

## 2023-02-06 PROCEDURE — 72100 X-RAY EXAM L-S SPINE 2/3 VWS: CPT | Mod: TC,PO

## 2023-03-16 ENCOUNTER — TELEPHONE (OUTPATIENT)
Dept: UROLOGY | Facility: CLINIC | Age: 73
End: 2023-03-16
Payer: MEDICARE

## 2023-03-16 NOTE — TELEPHONE ENCOUNTER
----- Message from Yolanda Johnson sent at 3/16/2023 10:41 AM CDT -----  Contact: pt  Patient is calling he wants his medication sent to [facility] his local pharm doesn't have it   Please give pt a call [phone]

## 2023-03-16 NOTE — TELEPHONE ENCOUNTER
Called patient to ask which [facility] he wants Rx to be sent to. No answer. LMOM to give the office a call back.

## 2023-04-12 ENCOUNTER — TELEPHONE (OUTPATIENT)
Dept: UROLOGY | Facility: CLINIC | Age: 73
End: 2023-04-12
Payer: MEDICARE

## 2023-04-12 NOTE — TELEPHONE ENCOUNTER
Provider out of office on 4/14/2023. Called patient to inform and reschedule. No answer. LMOM to call back.

## 2023-04-13 ENCOUNTER — TELEPHONE (OUTPATIENT)
Dept: UROLOGY | Facility: CLINIC | Age: 73
End: 2023-04-13
Payer: MEDICARE

## 2023-04-13 NOTE — TELEPHONE ENCOUNTER
Provider out of office on 4/14/2023. Called patient to inform and reschedule x2. No answer. LMOM to call back.

## 2023-05-01 ENCOUNTER — TELEPHONE (OUTPATIENT)
Dept: UROLOGY | Facility: CLINIC | Age: 73
End: 2023-05-01
Payer: MEDICARE

## 2023-05-01 NOTE — TELEPHONE ENCOUNTER
----- Message from Nuvia Guerrero sent at 5/1/2023 12:38 PM CDT -----  Contact: Self  Type:  Sooner Appointment Request    Caller is requesting a sooner appointment.  Caller declined first available appointment listed below.  Caller will not accept being placed on the waitlist and is requesting a message be sent to doctor.    Name of Caller:  Patient  When is the first available appointment?  07/31  Symptoms:  Needs his 3 mo f/u to discuss labs, stated previous appt was rescheduled & cancelled   Best Call Back Number:  [phone]  Additional Information:  Please call pt back to advise, stated if he doesn't answer please leave a msg. Thank You

## 2023-05-08 ENCOUNTER — HOSPITAL ENCOUNTER (OUTPATIENT)
Dept: RADIOLOGY | Facility: HOSPITAL | Age: 73
Discharge: HOME | End: 2023-05-08
Attending: PHYSICIAN ASSISTANT
Payer: MEDICARE

## 2023-05-08 DIAGNOSIS — M54.16 RADICULOPATHY, LUMBAR REGION: Primary | ICD-10-CM

## 2023-05-08 DIAGNOSIS — M54.16 RADICULOPATHY, LUMBAR REGION: ICD-10-CM

## 2023-05-08 PROCEDURE — 72100 X-RAY EXAM L-S SPINE 2/3 VWS: CPT | Mod: TC,PO

## 2023-05-10 ENCOUNTER — OFFICE VISIT (OUTPATIENT)
Dept: UROLOGY | Facility: CLINIC | Age: 73
End: 2023-05-10
Payer: MEDICARE

## 2023-05-10 VITALS
WEIGHT: 228 LBS | BODY MASS INDEX: 35.79 KG/M2 | HEART RATE: 72 BPM | SYSTOLIC BLOOD PRESSURE: 111 MMHG | DIASTOLIC BLOOD PRESSURE: 70 MMHG | HEIGHT: 67 IN

## 2023-05-10 DIAGNOSIS — R79.89 LOW TESTOSTERONE: Primary | ICD-10-CM

## 2023-05-10 DIAGNOSIS — Z87.442 HISTORY OF KIDNEY STONES: ICD-10-CM

## 2023-05-10 DIAGNOSIS — Z12.5 SCREENING FOR PROSTATE CANCER: ICD-10-CM

## 2023-05-10 DIAGNOSIS — R39.9 LOWER URINARY TRACT SYMPTOMS (LUTS): ICD-10-CM

## 2023-05-10 PROCEDURE — 1160F PR REVIEW ALL MEDS BY PRESCRIBER/CLIN PHARMACIST DOCUMENTED: ICD-10-PCS | Mod: CPTII,S$GLB,, | Performed by: UROLOGY

## 2023-05-10 PROCEDURE — 1126F PR PAIN SEVERITY QUANTIFIED, NO PAIN PRESENT: ICD-10-PCS | Mod: CPTII,S$GLB,, | Performed by: UROLOGY

## 2023-05-10 PROCEDURE — 3008F PR BODY MASS INDEX (BMI) DOCUMENTED: ICD-10-PCS | Mod: CPTII,S$GLB,, | Performed by: UROLOGY

## 2023-05-10 PROCEDURE — 3288F PR FALLS RISK ASSESSMENT DOCUMENTED: ICD-10-PCS | Mod: CPTII,S$GLB,, | Performed by: UROLOGY

## 2023-05-10 PROCEDURE — 3074F PR MOST RECENT SYSTOLIC BLOOD PRESSURE < 130 MM HG: ICD-10-PCS | Mod: CPTII,S$GLB,, | Performed by: UROLOGY

## 2023-05-10 PROCEDURE — 1160F RVW MEDS BY RX/DR IN RCRD: CPT | Mod: CPTII,S$GLB,, | Performed by: UROLOGY

## 2023-05-10 PROCEDURE — 3078F DIAST BP <80 MM HG: CPT | Mod: CPTII,S$GLB,, | Performed by: UROLOGY

## 2023-05-10 PROCEDURE — 1126F AMNT PAIN NOTED NONE PRSNT: CPT | Mod: CPTII,S$GLB,, | Performed by: UROLOGY

## 2023-05-10 PROCEDURE — 1101F PT FALLS ASSESS-DOCD LE1/YR: CPT | Mod: CPTII,S$GLB,, | Performed by: UROLOGY

## 2023-05-10 PROCEDURE — 3078F PR MOST RECENT DIASTOLIC BLOOD PRESSURE < 80 MM HG: ICD-10-PCS | Mod: CPTII,S$GLB,, | Performed by: UROLOGY

## 2023-05-10 PROCEDURE — 1101F PR PT FALLS ASSESS DOC 0-1 FALLS W/OUT INJ PAST YR: ICD-10-PCS | Mod: CPTII,S$GLB,, | Performed by: UROLOGY

## 2023-05-10 PROCEDURE — 99214 OFFICE O/P EST MOD 30 MIN: CPT | Mod: S$GLB,,, | Performed by: UROLOGY

## 2023-05-10 PROCEDURE — 1159F PR MEDICATION LIST DOCUMENTED IN MEDICAL RECORD: ICD-10-PCS | Mod: CPTII,S$GLB,, | Performed by: UROLOGY

## 2023-05-10 PROCEDURE — 3008F BODY MASS INDEX DOCD: CPT | Mod: CPTII,S$GLB,, | Performed by: UROLOGY

## 2023-05-10 PROCEDURE — 99999 PR PBB SHADOW E&M-EST. PATIENT-LVL III: CPT | Mod: PBBFAC,,, | Performed by: UROLOGY

## 2023-05-10 PROCEDURE — 3288F FALL RISK ASSESSMENT DOCD: CPT | Mod: CPTII,S$GLB,, | Performed by: UROLOGY

## 2023-05-10 PROCEDURE — 99999 PR PBB SHADOW E&M-EST. PATIENT-LVL III: ICD-10-PCS | Mod: PBBFAC,,, | Performed by: UROLOGY

## 2023-05-10 PROCEDURE — 1159F MED LIST DOCD IN RCRD: CPT | Mod: CPTII,S$GLB,, | Performed by: UROLOGY

## 2023-05-10 PROCEDURE — 3074F SYST BP LT 130 MM HG: CPT | Mod: CPTII,S$GLB,, | Performed by: UROLOGY

## 2023-05-10 PROCEDURE — 99214 PR OFFICE/OUTPT VISIT, EST, LEVL IV, 30-39 MIN: ICD-10-PCS | Mod: S$GLB,,, | Performed by: UROLOGY

## 2023-05-10 RX ORDER — NALOXONE HYDROCHLORIDE 4 MG/.1ML
SPRAY NASAL
COMMUNITY
Start: 2022-12-29

## 2023-05-10 RX ORDER — CLOPIDOGREL BISULFATE 75 MG/1
TABLET ORAL
COMMUNITY
End: 2023-07-27

## 2023-05-10 RX ORDER — GABAPENTIN 100 MG/1
1 CAPSULE ORAL
COMMUNITY
End: 2023-07-27

## 2023-05-10 RX ORDER — INFLUENZA A VIRUS A/VICTORIA/2570/2019 IVR-215 (H1N1) ANTIGEN (FORMALDEHYDE INACTIVATED), INFLUENZA A VIRUS A/DARWIN/9/2021 SAN-010 (H3N2) ANTIGEN (FORMALDEHYDE INACTIVATED), INFLUENZA B VIRUS B/PHUKET/3073/2013 ANTIGEN (FORMALDEHYDE INACTIVATED), AND INFLUENZA B VIRUS B/MICHIGAN/01/2021 ANTIGEN (FORMALDEHYDE INACTIVATED) 15; 15; 15; 15 UG/.5ML; UG/.5ML; UG/.5ML; UG/.5ML
INJECTION, SUSPENSION INTRAMUSCULAR
COMMUNITY
Start: 2022-11-28 | End: 2023-07-27

## 2023-05-10 RX ORDER — TESTOSTERONE CYPIONATE 200 MG/ML
400 INJECTION, SOLUTION INTRAMUSCULAR
Qty: 10 ML | Refills: 1 | Status: SHIPPED | OUTPATIENT
Start: 2023-05-10 | End: 2023-08-14

## 2023-05-10 NOTE — PROGRESS NOTES
[facility] Established Patient/H&P:    Orlando Treadwell is a 73 y.o. male who presents for follow up for lower urinary tract symptoms and low testosterone.     Patient previously managed by Dr. Stark for lower urinary tract symptoms and low testosterone. He has a history of nocturia x 1 - 2 that is not significantly bothersome and has been managed well conservatively.      Regarding his low testosterone, he has been managed well on Testosterone 200 mg every month per Dr. Stark.      Of note, his CT abd/pelvis on 1/16/20 revealed a 5 mm left distal ureteral stone. No imaging since. States he has had ESWL several years ago.        Interval History     6/3/21: Testosterone 94 on 2/18/21. Remains on Testosterone 200 mg every month - but reports hot flashes and low energy recently. CT abd pelvis wo contrast on 3/13/21 with only a 3 mm non-obstructing renal stone. Recently hospitalized with MRSA and Salmonella.      3/7/22: Patient had his Testosterone increased to 400 mg every month during his last visit, but states that the pharmacy instructions did not change so he never started the increased dosage. Continues to report symptoms of hypogonadism - low energy,  fatigue and depression.      6/22/22: Patient once again has not increased his dosage of his testosterone stating that he still did not understand. He has been taking 200 mg monthly. As expected, testosterone remains low at 31. Continues to have low energy, fatigue and depression. States that he got the new RX, but didn't understand he needed to increase to 400 mg.    5/10/23:  He is on Testosterone 400 mg every month. No recent Testosterone level since 12/2022. States he feels overall improved, however, he did have back surgery a few months ago which he is still recovering from. No significant lower urinary tract symptoms.      Denies any flank pain, gross hematuria, urinary tract infection,  trauma or history of  malignancy.          PSA  2.0                   5/20/22  1.9                   2/18/21  1.6                   12/2/19  1.3                   11/6/18  3.8                   7/18/18  1.9                   12/16/16  2.3                   12/7/15  2.2                   12/10/14     Testosterone  53  12/20/22  31                    5/20/22  94                    2/18/21  221                  12/16/16  733                  12/7/15  121                  5/16/14     IPSS    QoL       2          0          12/18/20    Past Medical History:   Diagnosis Date    Abdominal hernia     Arthritis     Asbestos exposure     SOB     Back pain     Bulging discs     lumbar    Chronic back pain     Coronary artery disease     Hypertension     Kidney stone     Prostatitis     Sepsis due to methicillin resistant Staphylococcus aureus (MRSA) 06/2019    Due to large right upper arm abscess    Wears glasses        Past Surgical History:   Procedure Laterality Date    APPENDECTOMY      BACK SURGERY  09/23/2022    COLONOSCOPY N/A 09/23/2021    Procedure: COLONOSCOPY;  Surgeon: Ashvin Batista MD;  Location: [facility];  Service: Endoscopy;  Laterality: N/A;    ESOPHAGOGASTRODUODENOSCOPY N/A 09/23/2021    Procedure: EGD (ESOPHAGOGASTRODUODENOSCOPY);  Surgeon: Ashvin Batista MD;  Location: [facility];  Service: Endoscopy;  Laterality: N/A;    HAND SURGERY      rt hand    INCISION AND DRAINAGE Bilateral 06/2019    Right upper arm and left upper arm abscesses    JOINT REPLACEMENT      right knee    KNEE LIGAMENT RECONSTRUCTION      left knee    LAPAROSCOPIC CHOLECYSTECTOMY N/A 01/17/2020    Procedure: CHOLECYSTECTOMY, LAPAROSCOPIC;  Surgeon: Edgard Hill III, MD;  Location: [facility];  Service: General;  Laterality: N/A;    LITHOTRIPSY      TONSILLECTOMY, ADENOIDECTOMY, BILATERAL MYRINGOTOMY AND TUBES      TOTAL KNEE ARTHROPLASTY  1971    rt knee    URETERAL STENT PLACEMENT         Review of patient's allergies indicates:   Allergen Reactions    Bactrim  "[sulfamethoxazole-trimethoprim]      dizziness    Codeine     Morphine Nausea Only     Ok with nausea med.       Medications Reviewed: see MAR    FOCUSED PHYSICAL EXAM:    Vitals:    05/10/23 0932   BP: 111/70   Pulse: 72     Body mass index is 35.71 kg/m². Weight: 103.4 kg (228 lb) Height: 5' 7" (170.2 cm)       General: Alert, cooperative, no distress, appears stated age  Abdomen: Soft, non-tender, no CVA tenderness, non-distended        LABS:    No results found for this or any previous visit (from the past 336 hour(s)).      Assessment/Diagnosis:    1. Low testosterone  testosterone cypionate (DEPOTESTOTERONE CYPIONATE) 200 mg/mL injection      2. Lower urinary tract symptoms (LUTS)        3. History of kidney stones            Plans:    - I spent 30 minutes of the day of this encounter preparing for, treating and managing this patient.   - Continue conservative management for mild LUTS.   - CBC, Testosterone and PSA next available.   - We discussed that if levels remain low, we can switch to 400 mg every 3 weeks or change to 200 - 300 mg every 2 weeks. He presently is not bothered and denies bothersome hypogonadism symptoms today. However, he does have major depression and PTSD.   - RTC in 1 year.      "

## 2023-05-18 ENCOUNTER — LAB VISIT (OUTPATIENT)
Dept: LAB | Facility: HOSPITAL | Age: 73
End: 2023-05-18
Attending: UROLOGY
Payer: MEDICARE

## 2023-05-18 DIAGNOSIS — R79.89 LOW TESTOSTERONE: ICD-10-CM

## 2023-05-18 DIAGNOSIS — M54.16 RADICULOPATHY, LUMBAR REGION: Primary | ICD-10-CM

## 2023-05-18 DIAGNOSIS — M54.12 RADICULOPATHY, CERVICAL REGION: ICD-10-CM

## 2023-05-18 DIAGNOSIS — M54.6 PAIN IN THORACIC SPINE: ICD-10-CM

## 2023-05-18 DIAGNOSIS — Z12.5 SCREENING FOR PROSTATE CANCER: ICD-10-CM

## 2023-05-18 LAB
BASOPHILS # BLD AUTO: 0.04 K/UL (ref 0–0.2)
BASOPHILS NFR BLD: 0.4 % (ref 0–1.9)
COMPLEXED PSA SERPL-MCNC: 3 NG/ML (ref 0–4)
DIFFERENTIAL METHOD: ABNORMAL
EOSINOPHIL # BLD AUTO: 0.3 K/UL (ref 0–0.5)
EOSINOPHIL NFR BLD: 3 % (ref 0–8)
ERYTHROCYTE [DISTWIDTH] IN BLOOD BY AUTOMATED COUNT: 18.2 % (ref 11.5–14.5)
HCT VFR BLD AUTO: 42.7 % (ref 40–54)
HGB BLD-MCNC: 13.3 G/DL (ref 14–18)
IMM GRANULOCYTES # BLD AUTO: 0.1 K/UL (ref 0–0.04)
IMM GRANULOCYTES NFR BLD AUTO: 0.9 % (ref 0–0.5)
LYMPHOCYTES # BLD AUTO: 1.7 K/UL (ref 1–4.8)
LYMPHOCYTES NFR BLD: 15.3 % (ref 18–48)
MCH RBC QN AUTO: 26.8 PG (ref 27–31)
MCHC RBC AUTO-ENTMCNC: 31.1 G/DL (ref 32–36)
MCV RBC AUTO: 86 FL (ref 82–98)
MONOCYTES # BLD AUTO: 1.1 K/UL (ref 0.3–1)
MONOCYTES NFR BLD: 10.1 % (ref 4–15)
NEUTROPHILS # BLD AUTO: 7.8 K/UL (ref 1.8–7.7)
NEUTROPHILS NFR BLD: 70.3 % (ref 38–73)
NRBC BLD-RTO: 0 /100 WBC
PLATELET # BLD AUTO: 217 K/UL (ref 150–450)
PMV BLD AUTO: 10.3 FL (ref 9.2–12.9)
RBC # BLD AUTO: 4.96 M/UL (ref 4.6–6.2)
WBC # BLD AUTO: 11.04 K/UL (ref 3.9–12.7)

## 2023-05-18 PROCEDURE — 85025 COMPLETE CBC W/AUTO DIFF WBC: CPT | Performed by: UROLOGY

## 2023-05-18 PROCEDURE — 84270 ASSAY OF SEX HORMONE GLOBUL: CPT | Performed by: UROLOGY

## 2023-05-18 PROCEDURE — 84153 ASSAY OF PSA TOTAL: CPT | Performed by: UROLOGY

## 2023-05-18 PROCEDURE — 84403 ASSAY OF TOTAL TESTOSTERONE: CPT | Performed by: UROLOGY

## 2023-05-24 ENCOUNTER — TELEPHONE (OUTPATIENT)
Dept: UROLOGY | Facility: CLINIC | Age: 73
End: 2023-05-24
Payer: MEDICARE

## 2023-05-24 DIAGNOSIS — R97.20 ELEVATED PSA: ICD-10-CM

## 2023-05-24 DIAGNOSIS — R79.89 LOW TESTOSTERONE: ICD-10-CM

## 2023-05-24 DIAGNOSIS — Z12.5 SCREENING FOR PROSTATE CANCER: Primary | ICD-10-CM

## 2023-05-24 NOTE — TELEPHONE ENCOUNTER
----- Message from Esthela Howard sent at 5/24/2023 12:30 PM CDT -----  Contact: patient  Type:  Needs Medical Advice    Who Called:  Patient     Would the patient rather a call back or a response via [facility]? Call     Best Call Back Number: [phone] (home)      Additional Information: Patient would like to speak with the nurse in regards to someone calling him and patient not sure what it was about.     Please call to advise

## 2023-05-25 LAB
ALBUMIN SERPL-MCNC: 3.8 G/DL (ref 3.6–5.1)
SHBG SERPL-SCNC: 33 NMOL/L (ref 22–77)
TESTOST FREE SERPL-MCNC: 126.5 PG/ML (ref 6–73)
TESTOST SERPL-MCNC: 802 NG/DL (ref 250–1100)
TESTOSTERONE.FREE+WB SERPL-MCNC: 221.7 NG/DL (ref 15–150)

## 2023-05-30 ENCOUNTER — HOSPITAL ENCOUNTER (OUTPATIENT)
Dept: RADIOLOGY | Facility: HOSPITAL | Age: 73
Discharge: HOME | End: 2023-05-30
Attending: NEUROLOGICAL SURGERY
Payer: MEDICARE

## 2023-05-30 DIAGNOSIS — M54.12 RADICULOPATHY, CERVICAL REGION: ICD-10-CM

## 2023-05-30 DIAGNOSIS — M54.16 RADICULOPATHY, LUMBAR REGION: ICD-10-CM

## 2023-05-30 DIAGNOSIS — M54.6 PAIN IN THORACIC SPINE: ICD-10-CM

## 2023-05-30 PROCEDURE — 72148 MRI LUMBAR SPINE W/O DYE: CPT | Mod: TC,PO

## 2023-05-30 PROCEDURE — 72146 MRI CHEST SPINE W/O DYE: CPT | Mod: TC,PO

## 2023-05-30 PROCEDURE — 72141 MRI NECK SPINE W/O DYE: CPT | Mod: TC,PO

## 2023-06-05 ENCOUNTER — PES CALL (OUTPATIENT)
Dept: ADMINISTRATIVE | Facility: CLINIC | Age: 73
End: 2023-06-05
Payer: MEDICARE

## 2023-06-30 ENCOUNTER — PES CALL (OUTPATIENT)
Dept: ADMINISTRATIVE | Facility: CLINIC | Age: 73
End: 2023-06-30
Payer: MEDICARE

## 2023-07-17 ENCOUNTER — HOSPITAL ENCOUNTER (EMERGENCY)
Facility: HOSPITAL | Age: 73
Discharge: HOME | End: 2023-07-17
Attending: EMERGENCY MEDICINE
Payer: MEDICARE

## 2023-07-17 VITALS
TEMPERATURE: 98 F | SYSTOLIC BLOOD PRESSURE: 157 MMHG | HEART RATE: 63 BPM | HEIGHT: 67 IN | OXYGEN SATURATION: 98 % | WEIGHT: 229 LBS | BODY MASS INDEX: 35.94 KG/M2 | RESPIRATION RATE: 19 BRPM | DIASTOLIC BLOOD PRESSURE: 69 MMHG

## 2023-07-17 DIAGNOSIS — I10 HYPERTENSION, UNSPECIFIED TYPE: Primary | ICD-10-CM

## 2023-07-17 DIAGNOSIS — R07.9 CHEST PAIN: ICD-10-CM

## 2023-07-17 LAB
ALBUMIN SERPL BCP-MCNC: 4.1 G/DL (ref 3.5–5.2)
ALP SERPL-CCNC: 54 U/L (ref 55–135)
ALT SERPL W/O P-5'-P-CCNC: 66 U/L (ref 10–44)
ANION GAP SERPL CALC-SCNC: 9 MMOL/L (ref 8–16)
AST SERPL-CCNC: 61 U/L (ref 10–40)
BASOPHILS # BLD AUTO: 0.04 K/UL (ref 0–0.2)
BASOPHILS NFR BLD: 0.4 % (ref 0–1.9)
BILIRUB SERPL-MCNC: 0.8 MG/DL (ref 0.1–1)
BNP SERPL-MCNC: 31 PG/ML (ref 0–99)
BUN SERPL-MCNC: 15 MG/DL (ref 8–23)
CALCIUM SERPL-MCNC: 10.3 MG/DL (ref 8.7–10.5)
CHLORIDE SERPL-SCNC: 94 MMOL/L (ref 95–110)
CO2 SERPL-SCNC: 29 MMOL/L (ref 23–29)
CREAT SERPL-MCNC: 1.3 MG/DL (ref 0.5–1.4)
DIFFERENTIAL METHOD: ABNORMAL
EOSINOPHIL # BLD AUTO: 0.1 K/UL (ref 0–0.5)
EOSINOPHIL NFR BLD: 0.6 % (ref 0–8)
ERYTHROCYTE [DISTWIDTH] IN BLOOD BY AUTOMATED COUNT: 16.5 % (ref 11.5–14.5)
EST. GFR  (NO RACE VARIABLE): 57.6 ML/MIN/1.73 M^2
GLUCOSE SERPL-MCNC: 124 MG/DL (ref 70–110)
HCT VFR BLD AUTO: 48.5 % (ref 40–54)
HGB BLD-MCNC: 14.7 G/DL (ref 14–18)
IMM GRANULOCYTES # BLD AUTO: 0.04 K/UL (ref 0–0.04)
IMM GRANULOCYTES NFR BLD AUTO: 0.4 % (ref 0–0.5)
LYMPHOCYTES # BLD AUTO: 1.1 K/UL (ref 1–4.8)
LYMPHOCYTES NFR BLD: 11.2 % (ref 18–48)
MAGNESIUM SERPL-MCNC: 1.8 MG/DL (ref 1.6–2.6)
MCH RBC QN AUTO: 24.9 PG (ref 27–31)
MCHC RBC AUTO-ENTMCNC: 30.3 G/DL (ref 32–36)
MCV RBC AUTO: 82 FL (ref 82–98)
MONOCYTES # BLD AUTO: 1 K/UL (ref 0.3–1)
MONOCYTES NFR BLD: 9.7 % (ref 4–15)
NEUTROPHILS # BLD AUTO: 7.6 K/UL (ref 1.8–7.7)
NEUTROPHILS NFR BLD: 77.7 % (ref 38–73)
NRBC BLD-RTO: 0 /100 WBC
PLATELET # BLD AUTO: 285 K/UL (ref 150–450)
PMV BLD AUTO: 9.8 FL (ref 9.2–12.9)
POTASSIUM SERPL-SCNC: 3.9 MMOL/L (ref 3.5–5.1)
PROT SERPL-MCNC: 8.6 G/DL (ref 6–8.4)
RBC # BLD AUTO: 5.9 M/UL (ref 4.6–6.2)
SODIUM SERPL-SCNC: 132 MMOL/L (ref 136–145)
TROPONIN I SERPL HS-MCNC: 12.4 PG/ML (ref 0–14.9)
WBC # BLD AUTO: 9.8 K/UL (ref 3.9–12.7)

## 2023-07-17 PROCEDURE — 96374 THER/PROPH/DIAG INJ IV PUSH: CPT

## 2023-07-17 PROCEDURE — 84484 ASSAY OF TROPONIN QUANT: CPT

## 2023-07-17 PROCEDURE — 85025 COMPLETE CBC W/AUTO DIFF WBC: CPT

## 2023-07-17 PROCEDURE — 25000003 PHARM REV CODE 250: Performed by: EMERGENCY MEDICINE

## 2023-07-17 PROCEDURE — 80053 COMPREHEN METABOLIC PANEL: CPT

## 2023-07-17 PROCEDURE — 93010 EKG 12-LEAD: ICD-10-PCS | Mod: ,,, | Performed by: INTERNAL MEDICINE

## 2023-07-17 PROCEDURE — 63600175 PHARM REV CODE 636 W HCPCS: Performed by: EMERGENCY MEDICINE

## 2023-07-17 PROCEDURE — 93005 ELECTROCARDIOGRAM TRACING: CPT | Performed by: INTERNAL MEDICINE

## 2023-07-17 PROCEDURE — 99285 EMERGENCY DEPT VISIT HI MDM: CPT | Mod: 25

## 2023-07-17 PROCEDURE — 93010 ELECTROCARDIOGRAM REPORT: CPT | Mod: ,,, | Performed by: INTERNAL MEDICINE

## 2023-07-17 PROCEDURE — 83880 ASSAY OF NATRIURETIC PEPTIDE: CPT

## 2023-07-17 PROCEDURE — 83735 ASSAY OF MAGNESIUM: CPT

## 2023-07-17 RX ORDER — LORAZEPAM 2 MG/ML
1 INJECTION INTRAMUSCULAR
Status: COMPLETED | OUTPATIENT
Start: 2023-07-17 | End: 2023-07-17

## 2023-07-17 RX ORDER — HYDRALAZINE HYDROCHLORIDE 25 MG/1
50 TABLET, FILM COATED ORAL
Status: COMPLETED | OUTPATIENT
Start: 2023-07-17 | End: 2023-07-17

## 2023-07-17 RX ADMIN — HYDRALAZINE HYDROCHLORIDE 50 MG: 25 TABLET ORAL at 09:07

## 2023-07-17 RX ADMIN — LORAZEPAM 1 MG: 2 INJECTION INTRAMUSCULAR; INTRAVENOUS at 08:07

## 2023-07-18 NOTE — ED PROVIDER NOTES
Encounter Date: 7/17/2023       History     Chief Complaint   Patient presents with    Hypertension     Pressure been elevated today. Compliant with meds. Pt is diaphoretic, and c/o slight dizziness and head pressure    Dizziness     Chief complaint is blood pressure elevation.  Patient has noticed a slight pressure to his head and was diaphoretic earlier.  Now he is not diaphoretic has minimal pressure in his head.  No complaints of chest pain only complains of minimal shortness of breath no nausea vomiting diarrhea no trouble urinating.  The patient takes metoprolol hydralazine and olmesartan HCTZ as his blood pressure medicines.  He normally takes metoprolol at night but actually was told to take her metoprolol earlier in the day.  He normally takes hydralazine 25 mg twice a day.  He was told to take a 50 mg pill and to increase his hydralazine to 50 mg 3 times a day.  He also took on losartan and HCTZ this morning.  He also takes Wellbutrin in the morning with as well.  The patient states he took his morning medicines 2 hours later his blood pressure was 240/109.  That is when he called his doctor and the medicines were adjusted.  His pressure was still slightly elevated so he came to the ER here for evaluation.  The patient has a history of cardiac disease with a blockage requiring stent.      Review of patient's allergies indicates:   Allergen Reactions    Bactrim [sulfamethoxazole-trimethoprim]      dizziness    Codeine     Morphine Nausea Only     Ok with nausea med.     Past Medical History:   Diagnosis Date    Abdominal hernia     Arthritis     Asbestos exposure     SOB     Back pain     Bulging discs     lumbar    Chronic back pain     Coronary artery disease     Hypertension     Kidney stone     Prostatitis     Sepsis due to methicillin resistant Staphylococcus aureus (MRSA) 06/2019    Due to large right upper arm abscess    Wears glasses      Past Surgical History:   Procedure Laterality Date     APPENDECTOMY      BACK SURGERY  09/23/2022    COLONOSCOPY N/A 09/23/2021    Procedure: COLONOSCOPY;  Surgeon: Ashvin Batista MD;  Location: [facility] ENDO;  Service: Endoscopy;  Laterality: N/A;    ESOPHAGOGASTRODUODENOSCOPY N/A 09/23/2021    Procedure: EGD (ESOPHAGOGASTRODUODENOSCOPY);  Surgeon: Ashvin Batista MD;  Location: [facility] ENDO;  Service: Endoscopy;  Laterality: N/A;    HAND SURGERY      rt hand    INCISION AND DRAINAGE Bilateral 06/2019    Right upper arm and left upper arm abscesses    JOINT REPLACEMENT      right knee    KNEE LIGAMENT RECONSTRUCTION      left knee    LAPAROSCOPIC CHOLECYSTECTOMY N/A 01/17/2020    Procedure: CHOLECYSTECTOMY, LAPAROSCOPIC;  Surgeon: Edgard Hill III, MD;  Location: [facility] OR;  Service: General;  Laterality: N/A;    LITHOTRIPSY      TONSILLECTOMY, ADENOIDECTOMY, BILATERAL MYRINGOTOMY AND TUBES      TOTAL KNEE ARTHROPLASTY  1971    rt knee    URETERAL STENT PLACEMENT       Family History   Problem Relation Age of Onset    Cancer Mother     Stroke Father     Heart disease Father     Collagen disease Neg Hx     Melanoma Neg Hx     Psoriasis Neg Hx     Lupus Neg Hx     Eczema Neg Hx      Social History     Tobacco Use    Smoking status: Never    Smokeless tobacco: Never   Substance Use Topics    Alcohol use: No    Drug use: No     Review of Systems   Constitutional:  Negative for chills and fever.   HENT:  Negative for ear pain, rhinorrhea and sore throat.    Eyes:  Negative for pain and visual disturbance.   Respiratory:  Negative for cough and shortness of breath.    Cardiovascular:  Negative for chest pain and palpitations.   Gastrointestinal:  Negative for abdominal pain, constipation, diarrhea, nausea and vomiting.   Genitourinary:  Negative for dysuria, frequency, hematuria and urgency.   Musculoskeletal:  Negative for back pain, joint swelling and myalgias.   Skin:  Negative for rash.   Neurological:  Positive for headaches. Negative for dizziness, seizures and weakness.    Psychiatric/Behavioral:  Negative for dysphoric mood. The patient is not nervous/anxious.      Physical Exam     Initial Vitals [07/17/23 1858]   BP Pulse Resp Temp SpO2   (!) 210/112 84 18 98.5 °F (36.9 °C) (!) 94 %      MAP       --         Physical Exam    Nursing note and vitals reviewed.  Constitutional: He appears well-developed and well-nourished.   HENT:   Head: Normocephalic and atraumatic.   Eyes: Conjunctivae, EOM and lids are normal. Pupils are equal, round, and reactive to light.   Neck: Trachea normal. Neck supple. No thyroid mass present.   Cardiovascular:  Normal rate, regular rhythm and normal heart sounds.           Pulmonary/Chest: Breath sounds normal. No respiratory distress.   Abdominal: Abdomen is soft. There is no abdominal tenderness.   Musculoskeletal:         General: Normal range of motion.      Cervical back: Neck supple.     Neurological: He is alert and oriented to person, place, and time. He has normal strength and normal reflexes. No cranial nerve deficit or sensory deficit.   Skin: Skin is warm and dry.   Psychiatric: He has a normal mood and affect. His speech is normal and behavior is normal. Judgment and thought content normal.       ED Course   Procedures  Labs Reviewed   CBC W/ AUTO DIFFERENTIAL - Abnormal; Notable for the following components:       Result Value    MCH 24.9 (*)     MCHC 30.3 (*)     RDW 16.5 (*)     Gran % 77.7 (*)     Lymph % 11.2 (*)     All other components within normal limits   COMPREHENSIVE METABOLIC PANEL - Abnormal; Notable for the following components:    Sodium 132 (*)     Chloride 94 (*)     Glucose 124 (*)     Total Protein 8.6 (*)     Alkaline Phosphatase 54 (*)     AST 61 (*)     ALT 66 (*)     eGFR 57.6 (*)     All other components within normal limits   MAGNESIUM   TROPONIN I HIGH SENSITIVITY   B-TYPE NATRIURETIC PEPTIDE   TROPONIN I HIGH SENSITIVITY          Imaging Results              X-Ray Chest AP Portable (In process)                       Medications   LORazepam injection 1 mg (1 mg Intravenous Given 7/17/23 2023)   hydrALAZINE tablet 50 mg (50 mg Oral Given 7/17/23 2145)     Medical Decision Making:   ED Management:  The patient is here for mild headache.  Differential diagnosis includes was intracranial abnormality intracranial bleed bacterial viral infection of the brain meningitis tension headache cluster headache migraine among others.  Some patients have mild headaches with hypertension as in the case of this patient.  Patient labs reviewed blood pressure came down with treatment patient will be discharged home with instructions                        Clinical Impression:   Final diagnoses:  [R07.9] Chest pain  [I10] Hypertension, unspecified type (Primary)        ED Disposition Condition    Discharge Stable          ED Prescriptions    None       Follow-up Information    None          Jackie Madera MD  07/17/23 6470

## 2023-07-18 NOTE — DISCHARGE INSTRUCTIONS
Take your medicines as instructed by your physician.  Return as needed for severe headache chest pain vision troubles nausea vomiting weakness confusion.

## 2023-07-25 RX ORDER — SERTRALINE HYDROCHLORIDE 100 MG/1
TABLET, FILM COATED ORAL
Qty: 135 TABLET | Refills: 3 | OUTPATIENT
Start: 2023-07-25

## 2023-07-27 ENCOUNTER — OFFICE VISIT (OUTPATIENT)
Dept: FAMILY MEDICINE | Facility: CLINIC | Age: 73
End: 2023-07-27
Payer: MEDICARE

## 2023-07-27 VITALS
TEMPERATURE: 98 F | HEIGHT: 67 IN | RESPIRATION RATE: 18 BRPM | WEIGHT: 235.44 LBS | BODY MASS INDEX: 36.95 KG/M2 | SYSTOLIC BLOOD PRESSURE: 104 MMHG | DIASTOLIC BLOOD PRESSURE: 62 MMHG | OXYGEN SATURATION: 97 % | HEART RATE: 91 BPM

## 2023-07-27 DIAGNOSIS — I25.10 CORONARY ARTERY DISEASE INVOLVING NATIVE CORONARY ARTERY OF NATIVE HEART WITHOUT ANGINA PECTORIS: ICD-10-CM

## 2023-07-27 DIAGNOSIS — Z86.14 HISTORY OF MRSA INFECTION: Chronic | ICD-10-CM

## 2023-07-27 DIAGNOSIS — F11.90 CHRONIC NARCOTIC USE: ICD-10-CM

## 2023-07-27 DIAGNOSIS — Z79.82 ASPIRIN LONG-TERM USE: ICD-10-CM

## 2023-07-27 DIAGNOSIS — M46.06 SPINAL ENTHESOPATHY, LUMBAR REGION: ICD-10-CM

## 2023-07-27 DIAGNOSIS — E66.01 CLASS 2 SEVERE OBESITY DUE TO EXCESS CALORIES WITH SERIOUS COMORBIDITY AND BODY MASS INDEX (BMI) OF 36.0 TO 36.9 IN ADULT: ICD-10-CM

## 2023-07-27 DIAGNOSIS — K21.9 GASTROESOPHAGEAL REFLUX DISEASE, UNSPECIFIED WHETHER ESOPHAGITIS PRESENT: ICD-10-CM

## 2023-07-27 DIAGNOSIS — M51.9 LUMBAR DISC DISEASE: ICD-10-CM

## 2023-07-27 DIAGNOSIS — I10 HYPERTENSION, ESSENTIAL: ICD-10-CM

## 2023-07-27 DIAGNOSIS — R79.89 LOW TESTOSTERONE: ICD-10-CM

## 2023-07-27 DIAGNOSIS — N18.31 CHRONIC KIDNEY DISEASE, STAGE 3A: ICD-10-CM

## 2023-07-27 DIAGNOSIS — G95.89 OTHER SPECIFIED DISEASES OF SPINAL CORD: ICD-10-CM

## 2023-07-27 DIAGNOSIS — F32.A DEPRESSION, UNSPECIFIED DEPRESSION TYPE: Primary | ICD-10-CM

## 2023-07-27 DIAGNOSIS — A49.02 MRSA INFECTION: ICD-10-CM

## 2023-07-27 DIAGNOSIS — M51.36 DDD (DEGENERATIVE DISC DISEASE), LUMBAR: ICD-10-CM

## 2023-07-27 DIAGNOSIS — G47.33 OSA ON CPAP: ICD-10-CM

## 2023-07-27 PROCEDURE — 1159F MED LIST DOCD IN RCRD: CPT | Mod: CPTII,S$GLB,,

## 2023-07-27 PROCEDURE — 1160F RVW MEDS BY RX/DR IN RCRD: CPT | Mod: CPTII,S$GLB,,

## 2023-07-27 PROCEDURE — 1125F PR PAIN SEVERITY QUANTIFIED, PAIN PRESENT: ICD-10-PCS | Mod: CPTII,S$GLB,,

## 2023-07-27 PROCEDURE — 3008F PR BODY MASS INDEX (BMI) DOCUMENTED: ICD-10-PCS | Mod: CPTII,S$GLB,,

## 2023-07-27 PROCEDURE — 1159F PR MEDICATION LIST DOCUMENTED IN MEDICAL RECORD: ICD-10-PCS | Mod: CPTII,S$GLB,,

## 2023-07-27 PROCEDURE — 3288F PR FALLS RISK ASSESSMENT DOCUMENTED: ICD-10-PCS | Mod: CPTII,S$GLB,,

## 2023-07-27 PROCEDURE — 3078F PR MOST RECENT DIASTOLIC BLOOD PRESSURE < 80 MM HG: ICD-10-PCS | Mod: CPTII,S$GLB,,

## 2023-07-27 PROCEDURE — 1125F AMNT PAIN NOTED PAIN PRSNT: CPT | Mod: CPTII,S$GLB,,

## 2023-07-27 PROCEDURE — 3288F FALL RISK ASSESSMENT DOCD: CPT | Mod: CPTII,S$GLB,,

## 2023-07-27 PROCEDURE — 99214 OFFICE O/P EST MOD 30 MIN: CPT | Mod: S$GLB,,,

## 2023-07-27 PROCEDURE — 3074F SYST BP LT 130 MM HG: CPT | Mod: CPTII,S$GLB,,

## 2023-07-27 PROCEDURE — 99214 PR OFFICE/OUTPT VISIT, EST, LEVL IV, 30-39 MIN: ICD-10-PCS | Mod: S$GLB,,,

## 2023-07-27 PROCEDURE — 3078F DIAST BP <80 MM HG: CPT | Mod: CPTII,S$GLB,,

## 2023-07-27 PROCEDURE — 1101F PT FALLS ASSESS-DOCD LE1/YR: CPT | Mod: CPTII,S$GLB,,

## 2023-07-27 PROCEDURE — 1160F PR REVIEW ALL MEDS BY PRESCRIBER/CLIN PHARMACIST DOCUMENTED: ICD-10-PCS | Mod: CPTII,S$GLB,,

## 2023-07-27 PROCEDURE — 3008F BODY MASS INDEX DOCD: CPT | Mod: CPTII,S$GLB,,

## 2023-07-27 PROCEDURE — 3074F PR MOST RECENT SYSTOLIC BLOOD PRESSURE < 130 MM HG: ICD-10-PCS | Mod: CPTII,S$GLB,,

## 2023-07-27 PROCEDURE — 1101F PR PT FALLS ASSESS DOC 0-1 FALLS W/OUT INJ PAST YR: ICD-10-PCS | Mod: CPTII,S$GLB,,

## 2023-07-27 RX ORDER — LABETALOL 200 MG/1
200 TABLET, FILM COATED ORAL 2 TIMES DAILY
COMMUNITY
Start: 2023-07-18 | End: 2023-10-19

## 2023-07-27 RX ORDER — SERTRALINE HYDROCHLORIDE 100 MG/1
TABLET, FILM COATED ORAL
Qty: 135 TABLET | Refills: 3 | Status: SHIPPED | OUTPATIENT
Start: 2023-07-27 | End: 2024-03-21 | Stop reason: SDUPTHER

## 2023-07-27 RX ORDER — VITAMIN E MIXED 400 UNIT
400 CAPSULE ORAL
COMMUNITY

## 2023-07-27 RX ORDER — CLONIDINE HYDROCHLORIDE 0.1 MG/1
0.1 TABLET ORAL DAILY PRN
COMMUNITY
Start: 2023-07-18

## 2023-07-27 RX ORDER — BUPROPION HYDROCHLORIDE 300 MG/1
300 TABLET ORAL EVERY MORNING
Qty: 90 TABLET | Refills: 3 | Status: SHIPPED | OUTPATIENT
Start: 2023-07-27 | End: 2024-03-21 | Stop reason: SDUPTHER

## 2023-07-27 RX ORDER — DOXYCYCLINE 100 MG/1
100 CAPSULE ORAL 2 TIMES DAILY
Qty: 20 CAPSULE | Refills: 2 | Status: SHIPPED | OUTPATIENT
Start: 2023-07-27 | End: 2023-10-19

## 2023-07-27 NOTE — PROGRESS NOTES
Subjective:       Patient ID: Orlando Treadwell is a 73 y.o. male.    Chief Complaint: Medication Refill    Orlando Treadwell is a 73-year-old male patient who presents to clinic today for medication refills.  He recently went to the hospital for high blood pressure and headache. Troponin was negative, BNP was negative.  EKG was stable.  Blood pressure on arrival to ER was 210/112 and prior to discharge was 157/69.  His blood pressure was treated and improved with resolution of headache.  He was discharged with instructions to see Dr. Melgar with Cardiology.  He did follow-up with Dr. Melgar and his blood pressure was still elevated at that time.  He was taking metoprolol, hydralazine, and olmesartan/HCTZ for blood pressure management.  Dr. Melgar made adjustments to his medications by discontinuing metoprolol and starting labetalol 200 mg twice daily and added and clonidine 0.1 p.r.n. blood pressure greater than 180 mmHg.  He states he has been compliant with his medications.  He also has a history of anxiety and depression along with PTSD.  He is doing well while taking Zoloft and Wellbutrin.  He denies thoughts of suicide or homicide.  He has chronic MRSA and currently has a lesion on his right arm and is requesting to have doxycycline refilled.Hyperlipidemia and CAD:  With no current lipid panel.  Degenerative disc disease of the lumbar spine, is followed by Dr. Vega with Orthopedic surgery.  He currently takes oxycodone for pain and Linzess for opioid induced constipation.  TROY on CPAP states he does wear his machine nightly.  GERD controlled well with pantoprazole.  CK 3a with most recent eGFR of 57.6.  BMI stable at 36.8                Dr. Melgar note  Patient ID: Orlando Treadwell is a 73 y.o. male.    Chief Complaint: Elevated blood pressure    HPI:  HPI His blood pressure was elevated yesterday. He could not get it down with medication. Went to the emergency room. It eventually returned back down to a  normal range. He is still feeling diaphoretic tachycardic and still has elevated blood pressure.    Medication List:     Current Outpatient Medications:   · ascorbic acid, vitamin C, (VITAMIN C) 100 MG tablet, Take 100 mg by mouth daily, Disp: , Rfl:   · aspirin 325 MG EC tablet, Take 325 mg by mouth daily, Disp: , Rfl:   · b complex vitamins capsule, Take 1 capsule by mouth daily, Disp: , Rfl:   · buPROPion (WELLBUTRIN XL) 150 MG 24 hr tablet, Take 150 mg by mouth every morning, Disp: , Rfl:   · diclofenac sodium (VOLTAREN) 1 % Gel gel, Apply 1 Application topically as needed, Disp: , Rfl:   · gabapentin (NEURONTIN) 300 MG capsule, Take 300 mg by mouth daily, Disp: , Rfl:   · hydrALAZINE (APRESOLINE) 50 MG tablet, Take 1 tablet by mouth 3 (three) times daily Hold if systolic BP <140, Disp: , Rfl:   · linaCLOtide 290 mcg Cap, Take by mouth as needed, Disp: , Rfl:   · metoprolol succinate (TOPROL XL) 100 MG 24 hr tablet, Take 100 mg by mouth daily, Disp: , Rfl:   · multivitamin tablet, Take 1 tablet by mouth daily, Disp: , Rfl:   · olmesartan-hydrochlorothiazide (BENICAR HCT) 40-25 mg per tablet, Take 1 tablet by mouth daily, Disp: , Rfl:   · oxyCODONE 20 mg Tab, Take 1 tablet by mouth every 6 (six) hours as needed, Disp: , Rfl:   · pantoprazole (PROTONIX) 40 MG tablet, Take 40 mg by mouth every morning before breakfast, Disp: , Rfl:   · sertraline (ZOLOFT) 100 MG tablet, Take 100 mg by mouth daily, Disp: , Rfl:   · testosterone cypionate (DEPOTESTOTERONE CYPIONATE) 100 mg/mL injection, Inject 1 mL into the muscle every 14 (fourteen) days, Disp: , Rfl:   · traMADoL (ULTRAM) 50 mg tablet, Take 50 mg by mouth every 6 (six) hours as needed, Disp: , Rfl:   · vitamin E, dl,tocopheryl acet, (VITAMIN E, DL, ACETATE,) 180 mg (400 unit) Cap capsule, Take 400 Units by mouth daily, Disp: , Rfl:   · zinc gluconate 50 mg tablet, Take 50 mg by mouth daily, Disp: , Rfl:     Past Medical History:  Past Medical History:   Diagnosis  Date   · Acid reflux   · Anxiety   · Arthritis   · Essential (primary) hypertension   · Kidney stones   · MRSA (methicillin resistant Staphylococcus aureus)   · Nasal sinus inflammation due to allergy   · PTSD (post-traumatic stress disorder)     Past Surgical History:  Past Surgical History:   Procedure Laterality Date   · CARDIAC CATHETERIZATION 04/22/2020   2.75 x 12 stent to prox LAD   · CHOLECYSTECTOMY 02/2019   · COLONOSCOPY 01/2006   · KIDNEY STONE SURGERY 06/2013   · LUMBAR SPINE SURGERY 02/2015   L4-L5   · TOTAL KNEE ARTHROPLASTY Right 02/2000   · TOTAL KNEE ARTHROPLASTY Left 09/2014   · UPPER GASTROINTESTINAL ENDOSCOPY 2011     Social History:  Orladno has a past medical history of Acid reflux, Anxiety, Arthritis, Essential (primary) hypertension, Kidney stones, MRSA (methicillin resistant Staphylococcus aureus), Nasal sinus inflammation due to allergy, and PTSD (post-traumatic stress disorder).    Family History:  Family History   Problem Relation Age of Onset   · Breast cancer Mother     Allergies:   Allergies   Allergen Reactions   · Codeine Other (See Comments)   · Sulfamethoxazole-Trimethoprim Other (See Comments)   dizziness   · Morphine Nausea Only   Ok with nausea med.     ROS:  Review of Systems   Constitutional: Negative for chills, diaphoresis and fever.   HENT: Negative for sore throat.   Eyes: Negative for blurred vision and double vision.   Cardiovascular: Negative for chest pain, claudication, cyanosis, dyspnea on exertion, irregular heartbeat, leg swelling, near-syncope, orthopnea, palpitations, paroxysmal nocturnal dyspnea and syncope.   Respiratory: Positive for shortness of breath. Negative for cough and hemoptysis.   Endocrine: Negative for cold intolerance.   Skin: Negative for itching and rash.   Musculoskeletal: Negative for back pain.   Gastrointestinal: Negative for abdominal pain and constipation.   Neurological: Positive for headaches. Negative for dizziness and light-headedness.    Psychiatric/Behavioral: Negative for altered mental status. The patient is nervous/anxious.           VITALS:  Vitals:   07/18/23 1051   BP: (!) 200/90 158/89   Pulse: 78     Physical Exam   Constitutional: No distress.   HENT:   Nose: Nose normal.   Eyes: Pupils are equal, round, and reactive to light.   Cardiovascular: Regular rhythm, normal heart sounds and normal pulses.   Pulmonary/Chest: Breath sounds normal. He has no wheezes. He has no rales. He exhibits no tenderness.   Musculoskeletal:   General: No tenderness.   Cervical back: Normal range of motion.   Neurological: He is alert and oriented to person, place, and time.   Skin: Skin is warm and dry.     Lab Review:       I have personally labs and imaging.     ECG:    Assessment /Plan     1. Essential hypertension   Continue blood pressure medicines with this exception: Stop metoprolol, begin labetalol 200 mg b.i.d., add clonidine 0.1 p.r.n. blood pressure greater than 180 mmHg.   2. Coronary artery disease involving native coronary artery of native heart without angina pectoris   Troponin and BNP in ED yesterday normal.   3. Tachycardia   Will stop metoprolol and start labetalol.   4. PTSD (post-traumatic stress disorder)   Continue Wellbutrin.         Review of patient's allergies indicates:   Allergen Reactions    Bactrim [sulfamethoxazole-trimethoprim]      dizziness    Codeine     Morphine Nausea Only     Ok with nausea med.     Social Determinants of Health     Tobacco Use: Low Risk  (7/27/2023)    Patient History     Smoking Tobacco Use: Never     Smokeless Tobacco Use: Never     Passive Exposure: Not on file   Alcohol Use: Not on file   Financial Resource Strain: Not on file   Food Insecurity: Not on file   Transportation Needs: Not on file   Physical Activity: Not on file   Stress: Not on file   Social Connections: Not on file   Housing Stability: Not on file   Depression: Low Risk  (7/27/2023)    Depression     Last PHQ-4: Flowsheet Data: 0       Past Medical History:   Diagnosis Date    Abdominal hernia     Arthritis     Asbestos exposure     SOB     Back pain     Bulging discs     lumbar    Chronic back pain     Coronary artery disease     Hypertension     Kidney stone     Prostatitis     Sepsis due to methicillin resistant Staphylococcus aureus (MRSA) 06/2019    Due to large right upper arm abscess    Wears glasses       Past Surgical History:   Procedure Laterality Date    APPENDECTOMY      BACK SURGERY  09/23/2022    COLONOSCOPY N/A 09/23/2021    Procedure: COLONOSCOPY;  Surgeon: Ashvin Batista MD;  Location: [facility] ENDO;  Service: Endoscopy;  Laterality: N/A;    ESOPHAGOGASTRODUODENOSCOPY N/A 09/23/2021    Procedure: EGD (ESOPHAGOGASTRODUODENOSCOPY);  Surgeon: Ashvin Batista MD;  Location: [facility] ENDO;  Service: Endoscopy;  Laterality: N/A;    HAND SURGERY      rt hand    INCISION AND DRAINAGE Bilateral 06/2019    Right upper arm and left upper arm abscesses    JOINT REPLACEMENT      right knee    KNEE LIGAMENT RECONSTRUCTION      left knee    LAPAROSCOPIC CHOLECYSTECTOMY N/A 01/17/2020    Procedure: CHOLECYSTECTOMY, LAPAROSCOPIC;  Surgeon: Edgard Hill III, MD;  Location: [facility] OR;  Service: General;  Laterality: N/A;    LITHOTRIPSY      TONSILLECTOMY, ADENOIDECTOMY, BILATERAL MYRINGOTOMY AND TUBES      TOTAL KNEE ARTHROPLASTY  1971    rt knee    URETERAL STENT PLACEMENT        Social History     Socioeconomic History    Marital status:          Current Outpatient Medications:     ammonium lactate 12 % Crea, Apply 1 application topically 2 (two) times daily., Disp: 140 g, Rfl: 11    aspirin 325 MG tablet, Take 325 mg by mouth once daily., Disp: , Rfl:     b complex vitamins tablet, Take 1 tablet by mouth once daily., Disp: , Rfl:     beta-carotene,A,-vits C,E/mins (VISION ORAL), Take 1 tablet by mouth once daily., Disp: , Rfl:     cloNIDine (CATAPRES) 0.1 MG tablet, Take 0.1 mg by mouth daily as needed., Disp: , Rfl:     diclofenac sodium  1 % Gel, Apply 2 g topically 2 (two) times daily., Disp: 1 Tube, Rfl: 2    gabapentin (NEURONTIN) 300 MG capsule, Take 600 mg by mouth every evening. , Disp: , Rfl:     hydrALAZINE (APRESOLINE) 50 MG tablet, Take 50 mg by mouth 2 (two) times daily., Disp: , Rfl:     labetaloL (NORMODYNE) 200 MG tablet, Take 200 mg by mouth 2 (two) times daily., Disp: , Rfl:     LINZESS 145 mcg Cap capsule, Take 145 mcg by mouth once daily., Disp: , Rfl:     MAGNESIUM ORAL, Take 500 mg by mouth once daily., Disp: , Rfl:     multivitamin (THERAGRAN) tablet, Take 1 tablet by mouth once daily., Disp: , Rfl:     nitroGLYCERIN (NITROSTAT) 0.4 MG SL tablet, Place 0.4 mg under the tongue every 5 (five) minutes as needed. , Disp: , Rfl:     olmesartan-hydrochlorothiazide (BENICAR HCT) 40-25 mg per tablet, Take 1 tablet by mouth once daily., Disp: , Rfl:     oxyCODONE (ROXICODONE) 20 mg Tab immediate release tablet, Take 20 mg by mouth 4 (four) times daily as needed for Pain., Disp: , Rfl:     pantoprazole (PROTONIX) 40 MG tablet, TAKE 1 TABLET (40 MG TOTAL) BY MOUTH 2 (TWO) TIMES DAILY., Disp: 180 tablet, Rfl: 1    testosterone cypionate (DEPOTESTOTERONE CYPIONATE) 200 mg/mL injection, Inject 2 mLs (400 mg total) into the muscle every 28 days., Disp: 10 mL, Rfl: 1    traMADoL (ULTRAM) 50 mg tablet, Take 50 mg by mouth 4 (four) times daily as needed., Disp: , Rfl:     vitamin E, dl,tocopheryl acet, (VITAMIN E, DL, ACETATE,) 180 mg (400 unit) Cap, Take 400 Units by mouth., Disp: , Rfl:     zinc gluconate 50 mg tablet, Take 50 mg by mouth once daily., Disp: , Rfl:     buPROPion (WELLBUTRIN XL) 300 MG 24 hr tablet, Take 1 tablet (300 mg total) by mouth every morning., Disp: 90 tablet, Rfl: 3    doxycycline (VIBRAMYCIN) 100 MG Cap, Take 1 capsule (100 mg total) by mouth 2 (two) times a day., Disp: 20 capsule, Rfl: 2    NARCAN 4 mg/actuation Spry, SMARTSI Spray(s) Both Nares 1-2 Times Daily, Disp: , Rfl:     sertraline (ZOLOFT) 100 MG tablet,  TAKE 1 & 1/2 TABLETS BY MOUTH ONCE DAILY, Disp: 135 tablet, Rfl: 3    Lab Results   Component Value Date    WBC 9.80 07/17/2023    HGB 14.7 07/17/2023    HCT 48.5 07/17/2023     07/17/2023    CHOL 144 11/06/2018    TRIG 204 (H) 11/06/2018    HDL 27 (L) 11/06/2018    ALT 66 (H) 07/17/2023    AST 61 (H) 07/17/2023     (L) 07/17/2023    K 3.9 07/17/2023    CL 94 (L) 07/17/2023    CREATININE 1.3 07/17/2023    BUN 15 07/17/2023    CO2 29 07/17/2023    TSH 3.51 11/15/2021    PSA 3.0 05/18/2023    INR 1.1 03/13/2021    HGBA1C 6.3 (H) 01/09/2020       Review of Systems   Constitutional:  Negative for chills and fever.   Respiratory:  Negative for chest tightness and shortness of breath.    Cardiovascular:  Negative for chest pain and palpitations.   Integumentary:         MRSA bump to right arm   Psychiatric/Behavioral:  Negative for dysphoric mood and suicidal ideas. The patient is nervous/anxious.        Objective:      Physical Exam  Vitals reviewed.   Constitutional:       Appearance: Normal appearance.   Cardiovascular:      Rate and Rhythm: Normal rate and regular rhythm.      Pulses: Normal pulses.      Heart sounds: Normal heart sounds.   Pulmonary:      Effort: Pulmonary effort is normal.      Breath sounds: Normal breath sounds.   Skin:     General: Skin is warm and dry.      Capillary Refill: Capillary refill takes less than 2 seconds.      Findings: Lesion present.      Comments: MRSA lesion to right forearm   Neurological:      Mental Status: He is alert and oriented to person, place, and time.   Psychiatric:         Attention and Perception: Attention normal.         Mood and Affect: Mood and affect normal.         Speech: Speech normal.         Behavior: Behavior normal. Behavior is cooperative.         Thought Content: Thought content normal. Thought content does not include homicidal or suicidal ideation.         Judgment: Judgment normal.         Assessment:       1. Depression, unspecified  depression type    2. DDD (degenerative disc disease), lumbar    3. Chronic narcotic use    4. Hypertension, essential    5. History of MRSA infection    6. Aspirin long-term use    7. Gastroesophageal reflux disease, unspecified whether esophagitis present    8. TROY on CPAP    9. MRSA infection    10. Spinal enthesopathy, lumbar region    11. Other specified diseases of spinal cord    12. Chronic kidney disease, stage 3a    13. Coronary artery disease involving native coronary artery of native heart without angina pectoris    14. Class 2 severe obesity due to excess calories with serious comorbidity and body mass index (BMI) of 36.0 to 36.9 in adult    15. Low testosterone    16. Lumbar disc disease        Plan:       Orlando was seen today for medication refill.    Diagnoses and all orders for this visit:    Depression, unspecified depression type  -     buPROPion (WELLBUTRIN XL) 300 MG 24 hr tablet; Take 1 tablet (300 mg total) by mouth every morning.  -     sertraline (ZOLOFT) 100 MG tablet; TAKE 1 & 1/2 TABLETS BY MOUTH ONCE DAILY    DDD (degenerative disc disease), lumbar    Chronic narcotic use    Hypertension, essential    History of MRSA infection  -     doxycycline (VIBRAMYCIN) 100 MG Cap; Take 1 capsule (100 mg total) by mouth 2 (two) times a day.    Aspirin long-term use    Gastroesophageal reflux disease, unspecified whether esophagitis present    TROY on CPAP    MRSA infection  -     doxycycline (VIBRAMYCIN) 100 MG Cap; Take 1 capsule (100 mg total) by mouth 2 (two) times a day.    Spinal enthesopathy, lumbar region    Other specified diseases of spinal cord    Chronic kidney disease, stage 3a    Coronary artery disease involving native coronary artery of native heart without angina pectoris    Class 2 severe obesity due to excess calories with serious comorbidity and body mass index (BMI) of 36.0 to 36.9 in adult    Low testosterone    Lumbar disc disease     His blood pressure looks good today,  continue current medications and follow-up with Dr. Melgar as recommended.  Anxiety and depression doing well continue Wellbutrin and Zoloft.  Doxycycline prescribed for recurrent MRSA infection with noted lesion to his right forearm.  Remaining medications with no changes.  He will call for refills.  Follow-up with Dr. Vega and pain management as recommended.  Follow-up with Dr. Montana for testosterone management.  He is due for shingles and pneumonia vaccine but declines at this time.  Follow-up in 6 months or sooner if needed

## 2023-07-30 PROBLEM — E66.812 CLASS 2 SEVERE OBESITY DUE TO EXCESS CALORIES WITH SERIOUS COMORBIDITY AND BODY MASS INDEX (BMI) OF 36.0 TO 36.9 IN ADULT: Status: ACTIVE | Noted: 2021-03-13

## 2023-07-30 PROBLEM — F43.10 PTSD (POST-TRAUMATIC STRESS DISORDER): Status: ACTIVE | Noted: 2023-07-18

## 2023-07-30 PROBLEM — E66.01 CLASS 2 SEVERE OBESITY DUE TO EXCESS CALORIES WITH SERIOUS COMORBIDITY AND BODY MASS INDEX (BMI) OF 36.0 TO 36.9 IN ADULT: Status: ACTIVE | Noted: 2021-03-13

## 2023-08-13 DIAGNOSIS — R79.89 LOW TESTOSTERONE: ICD-10-CM

## 2023-08-14 RX ORDER — TESTOSTERONE CYPIONATE 200 MG/ML
INJECTION, SOLUTION INTRAMUSCULAR
Qty: 10 ML | Refills: 1 | Status: SHIPPED | OUTPATIENT
Start: 2023-08-14 | End: 2023-10-12

## 2023-09-20 ENCOUNTER — TELEPHONE (OUTPATIENT)
Dept: FAMILY MEDICINE | Facility: CLINIC | Age: 73
End: 2023-09-20
Payer: MEDICARE

## 2023-09-20 DIAGNOSIS — Z13.220 SCREENING CHOLESTEROL LEVEL: ICD-10-CM

## 2023-09-20 DIAGNOSIS — I25.10 CORONARY ARTERY DISEASE INVOLVING NATIVE CORONARY ARTERY OF NATIVE HEART WITHOUT ANGINA PECTORIS: ICD-10-CM

## 2023-09-20 DIAGNOSIS — I10 HYPERTENSION, ESSENTIAL: ICD-10-CM

## 2023-09-20 DIAGNOSIS — R79.89 LOW TESTOSTERONE: ICD-10-CM

## 2023-09-20 DIAGNOSIS — F32.A DEPRESSION, UNSPECIFIED DEPRESSION TYPE: Primary | ICD-10-CM

## 2023-09-28 ENCOUNTER — LAB VISIT (OUTPATIENT)
Dept: LAB | Facility: HOSPITAL | Age: 73
End: 2023-09-28
Attending: FAMILY MEDICINE
Payer: MEDICARE

## 2023-09-28 DIAGNOSIS — R79.89 LOW TESTOSTERONE: ICD-10-CM

## 2023-09-28 DIAGNOSIS — I25.10 CORONARY ARTERY DISEASE INVOLVING NATIVE CORONARY ARTERY OF NATIVE HEART WITHOUT ANGINA PECTORIS: ICD-10-CM

## 2023-09-28 DIAGNOSIS — I10 HYPERTENSION, ESSENTIAL: ICD-10-CM

## 2023-09-28 DIAGNOSIS — Z13.220 SCREENING CHOLESTEROL LEVEL: ICD-10-CM

## 2023-09-28 DIAGNOSIS — F32.A DEPRESSION, UNSPECIFIED DEPRESSION TYPE: ICD-10-CM

## 2023-09-28 LAB
ALBUMIN SERPL BCP-MCNC: 4.1 G/DL (ref 3.5–5.2)
ALP SERPL-CCNC: 52 U/L (ref 55–135)
ALT SERPL W/O P-5'-P-CCNC: 74 U/L (ref 10–44)
ANION GAP SERPL CALC-SCNC: 9 MMOL/L (ref 8–16)
AST SERPL-CCNC: 64 U/L (ref 10–40)
BASOPHILS # BLD AUTO: 0.04 K/UL (ref 0–0.2)
BASOPHILS NFR BLD: 0.4 % (ref 0–1.9)
BILIRUB SERPL-MCNC: 0.5 MG/DL (ref 0.1–1)
BUN SERPL-MCNC: 22 MG/DL (ref 8–23)
CALCIUM SERPL-MCNC: 9.7 MG/DL (ref 8.7–10.5)
CHLORIDE SERPL-SCNC: 99 MMOL/L (ref 95–110)
CHOLEST SERPL-MCNC: 147 MG/DL (ref 120–199)
CHOLEST/HDLC SERPL: 4.9 {RATIO} (ref 2–5)
CO2 SERPL-SCNC: 28 MMOL/L (ref 23–29)
CREAT SERPL-MCNC: 1.6 MG/DL (ref 0.5–1.4)
DIFFERENTIAL METHOD: ABNORMAL
EOSINOPHIL # BLD AUTO: 0.3 K/UL (ref 0–0.5)
EOSINOPHIL NFR BLD: 2.7 % (ref 0–8)
ERYTHROCYTE [DISTWIDTH] IN BLOOD BY AUTOMATED COUNT: 19 % (ref 11.5–14.5)
EST. GFR  (NO RACE VARIABLE): 44.9 ML/MIN/1.73 M^2
GLUCOSE SERPL-MCNC: 124 MG/DL (ref 70–110)
HCT VFR BLD AUTO: 47.9 % (ref 40–54)
HDLC SERPL-MCNC: 30 MG/DL (ref 40–75)
HDLC SERPL: 20.4 % (ref 20–50)
HGB BLD-MCNC: 14.5 G/DL (ref 14–18)
IMM GRANULOCYTES # BLD AUTO: 0.05 K/UL (ref 0–0.04)
IMM GRANULOCYTES NFR BLD AUTO: 0.5 % (ref 0–0.5)
LDLC SERPL CALC-MCNC: 76.8 MG/DL (ref 63–159)
LYMPHOCYTES # BLD AUTO: 1.9 K/UL (ref 1–4.8)
LYMPHOCYTES NFR BLD: 17.8 % (ref 18–48)
MCH RBC QN AUTO: 25.2 PG (ref 27–31)
MCHC RBC AUTO-ENTMCNC: 30.3 G/DL (ref 32–36)
MCV RBC AUTO: 83 FL (ref 82–98)
MONOCYTES # BLD AUTO: 1.2 K/UL (ref 0.3–1)
MONOCYTES NFR BLD: 11.4 % (ref 4–15)
NEUTROPHILS # BLD AUTO: 7.1 K/UL (ref 1.8–7.7)
NEUTROPHILS NFR BLD: 67.2 % (ref 38–73)
NONHDLC SERPL-MCNC: 117 MG/DL
NRBC BLD-RTO: 0 /100 WBC
PLATELET # BLD AUTO: 253 K/UL (ref 150–450)
PMV BLD AUTO: 9.8 FL (ref 9.2–12.9)
POTASSIUM SERPL-SCNC: 4.7 MMOL/L (ref 3.5–5.1)
PROT SERPL-MCNC: 8.2 G/DL (ref 6–8.4)
RBC # BLD AUTO: 5.75 M/UL (ref 4.6–6.2)
SODIUM SERPL-SCNC: 136 MMOL/L (ref 136–145)
TRIGL SERPL-MCNC: 201 MG/DL (ref 30–150)
TSH SERPL DL<=0.005 MIU/L-ACNC: 4.43 UIU/ML (ref 0.34–5.6)
WBC # BLD AUTO: 10.57 K/UL (ref 3.9–12.7)

## 2023-09-28 PROCEDURE — 80061 LIPID PANEL: CPT | Performed by: FAMILY MEDICINE

## 2023-09-28 PROCEDURE — 80053 COMPREHEN METABOLIC PANEL: CPT | Performed by: FAMILY MEDICINE

## 2023-09-28 PROCEDURE — 85025 COMPLETE CBC W/AUTO DIFF WBC: CPT | Performed by: FAMILY MEDICINE

## 2023-09-28 PROCEDURE — 36415 COLL VENOUS BLD VENIPUNCTURE: CPT | Performed by: FAMILY MEDICINE

## 2023-09-28 PROCEDURE — 84443 ASSAY THYROID STIM HORMONE: CPT | Performed by: FAMILY MEDICINE

## 2023-09-30 RX ORDER — MUPIROCIN 20 MG/G
OINTMENT TOPICAL 2 TIMES DAILY
Qty: 22 G | Refills: 5 | Status: SHIPPED | OUTPATIENT
Start: 2023-09-30

## 2023-10-05 ENCOUNTER — TELEPHONE (OUTPATIENT)
Dept: FAMILY MEDICINE | Facility: CLINIC | Age: 73
End: 2023-10-05
Payer: MEDICARE

## 2023-10-05 NOTE — TELEPHONE ENCOUNTER
----- Message from Kleber Worthy III, MD sent at 9/28/2023  9:05 PM CDT -----  Kidney function is decreased.  ABNORMAL followup to discuss further action.

## 2023-10-12 DIAGNOSIS — R79.89 LOW TESTOSTERONE: ICD-10-CM

## 2023-10-12 RX ORDER — TESTOSTERONE CYPIONATE 200 MG/ML
INJECTION, SOLUTION INTRAMUSCULAR
Qty: 10 ML | Refills: 1 | Status: SHIPPED | OUTPATIENT
Start: 2023-10-12 | End: 2024-04-09

## 2023-10-19 ENCOUNTER — OFFICE VISIT (OUTPATIENT)
Dept: FAMILY MEDICINE | Facility: CLINIC | Age: 73
End: 2023-10-19
Payer: MEDICARE

## 2023-10-19 VITALS
BODY MASS INDEX: 37.89 KG/M2 | WEIGHT: 241.38 LBS | TEMPERATURE: 97 F | SYSTOLIC BLOOD PRESSURE: 132 MMHG | HEART RATE: 70 BPM | DIASTOLIC BLOOD PRESSURE: 78 MMHG | HEIGHT: 67 IN | OXYGEN SATURATION: 97 %

## 2023-10-19 DIAGNOSIS — F11.90 CHRONIC NARCOTIC USE: ICD-10-CM

## 2023-10-19 DIAGNOSIS — F43.10 PTSD (POST-TRAUMATIC STRESS DISORDER): ICD-10-CM

## 2023-10-19 DIAGNOSIS — N18.31 CHRONIC KIDNEY DISEASE, STAGE 3A: ICD-10-CM

## 2023-10-19 DIAGNOSIS — I10 HYPERTENSION, ESSENTIAL: ICD-10-CM

## 2023-10-19 DIAGNOSIS — G47.33 OSA (OBSTRUCTIVE SLEEP APNEA): ICD-10-CM

## 2023-10-19 DIAGNOSIS — I70.0 ATHEROSCLEROSIS OF AORTA: ICD-10-CM

## 2023-10-19 DIAGNOSIS — Z98.890 HISTORY OF BACK SURGERY: ICD-10-CM

## 2023-10-19 DIAGNOSIS — E29.1 HYPOGONADISM MALE: ICD-10-CM

## 2023-10-19 DIAGNOSIS — F32.A DEPRESSION, UNSPECIFIED DEPRESSION TYPE: ICD-10-CM

## 2023-10-19 DIAGNOSIS — D69.2 OTHER NONTHROMBOCYTOPENIC PURPURA: ICD-10-CM

## 2023-10-19 DIAGNOSIS — F33.0 MAJOR DEPRESSIVE DISORDER, RECURRENT, MILD: ICD-10-CM

## 2023-10-19 DIAGNOSIS — E03.9 HYPOTHYROIDISM, UNSPECIFIED TYPE: Primary | ICD-10-CM

## 2023-10-19 PROCEDURE — 1101F PT FALLS ASSESS-DOCD LE1/YR: CPT | Mod: CPTII,S$GLB,, | Performed by: FAMILY MEDICINE

## 2023-10-19 PROCEDURE — 99214 OFFICE O/P EST MOD 30 MIN: CPT | Mod: 25,S$GLB,, | Performed by: FAMILY MEDICINE

## 2023-10-19 PROCEDURE — 90694 FLU VACCINE - QUADRIVALENT - ADJUVANTED: ICD-10-PCS | Mod: S$GLB,,, | Performed by: FAMILY MEDICINE

## 2023-10-19 PROCEDURE — 3008F PR BODY MASS INDEX (BMI) DOCUMENTED: ICD-10-PCS | Mod: CPTII,S$GLB,, | Performed by: FAMILY MEDICINE

## 2023-10-19 PROCEDURE — 1159F MED LIST DOCD IN RCRD: CPT | Mod: CPTII,S$GLB,, | Performed by: FAMILY MEDICINE

## 2023-10-19 PROCEDURE — 99214 PR OFFICE/OUTPT VISIT, EST, LEVL IV, 30-39 MIN: ICD-10-PCS | Mod: 25,S$GLB,, | Performed by: FAMILY MEDICINE

## 2023-10-19 PROCEDURE — 3078F PR MOST RECENT DIASTOLIC BLOOD PRESSURE < 80 MM HG: ICD-10-PCS | Mod: CPTII,S$GLB,, | Performed by: FAMILY MEDICINE

## 2023-10-19 PROCEDURE — 1160F PR REVIEW ALL MEDS BY PRESCRIBER/CLIN PHARMACIST DOCUMENTED: ICD-10-PCS | Mod: CPTII,S$GLB,, | Performed by: FAMILY MEDICINE

## 2023-10-19 PROCEDURE — G0008 FLU VACCINE - QUADRIVALENT - ADJUVANTED: ICD-10-PCS | Mod: S$GLB,,, | Performed by: FAMILY MEDICINE

## 2023-10-19 PROCEDURE — 3075F SYST BP GE 130 - 139MM HG: CPT | Mod: CPTII,S$GLB,, | Performed by: FAMILY MEDICINE

## 2023-10-19 PROCEDURE — 1101F PR PT FALLS ASSESS DOC 0-1 FALLS W/OUT INJ PAST YR: ICD-10-PCS | Mod: CPTII,S$GLB,, | Performed by: FAMILY MEDICINE

## 2023-10-19 PROCEDURE — 1160F RVW MEDS BY RX/DR IN RCRD: CPT | Mod: CPTII,S$GLB,, | Performed by: FAMILY MEDICINE

## 2023-10-19 PROCEDURE — G0008 ADMIN INFLUENZA VIRUS VAC: HCPCS | Mod: S$GLB,,, | Performed by: FAMILY MEDICINE

## 2023-10-19 PROCEDURE — 3288F PR FALLS RISK ASSESSMENT DOCUMENTED: ICD-10-PCS | Mod: CPTII,S$GLB,, | Performed by: FAMILY MEDICINE

## 2023-10-19 PROCEDURE — 90694 VACC AIIV4 NO PRSRV 0.5ML IM: CPT | Mod: S$GLB,,, | Performed by: FAMILY MEDICINE

## 2023-10-19 PROCEDURE — 3078F DIAST BP <80 MM HG: CPT | Mod: CPTII,S$GLB,, | Performed by: FAMILY MEDICINE

## 2023-10-19 PROCEDURE — 3288F FALL RISK ASSESSMENT DOCD: CPT | Mod: CPTII,S$GLB,, | Performed by: FAMILY MEDICINE

## 2023-10-19 PROCEDURE — 1159F PR MEDICATION LIST DOCUMENTED IN MEDICAL RECORD: ICD-10-PCS | Mod: CPTII,S$GLB,, | Performed by: FAMILY MEDICINE

## 2023-10-19 PROCEDURE — 3075F PR MOST RECENT SYSTOLIC BLOOD PRESS GE 130-139MM HG: ICD-10-PCS | Mod: CPTII,S$GLB,, | Performed by: FAMILY MEDICINE

## 2023-10-19 PROCEDURE — 1126F AMNT PAIN NOTED NONE PRSNT: CPT | Mod: CPTII,S$GLB,, | Performed by: FAMILY MEDICINE

## 2023-10-19 PROCEDURE — 1126F PR PAIN SEVERITY QUANTIFIED, NO PAIN PRESENT: ICD-10-PCS | Mod: CPTII,S$GLB,, | Performed by: FAMILY MEDICINE

## 2023-10-19 PROCEDURE — 3008F BODY MASS INDEX DOCD: CPT | Mod: CPTII,S$GLB,, | Performed by: FAMILY MEDICINE

## 2023-10-19 RX ORDER — METOPROLOL SUCCINATE 100 MG/1
100 TABLET, EXTENDED RELEASE ORAL
COMMUNITY

## 2023-10-19 RX ORDER — LAMOTRIGINE 25 MG/1
TABLET ORAL
Qty: 60 TABLET | Refills: 3 | Status: SHIPPED | OUTPATIENT
Start: 2023-10-19

## 2023-10-19 RX ORDER — LEVOTHYROXINE SODIUM 25 UG/1
25 TABLET ORAL
Qty: 90 TABLET | Refills: 1 | Status: SHIPPED | OUTPATIENT
Start: 2023-10-19 | End: 2023-12-21 | Stop reason: SDUPTHER

## 2023-10-19 NOTE — PATIENT INSTRUCTIONS
Lamictal and thyroid med (levothyroxine) will be sent to pharm after 5:30 pm today    Follow up in 2 months with labs

## 2023-10-21 PROBLEM — E03.9 HYPOTHYROIDISM: Status: ACTIVE | Noted: 2023-10-21

## 2023-10-21 PROBLEM — I70.0 ATHEROSCLEROSIS OF AORTA: Status: ACTIVE | Noted: 2023-10-21

## 2023-10-21 PROBLEM — D69.2 OTHER NONTHROMBOCYTOPENIC PURPURA: Status: ACTIVE | Noted: 2023-10-21

## 2023-10-21 PROBLEM — E29.1 HYPOGONADISM MALE: Status: ACTIVE | Noted: 2023-10-21

## 2023-10-21 PROBLEM — F33.0 MAJOR DEPRESSIVE DISORDER, RECURRENT, MILD: Status: ACTIVE | Noted: 2023-10-21

## 2023-10-22 NOTE — PROGRESS NOTES
Subjective:       Patient ID: Orlando Treadwell is a 73 y.o. male.    Chief Complaint: Follow-up    BMI is 37.8.  Up 6 lb.  Hypertension was high medicine was increased by his cardiologist.  But [facility] told him [facility] was taking 2 beta blockers.  Looks like he may have been on labetalol possibly.  In his on Toprol.  He is off of it now.  Using Catapres p.r.n..  Also hypogonadism.  On testosterone from Urology.  Chronic opioid use.  Lumbar back pain.  Dr. Ko for cage procedure.  Hypothyroidism TSH is 4.43.  Has CKD 3A.  Using nonsteroidal.  Hemoglobin is 14.5.  GFR is 45.  Glucose 124.  Cholesterol 147 with an HDL of 30 and LDL of 77.  Has PTSD.  Depression.  Obstructive sleep apnea and he quit using his CPAP.  Uncomfortable.    Physical examination.  Vital signs noted.  Overweight male no acute distress.  Neck without bruit no adenopathy.  Chest clear.  Heart regular rate and rhythm.  Abdomen bowel sounds are positive soft nontender.  Extremities are without edema positive pulses.          Objective:        Assessment:       1. Hypothyroidism, unspecified type    2. Chronic kidney disease, stage 3a    3. BMI 37.0-37.9, adult    4. Hypertension, essential    5. Hypogonadism male    6. Chronic narcotic use    7. History of back surgery    8. PTSD (post-traumatic stress disorder)    9. Depression, unspecified depression type    10. TROY (obstructive sleep apnea)    11. Other nonthrombocytopenic purpura    12. Major depressive disorder, recurrent, mild    13. Atherosclerosis of aorta        Plan:       Hypothyroidism, unspecified type  -     TSH; Future; Expected date: 12/04/2023    Chronic kidney disease, stage 3a  -     Basic Metabolic Panel; Future; Expected date: 12/04/2023    BMI 37.0-37.9, adult    Hypertension, essential    Hypogonadism male    Chronic narcotic use    History of back surgery    PTSD (post-traumatic stress disorder)    Depression, unspecified depression type    TROY (obstructive sleep apnea)    Other  nonthrombocytopenic purpura    Major depressive disorder, recurrent, mild    Atherosclerosis of aorta    Other orders  -     Influenza (FLUAD) - Quadrivalent (Adjuvanted) *Preferred* (65+) (PF)  -     levothyroxine (SYNTHROID) 25 MCG tablet; Take 1 tablet (25 mcg total) by mouth before breakfast.  Dispense: 90 tablet; Refill: 1  -     lamoTRIgine (LAMICTAL) 25 MG tablet; Take one tab nightly for 7 days then one tab every 12 hours  Dispense: 60 tablet; Refill: 3      Had Lamictal 25 mg HS for 7 days then b.i.d..  Sixty with 3 refills.  Start levothyroxine 25 daily.  Follow-up in 3 months.  Flu shot high-dose today.  Prevnar 20 recommended.  Discontinue nonsteroidal anti-inflammatories.  Tylenol p.r.n. only.  BMP in 2 months.  And TSH in 2 months also.

## 2023-11-24 ENCOUNTER — LAB VISIT (OUTPATIENT)
Dept: LAB | Facility: HOSPITAL | Age: 73
End: 2023-11-24
Attending: UROLOGY
Payer: MEDICARE

## 2023-11-24 DIAGNOSIS — R97.20 ELEVATED PSA: ICD-10-CM

## 2023-11-24 LAB — COMPLEXED PSA SERPL-MCNC: 3.6 NG/ML (ref 0–4)

## 2023-11-24 PROCEDURE — 36415 COLL VENOUS BLD VENIPUNCTURE: CPT | Performed by: UROLOGY

## 2023-11-24 PROCEDURE — 84153 ASSAY OF PSA TOTAL: CPT | Performed by: UROLOGY

## 2023-12-08 ENCOUNTER — LAB VISIT (OUTPATIENT)
Dept: LAB | Facility: HOSPITAL | Age: 73
End: 2023-12-08
Attending: FAMILY MEDICINE
Payer: MEDICARE

## 2023-12-08 DIAGNOSIS — E03.9 HYPOTHYROIDISM, UNSPECIFIED TYPE: ICD-10-CM

## 2023-12-08 DIAGNOSIS — N18.31 CHRONIC KIDNEY DISEASE, STAGE 3A: ICD-10-CM

## 2023-12-08 LAB
ANION GAP SERPL CALC-SCNC: 6 MMOL/L (ref 8–16)
BUN SERPL-MCNC: 23 MG/DL (ref 8–23)
CALCIUM SERPL-MCNC: 9.6 MG/DL (ref 8.7–10.5)
CHLORIDE SERPL-SCNC: 100 MMOL/L (ref 95–110)
CO2 SERPL-SCNC: 28 MMOL/L (ref 23–29)
CREAT SERPL-MCNC: 1.5 MG/DL (ref 0.5–1.4)
EST. GFR  (NO RACE VARIABLE): 48.6 ML/MIN/1.73 M^2
GLUCOSE SERPL-MCNC: 108 MG/DL (ref 70–110)
POTASSIUM SERPL-SCNC: 4.7 MMOL/L (ref 3.5–5.1)
SODIUM SERPL-SCNC: 134 MMOL/L (ref 136–145)
TSH SERPL DL<=0.005 MIU/L-ACNC: 5.19 UIU/ML (ref 0.34–5.6)

## 2023-12-08 PROCEDURE — 84443 ASSAY THYROID STIM HORMONE: CPT | Performed by: FAMILY MEDICINE

## 2023-12-08 PROCEDURE — 80048 BASIC METABOLIC PNL TOTAL CA: CPT | Performed by: FAMILY MEDICINE

## 2023-12-08 PROCEDURE — 36415 COLL VENOUS BLD VENIPUNCTURE: CPT | Performed by: FAMILY MEDICINE

## 2023-12-11 DIAGNOSIS — M54.16 RADICULOPATHY OF LUMBAR REGION: Primary | ICD-10-CM

## 2023-12-13 ENCOUNTER — HOSPITAL ENCOUNTER (OUTPATIENT)
Dept: RADIOLOGY | Facility: HOSPITAL | Age: 73
Discharge: HOME | End: 2023-12-13
Attending: PHYSICIAN ASSISTANT
Payer: MEDICARE

## 2023-12-13 DIAGNOSIS — M54.16 RADICULOPATHY OF LUMBAR REGION: ICD-10-CM

## 2023-12-13 PROCEDURE — 72100 X-RAY EXAM L-S SPINE 2/3 VWS: CPT | Mod: TC,PO

## 2023-12-14 ENCOUNTER — PATIENT OUTREACH (OUTPATIENT)
Dept: ADMINISTRATIVE | Facility: HOSPITAL | Age: 73
End: 2023-12-14
Payer: MEDICARE

## 2023-12-14 NOTE — PROGRESS NOTES
Population Health Chart Review & Patient Outreach Details    Outreach Performed: NO    Additional Pop Health Notes:    PHN STATIN gap report.  Put in appt notes to review Statin Therapy for patient       Updates Requested / Reviewed:        Health Maintenance Topics Overdue:    Health Maintenance Due   Topic Date Due    High Dose Statin  Never done    Shingles Vaccine (1 of 2) Never done    RSV Vaccine (Age 60+ and Pregnant patients) (1 - 1-dose 60+ series) Never done    Pneumococcal Vaccines (Age 65+) (1 - PCV) Never done    COVID-19 Vaccine (8 - 2023-24 season) 09/01/2023         Health Maintenance Topic(s) Outreach Outcomes & Actions Taken:    Medication Adherence / Statins - Outreach Outcomes & Actions Taken  : msg

## 2023-12-21 ENCOUNTER — OFFICE VISIT (OUTPATIENT)
Dept: FAMILY MEDICINE | Facility: CLINIC | Age: 73
End: 2023-12-21
Payer: MEDICARE

## 2023-12-21 VITALS
HEIGHT: 67 IN | DIASTOLIC BLOOD PRESSURE: 82 MMHG | SYSTOLIC BLOOD PRESSURE: 118 MMHG | RESPIRATION RATE: 18 BRPM | BODY MASS INDEX: 38.06 KG/M2 | HEART RATE: 101 BPM | OXYGEN SATURATION: 95 % | TEMPERATURE: 97 F | WEIGHT: 242.5 LBS

## 2023-12-21 DIAGNOSIS — N18.31 STAGE 3A CHRONIC KIDNEY DISEASE: ICD-10-CM

## 2023-12-21 DIAGNOSIS — Z79.82 ASPIRIN LONG-TERM USE: ICD-10-CM

## 2023-12-21 DIAGNOSIS — Z95.5 STENTED CORONARY ARTERY: ICD-10-CM

## 2023-12-21 DIAGNOSIS — M54.9 BACK PAIN, UNSPECIFIED BACK LOCATION, UNSPECIFIED BACK PAIN LATERALITY, UNSPECIFIED CHRONICITY: ICD-10-CM

## 2023-12-21 DIAGNOSIS — Z86.14 HISTORY OF MRSA INFECTION: Chronic | ICD-10-CM

## 2023-12-21 DIAGNOSIS — I10 HYPERTENSION, ESSENTIAL: Primary | ICD-10-CM

## 2023-12-21 DIAGNOSIS — E03.9 HYPOTHYROIDISM, UNSPECIFIED TYPE: ICD-10-CM

## 2023-12-21 PROCEDURE — 3288F PR FALLS RISK ASSESSMENT DOCUMENTED: ICD-10-PCS | Mod: CPTII,S$GLB,, | Performed by: FAMILY MEDICINE

## 2023-12-21 PROCEDURE — 1160F RVW MEDS BY RX/DR IN RCRD: CPT | Mod: CPTII,S$GLB,, | Performed by: FAMILY MEDICINE

## 2023-12-21 PROCEDURE — 3079F PR MOST RECENT DIASTOLIC BLOOD PRESSURE 80-89 MM HG: ICD-10-PCS | Mod: CPTII,S$GLB,, | Performed by: FAMILY MEDICINE

## 2023-12-21 PROCEDURE — 1160F PR REVIEW ALL MEDS BY PRESCRIBER/CLIN PHARMACIST DOCUMENTED: ICD-10-PCS | Mod: CPTII,S$GLB,, | Performed by: FAMILY MEDICINE

## 2023-12-21 PROCEDURE — 1126F PR PAIN SEVERITY QUANTIFIED, NO PAIN PRESENT: ICD-10-PCS | Mod: CPTII,S$GLB,, | Performed by: FAMILY MEDICINE

## 2023-12-21 PROCEDURE — 1101F PR PT FALLS ASSESS DOC 0-1 FALLS W/OUT INJ PAST YR: ICD-10-PCS | Mod: CPTII,S$GLB,, | Performed by: FAMILY MEDICINE

## 2023-12-21 PROCEDURE — 1159F PR MEDICATION LIST DOCUMENTED IN MEDICAL RECORD: ICD-10-PCS | Mod: CPTII,S$GLB,, | Performed by: FAMILY MEDICINE

## 2023-12-21 PROCEDURE — 1101F PT FALLS ASSESS-DOCD LE1/YR: CPT | Mod: CPTII,S$GLB,, | Performed by: FAMILY MEDICINE

## 2023-12-21 PROCEDURE — 3074F SYST BP LT 130 MM HG: CPT | Mod: CPTII,S$GLB,, | Performed by: FAMILY MEDICINE

## 2023-12-21 PROCEDURE — 3008F PR BODY MASS INDEX (BMI) DOCUMENTED: ICD-10-PCS | Mod: CPTII,S$GLB,, | Performed by: FAMILY MEDICINE

## 2023-12-21 PROCEDURE — 1126F AMNT PAIN NOTED NONE PRSNT: CPT | Mod: CPTII,S$GLB,, | Performed by: FAMILY MEDICINE

## 2023-12-21 PROCEDURE — 99213 PR OFFICE/OUTPT VISIT, EST, LEVL III, 20-29 MIN: ICD-10-PCS | Mod: S$GLB,,, | Performed by: FAMILY MEDICINE

## 2023-12-21 PROCEDURE — 3079F DIAST BP 80-89 MM HG: CPT | Mod: CPTII,S$GLB,, | Performed by: FAMILY MEDICINE

## 2023-12-21 PROCEDURE — 3008F BODY MASS INDEX DOCD: CPT | Mod: CPTII,S$GLB,, | Performed by: FAMILY MEDICINE

## 2023-12-21 PROCEDURE — 99213 OFFICE O/P EST LOW 20 MIN: CPT | Mod: S$GLB,,, | Performed by: FAMILY MEDICINE

## 2023-12-21 PROCEDURE — 3288F FALL RISK ASSESSMENT DOCD: CPT | Mod: CPTII,S$GLB,, | Performed by: FAMILY MEDICINE

## 2023-12-21 PROCEDURE — 3074F PR MOST RECENT SYSTOLIC BLOOD PRESSURE < 130 MM HG: ICD-10-PCS | Mod: CPTII,S$GLB,, | Performed by: FAMILY MEDICINE

## 2023-12-21 PROCEDURE — 1159F MED LIST DOCD IN RCRD: CPT | Mod: CPTII,S$GLB,, | Performed by: FAMILY MEDICINE

## 2023-12-21 RX ORDER — PRAVASTATIN SODIUM 20 MG/1
20 TABLET ORAL DAILY
COMMUNITY
End: 2023-12-21 | Stop reason: SDUPTHER

## 2023-12-21 RX ORDER — LEVOTHYROXINE SODIUM 50 UG/1
50 TABLET ORAL
Qty: 90 TABLET | Refills: 1 | Status: SHIPPED | OUTPATIENT
Start: 2023-12-21

## 2023-12-21 RX ORDER — PRAVASTATIN SODIUM 20 MG/1
20 TABLET ORAL DAILY
Qty: 90 TABLET | Refills: 1 | Status: SHIPPED | OUTPATIENT
Start: 2023-12-21

## 2023-12-24 NOTE — PROGRESS NOTES
Subjective:       Patient ID: Orlando Treadwell is a 73 y.o. male.    Chief Complaint: Follow-up    Follow-up of hypertension.  Blood pressure under good control.  Has coronary artery disease and coronary stent.  Using aspirin daily.  Hypothyroidism.  TSH 5.2.  CKD 3 a.  GFR is 49 creatinine 1.5.  Some back pain.  Dr. Vega did surgery.  Helped but still flares up from time to time.  Especially when he is standing.  History of recurrent MRSA infections.  Wife has it more frequently than he does.  BMI is currently 38.    Physical examination.  Vital signs noted.  No acute distress.  Neck without bruit.  Chest clear.  Heart regular rate and rhythm.  Abdomen soft nontender.  Extremities without edema positive pedal pulses.        Objective:        Assessment:       1. Hypertension, essential    2. History of MRSA infection    3. Aspirin long-term use    4. Stage 3a chronic kidney disease    5. Back pain, unspecified back location, unspecified back pain laterality, unspecified chronicity    6. BMI 38.0-38.9,adult    7. Stented coronary artery    8. Hypothyroidism, unspecified type        Plan:       Hypertension, essential    History of MRSA infection    Aspirin long-term use    Stage 3a chronic kidney disease    Back pain, unspecified back location, unspecified back pain laterality, unspecified chronicity    BMI 38.0-38.9,adult    Stented coronary artery    Hypothyroidism, unspecified type    Other orders  -     levothyroxine (SYNTHROID) 50 MCG tablet; Take 1 tablet (50 mcg total) by mouth before breakfast.  Dispense: 90 tablet; Refill: 1  -     pravastatin (PRAVACHOL) 20 MG tablet; Take 1 tablet (20 mg total) by mouth once daily.  Dispense: 90 tablet; Refill: 1    Increase his levothyroxine dose to 50 daily.  Ninety with a refill.  Pravachol 20 mg daily 90 with a refill.  Follow-up in 3 months.

## 2024-03-21 ENCOUNTER — OFFICE VISIT (OUTPATIENT)
Dept: FAMILY MEDICINE | Facility: CLINIC | Age: 74
End: 2024-03-21
Payer: MEDICARE

## 2024-03-21 VITALS
HEART RATE: 73 BPM | TEMPERATURE: 99 F | WEIGHT: 237.75 LBS | OXYGEN SATURATION: 94 % | SYSTOLIC BLOOD PRESSURE: 122 MMHG | DIASTOLIC BLOOD PRESSURE: 68 MMHG | BODY MASS INDEX: 37.31 KG/M2 | RESPIRATION RATE: 18 BRPM | HEIGHT: 67 IN

## 2024-03-21 DIAGNOSIS — Z95.5 STENTED CORONARY ARTERY: ICD-10-CM

## 2024-03-21 DIAGNOSIS — G89.29 OTHER CHRONIC PAIN: ICD-10-CM

## 2024-03-21 DIAGNOSIS — Z79.82 ASPIRIN LONG-TERM USE: ICD-10-CM

## 2024-03-21 DIAGNOSIS — N18.31 STAGE 3A CHRONIC KIDNEY DISEASE: ICD-10-CM

## 2024-03-21 DIAGNOSIS — M46.06 SPINAL ENTHESOPATHY, LUMBAR REGION: ICD-10-CM

## 2024-03-21 DIAGNOSIS — G95.89 OTHER SPECIFIED DISEASES OF SPINAL CORD: ICD-10-CM

## 2024-03-21 DIAGNOSIS — E29.1 HYPOGONADISM MALE: ICD-10-CM

## 2024-03-21 DIAGNOSIS — F11.20 OPIOID DEPENDENCE, UNCOMPLICATED: ICD-10-CM

## 2024-03-21 DIAGNOSIS — E66.01 CLASS 2 SEVERE OBESITY DUE TO EXCESS CALORIES WITH SERIOUS COMORBIDITY AND BODY MASS INDEX (BMI) OF 36.0 TO 36.9 IN ADULT: ICD-10-CM

## 2024-03-21 DIAGNOSIS — F31.9 BIPOLAR AFFECTIVE DISORDER, REMISSION STATUS UNSPECIFIED: ICD-10-CM

## 2024-03-21 DIAGNOSIS — F32.A DEPRESSION, UNSPECIFIED DEPRESSION TYPE: ICD-10-CM

## 2024-03-21 DIAGNOSIS — I20.89 EXERTIONAL ANGINA: ICD-10-CM

## 2024-03-21 DIAGNOSIS — K59.09 CHRONIC CONSTIPATION: ICD-10-CM

## 2024-03-21 DIAGNOSIS — I10 HYPERTENSION, ESSENTIAL: ICD-10-CM

## 2024-03-21 DIAGNOSIS — I70.0 ATHEROSCLEROSIS OF AORTA: ICD-10-CM

## 2024-03-21 DIAGNOSIS — I25.10 CORONARY ARTERY DISEASE INVOLVING NATIVE CORONARY ARTERY OF NATIVE HEART WITHOUT ANGINA PECTORIS: ICD-10-CM

## 2024-03-21 DIAGNOSIS — E78.5 HYPERLIPIDEMIA, UNSPECIFIED HYPERLIPIDEMIA TYPE: ICD-10-CM

## 2024-03-21 DIAGNOSIS — E03.9 HYPOTHYROIDISM, UNSPECIFIED TYPE: Primary | ICD-10-CM

## 2024-03-21 PROCEDURE — 90677 PCV20 VACCINE IM: CPT | Mod: S$GLB,,, | Performed by: FAMILY MEDICINE

## 2024-03-21 PROCEDURE — 99999 PR PBB SHADOW E&M-EST. PATIENT-LVL V: CPT | Mod: PBBFAC,,, | Performed by: FAMILY MEDICINE

## 2024-03-21 PROCEDURE — G0009 ADMIN PNEUMOCOCCAL VACCINE: HCPCS | Mod: S$GLB,,, | Performed by: FAMILY MEDICINE

## 2024-03-21 PROCEDURE — 99214 OFFICE O/P EST MOD 30 MIN: CPT | Mod: S$GLB,,, | Performed by: FAMILY MEDICINE

## 2024-03-21 RX ORDER — BUPROPION HYDROCHLORIDE 300 MG/1
300 TABLET ORAL EVERY MORNING
Qty: 90 TABLET | Refills: 3 | Status: SHIPPED | OUTPATIENT
Start: 2024-03-21

## 2024-03-21 RX ORDER — SERTRALINE HYDROCHLORIDE 100 MG/1
TABLET, FILM COATED ORAL
Qty: 135 TABLET | Refills: 3 | Status: SHIPPED | OUTPATIENT
Start: 2024-03-21

## 2024-03-24 PROBLEM — D69.2 OTHER NONTHROMBOCYTOPENIC PURPURA: Status: RESOLVED | Noted: 2023-10-21 | Resolved: 2024-03-24

## 2024-03-24 PROBLEM — F33.0 MAJOR DEPRESSIVE DISORDER, RECURRENT, MILD: Status: RESOLVED | Noted: 2023-10-21 | Resolved: 2024-03-24

## 2024-03-24 PROBLEM — F31.9 BIPOLAR AFFECTIVE DISORDER, REMISSION STATUS UNSPECIFIED: Status: ACTIVE | Noted: 2024-03-24

## 2024-03-24 PROBLEM — F11.20 OPIOID DEPENDENCE, UNCOMPLICATED: Status: ACTIVE | Noted: 2024-03-24

## 2024-03-24 NOTE — PROGRESS NOTES
Subjective:       Patient ID: Orlando Treadwell is a 74 y.o. male.    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  No chest pain no palpitations.  Hypothyroidism TSH is due.  Does have some exertional angina.  Coronary artery disease with a coronary stent.  He is going to have a repeat angiogram soon.  No current MRSA infection.  Although he and his wife have both had multiple infections.  Chronic pain.  Plans to wean off of pain medication.  Has discuss this with his pain physician Dr. Reyez.  Depression needs refill.  But doing well.  No manic episodes.  Hypertension is controlled.  Has hyperlipidemia.  Some chronic constipation issues.  Has hypogonadism on testosterone.  Using 2 mL once per month.  200 milligrams/milliliter.  Prescribed by Urology.  Needs Prevnar 20 today.    Physical examination.  Vital signs noted.  Overweight male no acute distress.  Neck without bruit chest clear.  Heart regular rate and rhythm.  Abdomen bowel sounds are positive soft nontender.  Extremities are without edema positive pulses.        Objective:        Assessment:       1. Hypothyroidism, unspecified type    2. Depression, unspecified depression type    3. Stented coronary artery    4. Hypertension, essential    5. Coronary artery disease involving native coronary artery of native heart without angina pectoris    6. Aspirin long-term use    7. Hyperlipidemia, unspecified hyperlipidemia type    8. Other chronic pain    9. Chronic constipation    10. Hypogonadism male    11. Exertional angina    12. BMI 37.0-37.9, adult    13. Opioid dependence, uncomplicated    14. Bipolar affective disorder, remission status unspecified    15. Class 2 severe obesity due to excess calories with serious comorbidity and body mass index (BMI) of 36.0 to 36.9 in adult    16. Other specified diseases of spinal cord    17. Stage 3a chronic kidney disease    18. Spinal  enthesopathy, lumbar region    19. Atherosclerosis of aorta        Plan:       Hypothyroidism, unspecified type  -     TSH; Future; Expected date: 03/21/2024    Depression, unspecified depression type    Stented coronary artery    Hypertension, essential  -     Comprehensive Metabolic Panel; Future; Expected date: 03/21/2024    Coronary artery disease involving native coronary artery of native heart without angina pectoris    Aspirin long-term use    Hyperlipidemia, unspecified hyperlipidemia type  -     Lipid Panel; Future; Expected date: 03/21/2024    Other chronic pain    Chronic constipation    Hypogonadism male  -     Testosterone; Future; Expected date: 03/21/2024    Exertional angina    BMI 37.0-37.9, adult    Opioid dependence, uncomplicated    Bipolar affective disorder, remission status unspecified    Class 2 severe obesity due to excess calories with serious comorbidity and body mass index (BMI) of 36.0 to 36.9 in adult    Other specified diseases of spinal cord    Stage 3a chronic kidney disease    Spinal enthesopathy, lumbar region    Atherosclerosis of aorta    Other orders  -     sertraline (ZOLOFT) 100 MG tablet; TAKE 1 & 1/2 TABLETS BY MOUTH ONCE DAILY  Dispense: 135 tablet; Refill: 3  -     buPROPion (WELLBUTRIN XL) 300 MG 24 hr tablet; Take 1 tablet (300 mg total) by mouth every morning.  Dispense: 90 tablet; Refill: 3  -     Pneumococcal Conjugate Vaccine (20 Valent) (IM)(Preferred)    Refill Zoloft and Wellbutrin.  Prevnar 20 today.  Follow-up with Cardiology for coronary angiogram.  Lipid CMP TSH.  Check testosterone level.  His last shot was on February 27th.  So this should be a trough level.  May want to change to 1 cc every 2 weeks from once a month dosing.

## 2024-03-26 NOTE — TELEPHONE ENCOUNTER
No care due was identified.  Health [facility] Embedded Care Due Messages. Reference number: [id number].   3/26/2024 11:23:15 AM CDT

## 2024-03-27 RX ORDER — PANTOPRAZOLE SODIUM 40 MG/1
40 TABLET, DELAYED RELEASE ORAL 2 TIMES DAILY
Qty: 180 TABLET | Refills: 0 | Status: SHIPPED | OUTPATIENT
Start: 2024-03-27

## 2024-03-27 NOTE — TELEPHONE ENCOUNTER
Refill Routing Note   Medication(s) are not appropriate for processing by [facility] for the following reason(s):        Outside of protocol    [facility] action(s):  Route        Medication Therapy Plan: Patient's total daily dose exceeds [facility] criteria      Appointments  past 12m or future 3m with PCP    Date Provider   Last Visit   3/21/2024 Kleber Worthy III, MD   Next Visit   6/21/2024 Kleber Worthy III, MD   ED visits in past 90 days: 0        Note composed:10:44 AM 03/27/2024

## 2024-04-15 ENCOUNTER — LAB VISIT (OUTPATIENT)
Dept: LAB | Facility: HOSPITAL | Age: 74
End: 2024-04-15
Attending: FAMILY MEDICINE
Payer: MEDICARE

## 2024-04-15 DIAGNOSIS — I10 HYPERTENSION, ESSENTIAL: ICD-10-CM

## 2024-04-15 DIAGNOSIS — E29.1 HYPOGONADISM MALE: ICD-10-CM

## 2024-04-15 DIAGNOSIS — E78.5 HYPERLIPIDEMIA, UNSPECIFIED HYPERLIPIDEMIA TYPE: ICD-10-CM

## 2024-04-15 DIAGNOSIS — E03.9 HYPOTHYROIDISM, UNSPECIFIED TYPE: ICD-10-CM

## 2024-04-15 LAB
ALBUMIN SERPL BCP-MCNC: 4.1 G/DL (ref 3.5–5.2)
ALP SERPL-CCNC: 50 U/L (ref 55–135)
ALT SERPL W/O P-5'-P-CCNC: 103 U/L (ref 10–44)
ANION GAP SERPL CALC-SCNC: 7 MMOL/L (ref 8–16)
AST SERPL-CCNC: 100 U/L (ref 10–40)
BILIRUB SERPL-MCNC: 0.5 MG/DL (ref 0.1–1)
BUN SERPL-MCNC: 24 MG/DL (ref 8–23)
CALCIUM SERPL-MCNC: 10.1 MG/DL (ref 8.7–10.5)
CHLORIDE SERPL-SCNC: 98 MMOL/L (ref 95–110)
CHOLEST SERPL-MCNC: 179 MG/DL (ref 120–199)
CHOLEST/HDLC SERPL: 5.3 {RATIO} (ref 2–5)
CO2 SERPL-SCNC: 30 MMOL/L (ref 23–29)
CREAT SERPL-MCNC: 1.5 MG/DL (ref 0.5–1.4)
EST. GFR  (NO RACE VARIABLE): 48.2 ML/MIN/1.73 M^2
GLUCOSE SERPL-MCNC: 131 MG/DL (ref 70–110)
HDLC SERPL-MCNC: 34 MG/DL (ref 40–75)
HDLC SERPL: 19 % (ref 20–50)
LDLC SERPL CALC-MCNC: 100.4 MG/DL (ref 63–159)
NONHDLC SERPL-MCNC: 145 MG/DL
POTASSIUM SERPL-SCNC: 5 MMOL/L (ref 3.5–5.1)
PROT SERPL-MCNC: 8.1 G/DL (ref 6–8.4)
SODIUM SERPL-SCNC: 135 MMOL/L (ref 136–145)
TRIGL SERPL-MCNC: 223 MG/DL (ref 30–150)
TSH SERPL DL<=0.005 MIU/L-ACNC: 4.25 UIU/ML (ref 0.34–5.6)

## 2024-04-15 PROCEDURE — 36415 COLL VENOUS BLD VENIPUNCTURE: CPT | Performed by: FAMILY MEDICINE

## 2024-04-15 PROCEDURE — 84443 ASSAY THYROID STIM HORMONE: CPT | Performed by: FAMILY MEDICINE

## 2024-04-15 PROCEDURE — 80061 LIPID PANEL: CPT | Performed by: FAMILY MEDICINE

## 2024-04-15 PROCEDURE — 84403 ASSAY OF TOTAL TESTOSTERONE: CPT | Performed by: FAMILY MEDICINE

## 2024-04-15 PROCEDURE — 80053 COMPREHEN METABOLIC PANEL: CPT | Performed by: FAMILY MEDICINE

## 2024-04-16 LAB — TESTOST SERPL-MCNC: 48 NG/DL (ref 264–916)

## 2024-04-25 DIAGNOSIS — R79.9 ABNORMAL FINDING OF BLOOD CHEMISTRY, UNSPECIFIED: ICD-10-CM

## 2024-04-25 DIAGNOSIS — Z13.1 SCREENING FOR DIABETES MELLITUS: Primary | ICD-10-CM

## 2024-05-17 ENCOUNTER — HOSPITAL ENCOUNTER (OUTPATIENT)
Dept: RADIOLOGY | Facility: HOSPITAL | Age: 74
Discharge: HOME | End: 2024-05-17
Payer: MEDICARE

## 2024-05-17 ENCOUNTER — OFFICE VISIT (OUTPATIENT)
Dept: FAMILY MEDICINE | Facility: CLINIC | Age: 74
End: 2024-05-17
Payer: MEDICARE

## 2024-05-17 VITALS
WEIGHT: 237.75 LBS | TEMPERATURE: 99 F | HEIGHT: 67 IN | HEART RATE: 69 BPM | BODY MASS INDEX: 37.31 KG/M2 | OXYGEN SATURATION: 97 %

## 2024-05-17 DIAGNOSIS — R07.1 CHEST PAIN VARYING WITH BREATHING: ICD-10-CM
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
## 2024-05-17 DIAGNOSIS — R07.89 CHEST WALL PAIN: ICD-10-CM

## 2024-05-17 PROCEDURE — 99214 OFFICE O/P EST MOD 30 MIN: CPT | Mod: S$GLB,,,

## 2024-05-17 PROCEDURE — 1160F RVW MEDS BY RX/DR IN RCRD: CPT | Mod: CPTII,S$GLB,,

## 2024-05-17 PROCEDURE — 71046 X-RAY EXAM CHEST 2 VIEWS: CPT | Mod: TC

## 2024-05-17 PROCEDURE — 71100 X-RAY EXAM RIBS UNI 2 VIEWS: CPT | Mod: TC,LT

## 2024-05-17 PROCEDURE — 1100F PTFALLS ASSESS-DOCD GE2>/YR: CPT | Mod: CPTII,S$GLB,,

## 2024-05-17 PROCEDURE — 1125F AMNT PAIN NOTED PAIN PRSNT: CPT | Mod: CPTII,S$GLB,,

## 2024-05-17 PROCEDURE — 99999 PR PBB SHADOW E&M-EST. PATIENT-LVL V: CPT | Mod: PBBFAC,,,

## 2024-05-17 PROCEDURE — 3288F FALL RISK ASSESSMENT DOCD: CPT | Mod: CPTII,S$GLB,,

## 2024-05-17 PROCEDURE — 1159F MED LIST DOCD IN RCRD: CPT | Mod: CPTII,S$GLB,,

## 2024-05-17 RX ORDER — TRAMADOL HYDROCHLORIDE 50 MG/1
50 TABLET ORAL EVERY 6 HOURS PRN
Qty: 20 TABLET | Refills: 0 | Status: SHIPPED | OUTPATIENT
Start: 2024-05-17

## 2024-05-17 NOTE — PROGRESS NOTES
Your chest and rib xrays are normal. Continue with treatment discussed in clinic. If pain worsens or if you have worsening shortness of breath you should return to clinic.

## 2024-05-17 NOTE — PROGRESS NOTES
Subjective:       Patient ID: Orlando Treadwell is a 74 y.o. male.    Chief Complaint: Fall    Orlando Treadwell is a 74-year-old male patient who presents to clinic today with complaints of a fall.  Patient states he fell on Tuesday after tripping on a cement slab near his home.  He fell onto his left side.  He did hit his left side of his head but denies loss of consciousness.  He had a knot on the side of his head which is improving.  He also hit his left chest in his complaining of pain from the front through to the back.  Pain is worse with a deep breath.  He was using Voltaren gel on the side of his chest which gave him minimal relief.  Additionally he hit his left knee which has an abrasion but has not caused him any trouble.  Fall was unwitnessed.  He rates his pain 8/10 primarily in his left lateral and posterior chest.  He is requesting tramadol for pain.  He is chronic back pain and was previously seeing pain management but is no longer under their care.      Review of patient's allergies indicates:   Allergen Reactions    Codeine     Sulfamethoxazole-trimethoprim      dizziness    Codeine sulfate Nausea And Vomiting    Morphine Nausea Only     Ok with nausea med.     Social Determinants of Health     Tobacco Use: Low Risk  (5/17/2024)    Patient History     Smoking Tobacco Use: Never     Smokeless Tobacco Use: Never     Passive Exposure: Not on file   Recent Concern: Tobacco Use - High Risk (4/22/2024)    Received from [facility]    Patient History     Smoking Tobacco Use: Never     Smokeless Tobacco Use: Current     Passive Exposure: Not on file   Alcohol Use: Not on file   Financial Resource Strain: Not on file   Food Insecurity: Not on file   Transportation Needs: Not on file   Physical Activity: Not on file   Stress: No Stress Concern Present (8/10/2020)    [employer] [place] [employer] - Occupational Stress Questionnaire     Feeling of Stress : Not at all   Housing Stability: Not on file    Depression: Low Risk  (5/17/2024)    Depression     Last PHQ-4: Flowsheet Data: 0   Utilities: Not on file   Health Literacy: Not on file   Social Isolation: Not on file      Past Medical History:   Diagnosis Date    Abdominal hernia     Arthritis     Asbestos exposure     SOB     Back pain     Bulging discs     lumbar    Chronic back pain     Coronary artery disease     Hypertension     Kidney stone     Prostatitis     Sepsis due to methicillin resistant Staphylococcus aureus (MRSA) 06/2019    Due to large right upper arm abscess    Wears glasses       Past Surgical History:   Procedure Laterality Date    APPENDECTOMY      BACK SURGERY  09/23/2022    COLONOSCOPY N/A 09/23/2021    Procedure: COLONOSCOPY;  Surgeon: Ashvin Batista MD;  Location: [facility] ENDO;  Service: Endoscopy;  Laterality: N/A;    ESOPHAGOGASTRODUODENOSCOPY N/A 09/23/2021    Procedure: EGD (ESOPHAGOGASTRODUODENOSCOPY);  Surgeon: Ashvin Batista MD;  Location: [facility] ENDO;  Service: Endoscopy;  Laterality: N/A;    HAND SURGERY      rt hand    INCISION AND DRAINAGE Bilateral 06/2019    Right upper arm and left upper arm abscesses    JOINT REPLACEMENT      right knee    KNEE LIGAMENT RECONSTRUCTION      left knee    LAPAROSCOPIC CHOLECYSTECTOMY N/A 01/17/2020    Procedure: CHOLECYSTECTOMY, LAPAROSCOPIC;  Surgeon: Edgard Hill III, MD;  Location: [facility] OR;  Service: General;  Laterality: N/A;    LITHOTRIPSY      TONSILLECTOMY, ADENOIDECTOMY, BILATERAL MYRINGOTOMY AND TUBES      TOTAL KNEE ARTHROPLASTY  1971    rt knee    URETERAL STENT PLACEMENT        Social History     Socioeconomic History    Marital status:          Current Outpatient Medications:     ascorbic acid, vitamin C, (VITAMIN C) 100 MG tablet, Take 1 tablet by mouth once daily., Disp: , Rfl:     aspirin 325 MG tablet, Take 325 mg by mouth once daily., Disp: , Rfl:     b complex vitamins tablet, Take 1 tablet by mouth once daily., Disp: , Rfl:     beta-carotene,A,-vits C,E/mins  (VISION ORAL), Take 1 tablet by mouth once daily., Disp: , Rfl:     buPROPion (WELLBUTRIN XL) 300 MG 24 hr tablet, Take 1 tablet (300 mg total) by mouth every morning., Disp: 90 tablet, Rfl: 3    cloNIDine (CATAPRES) 0.1 MG tablet, Take 0.1 mg by mouth daily as needed., Disp: , Rfl:     diclofenac sodium 1 % Gel, Apply 2 g topically 2 (two) times daily., Disp: 1 Tube, Rfl: 2    gabapentin (NEURONTIN) 300 MG capsule, Take 600 mg by mouth every evening. , Disp: , Rfl:     hydrALAZINE (APRESOLINE) 50 MG tablet, Take 50 mg by mouth 2 (two) times daily., Disp: , Rfl:     lamoTRIgine (LAMICTAL) 25 MG tablet, Take one tab nightly for 7 days then one tab every 12 hours, Disp: 60 tablet, Rfl: 3    levothyroxine (SYNTHROID) 50 MCG tablet, Take 1 tablet (50 mcg total) by mouth before breakfast., Disp: 90 tablet, Rfl: 1    LINZESS 145 mcg Cap capsule, Take 145 mcg by mouth once daily., Disp: , Rfl:     MAGNESIUM ORAL, Take 500 mg by mouth once daily., Disp: , Rfl:     metoprolol succinate (TOPROL-XL) 100 MG 24 hr tablet, Take 100 mg by mouth., Disp: , Rfl:     multivitamin (THERAGRAN) tablet, Take 1 tablet by mouth once daily., Disp: , Rfl:     mupirocin (BACTROBAN) 2 % ointment, APPLY TOPICALLY 2 (TWO) TIMES DAILY., Disp: 22 g, Rfl: 5    NARCAN 4 mg/actuation Spry, SMARTSI Spray(s) Both Nares 1-2 Times Daily, Disp: , Rfl:     nitroGLYCERIN (NITROSTAT) 0.4 MG SL tablet, Place 0.4 mg under the tongue every 5 (five) minutes as needed. , Disp: , Rfl:     olmesartan-hydrochlorothiazide (BENICAR HCT) 40-25 mg per tablet, Take 1 tablet by mouth once daily., Disp: , Rfl:     pantoprazole (PROTONIX) 40 MG tablet, TAKE one TABLET BY MOUTH TWICE DAILY, Disp: 180 tablet, Rfl: 0    pravastatin (PRAVACHOL) 20 MG tablet, Take 1 tablet (20 mg total) by mouth once daily., Disp: 90 tablet, Rfl: 1    sertraline (ZOLOFT) 100 MG tablet, TAKE 1 & 1/2 TABLETS BY MOUTH ONCE DAILY, Disp: 135 tablet, Rfl: 3    vitamin E, dl,tocopheryl acet, (VITAMIN  E, DL, ACETATE,) 180 mg (400 unit) Cap, Take 400 Units by mouth., Disp: , Rfl:     zinc gluconate 50 mg tablet, Take 50 mg by mouth once daily., Disp: , Rfl:     testosterone cypionate (DEPOTESTOTERONE CYPIONATE) 200 mg/mL injection, INJECT 2 ML INTO THE MUSCLE EVERY 28 DAYS, Disp: 10 mL, Rfl: 1    traMADoL (ULTRAM) 50 mg tablet, Take 1 tablet (50 mg total) by mouth every 6 (six) hours as needed for Pain., Disp: 20 tablet, Rfl: 0    Lab Results   Component Value Date    WBC 10.57 09/28/2023    HGB 14.5 09/28/2023    HCT 47.9 09/28/2023     09/28/2023    CHOL 179 04/15/2024    TRIG 223 (H) 04/15/2024    HDL 34 (L) 04/15/2024     (H) 04/15/2024     (H) 04/15/2024     (L) 04/15/2024    K 5.0 04/15/2024    CL 98 04/15/2024    CREATININE 1.5 (H) 04/15/2024    BUN 24 (H) 04/15/2024    CO2 30 (H) 04/15/2024    TSH 4.253 04/15/2024    PSA 3.0 05/18/2023    INR 0.9 04/22/2024    HGBA1C 6.3 (H) 01/09/2020       Review of Systems   Constitutional: Negative.    Respiratory:  Positive for shortness of breath.         Pain in the left lateral and posterior chest wall.  Denies bruising   Musculoskeletal:  Negative for joint swelling and leg pain.   Integumentary:         Abrasion to left cheek and left knee   Neurological:  Negative for syncope, facial asymmetry, speech difficulty, weakness and light-headedness.   Psychiatric/Behavioral: Negative.         Objective:      Physical Exam  Vitals reviewed.   Constitutional:       Appearance: He is obese.   Cardiovascular:      Rate and Rhythm: Normal rate and regular rhythm.      Pulses: Normal pulses.      Heart sounds: Normal heart sounds.   Pulmonary:      Effort: Pulmonary effort is normal. No respiratory distress.      Breath sounds: Normal breath sounds. No decreased air movement.   Chest:      Chest wall: Tenderness present. No lacerations or deformity.       Musculoskeletal:      Left knee: No swelling, erythema or ecchymosis. Normal range of motion.  No tenderness.      Comments: Abrasion noted to left knee   Skin:     General: Skin is warm and dry.      Capillary Refill: Capillary refill takes less than 2 seconds.      Findings: Abrasion present.      Comments: Small abrasion to left anterior cheek.  Small not noted to left temple area, nontender  Abrasion to a left knee   Neurological:      Mental Status: He is alert and oriented to person, place, and time.      GCS: GCS eye subscore is 4. GCS verbal subscore is 5. GCS motor subscore is 6.      Cranial Nerves: No cranial nerve deficit or facial asymmetry.      Sensory: Sensation is intact.      Motor: Motor function is intact.      Coordination: Coordination is intact.   Psychiatric:         Mood and Affect: Mood normal.         Behavior: Behavior normal.         Thought Content: Thought content normal.         Judgment: Judgment normal.         Assessment:       1. Fall, initial encounter    2. Chest wall pain    3. Chest pain varying with breathing        Plan:       Orlando was seen today for fall.    Diagnoses and all orders for this visit:    Fall, initial encounter  -     X-Ray Chest PA And Lateral; Future  -     X-Ray Ribs 2 View Left; Future    Chest wall pain  -     X-Ray Ribs 2 View Left; Future  -     traMADoL (ULTRAM) 50 mg tablet; Take 1 tablet (50 mg total) by mouth every 6 (six) hours as needed for Pain.    Chest pain varying with breathing  -     X-Ray Chest PA And Lateral; Future  -     X-Ray Ribs 2 View Left; Future    Patient is neurologically intact.  Due to continued lateral chest pain we will assess for possible fracture or contusion his left chest/ribs.  X-rays of chest and left ribs today.  Tramadol as needed for pain.   checked.  Follow-up if symptoms worsen or do not improve.  Patient instructed to seek emergency care if he develops shortness a breath or worsening chest pain.  We will follow-up with patient once x-ray results received.

## 2024-06-20 ENCOUNTER — LAB VISIT (OUTPATIENT)
Dept: LAB | Facility: HOSPITAL | Age: 74
End: 2024-06-20
Attending: FAMILY MEDICINE
Payer: MEDICARE

## 2024-06-20 DIAGNOSIS — Z13.1 SCREENING FOR DIABETES MELLITUS: ICD-10-CM

## 2024-06-20 DIAGNOSIS — R79.9 ABNORMAL FINDING OF BLOOD CHEMISTRY, UNSPECIFIED: ICD-10-CM

## 2024-06-20 LAB
ESTIMATED AVG GLUCOSE: 169 MG/DL (ref 68–131)
HBA1C MFR BLD: 7.5 % (ref 4.5–6.2)

## 2024-06-20 PROCEDURE — 36415 COLL VENOUS BLD VENIPUNCTURE: CPT | Performed by: FAMILY MEDICINE

## 2024-06-20 PROCEDURE — 83036 HEMOGLOBIN GLYCOSYLATED A1C: CPT | Performed by: FAMILY MEDICINE

## 2024-06-21 ENCOUNTER — OFFICE VISIT (OUTPATIENT)
Dept: FAMILY MEDICINE | Facility: CLINIC | Age: 74
End: 2024-06-21
Payer: MEDICARE

## 2024-06-21 VITALS
TEMPERATURE: 98 F | WEIGHT: 236 LBS | BODY MASS INDEX: 36.96 KG/M2 | DIASTOLIC BLOOD PRESSURE: 72 MMHG | OXYGEN SATURATION: 95 % | HEART RATE: 73 BPM | SYSTOLIC BLOOD PRESSURE: 128 MMHG

## 2024-06-21 DIAGNOSIS — M51.36 DDD (DEGENERATIVE DISC DISEASE), LUMBAR: ICD-10-CM

## 2024-06-21 DIAGNOSIS — F32.A DEPRESSION, UNSPECIFIED DEPRESSION TYPE: ICD-10-CM

## 2024-06-21 DIAGNOSIS — Z79.82 ASPIRIN LONG-TERM USE: ICD-10-CM
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
## 2024-06-21 DIAGNOSIS — E03.9 HYPOTHYROIDISM, UNSPECIFIED TYPE: ICD-10-CM

## 2024-06-21 DIAGNOSIS — I25.10 CORONARY ARTERY DISEASE INVOLVING NATIVE CORONARY ARTERY OF NATIVE HEART WITHOUT ANGINA PECTORIS: ICD-10-CM

## 2024-06-21 DIAGNOSIS — N18.31 STAGE 3A CHRONIC KIDNEY DISEASE: ICD-10-CM

## 2024-06-21 DIAGNOSIS — Z95.5 STENTED CORONARY ARTERY: Primary | ICD-10-CM

## 2024-06-21 DIAGNOSIS — E11.9 TYPE 2 DIABETES MELLITUS WITHOUT COMPLICATION, WITHOUT LONG-TERM CURRENT USE OF INSULIN: ICD-10-CM

## 2024-06-21 DIAGNOSIS — I10 HYPERTENSION, ESSENTIAL: ICD-10-CM

## 2024-06-21 DIAGNOSIS — E78.5 HYPERLIPIDEMIA, UNSPECIFIED HYPERLIPIDEMIA TYPE: ICD-10-CM

## 2024-06-21 PROCEDURE — 1100F PTFALLS ASSESS-DOCD GE2>/YR: CPT | Mod: CPTII,S$GLB,, | Performed by: FAMILY MEDICINE

## 2024-06-21 PROCEDURE — 99214 OFFICE O/P EST MOD 30 MIN: CPT | Mod: S$GLB,,, | Performed by: FAMILY MEDICINE

## 2024-06-21 PROCEDURE — 3288F FALL RISK ASSESSMENT DOCD: CPT | Mod: CPTII,S$GLB,, | Performed by: FAMILY MEDICINE

## 2024-06-21 PROCEDURE — 99999 PR PBB SHADOW E&M-EST. PATIENT-LVL IV: CPT | Mod: PBBFAC,,, | Performed by: FAMILY MEDICINE

## 2024-06-21 PROCEDURE — 3074F SYST BP LT 130 MM HG: CPT | Mod: CPTII,S$GLB,, | Performed by: FAMILY MEDICINE

## 2024-06-21 PROCEDURE — 3078F DIAST BP <80 MM HG: CPT | Mod: CPTII,S$GLB,, | Performed by: FAMILY MEDICINE

## 2024-06-21 PROCEDURE — G2211 COMPLEX E/M VISIT ADD ON: HCPCS | Mod: S$GLB,,, | Performed by: FAMILY MEDICINE

## 2024-06-21 PROCEDURE — 1125F AMNT PAIN NOTED PAIN PRSNT: CPT | Mod: CPTII,S$GLB,, | Performed by: FAMILY MEDICINE

## 2024-06-21 PROCEDURE — 1160F RVW MEDS BY RX/DR IN RCRD: CPT | Mod: CPTII,S$GLB,, | Performed by: FAMILY MEDICINE

## 2024-06-21 PROCEDURE — 1159F MED LIST DOCD IN RCRD: CPT | Mod: CPTII,S$GLB,, | Performed by: FAMILY MEDICINE

## 2024-06-21 PROCEDURE — 3051F HG A1C>EQUAL 7.0%<8.0%: CPT | Mod: CPTII,S$GLB,, | Performed by: FAMILY MEDICINE

## 2024-06-21 RX ORDER — SERTRALINE HYDROCHLORIDE 100 MG/1
TABLET, FILM COATED ORAL
Qty: 135 TABLET | Refills: 3 | Status: SHIPPED | OUTPATIENT
Start: 2024-06-21

## 2024-06-21 RX ORDER — PRAVASTATIN SODIUM 20 MG/1
20 TABLET ORAL DAILY
Qty: 90 TABLET | Refills: 3 | Status: SHIPPED | OUTPATIENT
Start: 2024-06-21

## 2024-06-21 RX ORDER — LEVOTHYROXINE SODIUM 50 UG/1
50 TABLET ORAL
Qty: 90 TABLET | Refills: 3 | Status: SHIPPED | OUTPATIENT
Start: 2024-06-21

## 2024-06-21 RX ORDER — HYDROCODONE BITARTRATE AND ACETAMINOPHEN 5; 325 MG/1; MG/1
1 TABLET ORAL EVERY 6 HOURS PRN
Qty: 20 TABLET | Refills: 0 | Status: SHIPPED | OUTPATIENT
Start: 2024-06-21

## 2024-06-21 RX ORDER — PANTOPRAZOLE SODIUM 40 MG/1
40 TABLET, DELAYED RELEASE ORAL 2 TIMES DAILY
Qty: 180 TABLET | Refills: 3 | Status: SHIPPED | OUTPATIENT
Start: 2024-06-21

## 2024-06-21 RX ORDER — HYDRALAZINE HYDROCHLORIDE 50 MG/1
50 TABLET, FILM COATED ORAL 2 TIMES DAILY
Qty: 180 TABLET | Refills: 3 | Status: CANCELLED | OUTPATIENT
Start: 2024-06-21

## 2024-06-21 RX ORDER — LINACLOTIDE 145 UG/1
145 CAPSULE, GELATIN COATED ORAL DAILY
Qty: 30 CAPSULE | Refills: 5 | Status: SHIPPED | OUTPATIENT
Start: 2024-06-21

## 2024-06-21 RX ORDER — METFORMIN HYDROCHLORIDE 500 MG/1
500 TABLET, EXTENDED RELEASE ORAL 2 TIMES DAILY WITH MEALS
COMMUNITY
End: 2024-06-21 | Stop reason: SDUPTHER

## 2024-06-21 RX ORDER — GABAPENTIN 300 MG/1
600 CAPSULE ORAL NIGHTLY
Qty: 180 CAPSULE | Refills: 3 | Status: SHIPPED | OUTPATIENT
Start: 2024-06-21

## 2024-06-21 RX ORDER — HYDROCODONE BITARTRATE AND ACETAMINOPHEN 5; 325 MG/1; MG/1
1 TABLET ORAL EVERY 6 HOURS PRN
COMMUNITY
End: 2024-06-21 | Stop reason: SDUPTHER

## 2024-06-21 RX ORDER — BUPROPION HYDROCHLORIDE 300 MG/1
300 TABLET ORAL EVERY MORNING
Qty: 90 TABLET | Refills: 3 | Status: SHIPPED | OUTPATIENT
Start: 2024-06-21

## 2024-06-21 RX ORDER — METFORMIN HYDROCHLORIDE 500 MG/1
500 TABLET, EXTENDED RELEASE ORAL 2 TIMES DAILY WITH MEALS
Qty: 180 TABLET | Refills: 1 | Status: SHIPPED | OUTPATIENT
Start: 2024-06-21

## 2024-06-21 RX ORDER — TRAMADOL HYDROCHLORIDE 50 MG/1
50 TABLET ORAL EVERY 6 HOURS PRN
Qty: 20 TABLET | Refills: 0 | Status: CANCELLED | OUTPATIENT
Start: 2024-06-21

## 2024-06-21 NOTE — PROGRESS NOTES
SCRIBE #1 NOTE: I, Lashaun Oden, am scribing for, and in the presence of, Kleber Worthy III, MD. I have scribed the entire note.     Subjective:       Patient ID: Orlando Treadwell is a 74 y.o. male.    Chief Complaint: Back Pain and Shoulder Pain    Orlando Treadwell is a 74 y.o. male who presents for a 3 month follow-up. Notes recent angiogram 04/2024, but unable to view results. Also reporting lumbar back pain secondary to recent fall that occurred 1 month ago. Degenerative disc disease, lumbar. Has been taking ibuprofen for the pain. Was previously seeing Pain Management Dr. Arias and was previously prescribed hydrocodone, oxycodone, tramadol, and gabapentin for the pain. Hypertension is controlled. Coronary artery disease with a coronary stent. Hypothyroidism. Type 2 diabetes mellitus, A1C is 7.5. CKD 3A. Hyperlipidemia, on 20 mg Pravastatin. On Aspirin. Depression and Anxiety, doing okay. BMI of 37.  Say is is no longer seeing Dr. Reyez.  And is off pain medication.  Asking for some medication for his back for today.  Flared it up.  Since he had a fall not long ago.          Review of Systems   Constitutional: Negative.    HENT: Negative.     Eyes: Negative.    Respiratory: Negative.     Cardiovascular: Negative.    Gastrointestinal: Negative.    Endocrine: Negative.    Genitourinary: Negative.    Musculoskeletal:  Positive for back pain.   Skin: Negative.    Neurological: Negative.    Hematological: Negative.    Psychiatric/Behavioral: Negative.     All other systems reviewed and are negative.      Objective:      Physical examination: Vital signs noted. No acute distress. No carotid bruit. Regular heart rate and rhythm. Lungs clear to auscultation bilaterally. Abdomen bowel sounds are positive soft and nontender. Extremities without edema. 2+ pedal pulses. Mood and affect normal.  Tender lower back.  Straight leg raising is negative.      Assessment:       1. Stented coronary artery    2. Hypertension,  essential    3. Coronary artery disease involving native coronary artery of native heart without angina pectoris    4. Aspirin long-term use    5. Hypothyroidism, unspecified type    6. Hyperlipidemia, unspecified hyperlipidemia type    7. DDD (degenerative disc disease), lumbar    8. Depression, unspecified depression type    9. Stage 3a chronic kidney disease    10. Type 2 diabetes mellitus without complication, without long-term current use of insulin    11. BMI 37.0-37.9, adult    12. Fall, sequela        Plan:       Stented coronary artery    Hypertension, essential    Coronary artery disease involving native coronary artery of native heart without angina pectoris    Aspirin long-term use    Hypothyroidism, unspecified type    Hyperlipidemia, unspecified hyperlipidemia type    DDD (degenerative disc disease), lumbar    Depression, unspecified depression type    Stage 3a chronic kidney disease    Type 2 diabetes mellitus without complication, without long-term current use of insulin    BMI 37.0-37.9, adult    Fall, sequela    Start him on diabetes medication.  Diet weight reduction.  Hydrocodone acetaminophen 5/325 q.6 hours p.r.n. pain.  Twenty pills.  To pain management.    I, Dr Kleber Worthy, personally performed the services described in this documentation. All medical record entries made by the scribe were at my direction and in my presence. I have reviewed the chart and agree that the record reflects my personal performance and is accurate and complete.

## 2024-06-23 PROBLEM — E11.9 TYPE 2 DIABETES MELLITUS WITHOUT COMPLICATION, WITHOUT LONG-TERM CURRENT USE OF INSULIN: Status: ACTIVE | Noted: 2024-06-23

## 2024-06-23 PROBLEM — E78.5 HYPERLIPIDEMIA: Status: ACTIVE | Noted: 2024-06-23

## 2024-08-05 NOTE — NURSING
"1100  Patient repeatedly states, "I called [employer] and spoke with my people over there already. This place is going to be sorry. I sat here with blood on my floor for days and no one cleaned it up."     1115  Patient states, "My daughter had to go buy these Clorox wipes the day my IV came out and blood got everywhere and it took her hours to clean the mess up." Nurse states, "I thought you said the blood sat in the floor for days, but now you are stating that your daughter cleaned it up the day it happened? Am I hearing you correctly?" Patient states, "I meant it was here for days. That is what I meant."    1230  Patient states to nurse, "I am mad. I didn't even get a lunch tray." Upon apologizing to patient, food offered to patient and patient states, "No, I don't want your food. My daughter is cooking for me at home and I don't want to eat here." Manjula Odom, Director enters room and patient complains to her that he did not get food tray for lunch. Manjula offers to call and get tray or sandwich for patient and patient states, "No, I do not want anything except my paperwork fixed."   Patient continually cuts nurse off and Manjula off when speaking and unreceptive of attempts to help patient.    1400  Patient complains to nurse stating, "When am I going to get to see the surgeon? I am about to go nuts in here if you don't get my paperwork and find out when the surgeon is coming to check on me."    1415  Patient states to nurse, "I am not impressed with any of these doctors here. I mean, they did that procedure on my back here in my room and that is unsanitary. I do not like the doctors here."   Patient will not let nurse speak.      " [id number]    Afebrile. VSS. HR 80-90s. LSC on RA, O2 sats in high 90s. No signs of pain or nausea. Tolerating PO intake well. Adequate urine output, no stool. Pt alert and interactive this shift. Mom at bedside and attentive to pt. Continue with plan of care.

## 2024-09-01 ENCOUNTER — HOSPITAL ENCOUNTER (INPATIENT)
Facility: HOSPITAL | Age: 74
LOS: 4 days | Discharge: HOME | DRG: 871 | End: 2024-09-05
Attending: STUDENT IN AN ORGANIZED HEALTH CARE EDUCATION/TRAINING PROGRAM | Admitting: HOSPITALIST
Payer: MEDICARE

## 2024-09-01 DIAGNOSIS — A41.9 SEPSIS: ICD-10-CM

## 2024-09-01 DIAGNOSIS — R07.9 CHEST PAIN: ICD-10-CM

## 2024-09-01 DIAGNOSIS — R41.82 ALTERED MENTAL STATUS, UNSPECIFIED ALTERED MENTAL STATUS TYPE: ICD-10-CM

## 2024-09-01 DIAGNOSIS — F11.90 CHRONIC NARCOTIC USE: Primary | ICD-10-CM

## 2024-09-01 DIAGNOSIS — Z96.652 STATUS POST TOTAL LEFT KNEE REPLACEMENT USING CEMENT: ICD-10-CM

## 2024-09-01 PROBLEM — N17.9 ACUTE RENAL FAILURE: Status: ACTIVE | Noted: 2024-09-01

## 2024-09-01 PROBLEM — E03.9 HYPOTHYROID: Status: ACTIVE | Noted: 2024-09-01
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
## 2024-09-01 PROBLEM — E11.65 TYPE 2 DIABETES MELLITUS WITH HYPERGLYCEMIA, WITHOUT LONG-TERM CURRENT USE OF INSULIN: Status: ACTIVE | Noted: 2024-09-01

## 2024-09-01 PROBLEM — E87.5 HYPERKALEMIA: Status: ACTIVE | Noted: 2024-09-01

## 2024-09-01 PROBLEM — E87.1 HYPONATREMIA: Status: ACTIVE | Noted: 2024-09-01

## 2024-09-01 PROBLEM — N39.0 UTI (URINARY TRACT INFECTION): Status: ACTIVE | Noted: 2024-09-01

## 2024-09-01 PROBLEM — R65.20 SEVERE SEPSIS: Status: ACTIVE | Noted: 2024-09-01

## 2024-09-01 LAB
ADENOVIRUS: NOT DETECTED
ALBUMIN SERPL BCP-MCNC: 4.1 G/DL (ref 3.5–5.2)
ALLENS TEST: ABNORMAL
ALP SERPL-CCNC: 97 U/L (ref 55–135)
ALT SERPL W/O P-5'-P-CCNC: 84 U/L (ref 10–44)
ANION GAP SERPL CALC-SCNC: 11 MMOL/L (ref 8–16)
ANION GAP SERPL CALC-SCNC: 15 MMOL/L (ref 8–16)
AST SERPL-CCNC: 106 U/L (ref 10–40)
B-OH-BUTYR BLD STRIP-SCNC: 0.2 MMOL/L (ref 0–0.5)
BACTERIA #/AREA URNS HPF: ABNORMAL /HPF
BASOPHILS # BLD AUTO: 0.05 K/UL (ref 0–0.2)
BASOPHILS NFR BLD: 0.4 % (ref 0–1.9)
BILIRUB SERPL-MCNC: 1.1 MG/DL (ref 0.1–1)
BILIRUB UR QL STRIP: NEGATIVE
BORDETELLA PARAPERTUSSIS (IS1001): NOT DETECTED
BORDETELLA PERTUSSIS (PTXP): NOT DETECTED
BUN SERPL-MCNC: 84 MG/DL (ref 8–23)
BUN SERPL-MCNC: 93 MG/DL (ref 8–23)
CALCIUM SERPL-MCNC: 10.3 MG/DL (ref 8.7–10.5)
CALCIUM SERPL-MCNC: 8.1 MG/DL (ref 8.7–10.5)
CHLAMYDIA PNEUMONIAE: NOT DETECTED
CHLORIDE SERPL-SCNC: 84 MMOL/L (ref 95–110)
CHLORIDE SERPL-SCNC: 94 MMOL/L (ref 95–110)
CLARITY UR: CLEAR
CO2 SERPL-SCNC: 18 MMOL/L (ref 23–29)
CO2 SERPL-SCNC: 20 MMOL/L (ref 23–29)
COLOR UR: YELLOW
CORONAVIRUS 229E, COMMON COLD VIRUS: NOT DETECTED
CORONAVIRUS HKU1, COMMON COLD VIRUS: NOT DETECTED
CORONAVIRUS NL63, COMMON COLD VIRUS: NOT DETECTED
CORONAVIRUS OC43, COMMON COLD VIRUS: NOT DETECTED
CREAT SERPL-MCNC: 3.8 MG/DL (ref 0.5–1.4)
CREAT SERPL-MCNC: 4.9 MG/DL (ref 0.5–1.4)
DELSYS: ABNORMAL
DIFFERENTIAL METHOD BLD: ABNORMAL
EOSINOPHIL # BLD AUTO: 0.2 K/UL (ref 0–0.5)
EOSINOPHIL NFR BLD: 1.4 % (ref 0–8)
ERYTHROCYTE [DISTWIDTH] IN BLOOD BY AUTOMATED COUNT: 15.4 % (ref 11.5–14.5)
EST. GFR  (NO RACE VARIABLE): 11.7 ML/MIN/1.73 M^2
EST. GFR  (NO RACE VARIABLE): 15.8 ML/MIN/1.73 M^2
FLOW: 2
FLUBV RNA NPH QL NAA+NON-PROBE: NOT DETECTED
GLUCOSE SERPL-MCNC: 558 MG/DL (ref 70–110)
GLUCOSE SERPL-MCNC: 636 MG/DL (ref 70–110)
GLUCOSE SERPL-MCNC: 785 MG/DL (ref 70–110)
GLUCOSE SERPL-MCNC: >600 MG/DL (ref 70–110)
GLUCOSE UR QL STRIP: ABNORMAL
HCO3 UR-SCNC: 19.8 MMOL/L (ref 24–28)
HCT VFR BLD AUTO: 47.2 % (ref 40–54)
HGB BLD-MCNC: 15.5 G/DL (ref 14–18)
HGB UR QL STRIP: ABNORMAL
HPIV1 RNA NPH QL NAA+NON-PROBE: NOT DETECTED
HPIV2 RNA NPH QL NAA+NON-PROBE: NOT DETECTED
HPIV3 RNA NPH QL NAA+NON-PROBE: NOT DETECTED
HPIV4 RNA NPH QL NAA+NON-PROBE: NOT DETECTED
HUMAN METAPNEUMOVIRUS: NOT DETECTED
HYALINE CASTS #/AREA URNS LPF: 0 /LPF
IMM GRANULOCYTES # BLD AUTO: 0.11 K/UL (ref 0–0.04)
IMM GRANULOCYTES NFR BLD AUTO: 0.8 % (ref 0–0.5)
INFLUENZA A (SUBTYPES H1,H1-2009,H3): NOT DETECTED
INFLUENZA A, MOLECULAR: NEGATIVE
INFLUENZA B, MOLECULAR: NEGATIVE
KETONES UR QL STRIP: NEGATIVE
LACTATE SERPL-SCNC: 1.5 MMOL/L (ref 0.5–1.9)
LDH SERPL L TO P-CCNC: 3.26 MMOL/L (ref 0.5–2.2)
LEUKOCYTE ESTERASE UR QL STRIP: NEGATIVE
LYMPHOCYTES # BLD AUTO: 1.3 K/UL (ref 1–4.8)
LYMPHOCYTES NFR BLD: 9.4 % (ref 18–48)
MCH RBC QN AUTO: 28.5 PG (ref 27–31)
MCHC RBC AUTO-ENTMCNC: 32.8 G/DL (ref 32–36)
MCV RBC AUTO: 87 FL (ref 82–98)
MICROSCOPIC COMMENT: ABNORMAL
MODE: ABNORMAL
MONOCYTES # BLD AUTO: 1 K/UL (ref 0.3–1)
MONOCYTES NFR BLD: 6.8 % (ref 4–15)
MYCOPLASMA PNEUMONIAE: NOT DETECTED
NEUTROPHILS # BLD AUTO: 11.4 K/UL (ref 1.8–7.7)
NEUTROPHILS NFR BLD: 81.2 % (ref 38–73)
NITRITE UR QL STRIP: NEGATIVE
NRBC BLD-RTO: 0 /100 WBC
OSMOLALITY SERPL: 338 MOSM/KG (ref 280–300)
PCO2 BLDA: 42.5 MMHG (ref 35–45)
PH SMN: 7.28 [PH] (ref 7.35–7.45)
PH UR STRIP: 5 [PH] (ref 5–8)
PLATELET # BLD AUTO: 272 K/UL (ref 150–450)
PMV BLD AUTO: 12.3 FL (ref 9.2–12.9)
PO2 BLDA: 46 MMHG (ref 40–60)
POC BE: -7 MMOL/L
POC SATURATED O2: 76 % (ref 95–100)
POC TCO2: 21 MMOL/L (ref 24–29)
POTASSIUM SERPL-SCNC: 5.3 MMOL/L (ref 3.5–5.1)
POTASSIUM SERPL-SCNC: 5.6 MMOL/L (ref 3.5–5.1)
PROT SERPL-MCNC: 9.2 G/DL (ref 6–8.4)
PROT UR QL STRIP: ABNORMAL
RBC # BLD AUTO: 5.43 M/UL (ref 4.6–6.2)
RBC #/AREA URNS HPF: 0 /HPF (ref 0–4)
RESPIRATORY INFECTION PANEL SOURCE: NORMAL
RSV RNA NPH QL NAA+NON-PROBE: NOT DETECTED
RV+EV RNA NPH QL NAA+NON-PROBE: NOT DETECTED
SAMPLE: ABNORMAL
SAMPLE: ABNORMAL
SARS-COV-2 RDRP RESP QL NAA+PROBE: NEGATIVE
SARS-COV-2 RNA RESP QL NAA+PROBE: NOT DETECTED
SITE: ABNORMAL
SODIUM SERPL-SCNC: 119 MMOL/L (ref 136–145)
SODIUM SERPL-SCNC: 123 MMOL/L (ref 136–145)
SODIUM UR-SCNC: 46 MMOL/L (ref 20–250)
SP GR UR STRIP: 1.02 (ref 1–1.03)
SPECIMEN SOURCE: NORMAL
SQUAMOUS #/AREA URNS HPF: 1 /HPF
URN SPEC COLLECT METH UR: ABNORMAL
UROBILINOGEN UR STRIP-ACNC: NEGATIVE EU/DL
UUN UR-MCNC: 437 MG/DL (ref 140–1050)
WBC # BLD AUTO: 14.03 K/UL (ref 3.9–12.7)
WBC #/AREA URNS HPF: 6 /HPF (ref 0–5)
YEAST URNS QL MICRO: ABNORMAL

## 2024-09-01 PROCEDURE — 82803 BLOOD GASES ANY COMBINATION: CPT

## 2024-09-01 PROCEDURE — 96365 THER/PROPH/DIAG IV INF INIT: CPT | Mod: 59

## 2024-09-01 PROCEDURE — 63600175 PHARM REV CODE 636 W HCPCS: Performed by: HOSPITALIST

## 2024-09-01 PROCEDURE — 25000003 PHARM REV CODE 250: Performed by: STUDENT IN AN ORGANIZED HEALTH CARE EDUCATION/TRAINING PROGRAM

## 2024-09-01 PROCEDURE — 84540 ASSAY OF URINE/UREA-N: CPT | Performed by: STUDENT IN AN ORGANIZED HEALTH CARE EDUCATION/TRAINING PROGRAM

## 2024-09-01 PROCEDURE — 84300 ASSAY OF URINE SODIUM: CPT | Performed by: STUDENT IN AN ORGANIZED HEALTH CARE EDUCATION/TRAINING PROGRAM

## 2024-09-01 PROCEDURE — 36415 COLL VENOUS BLD VENIPUNCTURE: CPT | Performed by: STUDENT IN AN ORGANIZED HEALTH CARE EDUCATION/TRAINING PROGRAM

## 2024-09-01 PROCEDURE — 93010 ELECTROCARDIOGRAM REPORT: CPT | Mod: ,,, | Performed by: INTERNAL MEDICINE

## 2024-09-01 PROCEDURE — 87633 RESP VIRUS 12-25 TARGETS: CPT | Performed by: HOSPITALIST

## 2024-09-01 PROCEDURE — 99285 EMERGENCY DEPT VISIT HI MDM: CPT | Mod: 25

## 2024-09-01 PROCEDURE — 94799 UNLISTED PULMONARY SVC/PX: CPT

## 2024-09-01 PROCEDURE — 96361 HYDRATE IV INFUSION ADD-ON: CPT

## 2024-09-01 PROCEDURE — 85025 COMPLETE CBC W/AUTO DIFF WBC: CPT | Performed by: STUDENT IN AN ORGANIZED HEALTH CARE EDUCATION/TRAINING PROGRAM

## 2024-09-01 PROCEDURE — 87798 DETECT AGENT NOS DNA AMP: CPT | Mod: 59 | Performed by: HOSPITALIST

## 2024-09-01 PROCEDURE — 96375 TX/PRO/DX INJ NEW DRUG ADDON: CPT

## 2024-09-01 PROCEDURE — 94761 N-INVAS EAR/PLS OXIMETRY MLT: CPT | Mod: XB

## 2024-09-01 PROCEDURE — 99406 BEHAV CHNG SMOKING 3-10 MIN: CPT

## 2024-09-01 PROCEDURE — 87040 BLOOD CULTURE FOR BACTERIA: CPT | Performed by: STUDENT IN AN ORGANIZED HEALTH CARE EDUCATION/TRAINING PROGRAM

## 2024-09-01 PROCEDURE — U0002 COVID-19 LAB TEST NON-CDC: HCPCS | Performed by: STUDENT IN AN ORGANIZED HEALTH CARE EDUCATION/TRAINING PROGRAM

## 2024-09-01 PROCEDURE — 25000003 PHARM REV CODE 250: Performed by: HOSPITALIST

## 2024-09-01 PROCEDURE — 99900035 HC TECH TIME PER 15 MIN (STAT)

## 2024-09-01 PROCEDURE — 99900031 HC PATIENT EDUCATION (STAT)

## 2024-09-01 PROCEDURE — 82962 GLUCOSE BLOOD TEST: CPT

## 2024-09-01 PROCEDURE — 87086 URINE CULTURE/COLONY COUNT: CPT | Performed by: STUDENT IN AN ORGANIZED HEALTH CARE EDUCATION/TRAINING PROGRAM

## 2024-09-01 PROCEDURE — 81001 URINALYSIS AUTO W/SCOPE: CPT | Performed by: STUDENT IN AN ORGANIZED HEALTH CARE EDUCATION/TRAINING PROGRAM

## 2024-09-01 PROCEDURE — 63600175 PHARM REV CODE 636 W HCPCS: Performed by: STUDENT IN AN ORGANIZED HEALTH CARE EDUCATION/TRAINING PROGRAM

## 2024-09-01 PROCEDURE — 80048 BASIC METABOLIC PNL TOTAL CA: CPT | Performed by: HOSPITALIST

## 2024-09-01 PROCEDURE — 21400001 HC TELEMETRY ROOM

## 2024-09-01 PROCEDURE — 96366 THER/PROPH/DIAG IV INF ADDON: CPT

## 2024-09-01 PROCEDURE — 93005 ELECTROCARDIOGRAM TRACING: CPT | Performed by: INTERNAL MEDICINE

## 2024-09-01 PROCEDURE — 83930 ASSAY OF BLOOD OSMOLALITY: CPT | Performed by: STUDENT IN AN ORGANIZED HEALTH CARE EDUCATION/TRAINING PROGRAM

## 2024-09-01 PROCEDURE — 27000221 HC OXYGEN, UP TO 24 HOURS

## 2024-09-01 PROCEDURE — 80053 COMPREHEN METABOLIC PANEL: CPT | Performed by: STUDENT IN AN ORGANIZED HEALTH CARE EDUCATION/TRAINING PROGRAM

## 2024-09-01 PROCEDURE — 83605 ASSAY OF LACTIC ACID: CPT | Performed by: STUDENT IN AN ORGANIZED HEALTH CARE EDUCATION/TRAINING PROGRAM

## 2024-09-01 PROCEDURE — 82010 KETONE BODYS QUAN: CPT | Performed by: STUDENT IN AN ORGANIZED HEALTH CARE EDUCATION/TRAINING PROGRAM

## 2024-09-01 PROCEDURE — 87502 INFLUENZA DNA AMP PROBE: CPT | Performed by: STUDENT IN AN ORGANIZED HEALTH CARE EDUCATION/TRAINING PROGRAM

## 2024-09-01 RX ORDER — PRAVASTATIN SODIUM 20 MG/1
20 TABLET ORAL NIGHTLY
Status: DISCONTINUED | OUTPATIENT
Start: 2024-09-01 | End: 2024-09-05 | Stop reason: HOSPADM

## 2024-09-01 RX ORDER — SODIUM,POTASSIUM PHOSPHATES 280-250MG
2 POWDER IN PACKET (EA) ORAL
Status: DISCONTINUED | OUTPATIENT
Start: 2024-09-01 | End: 2024-09-05 | Stop reason: HOSPADM

## 2024-09-01 RX ORDER — INSULIN ASPART 100 [IU]/ML
0-15 INJECTION, SOLUTION INTRAVENOUS; SUBCUTANEOUS
Status: DISCONTINUED | OUTPATIENT
Start: 2024-09-02 | End: 2024-09-02

## 2024-09-01 RX ORDER — INSULIN GLARGINE 100 [IU]/ML
10 INJECTION, SOLUTION SUBCUTANEOUS NIGHTLY
Status: DISCONTINUED | OUTPATIENT
Start: 2024-09-01 | End: 2024-09-01

## 2024-09-01 RX ORDER — GLUCAGON 1 MG
1 KIT INJECTION
Status: DISCONTINUED | OUTPATIENT
Start: 2024-09-01 | End: 2024-09-01

## 2024-09-01 RX ORDER — ENOXAPARIN SODIUM 100 MG/ML
40 INJECTION SUBCUTANEOUS EVERY 24 HOURS
Status: DISCONTINUED | OUTPATIENT
Start: 2024-09-01 | End: 2024-09-01

## 2024-09-01 RX ORDER — SODIUM CHLORIDE 9 MG/ML
INJECTION, SOLUTION INTRAVENOUS CONTINUOUS
Status: DISCONTINUED | OUTPATIENT
Start: 2024-09-01 | End: 2024-09-05

## 2024-09-01 RX ORDER — SERTRALINE HYDROCHLORIDE 50 MG/1
100 TABLET, FILM COATED ORAL NIGHTLY
Status: DISCONTINUED | OUTPATIENT
Start: 2024-09-01 | End: 2024-09-02

## 2024-09-01 RX ORDER — HYDROCODONE BITARTRATE AND ACETAMINOPHEN 5; 325 MG/1; MG/1
1 TABLET ORAL EVERY 6 HOURS PRN
Status: DISCONTINUED | OUTPATIENT
Start: 2024-09-01 | End: 2024-09-02

## 2024-09-01 RX ORDER — TALC
6 POWDER (GRAM) TOPICAL NIGHTLY PRN
Status: DISCONTINUED | OUTPATIENT
Start: 2024-09-01 | End: 2024-09-02

## 2024-09-01 RX ORDER — LEVOTHYROXINE SODIUM 25 UG/1
50 TABLET ORAL
Status: DISCONTINUED | OUTPATIENT
Start: 2024-09-02 | End: 2024-09-05 | Stop reason: HOSPADM

## 2024-09-01 RX ORDER — IBUPROFEN 200 MG
16 TABLET ORAL
Status: DISCONTINUED | OUTPATIENT
Start: 2024-09-01 | End: 2024-09-05 | Stop reason: HOSPADM

## 2024-09-01 RX ORDER — IBUPROFEN 200 MG
16 TABLET ORAL
Status: DISCONTINUED | OUTPATIENT
Start: 2024-09-02 | End: 2024-09-01

## 2024-09-01 RX ORDER — ENOXAPARIN SODIUM 100 MG/ML
30 INJECTION SUBCUTANEOUS EVERY 24 HOURS
Status: DISCONTINUED | OUTPATIENT
Start: 2024-09-01 | End: 2024-09-04

## 2024-09-01 RX ORDER — ACETAMINOPHEN 325 MG/1
650 TABLET ORAL EVERY 8 HOURS PRN
Status: DISCONTINUED | OUTPATIENT
Start: 2024-09-01 | End: 2024-09-05 | Stop reason: HOSPADM

## 2024-09-01 RX ORDER — NALOXONE HCL 0.4 MG/ML
0.02 VIAL (ML) INJECTION
Status: DISCONTINUED | OUTPATIENT
Start: 2024-09-01 | End: 2024-09-05 | Stop reason: HOSPADM

## 2024-09-01 RX ORDER — VANCOMYCIN HCL IN 5 % DEXTROSE 1G/250ML
1000 PLASTIC BAG, INJECTION (ML) INTRAVENOUS ONCE
Status: COMPLETED | OUTPATIENT
Start: 2024-09-01 | End: 2024-09-01

## 2024-09-01 RX ORDER — ALUMINUM HYDROXIDE, MAGNESIUM HYDROXIDE, AND SIMETHICONE 1200; 120; 1200 MG/30ML; MG/30ML; MG/30ML
30 SUSPENSION ORAL 4 TIMES DAILY PRN
Status: DISCONTINUED | OUTPATIENT
Start: 2024-09-01 | End: 2024-09-05 | Stop reason: HOSPADM

## 2024-09-01 RX ORDER — MUPIROCIN 20 MG/G
OINTMENT TOPICAL 2 TIMES DAILY
Status: DISCONTINUED | OUTPATIENT
Start: 2024-09-01 | End: 2024-09-05 | Stop reason: HOSPADM

## 2024-09-01 RX ORDER — LANOLIN ALCOHOL/MO/W.PET/CERES
800 CREAM (GRAM) TOPICAL
Status: DISCONTINUED | OUTPATIENT
Start: 2024-09-01 | End: 2024-09-05 | Stop reason: HOSPADM

## 2024-09-01 RX ORDER — OXYCODONE HYDROCHLORIDE 10 MG/1
10 TABLET ORAL EVERY 8 HOURS PRN
COMMUNITY
Start: 2024-08-29

## 2024-09-01 RX ORDER — IBUPROFEN 200 MG
16 TABLET ORAL
Status: DISCONTINUED | OUTPATIENT
Start: 2024-09-01 | End: 2024-09-01

## 2024-09-01 RX ORDER — BUPROPION HYDROCHLORIDE 150 MG/1
300 TABLET ORAL DAILY
Status: DISCONTINUED | OUTPATIENT
Start: 2024-09-02 | End: 2024-09-02

## 2024-09-01 RX ORDER — SODIUM CHLORIDE 0.9 % (FLUSH) 0.9 %
2 SYRINGE (ML) INJECTION EVERY 12 HOURS PRN
Status: DISCONTINUED | OUTPATIENT
Start: 2024-09-01 | End: 2024-09-05 | Stop reason: HOSPADM

## 2024-09-01 RX ORDER — IBUPROFEN 200 MG
24 TABLET ORAL
Status: DISCONTINUED | OUTPATIENT
Start: 2024-09-01 | End: 2024-09-01

## 2024-09-01 RX ORDER — ONDANSETRON HYDROCHLORIDE 2 MG/ML
4 INJECTION, SOLUTION INTRAVENOUS EVERY 6 HOURS PRN
Status: DISCONTINUED | OUTPATIENT
Start: 2024-09-01 | End: 2024-09-05 | Stop reason: HOSPADM

## 2024-09-01 RX ORDER — INSULIN ASPART 100 [IU]/ML
0-5 INJECTION, SOLUTION INTRAVENOUS; SUBCUTANEOUS
Status: DISCONTINUED | OUTPATIENT
Start: 2024-09-01 | End: 2024-09-01

## 2024-09-01 RX ORDER — GABAPENTIN 300 MG/1
600 CAPSULE ORAL NIGHTLY
Status: DISCONTINUED | OUTPATIENT
Start: 2024-09-01 | End: 2024-09-02

## 2024-09-01 RX ORDER — IBUPROFEN 200 MG
24 TABLET ORAL
Status: DISCONTINUED | OUTPATIENT
Start: 2024-09-02 | End: 2024-09-01

## 2024-09-01 RX ORDER — AMOXICILLIN 250 MG
1 CAPSULE ORAL 2 TIMES DAILY PRN
Status: DISCONTINUED | OUTPATIENT
Start: 2024-09-01 | End: 2024-09-05 | Stop reason: HOSPADM

## 2024-09-01 RX ORDER — GLUCAGON 1 MG
1 KIT INJECTION
Status: DISCONTINUED | OUTPATIENT
Start: 2024-09-02 | End: 2024-09-01

## 2024-09-01 RX ORDER — NAPROXEN SODIUM 220 MG/1
81 TABLET, FILM COATED ORAL DAILY
Status: DISCONTINUED | OUTPATIENT
Start: 2024-09-02 | End: 2024-09-05 | Stop reason: HOSPADM

## 2024-09-01 RX ORDER — GLUCAGON 1 MG
1 KIT INJECTION
Status: DISCONTINUED | OUTPATIENT
Start: 2024-09-01 | End: 2024-09-05 | Stop reason: HOSPADM

## 2024-09-01 RX ORDER — SERTRALINE HYDROCHLORIDE 50 MG/1
150 TABLET, FILM COATED ORAL DAILY
Status: DISCONTINUED | OUTPATIENT
Start: 2024-09-02 | End: 2024-09-01

## 2024-09-01 RX ORDER — ACETAMINOPHEN 325 MG/1
650 TABLET ORAL EVERY 4 HOURS PRN
Status: DISCONTINUED | OUTPATIENT
Start: 2024-09-01 | End: 2024-09-05 | Stop reason: HOSPADM

## 2024-09-01 RX ORDER — IBUPROFEN 200 MG
24 TABLET ORAL
Status: DISCONTINUED | OUTPATIENT
Start: 2024-09-01 | End: 2024-09-05 | Stop reason: HOSPADM

## 2024-09-01 RX ADMIN — MUPIROCIN 1 G: 20 OINTMENT TOPICAL at 09:09

## 2024-09-01 RX ADMIN — VANCOMYCIN HYDROCHLORIDE 1000 MG: 1 INJECTION, POWDER, LYOPHILIZED, FOR SOLUTION INTRAVENOUS at 03:09

## 2024-09-01 RX ADMIN — CEFTRIAXONE SODIUM 2 G: 2 INJECTION, POWDER, FOR SOLUTION INTRAMUSCULAR; INTRAVENOUS at 01:09

## 2024-09-01 RX ADMIN — Medication 6 MG: at 10:09

## 2024-09-01 RX ADMIN — GABAPENTIN 600 MG: 300 CAPSULE ORAL at 09:09

## 2024-09-01 RX ADMIN — SODIUM CHLORIDE 1983 ML: 9 INJECTION, SOLUTION INTRAVENOUS at 01:09

## 2024-09-01 RX ADMIN — INSULIN HUMAN 10 UNITS: 100 INJECTION, SOLUTION PARENTERAL at 09:09

## 2024-09-01 RX ADMIN — SERTRALINE HYDROCHLORIDE 100 MG: 50 TABLET ORAL at 11:09

## 2024-09-01 RX ADMIN — INSULIN GLARGINE 10 UNITS: 100 INJECTION, SOLUTION SUBCUTANEOUS at 10:09

## 2024-09-01 RX ADMIN — ENOXAPARIN SODIUM 30 MG: 30 INJECTION SUBCUTANEOUS at 06:09

## 2024-09-01 RX ADMIN — SODIUM CHLORIDE: 9 INJECTION, SOLUTION INTRAVENOUS at 06:09

## 2024-09-01 RX ADMIN — INSULIN HUMAN 10 UNITS: 100 INJECTION, SOLUTION PARENTERAL at 04:09

## 2024-09-01 RX ADMIN — PRAVASTATIN SODIUM 20 MG: 20 TABLET ORAL at 09:09

## 2024-09-01 RX ADMIN — HYDROCODONE BITARTRATE AND ACETAMINOPHEN 1 TABLET: 5; 325 TABLET ORAL at 10:09

## 2024-09-01 NOTE — ASSESSMENT & PLAN NOTE
"This patient does have evidence of infective focus  My overall impression is sepsis.  Source: Urinary Tract  Antibiotics given-   Antibiotics (72h ago, onward)      Start     Stop Route Frequency Ordered    09/01/24 1549  vancomycin - pharmacy to dose  (vancomycin IVPB (PEDS and ADULTS))        Placed in "And" Linked Group    -- IV pharmacy to manage frequency 09/01/24 1449          Latest lactate reviewed-  Recent Labs   Lab 09/01/24  1329   POCLAC 3.26*     Organ dysfunction indicated by Acute kidney injury    UA has rare bacteria and +6 wbc, but patient has been having symptoms.  Urine culture was sent.    Blood cultures were collected.    Chest x-ray was negative for pneumonia.    COVID/flu are negative.  I will order comprehensive viral panel given his history of 2 weeks of dry cough.  Will continue antibiotic with ceftriaxone for suspected UTI.  Procalcitonin level.  "

## 2024-09-01 NOTE — ASSESSMENT & PLAN NOTE
"This patient does have evidence of infective focus  My overall impression is sepsis.  Source: Urinary Tract  Antibiotics given-   Antibiotics (72h ago, onward)      Start     Stop Route Frequency Ordered    09/01/24 2100  mupirocin 2 % ointment         09/06/24 2059 Nasl 2 times daily 09/01/24 1651    09/01/24 1549  vancomycin - pharmacy to dose  (vancomycin IVPB (PEDS and ADULTS))        Placed in "And" Linked Group    -- IV pharmacy to manage frequency 09/01/24 1449          Latest lactate reviewed-  Recent Labs   Lab 09/01/24  1329   POCLAC 3.26*       Organ dysfunction indicated by Acute kidney injury    UA has rare bacteria and +6 wbc, but patient has been having symptoms.  Urine culture was sent and so far neg    Blood cultures neg  Chest x-ray neg  COVID/flu are negative.  comprehensive viral panel neg  Continue ceftriaxone for suspected UTI for now .  Still hold BP meds   Patient was admitted with encephalopathy in the past .   Currently no fever . If fever+, will do LP and MRI brain   " negative...

## 2024-09-01 NOTE — ASSESSMENT & PLAN NOTE
The patient's most recent sodium results are listed below.  Recent Labs     09/01/24  1335   *     Pseudohyponatremia secondary to hyperglycemia.  Corrected sodium is 135.  Will continue IV fluids for hydration.  Repeat labs tomorrow.

## 2024-09-01 NOTE — H&P
"  Formerly [facility] - Emergency Dept  Hospital Medicine  History & Physical    Patient Name: Orlando Treadwell  MRN: [id number]  Patient Class: IP- Inpatient  Admission Date: 9/1/2024  Attending Physician: Paige Garcia MD  Primary Care Provider: Kleber Worthy III, MD         Patient information was obtained from patient and ER records.     Subjective:     Principal Problem:Severe sepsis    Chief Complaint:   Chief Complaint   Patient presents with    Urinary Frequency     URINATING ON SELF X 3 DAYS. PER WIFE , ACTING CONFUSED OVER LAST FEW DAYS, NOT TAKING HIS MEDS    GEN WEAKNESS     FALLEN X 2X OVER LAST 2 DAYS    Head Injury        HPI: 75 yo man with PMH of diabetes, hypertension, CAD and hypothyroid who presented to the ER because of generalized weakness and hypotension.  Apparently patient has not been feeling well, and appeared confused to the wife for the last 4 days.  He has been having urinary incontinence, frequency, and having generalized weakness.  2 days ago, pt has fallen twice, due to "weak knees" per pt. Today wife noted that his symptoms are getting worse.  She checked his blood pressure and it was in the 70s systolic (despite holding his bp meds for the last 4 days).  As a result, pt was brought to the ER for evaluation. ROS is also positive for dry cough for 2 weeks.     In the ER, vitals had bp of 83/63, HR of 73, RR of 24, afebrile, sating 97% on RA.  His SpO2 then dropped to 89% requiring 2 L nasal cannula.  CBC with white count of 14.  CMP showed multiple abnormalities including sodium of 119, potassium 5.6, negative anion gap, creatinine of 4.9 compared to baseline of 1.4, and blood sugar of 785.  Also elevated LFTs with AST being 106, ALT being 84.  UA with +6 white blood cells, and rare bacteria.  Chest x-ray is negative for pneumonia.  COVID/flu was negative.  Patient was given ceftriaxone and vanco, also 2 L of fluids for resuscitation.  Will be admitted to Medicine for his " severe sepsis suspect secondary to UTI.      Past Medical History:   Diagnosis Date    Abdominal hernia     Arthritis     Asbestos exposure     SOB     Back pain     Bulging discs     lumbar    Chronic back pain     Coronary artery disease     Hypertension     Kidney stone     Prostatitis     Sepsis due to methicillin resistant Staphylococcus aureus (MRSA) 06/2019    Due to large right upper arm abscess    Wears glasses        Past Surgical History:   Procedure Laterality Date    APPENDECTOMY      BACK SURGERY  09/23/2022    COLONOSCOPY N/A 09/23/2021    Procedure: COLONOSCOPY;  Surgeon: Ashvin Batista MD;  Location: [facility] ENDO;  Service: Endoscopy;  Laterality: N/A;    ESOPHAGOGASTRODUODENOSCOPY N/A 09/23/2021    Procedure: EGD (ESOPHAGOGASTRODUODENOSCOPY);  Surgeon: Ashvin Batista MD;  Location: [facility] ENDO;  Service: Endoscopy;  Laterality: N/A;    HAND SURGERY      rt hand    INCISION AND DRAINAGE Bilateral 06/2019    Right upper arm and left upper arm abscesses    JOINT REPLACEMENT      right knee    KNEE LIGAMENT RECONSTRUCTION      left knee    LAPAROSCOPIC CHOLECYSTECTOMY N/A 01/17/2020    Procedure: CHOLECYSTECTOMY, LAPAROSCOPIC;  Surgeon: Edgard Hill III, MD;  Location: [facility] OR;  Service: General;  Laterality: N/A;    LITHOTRIPSY      TONSILLECTOMY, ADENOIDECTOMY, BILATERAL MYRINGOTOMY AND TUBES      TOTAL KNEE ARTHROPLASTY  1971    rt knee    URETERAL STENT PLACEMENT         Review of patient's allergies indicates:   Allergen Reactions    Codeine     Sulfamethoxazole-trimethoprim      dizziness    Codeine sulfate Nausea And Vomiting    Morphine Nausea Only     Ok with nausea med.       No current facility-administered medications on file prior to encounter.     Current Outpatient Medications on File Prior to Encounter   Medication Sig    ascorbic acid, vitamin C, (VITAMIN C) 100 MG tablet Take 1 tablet by mouth once daily.    aspirin 325 MG tablet Take 325 mg by mouth once daily.    b complex  vitamins tablet Take 1 tablet by mouth once daily.    beta-carotene,A,-vits C,E/mins (VISION ORAL) Take 1 tablet by mouth once daily.    buPROPion (WELLBUTRIN XL) 300 MG 24 hr tablet Take 1 tablet (300 mg total) by mouth every morning.    diclofenac sodium 1 % Gel Apply 2 g topically 2 (two) times daily.    gabapentin (NEURONTIN) 300 MG capsule Take 2 capsules (600 mg total) by mouth every evening.    hydrALAZINE (APRESOLINE) 50 MG tablet Take 50 mg by mouth 2 (two) times daily.    levothyroxine (SYNTHROID) 50 MCG tablet Take 1 tablet (50 mcg total) by mouth before breakfast.    LINZESS 145 mcg Cap capsule Take 1 capsule (145 mcg total) by mouth once daily.    MAGNESIUM ORAL Take 500 mg by mouth once daily.    metFORMIN (GLUCOPHAGE-XR) 500 MG ER 24hr tablet Take 1 tablet (500 mg total) by mouth 2 (two) times daily with meals.    metoprolol succinate (TOPROL-XL) 100 MG 24 hr tablet Take 100 mg by mouth once daily.    multivitamin (THERAGRAN) tablet Take 1 tablet by mouth once daily.    NARCAN 4 mg/actuation Spry 1 spray by Nasal route once.    nitroGLYCERIN (NITROSTAT) 0.4 MG SL tablet Place 0.4 mg under the tongue every 5 (five) minutes as needed for Chest pain.    olmesartan-hydrochlorothiazide (BENICAR HCT) 40-25 mg per tablet Take 1 tablet by mouth once daily.    oxyCODONE (ROXICODONE) 10 mg Tab immediate release tablet Take 10 mg by mouth every 8 (eight) hours as needed.    pantoprazole (PROTONIX) 40 MG tablet Take 1 tablet (40 mg total) by mouth 2 (two) times daily.    pravastatin (PRAVACHOL) 20 MG tablet Take 1 tablet (20 mg total) by mouth once daily.    sertraline (ZOLOFT) 100 MG tablet TAKE 1 & 1/2 TABLETS BY MOUTH ONCE DAILY (Patient taking differently: Take 150 mg by mouth once daily. TAKE 1 & 1/2 TABLETS BY MOUTH ONCE DAILY)    testosterone cypionate (DEPOTESTOTERONE CYPIONATE) 200 mg/mL injection INJECT 2 ML INTO THE MUSCLE EVERY 28 DAYS (Patient taking differently: Inject 2 mLs into the muscle every 28  days.)    vitamin E, dl,tocopheryl acet, (VITAMIN E, DL, ACETATE,) 180 mg (400 unit) Cap Take 400 Units by mouth once daily.    zinc gluconate 50 mg tablet Take 50 mg by mouth once daily.    lamoTRIgine (LAMICTAL) 25 MG tablet Take one tab nightly for 7 days then one tab every 12 hours (Patient not taking: Reported on 6/21/2024)    [DISCONTINUED] HYDROcodone-acetaminophen (NORCO) 5-325 mg per tablet Take 1 tablet by mouth every 6 (six) hours as needed for Pain.    [DISCONTINUED] mupirocin (BACTROBAN) 2 % ointment APPLY TOPICALLY 2 (TWO) TIMES DAILY.    [DISCONTINUED] traMADoL (ULTRAM) 50 mg tablet Take 1 tablet (50 mg total) by mouth every 6 (six) hours as needed for Pain.     Family History       Problem Relation (Age of Onset)    Cancer Mother    Heart disease Father    Stroke Father          Tobacco Use    Smoking status: Never    Smokeless tobacco: Never   Substance and Sexual Activity    Alcohol use: No    Drug use: No    Sexual activity: Yes     Partners: Female     Review of Systems   Constitutional:  Positive for activity change and fatigue. Negative for chills and fever.   HENT:  Negative for sore throat.    Eyes:  Negative for visual disturbance.   Respiratory:  Negative for cough and shortness of breath.    Cardiovascular:  Negative for chest pain and palpitations.   Gastrointestinal:  Negative for abdominal pain, nausea and vomiting.   Genitourinary:  Positive for frequency. Negative for dysuria and urgency.   Skin:  Negative for rash.   Neurological:  Negative for syncope.     Objective:     Vital Signs (Most Recent):  Temp: 98.6 °F (37 °C) (09/01/24 1257)  Pulse: 77 (09/01/24 1622)  Resp: 18 (09/01/24 1622)  BP: (!) 156/74 (09/01/24 1622)  SpO2: 100 % (09/01/24 1622) Vital Signs (24h Range):  Temp:  [98.6 °F (37 °C)] 98.6 °F (37 °C)  Pulse:  [72-82] 77  Resp:  [14-22] 18  SpO2:  [89 %-100 %] 100 %  BP: ()/(51-74) 156/74     Weight: 104.3 kg (230 lb)  Body mass index is 36.02 kg/m².     Physical  Exam  Vitals reviewed.   Constitutional:       Appearance: He is ill-appearing.   HENT:      Head: Normocephalic and atraumatic.      Mouth/Throat:      Mouth: Mucous membranes are moist.   Eyes:      Extraocular Movements: Extraocular movements intact.      Conjunctiva/sclera: Conjunctivae normal.      Pupils: Pupils are equal, round, and reactive to light.   Cardiovascular:      Rate and Rhythm: Normal rate and regular rhythm.   Pulmonary:      Effort: Pulmonary effort is normal.      Breath sounds: Normal breath sounds.   Abdominal:      General: Abdomen is flat. Bowel sounds are normal. There is no distension.      Palpations: Abdomen is soft.      Tenderness: There is no abdominal tenderness.   Musculoskeletal:         General: Normal range of motion.      Cervical back: Normal range of motion and neck supple.   Skin:     General: Skin is warm.   Neurological:      General: No focal deficit present.      Mental Status: He is alert.      Comments: Pt is alert and oriented to the writer and the ER provider with no concerns of AMS.   Psychiatric:         Mood and Affect: Mood normal.         Behavior: Behavior normal.              CRANIAL NERVES     CN III, IV, VI   Pupils are equal, round, and reactive to light.       Significant Labs: All pertinent labs within the past 24 hours have been reviewed.  Recent Lab Results  (Last 5 results in the past 24 hours)        09/01/24  1629   09/01/24  1354   09/01/24  1340   09/01/24  1335   09/01/24  1329        Albumin       4.1         ALP       97         Allens Test     N/A           ALT       84         Anion Gap       15         Appearance, UA   Clear             AST       106         Bacteria, UA   Rare             Baso #       0.05         Basophil %       0.4         Beta-Hydroxybutyrate       0.2         Bilirubin (UA)   Negative             BILIRUBIN TOTAL       1.1  Comment: For infants and newborns, interpretation of results should be based  on gestational age,  weight and in agreement with clinical  observations.    Premature Infant recommended reference ranges:  Up to 24 hours.............<8.0 mg/dL  Up to 48 hours............<12.0 mg/dL  3-5 days..................<15.0 mg/dL  6-29 days.................<15.0 mg/dL           Site     Monica/UAC           BUN       93         Calcium       10.3         Chloride       84         CO2       20         Color, UA   Yellow             Creatinine       4.9         DelSys     Nasal Can           Differential Method       Automated         eGFR       11.7         Eos #       0.2         Eos %       1.4         Flow     2           Glucose       785  Comment: Critical result  mg/dL called to and read back by Sabine Huitron  RN/ED at 01-Sep-2024 14:29 by [facility].           Glucose, UA   4+             Gran # (ANC)       11.4         Gran %       81.2         Hematocrit       47.2         Hemoglobin       15.5         Hyaline Casts, UA   0             Immature Grans (Abs)       0.11  Comment: Mild elevation in immature granulocytes is non specific and   can be seen in a variety of conditions including stress response,   acute inflammation, trauma and pregnancy. Correlation with other   laboratory and clinical findings is essential.           Immature Granulocytes       0.8         Ketones, UA   Negative             Leukocyte Esterase, UA   Negative             Lymph #       1.3         Lymph %       9.4         MCH       28.5         MCHC       32.8         MCV       87         Microscopic Comment   SEE COMMENT  Comment: Other formed elements not mentioned in the report are not   present in the microscopic examination.                Mode     SPONT           Mono #       1.0         Mono %       6.8         MPV       12.3         NITRITE UA   Negative             nRBC       0         Blood, UA   TRACE             Osmolality       338  Comment: OSMOS critical result(s) repeated. Called and verbal readback   obtained from  "Sabine Huitron RN/ED by [id number] 09/01/2024 14:58           pH, UA   5.0             Platelet Count       272         POC BE     -7           POC Glucose >600               POC HCO3     19.8           POC Lactate         3.26       POC PCO2     42.5           POC PH     7.276           POC PO2     46           POC SATURATED O2     76           POC TCO2     21           Potassium       5.6         PROTEIN TOTAL       9.2         Protein, UA   1+  Comment: Recommend a 24 hour urine protein or a urine   protein/creatinine ratio if globulin induced proteinuria is  clinically suspected.               RBC       5.43         RBC, UA   0             RDW       15.4         Sample     VENOUS     VENOUS       Sodium       119  Comment: Critical result  mmol/L called to and read back by Sabine Huitron  RN/ED at 01-Sep-2024 14:29 by [facility].           Spec Grav UA   1.020             Specimen UA   Urine, Clean Catch             Squam Epithel, UA   1             UROBILINOGEN UA   Negative             WBC, UA   6             WBC       14.03         Yeast, UA   None                                    Significant Imaging: I have reviewed all pertinent imaging results/findings within the past 24 hours.  Assessment/Plan:     * Severe sepsis  This patient does have evidence of infective focus  My overall impression is sepsis.  Source: Urinary Tract  Antibiotics given-   Antibiotics (72h ago, onward)      Start     Stop Route Frequency Ordered    09/01/24 2100  mupirocin 2 % ointment         09/06/24 2059 Nasl 2 times daily 09/01/24 1651    09/01/24 1549  vancomycin - pharmacy to dose  (vancomycin IVPB (PEDS and ADULTS))        Placed in "And" Linked Group    -- IV pharmacy to manage frequency 09/01/24 1449          Latest lactate reviewed-  Recent Labs   Lab 09/01/24  1329   POCLAC 3.26*       Organ dysfunction indicated by Acute kidney injury    UA has rare bacteria and +6 wbc, but patient has been having symptoms.  Urine " culture was sent.    Blood cultures were collected.    Chest x-ray was negative for pneumonia.    COVID/flu are negative.  I will order comprehensive viral panel given his history of 2 weeks of dry cough.  Will continue antibiotic with ceftriaxone for suspected UTI.  Procalcitonin level.  Will hold all pt's home bp meds for now given his hypotension on presentation. Primary team to resume when appropriate.    UTI (urinary tract infection)  Urine cultures were collected.  Patient was started on ceftriaxone I will continue.      Acute renal failure  Most recent creatinine and eGFR are listed below.  Recent Labs     09/01/24  1335   CREATININE 4.9*   EGFRNORACEVR 11.7*     Baseline creatinine is 1.4.  Patient is currently in acute renal failure with creatinine of 4.9.    Will order urine electrolytes, and kidney ultrasound.  IV fluids for hydration, and repeat labs tomorrow.      Type 2 diabetes mellitus with hyperglycemia, without long-term current use of insulin  Blood sugar of 785.  Will order hemoglobin A1c.  10 units of regular insulin IV was given.  I will start the patient on Lantus 10 units at night, and Humalog sliding scale.  Also IV fluids for hydration.  Lipid panel in a.m.  Patient is not in DKA.      Fall  Will order pelvic x-ray and CT head.   PT/OT.  Suspect the fall to be 2/2 generalized weakness 2/2 current infection.     Hypothyroid  Resume levothyroxine.      Hyperkalemia  The patients most recent potassium results are listed below.  Recent Labs     09/01/24  1335   K 5.6*       Suspect secondary to the renal failure, and elevated blood sugar.  IV fluids for hydration.  Repeat labs in 4 hours.  If potassium continues to be elevated, then will give Lokelma.            Hyponatremia  The patient's most recent sodium results are listed below.  Recent Labs     09/01/24  1335   *     Pseudohyponatremia secondary to hyperglycemia.  Corrected sodium is 135.  Will continue IV fluids for hydration.   "Repeat labs tomorrow.        VTE Risk Mitigation (From admission, onward)           Ordered     enoxaparin injection 30 mg  Daily         09/01/24 1653     IP VTE HIGH RISK PATIENT  Once         09/01/24 1649     Place sequential compression device  Until discontinued         09/01/24 1649                     I have spent more than 70 min in Mr. Treadwell care today.       Pharmacist Renal Dose Adjustment Note    Orlando Treadwell is a 74 y.o. male being treated with the medication Enoxaparin    Patient Data:    Vital Signs (Most Recent):  Temp: 98.6 °F (37 °C) (09/01/24 1257)  Pulse: 77 (09/01/24 1622)  Resp: 18 (09/01/24 1622)  BP: (!) 156/74 (09/01/24 1622)  SpO2: 100 % (09/01/24 1622) Vital Signs (72h Range):  Temp:  [98.6 °F (37 °C)]   Pulse:  [72-82]   Resp:  [14-22]   BP: ()/(51-74)   SpO2:  [89 %-100 %]        Ht: 5' 7" (1.702 m)  Wt: 104.3 kg (230 lb)  Estimated Creatinine Clearance: 15 mL/min (A) (based on SCr of 4.9 mg/dL (H)).  Body mass index is 36.02 kg/m².    Per [facility] renal dosing protocol:     Previous Order: Enoxaparin 40 mg Q24H    Will be changed to:     New Order: Enoxaparin 30 mg Q24H,    Due to: Per Pharmacy Protocol    Renal dose adjustments performed as noted above.    We will continue monitoring and adjusting as necessary.    Pharmacist: Anibal Acevedo, PharmD  Ext: [phone]        Paige Garcia MD  Department of Hospital Medicine  [facility] - Emergency Dept          "

## 2024-09-01 NOTE — ASSESSMENT & PLAN NOTE
Most recent creatinine and eGFR are listed below.  Recent Labs     09/01/24  1335   CREATININE 4.9*   EGFRNORACEVR 11.7*     Baseline creatinine is 1.4.  Patient is currently in acute renal failure with creatinine of 4.9.    Will order urine electrolytes, and kidney ultrasound.  IV fluids for hydration, and repeat labs tomorrow.

## 2024-09-01 NOTE — ASSESSMENT & PLAN NOTE
Blood sugar of 785.  Will order hemoglobin A1c.  10 units of regular insulin IV was given.  I will start the patient on Lantus 10 units at night, and Humalog sliding scale.  Also IV fluids for hydration.  Lipid panel in a.m.  Patient is not in DKA.

## 2024-09-01 NOTE — ED PROVIDER NOTES
Encounter Date: 9/1/2024       History     Chief Complaint   Patient presents with    Urinary Frequency     URINATING ON SELF X 3 DAYS. PER WIFE , ACTING CONFUSED OVER LAST FEW DAYS, NOT TAKING HIS MEDS    GEN WEAKNESS     FALLEN X 2X OVER LAST 2 DAYS    Head Injury     HPI    Orlando Treadwell is a 74 y.o. male with a past medical history of coronary artery disease, hypertension, and recurrent MRSA infections who presents emergency department for evaluation of generalized weakness, urinary frequency, and fatigue.  Per his wife, he has been acting somewhat confused over the last several days and not taking his medications.  He did urinate on himself multiple times.  He is not complaining of any dysuria or hematuria.  Did fall over twice in the last 2 days.  Endorses mild shortness of breath.  Denies chest pain, nausea, vomiting, diarrhea, constipation, and drainage from wounds.    Review of patient's allergies indicates:   Allergen Reactions    Codeine     Sulfamethoxazole-trimethoprim      dizziness    Codeine sulfate Nausea And Vomiting    Morphine Nausea Only     Ok with nausea med.     Past Medical History:   Diagnosis Date    Abdominal hernia     Arthritis     Asbestos exposure     SOB     Back pain     Bulging discs     lumbar    Chronic back pain     Coronary artery disease     Hypertension     Kidney stone     Prostatitis     Sepsis due to methicillin resistant Staphylococcus aureus (MRSA) 06/2019    Due to large right upper arm abscess    Wears glasses      Past Surgical History:   Procedure Laterality Date    APPENDECTOMY      BACK SURGERY  09/23/2022    COLONOSCOPY N/A 09/23/2021    Procedure: COLONOSCOPY;  Surgeon: Ashvin Batista MD;  Location: [facility];  Service: Endoscopy;  Laterality: N/A;    ESOPHAGOGASTRODUODENOSCOPY N/A 09/23/2021    Procedure: EGD (ESOPHAGOGASTRODUODENOSCOPY);  Surgeon: Ashvin Batista MD;  Location: [facility];  Service: Endoscopy;  Laterality: N/A;    HAND SURGERY      rt hand     INCISION AND DRAINAGE Bilateral 06/2019    Right upper arm and left upper arm abscesses    JOINT REPLACEMENT      right knee    KNEE LIGAMENT RECONSTRUCTION      left knee    LAPAROSCOPIC CHOLECYSTECTOMY N/A 01/17/2020    Procedure: CHOLECYSTECTOMY, LAPAROSCOPIC;  Surgeon: Edgard Hill III, MD;  Location: [facility];  Service: General;  Laterality: N/A;    LITHOTRIPSY      TONSILLECTOMY, ADENOIDECTOMY, BILATERAL MYRINGOTOMY AND TUBES      TOTAL KNEE ARTHROPLASTY  1971    rt knee    URETERAL STENT PLACEMENT       Family History   Problem Relation Name Age of Onset    Cancer Mother      Stroke Father      Heart disease Father      Collagen disease Neg Hx      Melanoma Neg Hx      Psoriasis Neg Hx      Lupus Neg Hx      Eczema Neg Hx       Social History     Tobacco Use    Smoking status: Never    Smokeless tobacco: Never   Substance Use Topics    Alcohol use: No    Drug use: No     Review of Systems    Physical Exam     Initial Vitals   BP Pulse Resp Temp SpO2   09/01/24 1257 09/01/24 1257 09/01/24 1301 09/01/24 1257 09/01/24 1257   (!) 83/63 73 (!) 22 98.6 °F (37 °C) 97 %      MAP       --                Physical Exam    ED Course   Critical Care    Date/Time: 9/1/2024 7:21 PM    Performed by: Basilio Monte MD  Authorized by: Paige Garcia MD  Direct patient critical care time: 10 minutes  Ordering / reviewing critical care time: 10 minutes  Documentation critical care time: 10 minutes  Consulting other physicians critical care time: 5 minutes  Total critical care time (exclusive of procedural time) : 35 minutes  Critical care was necessary to treat or prevent imminent or life-threatening deterioration of the following conditions: sepsis.  Critical care was time spent personally by me on the following activities: discussions with consultants, re-evaluation of patient's condition, pulse oximetry, review of old charts, ordering and review of radiographic studies, ordering and review of laboratory  studies, ordering and performing treatments and interventions, obtaining history from patient or surrogate, examination of patient, evaluation of patient's response to treatment and interpretation of cardiac output measurements.        Labs Reviewed   CBC W/ AUTO DIFFERENTIAL - Abnormal       Result Value    WBC 14.03 (*)     RBC 5.43      Hemoglobin 15.5      Hematocrit 47.2      MCV 87      MCH 28.5      MCHC 32.8      RDW 15.4 (*)     Platelets 272      MPV 12.3      Immature Granulocytes 0.8 (*)     Gran # (ANC) 11.4 (*)     Immature Grans (Abs) 0.11 (*)     Lymph # 1.3      Mono # 1.0      Eos # 0.2      Baso # 0.05      nRBC 0      Gran % 81.2 (*)     Lymph % 9.4 (*)     Mono % 6.8      Eosinophil % 1.4      Basophil % 0.4      Differential Method Automated     COMPREHENSIVE METABOLIC PANEL - Abnormal    Sodium 119 (*)     Potassium 5.6 (*)     Chloride 84 (*)     CO2 20 (*)     Glucose 785 (*)     BUN 93 (*)     Creatinine 4.9 (*)     Calcium 10.3      Total Protein 9.2 (*)     Albumin 4.1      Total Bilirubin 1.1 (*)     Alkaline Phosphatase 97       (*)     ALT 84 (*)     eGFR 11.7 (*)     Anion Gap 15     URINALYSIS, REFLEX TO URINE CULTURE - Abnormal    Specimen UA Urine, Clean Catch      Color, UA Yellow      Appearance, UA Clear      pH, UA 5.0      Specific Gravity, UA 1.020      Protein, UA 1+ (*)     Glucose, UA 4+ (*)     Ketones, UA Negative      Bilirubin (UA) Negative      Occult Blood UA TRACE      Nitrite, UA Negative      Urobilinogen, UA Negative      Leukocytes, UA Negative      Narrative:     Specimen Source->Urine   OSMOLALITY, SERUM - Abnormal    Osmolality 338 (*)     Narrative:     OSMOS critical result(s) repeated. Called and verbal readback   obtained from Sabine Huitron RN/ED by [email or web address] 09/01/2024 14:58   URINALYSIS MICROSCOPIC - Abnormal    RBC, UA 0      WBC, UA 6 (*)     Bacteria Rare      Yeast, UA None      Squam Epithel, UA 1      Hyaline Casts, UA 0      Microscopic Comment  SEE COMMENT      Narrative:     Specimen Source->Urine   BASIC METABOLIC PANEL - Abnormal    Sodium 123 (*)     Potassium 5.3 (*)     Chloride 94 (*)     CO2 18 (*)     Glucose 636 (*)     BUN 84 (*)     Creatinine 3.8 (*)     Calcium 8.1 (*)     Anion Gap 11      eGFR 15.8 (*)    POCT GLUCOSE - Abnormal    POC Glucose >600 (*)    ISTAT LACTATE - Abnormal    POC Lactate 3.26 (*)     Sample VENOUS     ISTAT PROCEDURE - Abnormal    POC PH 7.276 (*)     POC PCO2 42.5      POC PO2 46      POC HCO3 19.8 (*)     POC BE -7 (*)     POC SATURATED O2 76      POC TCO2 21 (*)     Sample VENOUS      Site Monica/UAC      Allens Test N/A      DelSys Nasal Can      Mode SPONT      Flow 2     POCT GLUCOSE - Abnormal    POC Glucose >600 (*)    POCT GLUCOSE - Abnormal    POC Glucose >600 (*)    CULTURE, URINE   RESPIRATORY INFECTION PANEL (PCR), NASOPHARYNGEAL    Respiratory Infection Panel Source NP swab      Narrative:     Respiratory Infection Panel source->NP Swab   CULTURE, BLOOD   CULTURE, BLOOD   CULTURE, URINE   BETA - HYDROXYBUTYRATE, SERUM    Beta-Hydroxybutyrate 0.2     SARS-COV-2 RNA AMPLIFICATION, QUAL    SARS-CoV-2 RNA, Amplification, Qual Negative     INFLUENZA A AND B ANTIGEN    Influenza A, Molecular Negative      Influenza B, Molecular Negative      Flu A & B Source Nasal swab      Narrative:     Specimen Source->Nasopharyngeal Swab   LACTIC ACID, PLASMA    Lactate (Lactic Acid) 1.5     HEMOGLOBIN A1C   UREA NITROGEN, URINE, RANDOM   CREATININE, URINE, RANDOM   SODIUM, URINE, RANDOM   UREA NITROGEN, URINE, RANDOM    Urine Urea Nitrogen 437     SODIUM, URINE, RANDOM    Sodium, Urine 46     POCT GLUCOSE MONITORING CONTINUOUS   POCT LACTATE   POCT GLUCOSE MONITORING CONTINUOUS        ECG Results              EKG 12-lead (In process)        Collection Time Result Time QRS Duration OHS QTC Calculation    09/01/24 13:23:22 09/01/24 13:35:15 104 454                     In process by Interface, Lab In [facility] (09/01/24  13:35:21)                   Narrative:    Test Reason : A41.9,    Vent. Rate : 076 BPM     Atrial Rate : 076 BPM     P-R Int : 204 ms          QRS Dur : 104 ms      QT Int : 404 ms       P-R-T Axes : 051 -14 028 degrees     QTc Int : 454 ms    Normal sinus rhythm  Inferior infarct ,age undetermined  Abnormal ECG  When compared with ECG of 17-JUL-2023 19:21,  Inferior infarct is now Present  T wave inversion no longer evident in Anterior leads    Referred By: [email or web address]   SELF           Confirmed By:                                   Imaging Results               CT Head Without Contrast (Final result)  Result time 09/01/24 18:18:10      Final result by Michele Clay MD (09/01/24 18:18:10)                   Impression:      Stable and negative CT of the brain.  No infarct, hemorrhage or intracranial mass.    Intraconal ocular masses posterior superiorly on the right and anteromedially on the left.  Differential considerations include varicoceles, lymphoceles or even soft tissue tumor.  Ophthalmological consultation and follow-up is advised.    This report was flagged in Epic as abnormal.      Electronically signed by: Michele Clay  Date:    09/01/2024  Time:    18:18               Narrative:    EXAMINATION:  CT HEAD WITHOUT CONTRAST    CLINICAL HISTORY:  fall;    TECHNIQUE:  Low dose axial images were obtained through the head.  Coronal and sagittal reformations were also performed. Contrast was not administered.    COMPARISON:  03/13/2021    FINDINGS:  There is a small area of high density in the left frontal lobe adjacent to the frontal horn of the lateral ventricle (03:32-34) this appears stable since 2021 exam.  Involutional and chronic small vessel changes remain.  There is no new loss of gray-white matter junction differentiation, mass pathologic fluid collection.  No interval developing hydrocephalus.  No mass effect.    Calvarium intact.  Scalp unremarkable.  Orbits, orbital contents are noteworthy  for 2 nodules within the intraconal compartment.  On the right, it measures approximately 7 x 10 by 12 mm in the posterosuperior intraconal location abutting the optic nerve and medial rectus as well as superior rectus muscle.  On the left, it is anterior abutting the globe and the medial rectus measuring 8 by 6 by 11 mm.                                       X-Ray Pelvis Complete min 3 views (Final result)  Result time 09/01/24 18:44:30      Final result by Terrance Staples MD (09/01/24 18:44:30)                   Impression:      No acute findings.      Electronically signed by: Terrance Staples  Date:    09/01/2024  Time:    18:44               Narrative:    EXAMINATION:  XR PELVIS COMPLETE MIN 3 VIEWS    CLINICAL HISTORY:  fall;    TECHNIQUE:  Three views    COMPARISON:  None    FINDINGS:  No acute fracture or dislocation.  Mild bilateral hip osteoarthritis.                                       X-Ray Chest AP Portable (Final result)  Result time 09/01/24 14:00:00      Final result by Damien Maynard MD (09/01/24 14:00:00)                   Impression:      No evidence of active cardiopulmonary disease.      Electronically signed by: Damien Maynard  Date:    09/01/2024  Time:    14:00               Narrative:    EXAMINATION:  XR CHEST AP PORTABLE    CLINICAL HISTORY:  Sepsis;    FINDINGS:  Portable chest radiograph at 13:41 hours with comparison to prior exams shows the cardiomediastinal silhouette and pulmonary vasculature are within normal limits.    The lungs are normally expanded, with no consolidation, large pleural effusion, or evidence of pulmonary edema. No pneumothorax. There are no acute osseous abnormalities.                                       Medications   vancomycin - pharmacy to dose (has no administration in time range)   aspirin chewable tablet 81 mg (has no administration in time range)   buPROPion TB24 tablet 300 mg (has no administration in time range)   gabapentin capsule 600 mg (has  no administration in time range)   levothyroxine tablet 50 mcg (has no administration in time range)   linaCLOtide capsule 145 mcg (has no administration in time range)   pravastatin tablet 20 mg (has no administration in time range)   sertraline tablet 150 mg (has no administration in time range)   sodium chloride 0.9% flush 2 mL (has no administration in time range)   melatonin tablet 6 mg (has no administration in time range)   ondansetron injection 4 mg (has no administration in time range)   senna-docusate 8.6-50 mg per tablet 1 tablet (has no administration in time range)   acetaminophen tablet 650 mg (has no administration in time range)   aluminum-magnesium hydroxide-simethicone 200-200-20 mg/5 mL suspension 30 mL (has no administration in time range)   acetaminophen tablet 650 mg (has no administration in time range)   HYDROcodone-acetaminophen 5-325 mg per tablet 1 tablet (has no administration in time range)   naloxone 0.4 mg/mL injection 0.02 mg (has no administration in time range)   potassium bicarbonate disintegrating tablet 50 mEq (has no administration in time range)   potassium bicarbonate disintegrating tablet 35 mEq (has no administration in time range)   potassium bicarbonate disintegrating tablet 60 mEq (has no administration in time range)   magnesium oxide tablet 800 mg (has no administration in time range)   magnesium oxide tablet 800 mg (has no administration in time range)   potassium, sodium phosphates 280-160-250 mg packet 2 packet (has no administration in time range)   potassium, sodium phosphates 280-160-250 mg packet 2 packet (has no administration in time range)   potassium, sodium phosphates 280-160-250 mg packet 2 packet (has no administration in time range)   glucose chewable tablet 16 g (has no administration in time range)   glucose chewable tablet 24 g (has no administration in time range)   dextrose 50% injection 12.5 g (has no administration in time range)   dextrose 50%  injection 25 g (has no administration in time range)   glucagon (human recombinant) injection 1 mg (has no administration in time range)   mupirocin 2 % ointment (has no administration in time range)   enoxaparin injection 30 mg (30 mg Subcutaneous Given 9/1/24 1847)   0.9%  NaCl infusion ( Intravenous New Bag 9/1/24 1847)   cefTRIAXone (ROCEPHIN) 1 g in dextrose 5 % 100 mL IVPB (ready to mix) (has no administration in time range)   insulin glargine U-100 (Lantus) pen 10 Units (has no administration in time range)   insulin aspart U-100 pen 0-5 Units (has no administration in time range)   sodium chloride 0.9% bolus 1,983 mL 1,983 mL (0 mLs Intravenous Stopped 9/1/24 1429)   cefTRIAXone (ROCEPHIN) 2 g in dextrose 5 % 100 mL IVPB (ready to mix) (0 g Intravenous Stopped 9/1/24 1425)   vancomycin in dextrose 5 % 1 gram/250 mL IVPB 1,000 mg (0 mg Intravenous Stopped 9/1/24 1650)     And   vancomycin in dextrose 5 % 1 gram/250 mL IVPB 1,000 mg (0 mg Intravenous Stopped 9/1/24 1649)   insulin regular injection 10 Units 0.1 mL (10 Units Intravenous Given 9/1/24 1627)     Medical Decision Making  Orlando Treadwell is a 74 y.o. male with a past medical history of coronary artery disease, hypertension, and recurrent MRSA infections who presents emergency department for evaluation of generalized weakness, urinary frequency, and fatigue.  Hypotensive upon arrival.  Exam with uncomfortable male.  Lungs clear.  Heart sounds within normal limits.  Abdominal exam benign.   exam within normal limits.  Speaking in complete sentences and answering questions appropriately.  Differential includes but is not limited to sepsis, UTI, pneumonia, pneumothorax, ACS, arrhythmia, electrolyte abnormality, BRII, and acidosis.    Amount and/or Complexity of Data Reviewed  Labs: ordered.     Details: CMP with sodium of 119, glucose of 785, and creatinine of 4.9.  Anion gap of 15.  Viral swabs are negative.  Initial lactic of 3.26.  Osmolality of  "338.  Leukocytosis of 14.03.  PH is 7.27 on VBG.  Radiology: ordered.  Discussion of management or test interpretation with external provider(s): Given 30 cc/kg of fluids and Rocephin and vancomycin.  No clear source at this time.  Given insulin for lactic.  Blood pressure has normalized.  Admitted to Hospital Medicine for further management.    Risk  OTC drugs.  Prescription drug management.  Decision regarding hospitalization.               Sepsis Perfusion Assessment: "I attest a sepsis perfusion exam was performed within 6 hours of sepsis, severe sepsis, or septic shock presentation, following fluid resuscitation."                      Clinical Impression:  Final diagnoses:  [A41.9] Sepsis          ED Disposition Condition    Admit Stable                Basilio Monte MD  09/01/24 1939    "

## 2024-09-01 NOTE — PROGRESS NOTES
"Pharmacist Renal Dose Adjustment Note    Orlando Treadwell is a 74 y.o. male being treated with the medication Enoxaparin    Patient Data:    Vital Signs (Most Recent):  Temp: 98.6 °F (37 °C) (09/01/24 1257)  Pulse: 77 (09/01/24 1622)  Resp: 18 (09/01/24 1622)  BP: (!) 156/74 (09/01/24 1622)  SpO2: 100 % (09/01/24 1622) Vital Signs (72h Range):  Temp:  [98.6 °F (37 °C)]   Pulse:  [72-82]   Resp:  [14-22]   BP: ()/(51-74)   SpO2:  [89 %-100 %]        Ht: 5' 7" (1.702 m)  Wt: 104.3 kg (230 lb)  Estimated Creatinine Clearance: 15 mL/min (A) (based on SCr of 4.9 mg/dL (H)).  Body mass index is 36.02 kg/m².    Per [facility] renal dosing protocol:     Previous Order: Enoxaparin 40 mg Q24H    Will be changed to:     New Order: Enoxaparin 30 mg Q24H,    Due to: Per Pharmacy Protocol    Renal dose adjustments performed as noted above.    We will continue monitoring and adjusting as necessary.    Pharmacist: Anibal Acevedo, PharmD  Ext: [phone]      "

## 2024-09-01 NOTE — ASSESSMENT & PLAN NOTE
The patients most recent potassium results are listed below.  Recent Labs     09/01/24  1335   K 5.6*       Suspect secondary to the renal failure, and elevated blood sugar.  IV fluids for hydration.  Repeat labs in 4 hours.  If potassium continues to be elevated, then will give Lokelma.

## 2024-09-01 NOTE — HPI
"75 yo man with PMH of diabetes, hypertension, CAD and hypothyroid who presented to the ER because of generalized weakness and hypotension.  Apparently patient has not been feeling well, and appeared confused to the wife for the last 4 days.  He has been having urinary incontinence, frequency, and having generalized weakness.  2 days ago, pt has fallen twice, due to "weak knees" per pt. Today wife noted that his symptoms are getting worse.  She checked his blood pressure and it was in the 70s systolic (despite holding his bp meds for the last 4 days).  As a result, pt was brought to the ER for evaluation. ROS is also positive for dry cough for 2 weeks.     In the ER, vitals had bp of 83/63, HR of 73, RR of 24, afebrile, sating 97% on RA.  His SpO2 then dropped to 89% requiring 2 L nasal cannula.  CBC with white count of 14.  CMP showed multiple abnormalities including sodium of 119, potassium 5.6, negative anion gap, creatinine of 4.9 compared to baseline of 1.4, and blood sugar of 785.  Also elevated LFTs with AST being 106, ALT being 84.  UA with +6 white blood cells, and rare bacteria.  Chest x-ray is negative for pneumonia.  COVID/flu was negative.  Patient was given ceftriaxone and vanco, also 2 L of fluids for resuscitation.  Will be admitted to Medicine for his severe sepsis suspect secondary to UTI.    "

## 2024-09-01 NOTE — SUBJECTIVE & OBJECTIVE
Past Medical History:   Diagnosis Date    Abdominal hernia     Arthritis     Asbestos exposure     SOB     Back pain     Bulging discs     lumbar    Chronic back pain     Coronary artery disease     Hypertension     Kidney stone     Prostatitis     Sepsis due to methicillin resistant Staphylococcus aureus (MRSA) 06/2019    Due to large right upper arm abscess    Wears glasses        Past Surgical History:   Procedure Laterality Date    APPENDECTOMY      BACK SURGERY  09/23/2022    COLONOSCOPY N/A 09/23/2021    Procedure: COLONOSCOPY;  Surgeon: Ashvin Batista MD;  Location: [facility] ENDO;  Service: Endoscopy;  Laterality: N/A;    ESOPHAGOGASTRODUODENOSCOPY N/A 09/23/2021    Procedure: EGD (ESOPHAGOGASTRODUODENOSCOPY);  Surgeon: Ashvin Batista MD;  Location: [facility] ENDO;  Service: Endoscopy;  Laterality: N/A;    HAND SURGERY      rt hand    INCISION AND DRAINAGE Bilateral 06/2019    Right upper arm and left upper arm abscesses    JOINT REPLACEMENT      right knee    KNEE LIGAMENT RECONSTRUCTION      left knee    LAPAROSCOPIC CHOLECYSTECTOMY N/A 01/17/2020    Procedure: CHOLECYSTECTOMY, LAPAROSCOPIC;  Surgeon: Edgard Hill III, MD;  Location: [facility] OR;  Service: General;  Laterality: N/A;    LITHOTRIPSY      TONSILLECTOMY, ADENOIDECTOMY, BILATERAL MYRINGOTOMY AND TUBES      TOTAL KNEE ARTHROPLASTY  1971    rt knee    URETERAL STENT PLACEMENT         Review of patient's allergies indicates:   Allergen Reactions    Codeine     Sulfamethoxazole-trimethoprim      dizziness    Codeine sulfate Nausea And Vomiting    Morphine Nausea Only     Ok with nausea med.       No current facility-administered medications on file prior to encounter.     Current Outpatient Medications on File Prior to Encounter   Medication Sig    ascorbic acid, vitamin C, (VITAMIN C) 100 MG tablet Take 1 tablet by mouth once daily.    aspirin 325 MG tablet Take 325 mg by mouth once daily.    b complex vitamins tablet Take 1 tablet by mouth once daily.     beta-carotene,A,-vits C,E/mins (VISION ORAL) Take 1 tablet by mouth once daily.    buPROPion (WELLBUTRIN XL) 300 MG 24 hr tablet Take 1 tablet (300 mg total) by mouth every morning.    diclofenac sodium 1 % Gel Apply 2 g topically 2 (two) times daily.    gabapentin (NEURONTIN) 300 MG capsule Take 2 capsules (600 mg total) by mouth every evening.    hydrALAZINE (APRESOLINE) 50 MG tablet Take 50 mg by mouth 2 (two) times daily.    levothyroxine (SYNTHROID) 50 MCG tablet Take 1 tablet (50 mcg total) by mouth before breakfast.    LINZESS 145 mcg Cap capsule Take 1 capsule (145 mcg total) by mouth once daily.    MAGNESIUM ORAL Take 500 mg by mouth once daily.    metFORMIN (GLUCOPHAGE-XR) 500 MG ER 24hr tablet Take 1 tablet (500 mg total) by mouth 2 (two) times daily with meals.    metoprolol succinate (TOPROL-XL) 100 MG 24 hr tablet Take 100 mg by mouth once daily.    multivitamin (THERAGRAN) tablet Take 1 tablet by mouth once daily.    NARCAN 4 mg/actuation Spry 1 spray by Nasal route once.    nitroGLYCERIN (NITROSTAT) 0.4 MG SL tablet Place 0.4 mg under the tongue every 5 (five) minutes as needed for Chest pain.    olmesartan-hydrochlorothiazide (BENICAR HCT) 40-25 mg per tablet Take 1 tablet by mouth once daily.    oxyCODONE (ROXICODONE) 10 mg Tab immediate release tablet Take 10 mg by mouth every 8 (eight) hours as needed.    pantoprazole (PROTONIX) 40 MG tablet Take 1 tablet (40 mg total) by mouth 2 (two) times daily.    pravastatin (PRAVACHOL) 20 MG tablet Take 1 tablet (20 mg total) by mouth once daily.    sertraline (ZOLOFT) 100 MG tablet TAKE 1 & 1/2 TABLETS BY MOUTH ONCE DAILY (Patient taking differently: Take 150 mg by mouth once daily. TAKE 1 & 1/2 TABLETS BY MOUTH ONCE DAILY)    testosterone cypionate (DEPOTESTOTERONE CYPIONATE) 200 mg/mL injection INJECT 2 ML INTO THE MUSCLE EVERY 28 DAYS (Patient taking differently: Inject 2 mLs into the muscle every 28 days.)    vitamin E, dl,tocopheryl acet, (VITAMIN  E, DL, ACETATE,) 180 mg (400 unit) Cap Take 400 Units by mouth once daily.    zinc gluconate 50 mg tablet Take 50 mg by mouth once daily.    lamoTRIgine (LAMICTAL) 25 MG tablet Take one tab nightly for 7 days then one tab every 12 hours (Patient not taking: Reported on 6/21/2024)    [DISCONTINUED] HYDROcodone-acetaminophen (NORCO) 5-325 mg per tablet Take 1 tablet by mouth every 6 (six) hours as needed for Pain.    [DISCONTINUED] mupirocin (BACTROBAN) 2 % ointment APPLY TOPICALLY 2 (TWO) TIMES DAILY.    [DISCONTINUED] traMADoL (ULTRAM) 50 mg tablet Take 1 tablet (50 mg total) by mouth every 6 (six) hours as needed for Pain.     Family History       Problem Relation (Age of Onset)    Cancer Mother    Heart disease Father    Stroke Father          Tobacco Use    Smoking status: Never    Smokeless tobacco: Never   Substance and Sexual Activity    Alcohol use: No    Drug use: No    Sexual activity: Yes     Partners: Female     Review of Systems   Constitutional:  Positive for activity change and fatigue. Negative for chills and fever.   HENT:  Negative for sore throat.    Eyes:  Negative for visual disturbance.   Respiratory:  Negative for cough and shortness of breath.    Cardiovascular:  Negative for chest pain and palpitations.   Gastrointestinal:  Negative for abdominal pain, nausea and vomiting.   Genitourinary:  Positive for frequency. Negative for dysuria and urgency.   Skin:  Negative for rash.   Neurological:  Negative for syncope.     Objective:     Vital Signs (Most Recent):  Temp: 98.6 °F (37 °C) (09/01/24 1257)  Pulse: 77 (09/01/24 1622)  Resp: 18 (09/01/24 1622)  BP: (!) 156/74 (09/01/24 1622)  SpO2: 100 % (09/01/24 1622) Vital Signs (24h Range):  Temp:  [98.6 °F (37 °C)] 98.6 °F (37 °C)  Pulse:  [72-82] 77  Resp:  [14-22] 18  SpO2:  [89 %-100 %] 100 %  BP: ()/(51-74) 156/74     Weight: 104.3 kg (230 lb)  Body mass index is 36.02 kg/m².     Physical Exam  Vitals reviewed.   Constitutional:        Appearance: He is ill-appearing.   HENT:      Head: Normocephalic and atraumatic.      Mouth/Throat:      Mouth: Mucous membranes are moist.   Eyes:      Extraocular Movements: Extraocular movements intact.      Conjunctiva/sclera: Conjunctivae normal.      Pupils: Pupils are equal, round, and reactive to light.   Cardiovascular:      Rate and Rhythm: Normal rate and regular rhythm.   Pulmonary:      Effort: Pulmonary effort is normal.      Breath sounds: Normal breath sounds.   Abdominal:      General: Abdomen is flat. Bowel sounds are normal. There is no distension.      Palpations: Abdomen is soft.      Tenderness: There is no abdominal tenderness.   Musculoskeletal:         General: Normal range of motion.      Cervical back: Normal range of motion and neck supple.   Skin:     General: Skin is warm.   Neurological:      General: No focal deficit present.      Mental Status: He is alert.      Comments: Pt is alert and oriented to the writer and the ER provider with no concerns of AMS.   Psychiatric:         Mood and Affect: Mood normal.         Behavior: Behavior normal.              CRANIAL NERVES     CN III, IV, VI   Pupils are equal, round, and reactive to light.       Significant Labs: All pertinent labs within the past 24 hours have been reviewed.  Recent Lab Results  (Last 5 results in the past 24 hours)        09/01/24  1629   09/01/24  1354   09/01/24  1340   09/01/24  1335   09/01/24  1329        Albumin       4.1         ALP       97         Allens Test     N/A           ALT       84         Anion Gap       15         Appearance, UA   Clear             AST       106         Bacteria, UA   Rare             Baso #       0.05         Basophil %       0.4         Beta-Hydroxybutyrate       0.2         Bilirubin (UA)   Negative             BILIRUBIN TOTAL       1.1  Comment: For infants and newborns, interpretation of results should be based  on gestational age, weight and in agreement with  clinical  observations.    Premature Infant recommended reference ranges:  Up to 24 hours.............<8.0 mg/dL  Up to 48 hours............<12.0 mg/dL  3-5 days..................<15.0 mg/dL  6-29 days.................<15.0 mg/dL           Site     Monica/UAC           BUN       93         Calcium       10.3         Chloride       84         CO2       20         Color, UA   Yellow             Creatinine       4.9         DelSys     Nasal Can           Differential Method       Automated         eGFR       11.7         Eos #       0.2         Eos %       1.4         Flow     2           Glucose       785  Comment: Critical result  mg/dL called to and read back by Sabine Huitron  RN/ED at 01-Sep-2024 14:29 by [facility].           Glucose, UA   4+             Gran # (ANC)       11.4         Gran %       81.2         Hematocrit       47.2         Hemoglobin       15.5         Hyaline Casts, UA   0             Immature Grans (Abs)       0.11  Comment: Mild elevation in immature granulocytes is non specific and   can be seen in a variety of conditions including stress response,   acute inflammation, trauma and pregnancy. Correlation with other   laboratory and clinical findings is essential.           Immature Granulocytes       0.8         Ketones, UA   Negative             Leukocyte Esterase, UA   Negative             Lymph #       1.3         Lymph %       9.4         MCH       28.5         MCHC       32.8         MCV       87         Microscopic Comment   SEE COMMENT  Comment: Other formed elements not mentioned in the report are not   present in the microscopic examination.                Mode     SPONT           Mono #       1.0         Mono %       6.8         MPV       12.3         NITRITE UA   Negative             nRBC       0         Blood, UA   TRACE             Osmolality       338  Comment: OSMOS critical result(s) repeated. Called and verbal readback   obtained from Sabine Huitron RN/ED by [id number]  09/01/2024 14:58           pH, UA   5.0             Platelet Count       272         POC BE     -7           POC Glucose >600               POC HCO3     19.8           POC Lactate         3.26       POC PCO2     42.5           POC PH     7.276           POC PO2     46           POC SATURATED O2     76           POC TCO2     21           Potassium       5.6         PROTEIN TOTAL       9.2         Protein, UA   1+  Comment: Recommend a 24 hour urine protein or a urine   protein/creatinine ratio if globulin induced proteinuria is  clinically suspected.               RBC       5.43         RBC, UA   0             RDW       15.4         Sample     VENOUS     VENOUS       Sodium       119  Comment: Critical result  mmol/L called to and read back by Sabine Huitron  RN/ED at 01-Sep-2024 14:29 by [facility].           Spec Grav UA   1.020             Specimen UA   Urine, Clean Catch             Squam Epithel, UA   1             UROBILINOGEN UA   Negative             WBC, UA   6             WBC       14.03         Yeast, UA   None                                    Significant Imaging: I have reviewed all pertinent imaging results/findings within the past 24 hours.

## 2024-09-01 NOTE — Clinical Note
Diagnosis: Sepsis [081329]   Admit to which facility:: Critical access hospital [0005]   Reason for IP Medical Treatment  (Clinical interventions that can only be accomplished in the IP setting? ) :: severe sepsis   Plans for Post-Acute care--if anticipated (pick the single best option):: C. Discharge home with home health services

## 2024-09-01 NOTE — ASSESSMENT & PLAN NOTE
Will order pelvic x-ray and CT head.   PT/OT.  Suspect the fall to be 2/2 generalized weakness 2/2 current infection.

## 2024-09-02 LAB
ALBUMIN SERPL BCP-MCNC: 3.3 G/DL (ref 3.5–5.2)
ALP SERPL-CCNC: 76 U/L (ref 55–135)
ALT SERPL W/O P-5'-P-CCNC: 65 U/L (ref 10–44)
ANION GAP SERPL CALC-SCNC: 12 MMOL/L (ref 8–16)
AST SERPL-CCNC: 91 U/L (ref 10–40)
BASOPHILS # BLD AUTO: 0.05 K/UL (ref 0–0.2)
BASOPHILS NFR BLD: 0.4 % (ref 0–1.9)
BILIRUB SERPL-MCNC: 0.7 MG/DL (ref 0.1–1)
BUN SERPL-MCNC: 90 MG/DL (ref 8–23)
CALCIUM SERPL-MCNC: 9 MG/DL (ref 8.7–10.5)
CHLORIDE SERPL-SCNC: 95 MMOL/L (ref 95–110)
CHOLEST SERPL-MCNC: 212 MG/DL (ref 120–199)
CHOLEST/HDLC SERPL: 11.8 {RATIO} (ref 2–5)
CO2 SERPL-SCNC: 22 MMOL/L (ref 23–29)
CORTIS SERPL-MCNC: 21.7 UG/DL (ref 4.3–22.4)
CREAT SERPL-MCNC: 4.1 MG/DL (ref 0.5–1.4)
DIFFERENTIAL METHOD BLD: ABNORMAL
EOSINOPHIL # BLD AUTO: 0.3 K/UL (ref 0–0.5)
EOSINOPHIL NFR BLD: 2 % (ref 0–8)
ERYTHROCYTE [DISTWIDTH] IN BLOOD BY AUTOMATED COUNT: 15.2 % (ref 11.5–14.5)
EST. GFR  (NO RACE VARIABLE): 14.4 ML/MIN/1.73 M^2
GLUCOSE SERPL-MCNC: 240 MG/DL (ref 70–110)
GLUCOSE SERPL-MCNC: 273 MG/DL (ref 70–110)
GLUCOSE SERPL-MCNC: 314 MG/DL (ref 70–110)
GLUCOSE SERPL-MCNC: 325 MG/DL (ref 70–110)
GLUCOSE SERPL-MCNC: 422 MG/DL (ref 70–110)
GLUCOSE SERPL-MCNC: 426 MG/DL (ref 70–110)
GLUCOSE SERPL-MCNC: 427 MG/DL (ref 70–110)
HCT VFR BLD AUTO: 40.3 % (ref 40–54)
HDLC SERPL-MCNC: 18 MG/DL (ref 40–75)
HDLC SERPL: 8.5 % (ref 20–50)
HGB BLD-MCNC: 13 G/DL (ref 14–18)
IMM GRANULOCYTES # BLD AUTO: 0.08 K/UL (ref 0–0.04)
IMM GRANULOCYTES NFR BLD AUTO: 0.6 % (ref 0–0.5)
LDLC SERPL CALC-MCNC: ABNORMAL MG/DL (ref 63–159)
LYMPHOCYTES # BLD AUTO: 1.6 K/UL (ref 1–4.8)
LYMPHOCYTES NFR BLD: 11.7 % (ref 18–48)
MAGNESIUM SERPL-MCNC: 2.5 MG/DL (ref 1.6–2.6)
MCH RBC QN AUTO: 28.1 PG (ref 27–31)
MCHC RBC AUTO-ENTMCNC: 32.3 G/DL (ref 32–36)
MCV RBC AUTO: 87 FL (ref 82–98)
MONOCYTES # BLD AUTO: 1.4 K/UL (ref 0.3–1)
MONOCYTES NFR BLD: 9.8 % (ref 4–15)
NEUTROPHILS # BLD AUTO: 10.5 K/UL (ref 1.8–7.7)
NEUTROPHILS NFR BLD: 75.5 % (ref 38–73)
NONHDLC SERPL-MCNC: 194 MG/DL
NRBC BLD-RTO: 0 /100 WBC
PLATELET # BLD AUTO: 202 K/UL (ref 150–450)
PMV BLD AUTO: 11.6 FL (ref 9.2–12.9)
POTASSIUM SERPL-SCNC: 4.7 MMOL/L (ref 3.5–5.1)
PROT SERPL-MCNC: 7.2 G/DL (ref 6–8.4)
RBC # BLD AUTO: 4.63 M/UL (ref 4.6–6.2)
SODIUM SERPL-SCNC: 129 MMOL/L (ref 136–145)
TRIGL SERPL-MCNC: 571 MG/DL (ref 30–150)
TSH SERPL DL<=0.005 MIU/L-ACNC: 1.25 UIU/ML (ref 0.34–5.6)
VANCOMYCIN SERPL-MCNC: 15.7 UG/ML
WBC # BLD AUTO: 13.83 K/UL (ref 3.9–12.7)

## 2024-09-02 PROCEDURE — 94761 N-INVAS EAR/PLS OXIMETRY MLT: CPT

## 2024-09-02 PROCEDURE — 36415 COLL VENOUS BLD VENIPUNCTURE: CPT | Performed by: INTERNAL MEDICINE

## 2024-09-02 PROCEDURE — 25000003 PHARM REV CODE 250: Performed by: INTERNAL MEDICINE

## 2024-09-02 PROCEDURE — 82533 TOTAL CORTISOL: CPT | Performed by: INTERNAL MEDICINE

## 2024-09-02 PROCEDURE — 93005 ELECTROCARDIOGRAM TRACING: CPT | Performed by: GENERAL PRACTICE

## 2024-09-02 PROCEDURE — 85025 COMPLETE CBC W/AUTO DIFF WBC: CPT | Performed by: HOSPITALIST

## 2024-09-02 PROCEDURE — 63600175 PHARM REV CODE 636 W HCPCS: Performed by: INTERNAL MEDICINE

## 2024-09-02 PROCEDURE — 63600175 PHARM REV CODE 636 W HCPCS: Performed by: STUDENT IN AN ORGANIZED HEALTH CARE EDUCATION/TRAINING PROGRAM

## 2024-09-02 PROCEDURE — 83735 ASSAY OF MAGNESIUM: CPT | Performed by: HOSPITALIST

## 2024-09-02 PROCEDURE — 80061 LIPID PANEL: CPT | Performed by: HOSPITALIST

## 2024-09-02 PROCEDURE — 36415 COLL VENOUS BLD VENIPUNCTURE: CPT | Performed by: HOSPITALIST

## 2024-09-02 PROCEDURE — 21400001 HC TELEMETRY ROOM

## 2024-09-02 PROCEDURE — 93010 ELECTROCARDIOGRAM REPORT: CPT | Mod: ,,, | Performed by: GENERAL PRACTICE

## 2024-09-02 PROCEDURE — 80202 ASSAY OF VANCOMYCIN: CPT | Performed by: HOSPITALIST

## 2024-09-02 PROCEDURE — 80053 COMPREHEN METABOLIC PANEL: CPT | Performed by: HOSPITALIST

## 2024-09-02 PROCEDURE — 25000003 PHARM REV CODE 250: Performed by: STUDENT IN AN ORGANIZED HEALTH CARE EDUCATION/TRAINING PROGRAM

## 2024-09-02 PROCEDURE — 63600175 PHARM REV CODE 636 W HCPCS: Performed by: HOSPITALIST

## 2024-09-02 PROCEDURE — 25000003 PHARM REV CODE 250: Performed by: HOSPITALIST

## 2024-09-02 PROCEDURE — 84443 ASSAY THYROID STIM HORMONE: CPT | Performed by: INTERNAL MEDICINE

## 2024-09-02 RX ORDER — METOPROLOL TARTRATE 1 MG/ML
5 INJECTION, SOLUTION INTRAVENOUS ONCE
Status: COMPLETED | OUTPATIENT
Start: 2024-09-02 | End: 2024-09-02

## 2024-09-02 RX ORDER — IBUPROFEN 200 MG
16 TABLET ORAL
Status: DISCONTINUED | OUTPATIENT
Start: 2024-09-02 | End: 2024-09-02

## 2024-09-02 RX ORDER — INSULIN ASPART 100 [IU]/ML
0-10 INJECTION, SOLUTION INTRAVENOUS; SUBCUTANEOUS EVERY 6 HOURS
Status: DISCONTINUED | OUTPATIENT
Start: 2024-09-02 | End: 2024-09-04

## 2024-09-02 RX ORDER — IBUPROFEN 200 MG
24 TABLET ORAL
Status: DISCONTINUED | OUTPATIENT
Start: 2024-09-02 | End: 2024-09-02

## 2024-09-02 RX ORDER — HYDROCODONE BITARTRATE AND ACETAMINOPHEN 5; 325 MG/1; MG/1
1 TABLET ORAL EVERY 8 HOURS PRN
Status: DISCONTINUED | OUTPATIENT
Start: 2024-09-02 | End: 2024-09-05 | Stop reason: HOSPADM

## 2024-09-02 RX ORDER — GABAPENTIN 100 MG/1
200 CAPSULE ORAL NIGHTLY
Status: DISCONTINUED | OUTPATIENT
Start: 2024-09-02 | End: 2024-09-05 | Stop reason: HOSPADM

## 2024-09-02 RX ORDER — SERTRALINE HYDROCHLORIDE 25 MG/1
25 TABLET, FILM COATED ORAL NIGHTLY
Status: DISCONTINUED | OUTPATIENT
Start: 2024-09-02 | End: 2024-09-05 | Stop reason: HOSPADM

## 2024-09-02 RX ORDER — GLUCAGON 1 MG
1 KIT INJECTION
Status: DISCONTINUED | OUTPATIENT
Start: 2024-09-02 | End: 2024-09-02

## 2024-09-02 RX ORDER — BUPROPION HYDROCHLORIDE 150 MG/1
150 TABLET ORAL DAILY
Status: DISCONTINUED | OUTPATIENT
Start: 2024-09-03 | End: 2024-09-05 | Stop reason: HOSPADM

## 2024-09-02 RX ADMIN — INSULIN ASPART 10 UNITS: 100 INJECTION, SOLUTION INTRAVENOUS; SUBCUTANEOUS at 05:09

## 2024-09-02 RX ADMIN — INSULIN ASPART 8 UNITS: 100 INJECTION, SOLUTION INTRAVENOUS; SUBCUTANEOUS at 05:09

## 2024-09-02 RX ADMIN — SODIUM CHLORIDE: 9 INJECTION, SOLUTION INTRAVENOUS at 06:09

## 2024-09-02 RX ADMIN — HYDROCODONE BITARTRATE AND ACETAMINOPHEN 1 TABLET: 5; 325 TABLET ORAL at 11:09

## 2024-09-02 RX ADMIN — SODIUM CHLORIDE: 9 INJECTION, SOLUTION INTRAVENOUS at 03:09

## 2024-09-02 RX ADMIN — CEFTRIAXONE SODIUM 1 G: 1 INJECTION, POWDER, FOR SOLUTION INTRAMUSCULAR; INTRAVENOUS at 02:09

## 2024-09-02 RX ADMIN — MUPIROCIN 1 G: 20 OINTMENT TOPICAL at 08:09

## 2024-09-02 RX ADMIN — VANCOMYCIN HYDROCHLORIDE 1250 MG: 1.25 INJECTION, POWDER, LYOPHILIZED, FOR SOLUTION INTRAVENOUS at 02:09

## 2024-09-02 RX ADMIN — ASPIRIN 81 MG 81 MG: 81 TABLET ORAL at 09:09

## 2024-09-02 RX ADMIN — PRAVASTATIN SODIUM 20 MG: 20 TABLET ORAL at 08:09

## 2024-09-02 RX ADMIN — METOPROLOL TARTRATE 2.5 MG: 1 INJECTION, SOLUTION INTRAVENOUS at 05:09

## 2024-09-02 RX ADMIN — ENOXAPARIN SODIUM 30 MG: 30 INJECTION SUBCUTANEOUS at 05:09

## 2024-09-02 RX ADMIN — GABAPENTIN 200 MG: 100 CAPSULE ORAL at 08:09

## 2024-09-02 RX ADMIN — SERTRALINE HYDROCHLORIDE 25 MG: 25 TABLET ORAL at 08:09

## 2024-09-02 RX ADMIN — INSULIN ASPART 10 UNITS: 100 INJECTION, SOLUTION INTRAVENOUS; SUBCUTANEOUS at 12:09

## 2024-09-02 RX ADMIN — LEVOTHYROXINE SODIUM 50 MCG: 0.03 TABLET ORAL at 06:09

## 2024-09-02 RX ADMIN — MUPIROCIN 1 G: 20 OINTMENT TOPICAL at 09:09

## 2024-09-02 RX ADMIN — INSULIN ASPART 8 UNITS: 100 INJECTION, SOLUTION INTRAVENOUS; SUBCUTANEOUS at 11:09

## 2024-09-02 RX ADMIN — BUPROPION HYDROCHLORIDE 300 MG: 150 TABLET, EXTENDED RELEASE ORAL at 09:09

## 2024-09-02 NOTE — ASSESSMENT & PLAN NOTE
Urine cultures were collected.  Patient was started on ceftriaxone   UA is not very impressive   Follow blood culture

## 2024-09-02 NOTE — ASSESSMENT & PLAN NOTE
Most recent creatinine and eGFR are listed below.  Recent Labs     09/01/24  1335 09/01/24  1846 09/02/24  0444   CREATININE 4.9* 3.8* 4.1*   EGFRNORACEVR 11.7* 15.8* 14.4*       Baseline creatinine is 1.4.  Patient is currently in acute renal failure with creatinine of 4.9   kidney ultrasound with no hydronephrosis   IV fluids start  for hydration and nephrology consult for non-anion gap metabolic acidosis and renal failure

## 2024-09-02 NOTE — PROGRESS NOTES
"[facility] Medicine  Progress Note    Patient Name: Orlando Treadwell  MRN: [id number]  Patient Class: IP- Inpatient   Admission Date: 9/1/2024  Length of Stay: 1 days  Attending Physician: Suleman Carpio MD  Primary Care Provider: Kleber Worthy III, MD        Subjective:     Principal Problem:Severe sepsis        HPI:  75 yo man with PMH of diabetes, hypertension, CAD and hypothyroid who presented to the ER because of generalized weakness and hypotension.  Apparently patient has not been feeling well, and appeared confused to the wife for the last 4 days.  He has been having urinary incontinence, frequency, and having generalized weakness.  2 days ago, pt has fallen twice, due to "weak knees" per pt. Today wife noted that his symptoms are getting worse.  She checked his blood pressure and it was in the 70s systolic (despite holding his bp meds for the last 4 days).  As a result, pt was brought to the ER for evaluation. ROS is also positive for dry cough for 2 weeks.     In the ER, vitals had bp of 83/63, HR of 73, RR of 24, afebrile, sating 97% on RA.  His SpO2 then dropped to 89% requiring 2 L nasal cannula.  CBC with white count of 14.  CMP showed multiple abnormalities including sodium of 119, potassium 5.6, negative anion gap, creatinine of 4.9 compared to baseline of 1.4, and blood sugar of 785.  Also elevated LFTs with AST being 106, ALT being 84.  UA with +6 white blood cells, and rare bacteria.  Chest x-ray is negative for pneumonia.  COVID/flu was negative.  Patient was given ceftriaxone and vanco, also 2 L of fluids for resuscitation.  Will be admitted to Medicine for his severe sepsis suspect secondary to UTI.      Overview/Hospital Course:  No notes on file    Interval History:     Patient was seen and examined, wife at bedside.  Patient answer the questions appropriately but at 1 point he has slowed response.  Previous prostatitis history noted.  Urine culture and blood culture " and respiratory panels are negative.  CT head negative but showing intra coronal or killer masses posterior superiorly on the right and anteromedially on the left possible varicocele/lymphocele  Wife reports that patient was seen by ophthalmologist and this findings are not new and they found in previous MRI.  And slit lamp exam was done at time 2 weeks ago  Previous MRI in 2020 noted.    Review of Systems  Objective:     Vital Signs (Most Recent):  Temp: 98.5 °F (36.9 °C) (09/02/24 1142)  Pulse: 96 (09/02/24 1142)  Resp: 17 (09/02/24 1142)  BP: (!) 89/53 (09/02/24 1145)  SpO2: 96 % (09/02/24 1142) Vital Signs (24h Range):  Temp:  [97.5 °F (36.4 °C)-98.6 °F (37 °C)] 98.5 °F (36.9 °C)  Pulse:  [66-96] 96  Resp:  [14-22] 17  SpO2:  [89 %-100 %] 96 %  BP: ()/(44-79) 89/53     Weight: 106.4 kg (234 lb 9.1 oz)  Body mass index is 35.67 kg/m².    Intake/Output Summary (Last 24 hours) at 9/2/2024 1205  Last data filed at 9/2/2024 1009  Gross per 24 hour   Intake 240 ml   Output 1450 ml   Net -1210 ml         Physical Exam  Constitutional:       General: He is not in acute distress.     Appearance: He is well-developed.   HENT:      Head: Normocephalic.   Eyes:      Pupils: Pupils are equal, round, and reactive to light.   Cardiovascular:      Rate and Rhythm: Normal rate and regular rhythm.      Pulses: Normal pulses.   Pulmonary:      Effort: No respiratory distress.   Abdominal:      General: Abdomen is flat. There is no distension.      Tenderness: There is no abdominal tenderness.   Musculoskeletal:         General: Normal range of motion.      Cervical back: Neck supple.   Skin:     Findings: No rash.   Neurological:      Mental Status: He is alert and oriented to person, place, and time.      Comments: Slow to respond sometimes   Psychiatric:         Mood and Affect: Mood normal.             Significant Labs: All pertinent labs within the past 24 hours have been reviewed.  CBC:   Recent Labs   Lab  "09/01/24  1335 09/02/24  0443   WBC 14.03* 13.83*   HGB 15.5 13.0*   HCT 47.2 40.3    202     CMP:   Recent Labs   Lab 09/01/24  1335 09/01/24  1846 09/02/24  0444   * 123* 129*   K 5.6* 5.3* 4.7   CL 84* 94* 95   CO2 20* 18* 22*   * 636* 273*   BUN 93* 84* 90*   CREATININE 4.9* 3.8* 4.1*   CALCIUM 10.3 8.1* 9.0   PROT 9.2*  --  7.2   ALBUMIN 4.1  --  3.3*   BILITOT 1.1*  --  0.7   ALKPHOS 97  --  76   *  --  91*   ALT 84*  --  65*   ANIONGAP 15 11 12     Cardiac Markers: No results for input(s): "CKMB", "MYOGLOBIN", "BNP", "TROPISTAT" in the last 48 hours.    Significant Imaging: I have reviewed all pertinent imaging results/findings within the past 24 hours.    Assessment/Plan:      * Severe sepsis  This patient does have evidence of infective focus  My overall impression is sepsis.  Source: Urinary Tract  Antibiotics given-   Antibiotics (72h ago, onward)      Start     Stop Route Frequency Ordered    09/01/24 2100  mupirocin 2 % ointment         09/06/24 2059 Nasl 2 times daily 09/01/24 1651    09/01/24 1549  vancomycin - pharmacy to dose  (vancomycin IVPB (PEDS and ADULTS))        Placed in "And" Linked Group    -- IV pharmacy to manage frequency 09/01/24 1449          Latest lactate reviewed-  Recent Labs   Lab 09/01/24  1329   POCLAC 3.26*       Organ dysfunction indicated by Acute kidney injury    UA has rare bacteria and +6 wbc, but patient has been having symptoms.  Urine culture was sent and so far neg    Blood cultures neg  Chest x-ray neg  COVID/flu are negative.  comprehensive viral panel neg  Continue ceftriaxone for suspected UTI for now .  Still hold BP meds   Patient was admitted with encephalopathy in the past .   Currently no fever . If fever+, will do LP and MRI brain     Fall  Normal CT head. Except eye findings but appear not new   PT/OT.  Suspect the fall to be 2/2 generalized weakness 2/2 current infection   May need MRI of brain if not improve quickly . Possible " from uremia and renal failure  Outpatient opthalmology referral    Hypothyroid  Resumed  levothyroxine. Patient report his MD stopped because of glucoma !!  Add TSH        Hyperkalemia  The patients most recent potassium results are listed below.  Recent Labs     09/01/24  1335 09/01/24  1846 09/02/24  0444   K 5.6* 5.3* 4.7       Resolved               Hyponatremia  The patient's most recent sodium results are listed below.  Recent Labs     09/01/24  1335 09/01/24  1846 09/02/24  0444   * 123* 129*     Improved   Appear hypovolumic hypo Na      Type 2 diabetes mellitus with hyperglycemia, without long-term current use of insulin  Blood sugar of 785 at admission and coming down   Started  the patient on Lantus 10 units at night, and Humalog sliding scale.   IV fluids for hydration.  Patient is not in DKA.and no gap      Acute renal failure  Most recent creatinine and eGFR are listed below.  Recent Labs     09/01/24  1335 09/01/24 1846 09/02/24  0444   CREATININE 4.9* 3.8* 4.1*   EGFRNORACEVR 11.7* 15.8* 14.4*       Baseline creatinine is 1.4.  Patient is currently in acute renal failure with creatinine of 4.9   kidney ultrasound with no hydronephrosis   IV fluids start  for hydration and nephrology consult for non-anion gap metabolic acidosis and renal failure        UTI (urinary tract infection)  Urine cultures were collected.  Patient was started on ceftriaxone   UA is not very impressive   Follow blood culture     Encephalopathy, metabolic  Metabolic Vs toxic     Plan   He had previous the same problem   CT head neg  IV antibiotics   No CNS infection suspected . If fever +, will order LP and MRI brain   He is on tremendous amount of 3 psych medications and decrease the doses on very high-dose gabapentin too and decreased        VTE Risk Mitigation (From admission, onward)           Ordered     enoxaparin injection 30 mg  Daily         09/01/24 1653     IP VTE HIGH RISK PATIENT  Once         09/01/24 1646      Place sequential compression device  Until discontinued         09/01/24 3167                    Discharge Planning   KRISTEN:      Code Status: Full Code   Is the patient medically ready for discharge?:     Reason for patient still in hospital (select all that apply): Treatment                     Suleman Carpio MD  Department of Hospital Medicine   [facility]

## 2024-09-02 NOTE — PROGRESS NOTES
Pharmacokinetic Assessment Follow Up: IV Vancomycin    Vancomycin serum concentration assessment(s):    The random level was drawn correctly and can be used to guide therapy at this time. The measurement is within the desired definitive target range of 15 to 20 mcg/mL.    Vancomycin Regimen Plan:    Continue regimen to Vancomycin 1250 mg IV x 1 dose with next serum random concentration on 9/3/24 at 13:00.    Drug levels (last 3 results):  Recent Labs   Lab Result Units 09/02/24  0444   Vancomycin, Random ug/mL 15.7       Pharmacy will continue to follow and monitor vancomycin.    Please contact pharmacy at extension [phone] for questions regarding this assessment.    Thank you for the consult,   Meaghan Tejada       Patient brief summary:  Orlando Treadwell is a 74 y.o. male initiated on antimicrobial therapy with IV Vancomycin for treatment of sepsis    The patient's current regimen is Vancomycin Intermittent dosing.    Drug Allergies:   Review of patient's allergies indicates:   Allergen Reactions    Codeine     Sulfamethoxazole-trimethoprim      dizziness    Codeine sulfate Nausea And Vomiting    Morphine Nausea Only     Ok with nausea med.       Actual Body Weight:   106.4 kg    Renal Function:   Estimated Creatinine Clearance: 18.4 mL/min (A) (based on SCr of 4.1 mg/dL (H)).,     Dialysis Method (if applicable):  N/A    CBC (last 72 hours):  Recent Labs   Lab Result Units 09/01/24  1335 09/02/24  0443   WBC K/uL 14.03* 13.83*   Hemoglobin g/dL 15.5 13.0*   Hematocrit % 47.2 40.3   Platelets K/uL 272 202   Gran % % 81.2* 75.5*   Lymph % % 9.4* 11.7*   Mono % % 6.8 9.8   Eosinophil % % 1.4 2.0   Basophil % % 0.4 0.4   Differential Method  Automated Automated       Metabolic Panel (last 72 hours):  Recent Labs   Lab Result Units 09/01/24  1335 09/01/24  1354 09/01/24  1846 09/01/24  1850 09/02/24  0444   Sodium mmol/L 119*  --  123*  --  129*   Sodium, Urine mmol/L  --   --   --  46  --    Potassium mmol/L 5.6*  --  5.3*   --  4.7   Chloride mmol/L 84*  --  94*  --  95   CO2 mmol/L 20*  --  18*  --  22*   Glucose mg/dL 785*  --  636*  --  273*   Glucose, UA   --  4+*  --   --   --    BUN mg/dL 93*  --  84*  --  90*   Creatinine mg/dL 4.9*  --  3.8*  --  4.1*   Albumin g/dL 4.1  --   --   --  3.3*   Total Bilirubin mg/dL 1.1*  --   --   --  0.7   Alkaline Phosphatase U/L 97  --   --   --  76   AST U/L 106*  --   --   --  91*   ALT U/L 84*  --   --   --  65*   Magnesium mg/dL  --   --   --   --  2.5       Vancomycin Administrations:  vancomycin given in the last 96 hours                     vancomycin in dextrose 5 % 1 gram/250 mL IVPB 1,000 mg (mg) 1,000 mg New Bag 09/01/24 1520    vancomycin in dextrose 5 % 1 gram/250 mL IVPB 1,000 mg (mg) 1,000 mg New Bag 09/01/24 1519                    Microbiologic Results:  Microbiology Results (last 7 days)       Procedure Component Value Units Date/Time    Urine culture [[id number]] Collected: 09/01/24 1354    Order Status: Completed Specimen: Urine Updated: 09/02/24 0700     Urine Culture, Routine No growth to date    Blood culture x two cultures. Draw prior to antibiotics. [[id number]] Collected: 09/01/24 1343    Order Status: Completed Specimen: Blood from Peripheral, Hand, Left Updated: 09/01/24 2117     Blood Culture, Routine No Growth to date    Narrative:      Aerobic and anaerobic    Blood culture x two cultures. Draw prior to antibiotics. [[id number]] Collected: 09/01/24 1343    Order Status: Completed Specimen: Blood from Peripheral, Antecubital, Right Updated: 09/01/24 2117     Blood Culture, Routine No Growth to date    Narrative:      Aerobic and anaerobic    Respiratory Infection Panel (PCR), Nasopharyngeal [[id number]] Collected: 09/01/24 1838    Order Status: Completed Specimen: Nasopharyngeal Swab Updated: 09/01/24 2033     Respiratory Infection Panel Source NP swab     Adenovirus Not Detected     Coronavirus 229E, Common Cold Virus Not Detected     Coronavirus HKU1, Common  Cold Virus Not Detected     Coronavirus NL63, Common Cold Virus Not Detected     Coronavirus OC43, Common Cold Virus Not Detected     Comment: Coronavirus strains 229E, HKU1, NL63, and OC43 can cause the common   cold   and are not associated with the respiratory disease outbreak caused   by  the COVID-19 (SARS-CoV-2 novel Coronavirus) strain.           SARS-CoV2 (COVID-19) Qualitative PCR Not Detected     Human Metapneumovirus Not Detected     Human Rhinovirus/Enterovirus Not Detected     Influenza A (subtypes H1, H1-2009,H3) Not Detected     Influenza B Not Detected     Parainfluenza Virus 1 Not Detected     Parainfluenza Virus 2 Not Detected     Parainfluenza Virus 3 Not Detected     Parainfluenza Virus 4 Not Detected     Respiratory Syncytial Virus Not Detected     Bordetella Parapertussis (EA9606) Not Detected     Bordetella pertussis (ptxP) Not Detected     Chlamydia pneumoniae Not Detected     Mycoplasma pneumoniae Not Detected     Comment: Respiratory Infection Panel testing performed by Multiplex PCR.       Narrative:      Respiratory Infection Panel source->NP Swab    Urine Culture High Risk [[id number]]     Order Status: Completed Specimen: Urine, Catheterized     Urine Culture High Risk [[id number]]     Order Status: Canceled Specimen: Urine

## 2024-09-02 NOTE — ASSESSMENT & PLAN NOTE
The patients most recent potassium results are listed below.  Recent Labs     09/01/24  1335 09/01/24  1846 09/02/24  0444   K 5.6* 5.3* 4.7       Resolved

## 2024-09-02 NOTE — ASSESSMENT & PLAN NOTE
Normal CT head. Except eye findings but appear not new   PT/OT.  Suspect the fall to be 2/2 generalized weakness 2/2 current infection   May need MRI of brain if not improve quickly . Possible from uremia and renal failure  Outpatient opthalmology referral

## 2024-09-02 NOTE — CONSULTS
"INPATIENT NEPHROLOGY CONSULT   [facility]    Patient Name: Orlando Treadwell  Date: 09/02/2024    Reason for consultation: BRII    Chief Complaint:   Chief Complaint   Patient presents with    Urinary Frequency     URINATING ON SELF X 3 DAYS. PER WIFE , ACTING CONFUSED OVER LAST FEW DAYS, NOT TAKING HIS MEDS    GEN WEAKNESS     FALLEN X 2X OVER LAST 2 DAYS    Head Injury       History of Present Illness:  73 yo man with PMH of diabetes, hypertension, CAD and hypothyroid who presented to the ER because of generalized weakness and hypotension.  Apparently patient has not been feeling well, and appeared confused to the wife for the last 4 days.  He has been having urinary incontinence, frequency, and having generalized weakness.  2 days ago, pt has fallen twice, due to "weak knees" per pt. Today wife noted that his symptoms are getting worse.  She checked his blood pressure and it was in the 70s systolic (despite holding his bp meds for the last 4 days).  As a result, pt was brought to the ER for evaluation. ROS is also positive for dry cough for 2 weeks. CMP showed multiple abnormalities including sodium of 119, potassium 5.6, negative anion gap, creatinine of 4.9 compared to baseline of 1.4, and blood sugar of 785. Also elevated LFTs with AST being 106, ALT being 84. UA with +6 white blood cells, and rare bacteria. Chest x-ray is negative for pneumonia. COVID/flu was negative. Patient was given ceftriaxone and vanco, also 2 L of fluids for resuscitation. Will be admitted to Medicine for his severe sepsis suspect secondary to UTI. Consulted for BRII.    Home meds- hydral, mg, metformin, metoprolol, benicar/HCTZ     Interval History:  9/2- afebrile, tachy, hypotensive, on 2L NC, UOP 1.4L    Plan of Care:    Assessment:  Urosepsis  Possible HHS; underlying DMII  BRII/CKD III  Baseline HTN  Hyponatremia  Hyperkalemia  Acidosis   SHPT  Anemia of CKD    Plan:    - agree with IV antbx and IVFs- dose for CrCl " < 15  - blood glucose management per HM- hold metformin  - hold all home BP med including benicar/HCTZ  - trend metabolic parameters with above management- renal/diabetic diet  - no acute RRT needs = reassess daily  - no nsaids or IV contrast- strict ins/outs  - check phos, PTH when more stable  - no acute hematologic issues    Thank you for allowing us to participate in this patient's care. We will continue to follow.    Vital Signs:  Temp Readings from Last 3 Encounters:   09/02/24 98.5 °F (36.9 °C) (Oral)   06/21/24 98 °F (36.7 °C)   05/17/24 98.9 °F (37.2 °C)       Pulse Readings from Last 3 Encounters:   09/02/24 96   06/21/24 73   05/17/24 69       BP Readings from Last 3 Encounters:   09/02/24 (!) 89/53   06/21/24 128/72   03/21/24 122/68       Weight:  Wt Readings from Last 3 Encounters:   09/01/24 106.4 kg (234 lb 9.1 oz)   06/21/24 107 kg (236 lb)   05/17/24 107.9 kg (237 lb 12.3 oz)       INS/OUTS:  I/O last 3 completed shifts:  In: -   Out: 1450 [Urine:1450]  I/O this shift:  In: 240 [P.O.:240]  Out: -     Past Medical & Surgical History:  Past Medical History:   Diagnosis Date    Abdominal hernia     Arthritis     Asbestos exposure     SOB     Back pain     Bulging discs     lumbar    Chronic back pain     Coronary artery disease     Hypertension     Kidney stone     Prostatitis     Sepsis due to methicillin resistant Staphylococcus aureus (MRSA) 06/2019    Due to large right upper arm abscess    Wears glasses        Past Surgical History:   Procedure Laterality Date    APPENDECTOMY      BACK SURGERY  09/23/2022    COLONOSCOPY N/A 09/23/2021    Procedure: COLONOSCOPY;  Surgeon: Ashvin Batista MD;  Location: [facility];  Service: Endoscopy;  Laterality: N/A;    ESOPHAGOGASTRODUODENOSCOPY N/A 09/23/2021    Procedure: EGD (ESOPHAGOGASTRODUODENOSCOPY);  Surgeon: Ashvin Batista MD;  Location: [facility];  Service: Endoscopy;  Laterality: N/A;    HAND SURGERY      rt hand    INCISION AND DRAINAGE Bilateral  06/2019    Right upper arm and left upper arm abscesses    JOINT REPLACEMENT      right knee    KNEE LIGAMENT RECONSTRUCTION      left knee    LAPAROSCOPIC CHOLECYSTECTOMY N/A 01/17/2020    Procedure: CHOLECYSTECTOMY, LAPAROSCOPIC;  Surgeon: Edgard Hill III, MD;  Location: [facility];  Service: General;  Laterality: N/A;    LITHOTRIPSY      TONSILLECTOMY, ADENOIDECTOMY, BILATERAL MYRINGOTOMY AND TUBES      TOTAL KNEE ARTHROPLASTY  1971    rt knee    URETERAL STENT PLACEMENT         Past Social History:  Social History     Socioeconomic History    Marital status:    Occupational History    Occupation: RETIRED   Tobacco Use    Smoking status: Never    Smokeless tobacco: Never   Substance and Sexual Activity    Alcohol use: No    Drug use: No    Sexual activity: Yes     Partners: Female     Social Determinants of Health     Financial Resource Strain: Medium Risk (9/2/2024)    Overall Financial Resource Strain (CARDIA)     Difficulty of Paying Living Expenses: Somewhat hard   Food Insecurity: Food Insecurity Present (9/2/2024)    Hunger Vital Sign     Worried About Running Out of Food in the Last Year: Sometimes true     Ran Out of Food in the Last Year: Sometimes true   Transportation Needs: No Transportation Needs (9/2/2024)    TRANSPORTATION NEEDS     Transportation : No   Physical Activity: Inactive (9/2/2024)    Exercise Vital Sign     Days of Exercise per Week: 0 days     Minutes of Exercise per Session: 0 min   Stress: Stress Concern Present (9/2/2024)    [employer] - Occupational Stress Questionnaire     Feeling of Stress : Very much   Housing Stability: Low Risk  (9/2/2024)    Housing Stability Vital Sign     Unable to Pay for Housing in the Last Year: No     Homeless in the Last Year: No       Medications:  Scheduled Meds:   aspirin  81 mg Oral Daily    [START ON 9/3/2024] buPROPion  150 mg Oral Daily    cefTRIAXone (Rocephin) IV (PEDS and ADULTS)  1 g Intravenous Q24H     enoxparin  30 mg Subcutaneous Daily    gabapentin  200 mg Oral QHS    insulin aspart U-100  0-10 Units Subcutaneous Q6H    levothyroxine  50 mcg Oral Before breakfast    linaCLOtide  145 mcg Oral Daily    mupirocin   Nasal BID    pravastatin  20 mg Oral QHS    sertraline  25 mg Oral QHS     Continuous Infusions:   0.9% NaCl   Intravenous Continuous 125 mL/hr at 09/02/24 0603 New Bag at 09/02/24 0603     PRN Meds:.  Current Facility-Administered Medications:     acetaminophen, 650 mg, Oral, Q8H PRN    acetaminophen, 650 mg, Oral, Q4H PRN    aluminum-magnesium hydroxide-simethicone, 30 mL, Oral, QID PRN    dextrose 50%, 12.5 g, Intravenous, PRN    dextrose 50%, 25 g, Intravenous, PRN    glucagon (human recombinant), 1 mg, Intramuscular, PRN    glucose, 16 g, Oral, PRN    glucose, 24 g, Oral, PRN    HYDROcodone-acetaminophen, 1 tablet, Oral, Q8H PRN    magnesium oxide, 800 mg, Oral, PRN    magnesium oxide, 800 mg, Oral, PRN    naloxone, 0.02 mg, Intravenous, PRN    ondansetron, 4 mg, Intravenous, Q6H PRN    potassium bicarbonate, 35 mEq, Oral, PRN    potassium bicarbonate, 50 mEq, Oral, PRN    potassium bicarbonate, 60 mEq, Oral, PRN    potassium, sodium phosphates, 2 packet, Oral, PRN    potassium, sodium phosphates, 2 packet, Oral, PRN    potassium, sodium phosphates, 2 packet, Oral, PRN    senna-docusate 8.6-50 mg, 1 tablet, Oral, BID PRN    sodium chloride 0.9%, 2 mL, Intravenous, Q12H PRN    Pharmacy to dose Vancomycin consult, , , Once **AND** vancomycin - pharmacy to dose, , Intravenous, pharmacy to manage frequency  No current facility-administered medications on file prior to encounter.     Current Outpatient Medications on File Prior to Encounter   Medication Sig Dispense Refill    ascorbic acid, vitamin C, (VITAMIN C) 100 MG tablet Take 1 tablet by mouth once daily.      aspirin 325 MG tablet Take 325 mg by mouth once daily.      b complex vitamins tablet Take 1 tablet by mouth once daily.       beta-carotene,A,-vits C,E/mins (VISION ORAL) Take 1 tablet by mouth once daily.      buPROPion (WELLBUTRIN XL) 300 MG 24 hr tablet Take 1 tablet (300 mg total) by mouth every morning. 90 tablet 3    diclofenac sodium 1 % Gel Apply 2 g topically 2 (two) times daily. 1 Tube 2    gabapentin (NEURONTIN) 300 MG capsule Take 2 capsules (600 mg total) by mouth every evening. 180 capsule 3    hydrALAZINE (APRESOLINE) 50 MG tablet Take 50 mg by mouth 2 (two) times daily.      levothyroxine (SYNTHROID) 50 MCG tablet Take 1 tablet (50 mcg total) by mouth before breakfast. 90 tablet 3    LINZESS 145 mcg Cap capsule Take 1 capsule (145 mcg total) by mouth once daily. 30 capsule 5    MAGNESIUM ORAL Take 500 mg by mouth once daily.      metFORMIN (GLUCOPHAGE-XR) 500 MG ER 24hr tablet Take 1 tablet (500 mg total) by mouth 2 (two) times daily with meals. 180 tablet 1    metoprolol succinate (TOPROL-XL) 100 MG 24 hr tablet Take 100 mg by mouth once daily.      multivitamin (THERAGRAN) tablet Take 1 tablet by mouth once daily.      NARCAN 4 mg/actuation Spry 1 spray by Nasal route once.      nitroGLYCERIN (NITROSTAT) 0.4 MG SL tablet Place 0.4 mg under the tongue every 5 (five) minutes as needed for Chest pain.      olmesartan-hydrochlorothiazide (BENICAR HCT) 40-25 mg per tablet Take 1 tablet by mouth once daily.      oxyCODONE (ROXICODONE) 10 mg Tab immediate release tablet Take 10 mg by mouth every 8 (eight) hours as needed.      pantoprazole (PROTONIX) 40 MG tablet Take 1 tablet (40 mg total) by mouth 2 (two) times daily. 180 tablet 3    pravastatin (PRAVACHOL) 20 MG tablet Take 1 tablet (20 mg total) by mouth once daily. 90 tablet 3    sertraline (ZOLOFT) 100 MG tablet TAKE 1 & 1/2 TABLETS BY MOUTH ONCE DAILY (Patient taking differently: Take 150 mg by mouth once daily. TAKE 1 & 1/2 TABLETS BY MOUTH ONCE DAILY) 135 tablet 3    testosterone cypionate (DEPOTESTOTERONE CYPIONATE) 200 mg/mL injection INJECT 2 ML INTO THE MUSCLE EVERY  "28 DAYS (Patient taking differently: Inject 2 mLs into the muscle every 28 days.) 10 mL 1    vitamin E, dl,tocopheryl acet, (VITAMIN E, DL, ACETATE,) 180 mg (400 unit) Cap Take 400 Units by mouth once daily.      zinc gluconate 50 mg tablet Take 50 mg by mouth once daily.      lamoTRIgine (LAMICTAL) 25 MG tablet Take one tab nightly for 7 days then one tab every 12 hours (Patient not taking: Reported on 6/21/2024) 60 tablet 3       Allergies:  Codeine, Sulfamethoxazole-trimethoprim, Codeine sulfate, and Morphine    Past Family History:  Reviewed; refer to Hospitalist Admission Note    Review of Systems:  Review of Systems - All 14 systems reviewed and negative, except as noted in HPI    Physical Exam:  BP (!) 89/53   Pulse 96   Temp 98.5 °F (36.9 °C) (Oral)   Resp 17   Ht 5' 8" (1.727 m)   Wt 106.4 kg (234 lb 9.1 oz)   SpO2 96%   BMI 35.67 kg/m²     General Appearance:    NAD, AAO x 3, cooperative, appears stated age   Head:    Normocephalic, atraumatic   Eyes:    PER, EOMI, and conjunctiva/sclera clear bilaterally        Mouth:   Moist mucus membranes, no thrush or oral lesions, normal      dentition   Back:     No CVA tenderness   Lungs:     Clear to auscultation bilaterally, no wheeze, crackles, rales      or rhonchi, symmetric air movement, respirations unlabored   Heart:    Regular rate and rhythm, S1 and S2 normal, no murmur, rub   or gallop   Abdomen:     Soft, non-tender, non-distended, bowel sounds active all four   quadrants, no RT or guarding, no masses, no organomegaly   Extremities:   Warm and well perfused, distal pulses intact, no cyanosis or    peripheral edema   MSK:   No joint or muscle swelling, tenderness or deformity   Skin:   Skin color, texture, turgor normal, no rashes or lesions   Neurologic/Psychiatric:   CNII-XII intact, normal strength and sensation       throughout, no asterixis; normal affect, memory, judgement     and insight     Results:  Lab Results   Component Value Date    "  (L) 09/02/2024    K 4.7 09/02/2024    CL 95 09/02/2024    CO2 22 (L) 09/02/2024    BUN 90 (H) 09/02/2024    CREATININE 4.1 (H) 09/02/2024    CALCIUM 9.0 09/02/2024    ANIONGAP 12 09/02/2024    ESTGFRAFRICA 57.2 (A) 06/24/2022    EGFRNONAA 49.5 (A) 06/24/2022       Lab Results   Component Value Date    CALCIUM 9.0 09/02/2024    PHOS 3.5 01/14/2020       Recent Labs   Lab 09/02/24  0443   WBC 13.83*   RBC 4.63   HGB 13.0*   HCT 40.3      MCV 87   MCH 28.1   MCHC 32.3       I have personally reviewed pertinent radiological imaging and reports from this admission.    I have spent > 70 minutes providing care for this patient for the above diagnoses. These services have included chart/data/imaging review, evaluation, exam, formulation of plan, , note preparation, and discussions with staff involved in this patient's care.    Neisha Swanson MD MPH   [facility]  [address] [place] [id number]  [phone] (p)  [phone] (f)

## 2024-09-02 NOTE — SUBJECTIVE & OBJECTIVE
Interval History:     Patient was seen and examined, wife at bedside.  Patient answer the questions appropriately but at 1 point he has slowed response.  Previous prostatitis history noted.  Urine culture and blood culture and respiratory panels are negative.  CT head negative but showing intra coronal or killer masses posterior superiorly on the right and anteromedially on the left possible varicocele/lymphocele  Wife reports that patient was seen by ophthalmologist and this findings are not new and they found in previous MRI.  And slit lamp exam was done at time 2 weeks ago  Previous MRI in 2020 noted.    Review of Systems  Objective:     Vital Signs (Most Recent):  Temp: 98.5 °F (36.9 °C) (09/02/24 1142)  Pulse: 96 (09/02/24 1142)  Resp: 17 (09/02/24 1142)  BP: (!) 89/53 (09/02/24 1145)  SpO2: 96 % (09/02/24 1142) Vital Signs (24h Range):  Temp:  [97.5 °F (36.4 °C)-98.6 °F (37 °C)] 98.5 °F (36.9 °C)  Pulse:  [66-96] 96  Resp:  [14-22] 17  SpO2:  [89 %-100 %] 96 %  BP: ()/(44-79) 89/53     Weight: 106.4 kg (234 lb 9.1 oz)  Body mass index is 35.67 kg/m².    Intake/Output Summary (Last 24 hours) at 9/2/2024 1205  Last data filed at 9/2/2024 1009  Gross per 24 hour   Intake 240 ml   Output 1450 ml   Net -1210 ml         Physical Exam  Constitutional:       General: He is not in acute distress.     Appearance: He is well-developed.   HENT:      Head: Normocephalic.   Eyes:      Pupils: Pupils are equal, round, and reactive to light.   Cardiovascular:      Rate and Rhythm: Normal rate and regular rhythm.      Pulses: Normal pulses.   Pulmonary:      Effort: No respiratory distress.   Abdominal:      General: Abdomen is flat. There is no distension.      Tenderness: There is no abdominal tenderness.   Musculoskeletal:         General: Normal range of motion.      Cervical back: Neck supple.   Skin:     Findings: No rash.   Neurological:      Mental Status: He is alert and oriented to person, place, and time.       "Comments: Slow to respond sometimes   Psychiatric:         Mood and Affect: Mood normal.             Significant Labs: All pertinent labs within the past 24 hours have been reviewed.  CBC:   Recent Labs   Lab 09/01/24  1335 09/02/24  0443   WBC 14.03* 13.83*   HGB 15.5 13.0*   HCT 47.2 40.3    202     CMP:   Recent Labs   Lab 09/01/24  1335 09/01/24  1846 09/02/24  0444   * 123* 129*   K 5.6* 5.3* 4.7   CL 84* 94* 95   CO2 20* 18* 22*   * 636* 273*   BUN 93* 84* 90*   CREATININE 4.9* 3.8* 4.1*   CALCIUM 10.3 8.1* 9.0   PROT 9.2*  --  7.2   ALBUMIN 4.1  --  3.3*   BILITOT 1.1*  --  0.7   ALKPHOS 97  --  76   *  --  91*   ALT 84*  --  65*   ANIONGAP 15 11 12     Cardiac Markers: No results for input(s): "CKMB", "MYOGLOBIN", "BNP", "TROPISTAT" in the last 48 hours.    Significant Imaging: I have reviewed all pertinent imaging results/findings within the past 24 hours.  "

## 2024-09-02 NOTE — NURSING
Nurses Note -- 4 Eyes      9/2/2024   3:34 AM      Skin assessed during: Admit      [x] No Altered Skin Integrity Present    []Prevention Measures Documented      [] Yes- Altered Skin Integrity Present or Discovered   [] LDA Added if Not in Epic (Describe Wound)   [] New Altered Skin Integrity was Present on Admit and Documented in LDA   [] Wound Image Taken    Wound Care Consulted? No    Attending Nurse:  tobias Blunt RN/Staff Member:   Francine

## 2024-09-02 NOTE — ASSESSMENT & PLAN NOTE
Blood sugar of 785 at admission and coming down   Started  the patient on Lantus 10 units at night, and Humalog sliding scale.   IV fluids for hydration.  Patient is not in DKA.and no gap

## 2024-09-02 NOTE — PROGRESS NOTES
Pharmacokinetic Initial Assessment: IV Vancomycin    Assessment/Plan:    Initiate intravenous vancomycin with loading dose of 2000 mg once with subsequent doses when random concentrations are less than 20 mcg/mL  Desired empiric serum trough concentration is 10 to 15 mcg/mL  Draw vancomycin random level on 09/02 at 0430.  Pharmacy will continue to follow and monitor vancomycin.      Please contact pharmacy at extension [phone] with any questions regarding this assessment.     Thank you for the consult,   Anibal Acevedo       Patient brief summary:  Orlando Treadwell is a 74 y.o. male initiated on antimicrobial therapy with IV Vancomycin for treatment of suspected urinary tract infection    Drug Allergies:   Review of patient's allergies indicates:   Allergen Reactions    Codeine     Sulfamethoxazole-trimethoprim      dizziness    Codeine sulfate Nausea And Vomiting    Morphine Nausea Only     Ok with nausea med.       Actual Body Weight:   106.4 kg    Renal Function:   Estimated Creatinine Clearance: 19.9 mL/min (A) (based on SCr of 3.8 mg/dL (H)).,     CBC (last 72 hours):  Recent Labs   Lab Result Units 09/01/24  1335   WBC K/uL 14.03*   Hemoglobin g/dL 15.5   Hematocrit % 47.2   Platelets K/uL 272   Gran % % 81.2*   Lymph % % 9.4*   Mono % % 6.8   Eosinophil % % 1.4   Basophil % % 0.4   Differential Method  Automated       Metabolic Panel (last 72 hours):  Recent Labs   Lab Result Units 09/01/24  1335 09/01/24  1354 09/01/24  1846 09/01/24  1850   Sodium mmol/L 119*  --  123*  --    Sodium, Urine mmol/L  --   --   --  46   Potassium mmol/L 5.6*  --  5.3*  --    Chloride mmol/L 84*  --  94*  --    CO2 mmol/L 20*  --  18*  --    Glucose mg/dL 785*  --  636*  --    Glucose, UA   --  4+*  --   --    BUN mg/dL 93*  --  84*  --    Creatinine mg/dL 4.9*  --  3.8*  --    Albumin g/dL 4.1  --   --   --    Total Bilirubin mg/dL 1.1*  --   --   --    Alkaline Phosphatase U/L 97  --   --   --    AST U/L 106*  --   --   --    ALT U/L  "84*  --   --   --        Drug levels (last 3 results):  No results for input(s): "VANCOMYCINRA", "VANCORANDOM", "VANCOMYCINPE", "VANCOPEAK", "VANCOMYCINTR", "VANCOTROUGH" in the last 72 hours.    Microbiologic Results:  Microbiology Results (last 7 days)       Procedure Component Value Units Date/Time    Blood culture x two cultures. Draw prior to antibiotics. [[id number]] Collected: 09/01/24 1343    Order Status: Completed Specimen: Blood from Peripheral, Hand, Left Updated: 09/01/24 2117     Blood Culture, Routine No Growth to date    Narrative:      Aerobic and anaerobic    Blood culture x two cultures. Draw prior to antibiotics. [[id number]] Collected: 09/01/24 1343    Order Status: Completed Specimen: Blood from Peripheral, Antecubital, Right Updated: 09/01/24 2117     Blood Culture, Routine No Growth to date    Narrative:      Aerobic and anaerobic    Respiratory Infection Panel (PCR), Nasopharyngeal [[id number]] Collected: 09/01/24 1838    Order Status: Completed Specimen: Nasopharyngeal Swab Updated: 09/01/24 2033     Respiratory Infection Panel Source NP swab     Adenovirus Not Detected     Coronavirus 229E, Common Cold Virus Not Detected     Coronavirus HKU1, Common Cold Virus Not Detected     Coronavirus NL63, Common Cold Virus Not Detected     Coronavirus OC43, Common Cold Virus Not Detected     Comment: Coronavirus strains 229E, HKU1, NL63, and OC43 can cause the common   cold   and are not associated with the respiratory disease outbreak caused   by  the COVID-19 (SARS-CoV-2 novel Coronavirus) strain.           SARS-CoV2 (COVID-19) Qualitative PCR Not Detected     Human Metapneumovirus Not Detected     Human Rhinovirus/Enterovirus Not Detected     Influenza A (subtypes H1, H1-2009,H3) Not Detected     Influenza B Not Detected     Parainfluenza Virus 1 Not Detected     Parainfluenza Virus 2 Not Detected     Parainfluenza Virus 3 Not Detected     Parainfluenza Virus 4 Not Detected     Respiratory " Syncytial Virus Not Detected     Bordetella Parapertussis (PV9836) Not Detected     Bordetella pertussis (ptxP) Not Detected     Chlamydia pneumoniae Not Detected     Mycoplasma pneumoniae Not Detected     Comment: Respiratory Infection Panel testing performed by Multiplex PCR.       Narrative:      Respiratory Infection Panel source->NP Swab    Urine culture [[id number]] Collected: 09/01/24 1354    Order Status: No result Specimen: Urine Updated: 09/01/24 1555    Urine Culture High Risk [[id number]]     Order Status: Completed Specimen: Urine, Catheterized     Urine Culture High Risk [[id number]]     Order Status: Canceled Specimen: Urine

## 2024-09-02 NOTE — PLAN OF CARE
Problem: Adult Inpatient Plan of Care  Goal: Plan of Care Review  Outcome: Progressing  Goal: Patient-Specific Goal (Individualized)  Outcome: Progressing  Goal: Absence of Hospital-Acquired Illness or Injury  Outcome: Progressing  Goal: Optimal Comfort and Wellbeing  Outcome: Progressing  Goal: Readiness for Transition of Care  Outcome: Progressing     Problem: Diabetes Comorbidity  Goal: Blood Glucose Level Within Targeted Range  Outcome: Progressing     Problem: Sepsis/Septic Shock  Goal: Optimal Coping  Outcome: Progressing  Goal: Absence of Bleeding  Outcome: Progressing  Goal: Blood Glucose Level Within Targeted Range  Outcome: Progressing  Goal: Absence of Infection Signs and Symptoms  Outcome: Progressing  Goal: Optimal Nutrition Intake  Outcome: Progressing     Problem: Acute Kidney Injury/Impairment  Goal: Fluid and Electrolyte Balance  Outcome: Progressing  Goal: Improved Oral Intake  Outcome: Progressing  Goal: Effective Renal Function  Outcome: Progressing     Problem: Skin Injury Risk Increased  Goal: Skin Health and Integrity  Outcome: Progressing

## 2024-09-02 NOTE — PLAN OF CARE
Met with patient and  Tiffany Huitron (Spouse)  [phone] (Mobile) at bedside to complete initial assessment. Patient / family reports patient DOES NOT have a living will and NO ONE is medical POA.     [facility]  Initial Discharge Assessment       Primary Care Provider: Kleber Worthy III, MD    Admission Diagnosis: Sepsis [A41.9]    Admission Date: 9/1/2024  Expected Discharge Date:     Transition of Care Barriers: None    Payor: [employer] [facility] / Plan: [employer] CHOICES / Product Type: Medicare Advantage /     Extended Emergency Contact Information  Primary Emergency Contact: Tiffany Huitron  Address: [address]           [place] [address] [place]  Mobile Phone: [phone]  Relation: Spouse  Secondary Emergency Contact: Francisco HUITRON  Address: [address], [place] [address] [place] [address]  Mobile Phone: [phone]  Relation: Son    Discharge Plan A: Home with family  Discharge Plan B: Home with family      [facility] - [place] - [address]  [address]  [facility] [address]  Phone: [phone] Fax: [phone]    [id number] - [place] - [address]  [address]  [facility] [address]  Phone: [phone] Fax: [phone]      Initial Assessment (most recent)       Adult Discharge Assessment - 09/02/24 1227          Discharge Assessment    Assessment Type Discharge Planning Assessment     Confirmed/corrected address, phone number and insurance Yes     Confirmed Demographics Correct on Facesheet     Source of Information patient;family     Communicated KRISTEN with patient/caregiver Date not available/Unable to determine     Reason For Admission severe sepsis     People in Home spouse     Facility Arrived From: home     Do you expect to return to your current living situation? Yes     Do you have help at home or someone to help you manage your care at home? Yes     Who are your caregiver(s)  and their phone number(s)? EbenTiffany canchola (Spouse)  [phone] (Mobile)     Walking or Climbing Stairs Difficulty yes     Walking or Climbing Stairs ambulation difficulty, requires equipment     Dressing/Bathing Difficulty yes     Dressing/Bathing bathing difficulty, assistance 1 person;dressing difficulty, assistance 1 person     Dressing/Bathing Management spouse assists     Equipment Currently Used at Home CPAP;walker, rolling     Readmission within 30 days? No     Patient currently being followed by outpatient case management? No     Do you currently have service(s) that help you manage your care at home? No     Do you have prescription coverage? Yes     Coverage PHN     Who is going to help you get home at discharge? Tiffany Treadwell (Spouse)  [phone] (Mobile)     How do you get to doctors appointments? family or friend will provide     Are you on dialysis? No     Do you take coumadin? No     Discharge Plan A Home with family     Discharge Plan B Home with family     DME Needed Upon Discharge  none     Discharge Plan discussed with: Patient;Spouse/sig other     Name(s) and Number(s) EbenTiffany canchola (Spouse)  [phone] (Mobile)     Transition of Care Barriers None        Physical Activity    On average, how many days per week do you engage in moderate to strenuous exercise (like a brisk walk)? 0 days     On average, how many minutes do you engage in exercise at this level? 0 min        Financial Resource Strain    How hard is it for you to pay for the very basics like food, housing, medical care, and heating? Somewhat hard        Housing Stability    In the last 12 months, was there a time when you were not able to pay the mortgage or rent on time? No     At any time in the past 12 months, were you homeless or living in a shelter (including now)? No        Transportation Needs    Has the lack of transportation kept you from medical appointments, meetings, work or from getting things needed for daily living?  No        Food Insecurity    Within the past 12 months, you worried that your food would run out before you got the money to buy more. Sometimes true     Within the past 12 months, the food you bought just didn't last and you didn't have money to get more. Sometimes true        Stress    Do you feel stress - tense, restless, nervous, or anxious, or unable to sleep at night because your mind is troubled all the time - these days? Very much        Social Isolation    How often do you feel lonely or isolated from those around you?  Never        Alcohol Use    Q1: How often do you have a drink containing alcohol? Never     Q2: How many drinks containing alcohol do you have on a typical day when you are drinking? Patient does not drink     Q3: How often do you have six or more drinks on one occasion? Never        Utilities    In the past 12 months has the electric, gas, oil, or water company threatened to shut off services in your home? No        Health Literacy    How often do you need to have someone help you when you read instructions, pamphlets, or other written material from your doctor or pharmacy? Often        OTHER    Name(s) of People in Home Tiffany Treadwell (Spouse)  [phone] (Mobile)

## 2024-09-02 NOTE — ASSESSMENT & PLAN NOTE
The patient's most recent sodium results are listed below.  Recent Labs     09/01/24  1335 09/01/24  1846 09/02/24  0444   * 123* 129*     Improved   Appear hypovolumic hypo Na

## 2024-09-02 NOTE — ASSESSMENT & PLAN NOTE
Metabolic Vs toxic     Plan   He had previous the same problem   CT head neg  IV antibiotics   No CNS infection suspected . If fever +, will order LP and MRI brain   He is on tremendous amount of 3 psych medications and decrease the doses on very high-dose gabapentin too and decreased

## 2024-09-02 NOTE — PLAN OF CARE
09/01/24 1902   Patient Assessment/Suction   Level of Consciousness (AVPU) alert   Respiratory Effort Normal;Unlabored   Expansion/Accessory Muscles/Retractions no use of accessory muscles   All Lung Fields Breath Sounds clear;equal bilaterally   Rhythm/Pattern, Respiratory unlabored;pattern regular;depth regular  (SOB with exertion,new for pt.)   Cough Frequency infrequent   Cough Type good   Skin Integrity   $ Wound Care Tech Time 15 min   Area Observed Left;Right;Behind ear;Nares   Skin Appearance without discoloration   PRE-TX-O2   Device (Oxygen Therapy) nasal cannula   $ Is the patient on Low Flow Oxygen? Yes   Flow (L/min) (Oxygen Therapy) 2   SpO2 100 %   Pulse Oximetry Type Continuous   $ Pulse Oximetry - Multiple Charge Pulse Oximetry - Multiple   Pulse 73   Resp 15   /61   Education   $ Education Bronchodilator;15 min   Tobacco Cessation Intervention   Do you use any type of tobacco product? No   $ Smoking Cessation Charges Smoking Cessation - Intermediate (Non-CTTS)   Respiratory Evaluation   $ Care Plan Tech Time 15 min   $ Respiratory Evaluation Complete   Evaluation For New Orders   Admitting Diagnosis severe sepsis   Cardiac Diagnosis CAD, HTN,   Pulmonary Diagnosis asbestos exposure, TROY-non compliant with CPAP   Home Oxygen   Has Home Oxygen? No   Home Aerosol, MDI, DPI, and Other Treatments/Therapies   Home Respiratory Therapy Per Patient/Review of Chart No   Oxygen Care Plan   Oxygen Care Plan Per Protocol   SPO2 Goal (%) 95% cardiac   Rationale Suspected hyoxemia;MI/CVA/PE keep SpO2 > or equal to 95%;Shortness of Breath   Bronchodilator Care Plan   Rationale No Rationale found   Atelectasis Care Plan   Rationale No Rational Found   Airway Clearance Care Plan   Rationale No rationale found   Handoff Report   Received From EVAL DONE

## 2024-09-03 LAB
ALBUMIN SERPL BCP-MCNC: 3.1 G/DL (ref 3.5–5.2)
ALP SERPL-CCNC: 73 U/L (ref 55–135)
ALT SERPL W/O P-5'-P-CCNC: 58 U/L (ref 10–44)
ANION GAP SERPL CALC-SCNC: 10 MMOL/L (ref 8–16)
AST SERPL-CCNC: 97 U/L (ref 10–40)
BASOPHILS # BLD AUTO: 0.04 K/UL (ref 0–0.2)
BASOPHILS NFR BLD: 0.4 % (ref 0–1.9)
BILIRUB SERPL-MCNC: 0.6 MG/DL (ref 0.1–1)
BUN SERPL-MCNC: 89 MG/DL (ref 8–23)
CALCIUM SERPL-MCNC: 8.4 MG/DL (ref 8.7–10.5)
CHLORIDE SERPL-SCNC: 102 MMOL/L (ref 95–110)
CO2 SERPL-SCNC: 18 MMOL/L (ref 23–29)
CREAT SERPL-MCNC: 3.2 MG/DL (ref 0.5–1.4)
DIFFERENTIAL METHOD BLD: ABNORMAL
EOSINOPHIL # BLD AUTO: 0.2 K/UL (ref 0–0.5)
EOSINOPHIL NFR BLD: 1.5 % (ref 0–8)
ERYTHROCYTE [DISTWIDTH] IN BLOOD BY AUTOMATED COUNT: 15.2 % (ref 11.5–14.5)
EST. GFR  (NO RACE VARIABLE): 19.4 ML/MIN/1.73 M^2
GLUCOSE SERPL-MCNC: 321 MG/DL (ref 70–110)
GLUCOSE SERPL-MCNC: 356 MG/DL (ref 70–110)
GLUCOSE SERPL-MCNC: 358 MG/DL (ref 70–110)
GLUCOSE SERPL-MCNC: 360 MG/DL (ref 70–110)
GLUCOSE SERPL-MCNC: 383 MG/DL (ref 70–110)
GLUCOSE SERPL-MCNC: 410 MG/DL (ref 70–110)
GLUCOSE SERPL-MCNC: 429 MG/DL (ref 70–110)
HCT VFR BLD AUTO: 38.1 % (ref 40–54)
HGB BLD-MCNC: 12.3 G/DL (ref 14–18)
IMM GRANULOCYTES # BLD AUTO: 0.07 K/UL (ref 0–0.04)
IMM GRANULOCYTES NFR BLD AUTO: 0.7 % (ref 0–0.5)
LYMPHOCYTES # BLD AUTO: 1.1 K/UL (ref 1–4.8)
LYMPHOCYTES NFR BLD: 10.9 % (ref 18–48)
MAGNESIUM SERPL-MCNC: 2.2 MG/DL (ref 1.6–2.6)
MCH RBC QN AUTO: 27.9 PG (ref 27–31)
MCHC RBC AUTO-ENTMCNC: 32.3 G/DL (ref 32–36)
MCV RBC AUTO: 86 FL (ref 82–98)
MONOCYTES # BLD AUTO: 0.9 K/UL (ref 0.3–1)
MONOCYTES NFR BLD: 8.7 % (ref 4–15)
NEUTROPHILS # BLD AUTO: 8 K/UL (ref 1.8–7.7)
NEUTROPHILS NFR BLD: 77.8 % (ref 38–73)
NRBC BLD-RTO: 0 /100 WBC
OHS QRS DURATION: 94 MS
OHS QTC CALCULATION: 496 MS
PLATELET # BLD AUTO: 185 K/UL (ref 150–450)
PMV BLD AUTO: 11.4 FL (ref 9.2–12.9)
POTASSIUM SERPL-SCNC: 4.6 MMOL/L (ref 3.5–5.1)
PROT SERPL-MCNC: 6.6 G/DL (ref 6–8.4)
RBC # BLD AUTO: 4.41 M/UL (ref 4.6–6.2)
SODIUM SERPL-SCNC: 130 MMOL/L (ref 136–145)
VANCOMYCIN SERPL-MCNC: 15.9 UG/ML
WBC # BLD AUTO: 10.26 K/UL (ref 3.9–12.7)

## 2024-09-03 PROCEDURE — 85025 COMPLETE CBC W/AUTO DIFF WBC: CPT | Performed by: HOSPITALIST

## 2024-09-03 PROCEDURE — 63600175 PHARM REV CODE 636 W HCPCS: Performed by: INTERNAL MEDICINE

## 2024-09-03 PROCEDURE — 80053 COMPREHEN METABOLIC PANEL: CPT | Performed by: HOSPITALIST

## 2024-09-03 PROCEDURE — 80202 ASSAY OF VANCOMYCIN: CPT | Performed by: INTERNAL MEDICINE

## 2024-09-03 PROCEDURE — 21400001 HC TELEMETRY ROOM

## 2024-09-03 PROCEDURE — 25000003 PHARM REV CODE 250: Performed by: STUDENT IN AN ORGANIZED HEALTH CARE EDUCATION/TRAINING PROGRAM

## 2024-09-03 PROCEDURE — 25000003 PHARM REV CODE 250: Performed by: INTERNAL MEDICINE

## 2024-09-03 PROCEDURE — 63600175 PHARM REV CODE 636 W HCPCS: Performed by: STUDENT IN AN ORGANIZED HEALTH CARE EDUCATION/TRAINING PROGRAM

## 2024-09-03 PROCEDURE — 99900031 HC PATIENT EDUCATION (STAT)

## 2024-09-03 PROCEDURE — 25000003 PHARM REV CODE 250: Performed by: HOSPITALIST

## 2024-09-03 PROCEDURE — 36415 COLL VENOUS BLD VENIPUNCTURE: CPT | Performed by: HOSPITALIST

## 2024-09-03 PROCEDURE — 63600175 PHARM REV CODE 636 W HCPCS: Performed by: HOSPITALIST

## 2024-09-03 PROCEDURE — 36415 COLL VENOUS BLD VENIPUNCTURE: CPT | Performed by: INTERNAL MEDICINE

## 2024-09-03 PROCEDURE — 94761 N-INVAS EAR/PLS OXIMETRY MLT: CPT

## 2024-09-03 PROCEDURE — 27000221 HC OXYGEN, UP TO 24 HOURS

## 2024-09-03 PROCEDURE — 83735 ASSAY OF MAGNESIUM: CPT | Performed by: HOSPITALIST

## 2024-09-03 RX ORDER — GLUCAGON 1 MG
1 KIT INJECTION
Status: DISCONTINUED | OUTPATIENT
Start: 2024-09-03 | End: 2024-09-04

## 2024-09-03 RX ORDER — IBUPROFEN 200 MG
16 TABLET ORAL
Status: DISCONTINUED | OUTPATIENT
Start: 2024-09-03 | End: 2024-09-04

## 2024-09-03 RX ORDER — IBUPROFEN 200 MG
24 TABLET ORAL
Status: DISCONTINUED | OUTPATIENT
Start: 2024-09-03 | End: 2024-09-03

## 2024-09-03 RX ORDER — INSULIN ASPART 100 [IU]/ML
0-15 INJECTION, SOLUTION INTRAVENOUS; SUBCUTANEOUS
Status: DISCONTINUED | OUTPATIENT
Start: 2024-09-03 | End: 2024-09-04

## 2024-09-03 RX ORDER — INSULIN GLARGINE 100 [IU]/ML
10 INJECTION, SOLUTION SUBCUTANEOUS NIGHTLY
Status: DISCONTINUED | OUTPATIENT
Start: 2024-09-03 | End: 2024-09-05 | Stop reason: HOSPADM

## 2024-09-03 RX ORDER — GLUCAGON 1 MG
1 KIT INJECTION
Status: DISCONTINUED | OUTPATIENT
Start: 2024-09-03 | End: 2024-09-03

## 2024-09-03 RX ORDER — INSULIN ASPART 100 [IU]/ML
0-15 INJECTION, SOLUTION INTRAVENOUS; SUBCUTANEOUS
Status: DISCONTINUED | OUTPATIENT
Start: 2024-09-03 | End: 2024-09-03

## 2024-09-03 RX ORDER — IBUPROFEN 200 MG
16 TABLET ORAL
Status: DISCONTINUED | OUTPATIENT
Start: 2024-09-03 | End: 2024-09-03

## 2024-09-03 RX ORDER — IBUPROFEN 200 MG
24 TABLET ORAL
Status: DISCONTINUED | OUTPATIENT
Start: 2024-09-03 | End: 2024-09-04

## 2024-09-03 RX ADMIN — INSULIN GLARGINE 10 UNITS: 100 INJECTION, SOLUTION SUBCUTANEOUS at 09:09

## 2024-09-03 RX ADMIN — GABAPENTIN 200 MG: 100 CAPSULE ORAL at 09:09

## 2024-09-03 RX ADMIN — HYDROCODONE BITARTRATE AND ACETAMINOPHEN 1 TABLET: 5; 325 TABLET ORAL at 12:09

## 2024-09-03 RX ADMIN — LEVOTHYROXINE SODIUM 50 MCG: 0.03 TABLET ORAL at 05:09

## 2024-09-03 RX ADMIN — INSULIN ASPART 10 UNITS: 100 INJECTION, SOLUTION INTRAVENOUS; SUBCUTANEOUS at 05:09

## 2024-09-03 RX ADMIN — ENOXAPARIN SODIUM 30 MG: 30 INJECTION SUBCUTANEOUS at 04:09

## 2024-09-03 RX ADMIN — INSULIN ASPART 10 UNITS: 100 INJECTION, SOLUTION INTRAVENOUS; SUBCUTANEOUS at 12:09

## 2024-09-03 RX ADMIN — ASPIRIN 81 MG 81 MG: 81 TABLET ORAL at 08:09

## 2024-09-03 RX ADMIN — VANCOMYCIN HYDROCHLORIDE 1250 MG: 1.25 INJECTION, POWDER, LYOPHILIZED, FOR SOLUTION INTRAVENOUS at 04:09

## 2024-09-03 RX ADMIN — BUPROPION HYDROCHLORIDE 150 MG: 150 TABLET, EXTENDED RELEASE ORAL at 08:09

## 2024-09-03 RX ADMIN — SERTRALINE HYDROCHLORIDE 25 MG: 25 TABLET ORAL at 09:09

## 2024-09-03 RX ADMIN — CEFTRIAXONE SODIUM 1 G: 1 INJECTION, POWDER, FOR SOLUTION INTRAMUSCULAR; INTRAVENOUS at 01:09

## 2024-09-03 RX ADMIN — SODIUM CHLORIDE: 9 INJECTION, SOLUTION INTRAVENOUS at 10:09

## 2024-09-03 RX ADMIN — PRAVASTATIN SODIUM 20 MG: 20 TABLET ORAL at 09:09

## 2024-09-03 RX ADMIN — INSULIN HUMAN 10 UNITS: 100 INJECTION, SOLUTION PARENTERAL at 01:09

## 2024-09-03 RX ADMIN — METOPROLOL SUCCINATE 12.5 MG: 25 TABLET, EXTENDED RELEASE ORAL at 12:09

## 2024-09-03 RX ADMIN — SODIUM CHLORIDE: 9 INJECTION, SOLUTION INTRAVENOUS at 09:09

## 2024-09-03 RX ADMIN — MUPIROCIN 1 G: 20 OINTMENT TOPICAL at 08:09

## 2024-09-03 RX ADMIN — HYDROCODONE BITARTRATE AND ACETAMINOPHEN 1 TABLET: 5; 325 TABLET ORAL at 05:09

## 2024-09-03 RX ADMIN — MUPIROCIN 1 G: 20 OINTMENT TOPICAL at 09:09

## 2024-09-03 NOTE — ASSESSMENT & PLAN NOTE
Normal CT head. Except eye findings but appear not new   PT/OT.  Suspect the fall to be 2/2 generalized weakness 2/2 current infection   May need MRI of brain if not improve quickly with PT and OT . But Possible from uremia and renal failure  Outpatient opthalmology referral

## 2024-09-03 NOTE — ASSESSMENT & PLAN NOTE
Metabolic Vs toxic     Plan   He had previous the same problem   CT head neg  IV antibiotics   No CNS infection suspected . If fever +, will order LP and MRI brain   He is on tremendous amount of 3 psych medications and nephrotoxic meds  and decrease the doses on very high-dose gabapentin decreased  Will do PT and OT and if focal , will do MRI brain . Previous MRI in chart 2020 with remote petechial microhemorrhages

## 2024-09-03 NOTE — ASSESSMENT & PLAN NOTE
The patient's most recent sodium results are listed below.  Recent Labs     09/01/24  1846 09/02/24  0444 09/03/24  0435   * 129* 130*     Improved   Appear hypovolumic hypo Na

## 2024-09-03 NOTE — SUBJECTIVE & OBJECTIVE
Interval History:     AMS improved . Renal function almost the same . WBC better . BRII better . Na better    Review of Systems  Objective:     Vital Signs (Most Recent):  Temp: 98.5 °F (36.9 °C) (09/03/24 0733)  Pulse: (!) 132 (09/03/24 0820)  Resp: (!) 94 (09/03/24 0733)  BP: (!) 148/89 (09/03/24 0733)  SpO2: 99 % (09/03/24 0820) Vital Signs (24h Range):  Temp:  [97.3 °F (36.3 °C)-98.6 °F (37 °C)] 98.5 °F (36.9 °C)  Pulse:  [] 132  Resp:  [17-94] 94  SpO2:  [95 %-99 %] 99 %  BP: ()/(46-89) 148/89     Weight: 106.4 kg (234 lb 9.1 oz)  Body mass index is 35.67 kg/m².    Intake/Output Summary (Last 24 hours) at 9/3/2024 1123  Last data filed at 9/3/2024 0942  Gross per 24 hour   Intake 2100 ml   Output 2450 ml   Net -350 ml         Physical Exam  Constitutional:       General: He is not in acute distress.     Appearance: He is well-developed.   HENT:      Head: Normocephalic.      Nose: Nose normal.   Eyes:      Pupils: Pupils are equal, round, and reactive to light.   Cardiovascular:      Rate and Rhythm: Normal rate and regular rhythm.      Pulses: Normal pulses.   Pulmonary:      Effort: No respiratory distress.   Abdominal:      General: There is no distension.      Tenderness: There is no abdominal tenderness.   Musculoskeletal:         General: Normal range of motion.      Cervical back: Neck supple.   Skin:     General: Skin is warm.      Findings: No rash.   Neurological:      Mental Status: He is alert and oriented to person, place, and time.   Psychiatric:         Mood and Affect: Mood normal.             Significant Labs: All pertinent labs within the past 24 hours have been reviewed.  CBC:   Recent Labs   Lab 09/01/24  1335 09/02/24  0443 09/03/24  0435   WBC 14.03* 13.83* 10.26   HGB 15.5 13.0* 12.3*   HCT 47.2 40.3 38.1*    202 185     CMP:   Recent Labs   Lab 09/01/24  1335 09/01/24  1846 09/02/24  0444 09/03/24  0435   * 123* 129* 130*   K 5.6* 5.3* 4.7 4.6   CL 84* 94* 95 102  "  CO2 20* 18* 22* 18*   * 636* 273* 356*   BUN 93* 84* 90* 89*   CREATININE 4.9* 3.8* 4.1* 3.2*   CALCIUM 10.3 8.1* 9.0 8.4*   PROT 9.2*  --  7.2 6.6   ALBUMIN 4.1  --  3.3* 3.1*   BILITOT 1.1*  --  0.7 0.6   ALKPHOS 97  --  76 73   *  --  91* 97*   ALT 84*  --  65* 58*   ANIONGAP 15 11 12 10     Cardiac Markers: No results for input(s): "CKMB", "MYOGLOBIN", "BNP", "TROPISTAT" in the last 48 hours.    Significant Imaging: I have reviewed all pertinent imaging results/findings within the past 24 hours.  "

## 2024-09-03 NOTE — ASSESSMENT & PLAN NOTE
Blood sugar of 785 at admission and coming down   Started  the patient on Lantus 10 units at night, and Humalog sliding scale.   IV fluids for hydration.  Patient is not in DKA.and no  gap but possible HHS component

## 2024-09-03 NOTE — ASSESSMENT & PLAN NOTE
Resumed  levothyroxine. Patient report his MD stopped because of glucoma !!  Add TSH WNL . Cortisol WNL

## 2024-09-03 NOTE — ASSESSMENT & PLAN NOTE
This patient does have evidence of infective focus  My overall impression is sepsis.  Source: Urinary Tract  Antibiotics given-   Antibiotics (72h ago, onward)      Start     Stop Route Frequency Ordered    09/02/24 1411  vancomycin - pharmacy to dose         -- IV pharmacy to manage frequency 09/02/24 1311    09/02/24 1400  cefTRIAXone (ROCEPHIN) 1 g in dextrose 5 % 100 mL IVPB (ready to mix)         -- IV Every 24 hours (non-standard times) 09/01/24 1723    09/01/24 2100  mupirocin 2 % ointment         09/06/24 2059 Nasl 2 times daily 09/01/24 1651          Latest lactate reviewed-  Recent Labs   Lab 09/01/24  1329 09/01/24  1846   LACTATE  --  1.5   POCLAC 3.26*  --        Organ dysfunction indicated by Acute kidney injury    UA has rare bacteria and +6 wbc, but patient has been having symptoms.  Urine culture was sent and so far neg    Blood cultures neg  Chest x-ray neg  COVID/flu are negative.  comprehensive viral panel neg  Continue ceftriaxone for suspected UTI for now .  Still hold BP meds and restarted beta blocker   Patient was admitted with encephalopathy in the past .   Currently no fever . If fever+, will do LP and MRI brain

## 2024-09-03 NOTE — ASSESSMENT & PLAN NOTE
Urine cultures were collected.  Patient was started on ceftriaxone . UC no growth so far  UA is not very impressive   Follow blood culture

## 2024-09-03 NOTE — ASSESSMENT & PLAN NOTE
The patients most recent potassium results are listed below.  Recent Labs     09/01/24  1846 09/02/24  0444 09/03/24  0435   K 5.3* 4.7 4.6       Resolved

## 2024-09-03 NOTE — PROGRESS NOTES
Pharmacokinetic Assessment Follow Up: IV Vancomycin    Vancomycin serum concentration assessment(s):    The random level was drawn correctly and can be used to guide therapy at this time. The measurement is within the desired definitive target range of 15 to 20 mcg/mL.    Vancomycin Regimen Plan:    Continue regimen to Vancomycin 1250 mg IV pulse with next serum random concentration measured before next dose on 9/4 @1530    Drug levels (last 3 results):  Recent Labs   Lab Result Units 09/02/24  0444 09/03/24  1420   Vancomycin, Random ug/mL 15.7 15.9       Pharmacy will continue to follow and monitor vancomycin.    Please contact pharmacy at extension [phone] for questions regarding this assessment.    Thank you for the consult,   Tova Jensen       Patient brief summary:  Orlando Treadwell is a 74 y.o. male initiated on antimicrobial therapy with IV Vancomycin for treatment of sepsis    The patient's current regimen is pulse    Drug Allergies:   Review of patient's allergies indicates:   Allergen Reactions    Codeine     Sulfamethoxazole-trimethoprim      dizziness    Codeine sulfate Nausea And Vomiting    Morphine Nausea Only     Ok with nausea med.       Actual Body Weight:   106.4 kg    Renal Function:   Estimated Creatinine Clearance: 23.6 mL/min (A) (based on SCr of 3.2 mg/dL (H)).,     Dialysis Method (if applicable):  N/A    CBC (last 72 hours):  Recent Labs   Lab Result Units 09/01/24  1335 09/02/24  0443 09/03/24  0435   WBC K/uL 14.03* 13.83* 10.26   Hemoglobin g/dL 15.5 13.0* 12.3*   Hematocrit % 47.2 40.3 38.1*   Platelets K/uL 272 202 185   Gran % % 81.2* 75.5* 77.8*   Lymph % % 9.4* 11.7* 10.9*   Mono % % 6.8 9.8 8.7   Eosinophil % % 1.4 2.0 1.5   Basophil % % 0.4 0.4 0.4   Differential Method  Automated Automated Automated       Metabolic Panel (last 72 hours):  Recent Labs   Lab Result Units 09/01/24  1335 09/01/24  1354 09/01/24  1846 09/01/24  1850 09/02/24  0444 09/03/24  0435   Sodium mmol/L 119*   --  123*  --  129* 130*   Sodium, Urine mmol/L  --   --   --  46  --   --    Potassium mmol/L 5.6*  --  5.3*  --  4.7 4.6   Chloride mmol/L 84*  --  94*  --  95 102   CO2 mmol/L 20*  --  18*  --  22* 18*   Glucose mg/dL 785*  --  636*  --  273* 356*   Glucose, UA   --  4+*  --   --   --   --    BUN mg/dL 93*  --  84*  --  90* 89*   Creatinine mg/dL 4.9*  --  3.8*  --  4.1* 3.2*   Albumin g/dL 4.1  --   --   --  3.3* 3.1*   Total Bilirubin mg/dL 1.1*  --   --   --  0.7 0.6   Alkaline Phosphatase U/L 97  --   --   --  76 73   AST U/L 106*  --   --   --  91* 97*   ALT U/L 84*  --   --   --  65* 58*   Magnesium mg/dL  --   --   --   --  2.5 2.2       Vancomycin Administrations:  vancomycin given in the last 96 hours                     vancomycin 1.25 g in dextrose 5% 250 mL IVPB (ready to mix) (mg) 1,250 mg New Bag 09/02/24 1401    vancomycin in dextrose 5 % 1 gram/250 mL IVPB 1,000 mg (mg) 1,000 mg New Bag 09/01/24 1520    vancomycin in dextrose 5 % 1 gram/250 mL IVPB 1,000 mg (mg) 1,000 mg New Bag 09/01/24 1519                    Microbiologic Results:  Microbiology Results (last 7 days)       Procedure Component Value Units Date/Time    Blood culture x two cultures. Draw prior to antibiotics. [[id number]] Collected: 09/01/24 1343    Order Status: Completed Specimen: Blood from Peripheral, Antecubital, Right Updated: 09/03/24 1432     Blood Culture, Routine No Growth to date      No Growth to date      No Growth to date    Narrative:      Aerobic and anaerobic    Blood culture x two cultures. Draw prior to antibiotics. [[id number]] Collected: 09/01/24 1343    Order Status: Completed Specimen: Blood from Peripheral, Hand, Left Updated: 09/03/24 1432     Blood Culture, Routine No Growth to date      No Growth to date      No Growth to date    Narrative:      Aerobic and anaerobic    Urine culture [[id number]] Collected: 09/01/24 1354    Order Status: Completed Specimen: Urine Updated: 09/03/24 0610     Urine Culture,  Routine No growth to date    Respiratory Infection Panel (PCR), Nasopharyngeal [[id number]] Collected: 09/01/24 1838    Order Status: Completed Specimen: Nasopharyngeal Swab Updated: 09/01/24 2033     Respiratory Infection Panel Source NP swab     Adenovirus Not Detected     Coronavirus 229E, Common Cold Virus Not Detected     Coronavirus HKU1, Common Cold Virus Not Detected     Coronavirus NL63, Common Cold Virus Not Detected     Coronavirus OC43, Common Cold Virus Not Detected     Comment: Coronavirus strains 229E, HKU1, NL63, and OC43 can cause the common   cold   and are not associated with the respiratory disease outbreak caused   by  the COVID-19 (SARS-CoV-2 novel Coronavirus) strain.           SARS-CoV2 (COVID-19) Qualitative PCR Not Detected     Human Metapneumovirus Not Detected     Human Rhinovirus/Enterovirus Not Detected     Influenza A (subtypes H1, H1-2009,H3) Not Detected     Influenza B Not Detected     Parainfluenza Virus 1 Not Detected     Parainfluenza Virus 2 Not Detected     Parainfluenza Virus 3 Not Detected     Parainfluenza Virus 4 Not Detected     Respiratory Syncytial Virus Not Detected     Bordetella Parapertussis (EO4077) Not Detected     Bordetella pertussis (ptxP) Not Detected     Chlamydia pneumoniae Not Detected     Mycoplasma pneumoniae Not Detected     Comment: Respiratory Infection Panel testing performed by Multiplex PCR.       Narrative:      Respiratory Infection Panel source->NP Swab    Urine Culture High Risk [[id number]]     Order Status: Completed Specimen: Urine, Catheterized     Urine Culture High Risk [[id number]]     Order Status: Canceled Specimen: Urine

## 2024-09-03 NOTE — CARE UPDATE
09/03/24 0820   Patient Assessment/Suction   Level of Consciousness (AVPU) alert   Respiratory Effort Unlabored;Normal   Expansion/Accessory Muscles/Retractions no use of accessory muscles   All Lung Fields Breath Sounds Anterior:;Lateral:;clear;diminished   Rhythm/Pattern, Respiratory unlabored;pattern regular;depth regular   Cough Frequency no cough   PRE-TX-O2   Device (Oxygen Therapy) nasal cannula   $ Is the patient on Low Flow Oxygen? Yes   Flow (L/min) (Oxygen Therapy) 2   Oxygen Concentration (%) 28   SpO2 99 %   Pulse Oximetry Type Intermittent   $ Pulse Oximetry - Multiple Charge Pulse Oximetry - Multiple   Pulse (!) 132   Education   $ Education Bronchodilator;15 min

## 2024-09-03 NOTE — NURSING
Nurses Note -- 4 Eyes      9/3/2024   8:17 AM      Skin assessed during: Daily Assessment      [] No Altered Skin Integrity Present    []Prevention Measures Documented      [x] Yes- Altered Skin Integrity Present or Discovered   [x] LDA Added if Not in Epic (Describe Wound)   [x] New Altered Skin Integrity was Present on Admit and Documented in LDA   [x] Wound Image Taken    Wound Care Consulted? Yes    Attending Nurse:  Katie Blunt RN/Staff Member:   Paz

## 2024-09-03 NOTE — PROGRESS NOTES
"[facility]  Progress Note    Patient Name: Orlando Treadwell  MRN: [id number]  Patient Class: IP- Inpatient   Admission Date: 9/1/2024  Length of Stay: 2 days  Attending Physician: Suleman Carpio MD  Primary Care Provider: Kleber Worthy III, MD        Subjective:     Principal Problem:Severe sepsis        HPI:  73 yo man with PMH of diabetes, hypertension, CAD and hypothyroid who presented to the ER because of generalized weakness and hypotension.  Apparently patient has not been feeling well, and appeared confused to the wife for the last 4 days.  He has been having urinary incontinence, frequency, and having generalized weakness.  2 days ago, pt has fallen twice, due to "weak knees" per pt. Today wife noted that his symptoms are getting worse.  She checked his blood pressure and it was in the 70s systolic (despite holding his bp meds for the last 4 days).  As a result, pt was brought to the ER for evaluation. ROS is also positive for dry cough for 2 weeks.     In the ER, vitals had bp of 83/63, HR of 73, RR of 24, afebrile, sating 97% on RA.  His SpO2 then dropped to 89% requiring 2 L nasal cannula.  CBC with white count of 14.  CMP showed multiple abnormalities including sodium of 119, potassium 5.6, negative anion gap, creatinine of 4.9 compared to baseline of 1.4, and blood sugar of 785.  Also elevated LFTs with AST being 106, ALT being 84.  UA with +6 white blood cells, and rare bacteria.  Chest x-ray is negative for pneumonia.  COVID/flu was negative.  Patient was given ceftriaxone and vanco, also 2 L of fluids for resuscitation.  Will be admitted to Medicine for his severe sepsis suspect secondary to UTI.      Overview/Hospital Course:   73 yo man with PMH of diabetes, hypertension, CAD and hypothyroid was admitted with generalized weakness and hypotension and BRII and UIT and AMS and severe hyperglycemia . Also reported repeated falls recently as well .     In the ER, vitals " had bp of 83/63, HR of 73, RR of 24, afebrile, sating 97% on RA.  His SpO2 then dropped to 89% requiring 2 L nasal cannula.  CBC with white count of 14.  CMP showed multiple abnormalities including sodium of 119, potassium 5.6, negative anion gap, creatinine of 4.9 compared to baseline of 1.4, and blood sugar of 785.  Also elevated LFTs with AST being 106, ALT being 84.  UA with +6 white blood cells, and rare bacteria.      Chest x-ray is negative for pneumonia.  COVID/flu was negative.  Patient was given ceftriaxone and vanco, also 2 L of fluids for resuscitation and BP improved . Later glucose better controlled . BRII still the same and nephrology consulted . AMS possible from HHS as well and improved with IVF . Metabolic acidosis from diarrhea . All nephrotoxic medications are hold     Interval History:     AMS improved . Renal function almost the same . WBC better . BRII better . Na better    Review of Systems  Objective:     Vital Signs (Most Recent):  Temp: 98.5 °F (36.9 °C) (09/03/24 0733)  Pulse: (!) 132 (09/03/24 0820)  Resp: (!) 94 (09/03/24 0733)  BP: (!) 148/89 (09/03/24 0733)  SpO2: 99 % (09/03/24 0820) Vital Signs (24h Range):  Temp:  [97.3 °F (36.3 °C)-98.6 °F (37 °C)] 98.5 °F (36.9 °C)  Pulse:  [] 132  Resp:  [17-94] 94  SpO2:  [95 %-99 %] 99 %  BP: ()/(46-89) 148/89     Weight: 106.4 kg (234 lb 9.1 oz)  Body mass index is 35.67 kg/m².    Intake/Output Summary (Last 24 hours) at 9/3/2024 1123  Last data filed at 9/3/2024 0942  Gross per 24 hour   Intake 2100 ml   Output 2450 ml   Net -350 ml         Physical Exam  Constitutional:       General: He is not in acute distress.     Appearance: He is well-developed.   HENT:      Head: Normocephalic.      Nose: Nose normal.   Eyes:      Pupils: Pupils are equal, round, and reactive to light.   Cardiovascular:      Rate and Rhythm: Normal rate and regular rhythm.      Pulses: Normal pulses.   Pulmonary:      Effort: No respiratory distress.  "  Abdominal:      General: There is no distension.      Tenderness: There is no abdominal tenderness.   Musculoskeletal:         General: Normal range of motion.      Cervical back: Neck supple.   Skin:     General: Skin is warm.      Findings: No rash.   Neurological:      Mental Status: He is alert and oriented to person, place, and time.   Psychiatric:         Mood and Affect: Mood normal.             Significant Labs: All pertinent labs within the past 24 hours have been reviewed.  CBC:   Recent Labs   Lab 09/01/24  1335 09/02/24  0443 09/03/24  0435   WBC 14.03* 13.83* 10.26   HGB 15.5 13.0* 12.3*   HCT 47.2 40.3 38.1*    202 185     CMP:   Recent Labs   Lab 09/01/24  1335 09/01/24  1846 09/02/24  0444 09/03/24  0435   * 123* 129* 130*   K 5.6* 5.3* 4.7 4.6   CL 84* 94* 95 102   CO2 20* 18* 22* 18*   * 636* 273* 356*   BUN 93* 84* 90* 89*   CREATININE 4.9* 3.8* 4.1* 3.2*   CALCIUM 10.3 8.1* 9.0 8.4*   PROT 9.2*  --  7.2 6.6   ALBUMIN 4.1  --  3.3* 3.1*   BILITOT 1.1*  --  0.7 0.6   ALKPHOS 97  --  76 73   *  --  91* 97*   ALT 84*  --  65* 58*   ANIONGAP 15 11 12 10     Cardiac Markers: No results for input(s): "CKMB", "MYOGLOBIN", "BNP", "TROPISTAT" in the last 48 hours.    Significant Imaging: I have reviewed all pertinent imaging results/findings within the past 24 hours.    Assessment/Plan:      * Severe sepsis  This patient does have evidence of infective focus  My overall impression is sepsis.  Source: Urinary Tract  Antibiotics given-   Antibiotics (72h ago, onward)      Start     Stop Route Frequency Ordered    09/02/24 1411  vancomycin - pharmacy to dose         -- IV pharmacy to manage frequency 09/02/24 1311    09/02/24 1400  cefTRIAXone (ROCEPHIN) 1 g in dextrose 5 % 100 mL IVPB (ready to mix)         -- IV Every 24 hours (non-standard times) 09/01/24 1723    09/01/24 2100  mupirocin 2 % ointment         09/06/24 2059 Nasl 2 times daily 09/01/24 1651          Latest lactate " reviewed-  Recent Labs   Lab 09/01/24  1329 09/01/24 1846   LACTATE  --  1.5   POCLAC 3.26*  --        Organ dysfunction indicated by Acute kidney injury    UA has rare bacteria and +6 wbc, but patient has been having symptoms.  Urine culture was sent and so far neg    Blood cultures neg  Chest x-ray neg  COVID/flu are negative.  comprehensive viral panel neg  Continue ceftriaxone for suspected UTI for now .  Still hold BP meds and restarted beta blocker   Patient was admitted with encephalopathy in the past .   Currently no fever . If fever+, will do LP and MRI brain     Fall  Normal CT head. Except eye findings but appear not new   PT/OT.  Suspect the fall to be 2/2 generalized weakness 2/2 current infection   May need MRI of brain if not improve quickly with PT and OT . But Possible from uremia and renal failure  Outpatient opthalmology referral    Hypothyroid  Resumed  levothyroxine. Patient report his MD stopped because of glucoma !!  Add TSH WNL . Cortisol WNL        Hyperkalemia  The patients most recent potassium results are listed below.  Recent Labs     09/01/24  1846 09/02/24 0444 09/03/24  0435   K 5.3* 4.7 4.6       Resolved               Hyponatremia  The patient's most recent sodium results are listed below.  Recent Labs     09/01/24  1846 09/02/24 0444 09/03/24  0435   * 129* 130*     Improved   Appear hypovolumic hypo Na      Type 2 diabetes mellitus with hyperglycemia, without long-term current use of insulin  Blood sugar of 785 at admission and coming down   Started  the patient on Lantus 10 units at night, and Humalog sliding scale.   IV fluids for hydration.  Patient is not in DKA.and no  gap but possible HHS component   Glucose not really controlled and added lantus and 10 U IV regular insulin and increased sliding scale     Acute renal failure  Most recent creatinine and eGFR are listed below.  Recent Labs     09/01/24  1846 09/02/24 0444 09/03/24  0435   CREATININE 3.8* 4.1* 3.2*    EGFRNORACEVR 15.8* 14.4* 19.4*     From UTI and dehydration   Baseline creatinine is 1.4.  Patient is currently in acute renal failure with creatinine of 4.9   kidney ultrasound with no hydronephrosis   IV fluids to continue  for hydration and nephrology consult reviewed         UTI (urinary tract infection)  Urine cultures were collected.  Patient was started on ceftriaxone . UC no growth so far  UA is not very impressive   Follow blood culture     Encephalopathy, metabolic  Metabolic Vs toxic     Plan   He had previous the same problem   CT head neg  IV antibiotics   No CNS infection suspected . If fever +, will order LP and MRI brain   He is on tremendous amount of 3 psych medications and nephrotoxic meds  and decrease the doses on very high-dose gabapentin decreased  Will do PT and OT and if focal , will do MRI brain . Previous MRI in chart 2020 with remote petechial microhemorrhages         VTE Risk Mitigation (From admission, onward)           Ordered     enoxaparin injection 30 mg  Daily         09/01/24 1653     IP VTE HIGH RISK PATIENT  Once         09/01/24 1649     Place sequential compression device  Until discontinued         09/01/24 1649                    Discharge Planning   KRISTEN: 9/5/2024     Code Status: Full Code   Is the patient medically ready for discharge?:     Reason for patient still in hospital (select all that apply): Treatment  Discharge Plan A: Home with family                  Suleman Carpio MD  Department of Hospital Medicine   [facility]

## 2024-09-03 NOTE — ASSESSMENT & PLAN NOTE
Most recent creatinine and eGFR are listed below.  Recent Labs     09/01/24  1846 09/02/24  0444 09/03/24  0435   CREATININE 3.8* 4.1* 3.2*   EGFRNORACEVR 15.8* 14.4* 19.4*     From UTI and dehydration   Baseline creatinine is 1.4.  Patient is currently in acute renal failure with creatinine of 4.9   kidney ultrasound with no hydronephrosis   IV fluids to continue  for hydration and nephrology consult reviewed

## 2024-09-03 NOTE — HOSPITAL COURSE
73 yo man with PMH of diabetes, hypertension, CAD and hypothyroid was admitted with generalized weakness and hypotension and BRII and UIT and AMS and severe hyperglycemia . Also reported repeated falls recently as well .     In the ER, vitals had bp of 83/63, HR of 73, RR of 24, afebrile, sating 97% on RA.  His SpO2 then dropped to 89% requiring 2 L nasal cannula.  CBC with white count of 14.  CMP showed multiple abnormalities including sodium of 119, potassium 5.6, negative anion gap, creatinine of 4.9 compared to baseline of 1.4, and blood sugar of 785.  Also elevated LFTs with AST being 106, ALT being 84.  UA with +6 white blood cells, and rare bacteria.      Chest x-ray is negative for pneumonia.  COVID/flu was negative.  Patient was given ceftriaxone and vanco, also 2 L of fluids for resuscitation and BP improved . Later glucose better controlled . BRII resolved with IVF . Urine culture came back neg and BC neg too  AMS possible from HHS as well and improved with IVF . Metabolic acidosis from diarrhea better  . All nephrotoxic medications are hold .  Patient gradually better . AMS resolved . PT done and imbalance and MRI brain was done and neg for acute .He claim that he has bone spur at feet . He was on pain Mx and high dose pain killer and put back. SNF/rehab arranging and Later he did not like the hospital food and he adamantly asked to get DC to home . Glucometer prescribed . Metformin dose increased at discharge .

## 2024-09-03 NOTE — CONSULTS
"INPATIENT NEPHROLOGY CONSULT   [facility]    Patient Name: Orlando Treadwell  Date: 09/03/2024    Reason for consultation: BRII    Chief Complaint:   Chief Complaint   Patient presents with    Urinary Frequency     URINATING ON SELF X 3 DAYS. PER WIFE , ACTING CONFUSED OVER LAST FEW DAYS, NOT TAKING HIS MEDS    GEN WEAKNESS     FALLEN X 2X OVER LAST 2 DAYS    Head Injury       History of Present Illness:  73 yo man with PMH of diabetes, hypertension, CAD and hypothyroid who presented to the ER because of generalized weakness and hypotension.  Apparently patient has not been feeling well, and appeared confused to the wife for the last 4 days.  He has been having urinary incontinence, frequency, and having generalized weakness.  2 days ago, pt has fallen twice, due to "weak knees" per pt. Today wife noted that his symptoms are getting worse.  She checked his blood pressure and it was in the 70s systolic (despite holding his bp meds for the last 4 days).  As a result, pt was brought to the ER for evaluation. ROS is also positive for dry cough for 2 weeks. CMP showed multiple abnormalities including sodium of 119, potassium 5.6, negative anion gap, creatinine of 4.9 compared to baseline of 1.4, and blood sugar of 785. Also elevated LFTs with AST being 106, ALT being 84. UA with +6 white blood cells, and rare bacteria. Chest x-ray is negative for pneumonia. COVID/flu was negative. Patient was given ceftriaxone and vanco, also 2 L of fluids for resuscitation. Will be admitted to Medicine for his severe sepsis suspect secondary to UTI. Consulted for BRII.    Home meds- hydral, mg, metformin, metoprolol, benicar/HCTZ     Interval History:  9/2- afebrile, tachy, hypotensive, on 2L NC, UOP 1.4L  9/3  Afebrile, intermittently tachycardic,   1900 cc uop.  No nausea, chest pain, sob.    Plan of Care:    Assessment:  Urosepsis  Possible HHS; underlying DMII  BRII/CKD III  Baseline " HTN  Hyponatremia  Hyperkalemia  Acidosis   SHPT  Anemia of CKD    Plan:    - agree with IV antbx and IVFs- dose for CrCl < 10-30  - blood glucose management per HM- hold metformin  - hold all home BP med including benicar/HCTZ  - trend metabolic parameters with above management- renal/diabetic diet  - no acute RRT needs = reassess daily  - no nsaids or IV contrast- strict ins/outs  - check phos, PTH when more stable  - no acute hematologic issues  --glycemic control given acidosis and and hyperkalemia with ricarda    Thank you for allowing us to participate in this patient's care. We will continue to follow.    Vital Signs:  Temp Readings from Last 3 Encounters:   09/03/24 97.9 °F (36.6 °C) (Oral)   06/21/24 98 °F (36.7 °C)   05/17/24 98.9 °F (37.2 °C)       Pulse Readings from Last 3 Encounters:   09/03/24 (!) 129   06/21/24 73   05/17/24 69       BP Readings from Last 3 Encounters:   09/03/24 (!) 140/75   06/21/24 128/72   03/21/24 122/68       Weight:  Wt Readings from Last 3 Encounters:   09/01/24 106.4 kg (234 lb 9.1 oz)   06/21/24 107 kg (236 lb)   05/17/24 107.9 kg (237 lb 12.3 oz)       INS/OUTS:  I/O last 3 completed shifts:  In: 2100 [P.O.:600; I.V.:1500]  Out: 3300 [Urine:3300]  I/O this shift:  In: 240 [P.O.:240]  Out: 550 [Urine:550]    Past Medical & Surgical History:  Past Medical History:   Diagnosis Date    Abdominal hernia     Arthritis     Asbestos exposure     SOB     Back pain     Bulging discs     lumbar    Chronic back pain     Coronary artery disease     Hypertension     Kidney stone     Prostatitis     Sepsis due to methicillin resistant Staphylococcus aureus (MRSA) 06/2019    Due to large right upper arm abscess    Wears glasses        Past Surgical History:   Procedure Laterality Date    APPENDECTOMY      BACK SURGERY  09/23/2022    COLONOSCOPY N/A 09/23/2021    Procedure: COLONOSCOPY;  Surgeon: Ashvin Batista MD;  Location: [facility];  Service: Endoscopy;  Laterality: N/A;     ESOPHAGOGASTRODUODENOSCOPY N/A 09/23/2021    Procedure: EGD (ESOPHAGOGASTRODUODENOSCOPY);  Surgeon: Ashvin Batista MD;  Location: [facility];  Service: Endoscopy;  Laterality: N/A;    HAND SURGERY      rt hand    INCISION AND DRAINAGE Bilateral 06/2019    Right upper arm and left upper arm abscesses    JOINT REPLACEMENT      right knee    KNEE LIGAMENT RECONSTRUCTION      left knee    LAPAROSCOPIC CHOLECYSTECTOMY N/A 01/17/2020    Procedure: CHOLECYSTECTOMY, LAPAROSCOPIC;  Surgeon: Edgard Hill III, MD;  Location: [facility] OR;  Service: General;  Laterality: N/A;    LITHOTRIPSY      TONSILLECTOMY, ADENOIDECTOMY, BILATERAL MYRINGOTOMY AND TUBES      TOTAL KNEE ARTHROPLASTY  1971    rt knee    URETERAL STENT PLACEMENT         Past Social History:  Social History     Socioeconomic History    Marital status:    Occupational History    Occupation: RETIRED   Tobacco Use    Smoking status: Never    Smokeless tobacco: Never   Substance and Sexual Activity    Alcohol use: No    Drug use: No    Sexual activity: Yes     Partners: Female     Social Determinants of Health     Financial Resource Strain: Medium Risk (9/2/2024)    Overall Financial Resource Strain (CARDIA)     Difficulty of Paying Living Expenses: Somewhat hard   Food Insecurity: Food Insecurity Present (9/2/2024)    Hunger Vital Sign     Worried About Running Out of Food in the Last Year: Sometimes true     Ran Out of Food in the Last Year: Sometimes true   Transportation Needs: No Transportation Needs (9/2/2024)    TRANSPORTATION NEEDS     Transportation : No   Physical Activity: Inactive (9/2/2024)    Exercise Vital Sign     Days of Exercise per Week: 0 days     Minutes of Exercise per Session: 0 min   Stress: Stress Concern Present (9/2/2024)    [employer] - Occupational Stress Questionnaire     Feeling of Stress : Very much   Housing Stability: Low Risk  (9/2/2024)    Housing Stability Vital Sign     Unable to Pay for  Housing in the Last Year: No     Homeless in the Last Year: No       Medications:  Scheduled Meds:   aspirin  81 mg Oral Daily    buPROPion  150 mg Oral Daily    cefTRIAXone (Rocephin) IV (PEDS and ADULTS)  1 g Intravenous Q24H    enoxparin  30 mg Subcutaneous Daily    gabapentin  200 mg Oral QHS    insulin aspart U-100  0-10 Units Subcutaneous Q6H    levothyroxine  50 mcg Oral Before breakfast    linaCLOtide  145 mcg Oral Daily    metoprolol succinate  12.5 mg Oral Daily    mupirocin   Nasal BID    pravastatin  20 mg Oral QHS    sertraline  25 mg Oral QHS     Continuous Infusions:   0.9% NaCl   Intravenous Continuous 125 mL/hr at 09/03/24 1049 New Bag at 09/03/24 1049     PRN Meds:.  Current Facility-Administered Medications:     acetaminophen, 650 mg, Oral, Q8H PRN    acetaminophen, 650 mg, Oral, Q4H PRN    aluminum-magnesium hydroxide-simethicone, 30 mL, Oral, QID PRN    dextrose 50%, 12.5 g, Intravenous, PRN    dextrose 50%, 25 g, Intravenous, PRN    glucagon (human recombinant), 1 mg, Intramuscular, PRN    glucose, 16 g, Oral, PRN    glucose, 24 g, Oral, PRN    HYDROcodone-acetaminophen, 1 tablet, Oral, Q8H PRN    magnesium oxide, 800 mg, Oral, PRN    magnesium oxide, 800 mg, Oral, PRN    naloxone, 0.02 mg, Intravenous, PRN    ondansetron, 4 mg, Intravenous, Q6H PRN    potassium bicarbonate, 35 mEq, Oral, PRN    potassium bicarbonate, 50 mEq, Oral, PRN    potassium bicarbonate, 60 mEq, Oral, PRN    potassium, sodium phosphates, 2 packet, Oral, PRN    potassium, sodium phosphates, 2 packet, Oral, PRN    potassium, sodium phosphates, 2 packet, Oral, PRN    senna-docusate 8.6-50 mg, 1 tablet, Oral, BID PRN    sodium chloride 0.9%, 2 mL, Intravenous, Q12H PRN    vancomycin - pharmacy to dose, , Intravenous, pharmacy to manage frequency  No current facility-administered medications on file prior to encounter.     Current Outpatient Medications on File Prior to Encounter   Medication Sig Dispense Refill    ascorbic  acid, vitamin C, (VITAMIN C) 100 MG tablet Take 1 tablet by mouth once daily.      aspirin 325 MG tablet Take 325 mg by mouth once daily.      b complex vitamins tablet Take 1 tablet by mouth once daily.      beta-carotene,A,-vits C,E/mins (VISION ORAL) Take 1 tablet by mouth once daily.      buPROPion (WELLBUTRIN XL) 300 MG 24 hr tablet Take 1 tablet (300 mg total) by mouth every morning. 90 tablet 3    diclofenac sodium 1 % Gel Apply 2 g topically 2 (two) times daily. 1 Tube 2    gabapentin (NEURONTIN) 300 MG capsule Take 2 capsules (600 mg total) by mouth every evening. 180 capsule 3    hydrALAZINE (APRESOLINE) 50 MG tablet Take 50 mg by mouth 2 (two) times daily.      levothyroxine (SYNTHROID) 50 MCG tablet Take 1 tablet (50 mcg total) by mouth before breakfast. 90 tablet 3    LINZESS 145 mcg Cap capsule Take 1 capsule (145 mcg total) by mouth once daily. 30 capsule 5    MAGNESIUM ORAL Take 500 mg by mouth once daily.      metFORMIN (GLUCOPHAGE-XR) 500 MG ER 24hr tablet Take 1 tablet (500 mg total) by mouth 2 (two) times daily with meals. 180 tablet 1    metoprolol succinate (TOPROL-XL) 100 MG 24 hr tablet Take 100 mg by mouth once daily.      multivitamin (THERAGRAN) tablet Take 1 tablet by mouth once daily.      NARCAN 4 mg/actuation Spry 1 spray by Nasal route once.      nitroGLYCERIN (NITROSTAT) 0.4 MG SL tablet Place 0.4 mg under the tongue every 5 (five) minutes as needed for Chest pain.      olmesartan-hydrochlorothiazide (BENICAR HCT) 40-25 mg per tablet Take 1 tablet by mouth once daily.      oxyCODONE (ROXICODONE) 10 mg Tab immediate release tablet Take 10 mg by mouth every 8 (eight) hours as needed.      pantoprazole (PROTONIX) 40 MG tablet Take 1 tablet (40 mg total) by mouth 2 (two) times daily. 180 tablet 3    pravastatin (PRAVACHOL) 20 MG tablet Take 1 tablet (20 mg total) by mouth once daily. 90 tablet 3    sertraline (ZOLOFT) 100 MG tablet TAKE 1 & 1/2 TABLETS BY MOUTH ONCE DAILY (Patient taking  "differently: Take 150 mg by mouth once daily. TAKE 1 & 1/2 TABLETS BY MOUTH ONCE DAILY) 135 tablet 3    testosterone cypionate (DEPOTESTOTERONE CYPIONATE) 200 mg/mL injection INJECT 2 ML INTO THE MUSCLE EVERY 28 DAYS (Patient taking differently: Inject 2 mLs into the muscle every 28 days.) 10 mL 1    vitamin E, dl,tocopheryl acet, (VITAMIN E, DL, ACETATE,) 180 mg (400 unit) Cap Take 400 Units by mouth once daily.      zinc gluconate 50 mg tablet Take 50 mg by mouth once daily.      lamoTRIgine (LAMICTAL) 25 MG tablet Take one tab nightly for 7 days then one tab every 12 hours (Patient not taking: Reported on 6/21/2024) 60 tablet 3       Allergies:  Codeine, Sulfamethoxazole-trimethoprim, Codeine sulfate, and Morphine    Past Family History:  Reviewed; refer to Hospitalist Admission Note    Review of Systems:  Review of Systems - All 14 systems reviewed and negative, except as noted in HPI    Physical Exam:  BP (!) 140/75 (BP Location: Right arm, Patient Position: Lying)   Pulse (!) 129   Temp 97.9 °F (36.6 °C) (Oral)   Resp 20   Ht 5' 8" (1.727 m)   Wt 106.4 kg (234 lb 9.1 oz)   SpO2 97%   BMI 35.67 kg/m²     General Appearance:    NAD, AAO x 3, cooperative, appears stated age   Head:    Normocephalic, atraumatic   Eyes:    PER, EOMI, and conjunctiva/sclera clear bilaterally        Mouth:   Moist mucus membranes, no thrush or oral lesions, normal      dentition   Back:     No CVA tenderness   Lungs:     Clear to auscultation bilaterally, no wheeze, crackles, rales      or rhonchi, symmetric air movement, respirations unlabored   Heart:    Regular rate and rhythm, S1 and S2 normal, no murmur, rub   or gallop   Abdomen:     Soft, non-tender, non-distended, bowel sounds active all four   quadrants, no RT or guarding, no masses, no organomegaly   Extremities:   Warm and well perfused, distal pulses intact, no cyanosis or    peripheral edema   MSK:   No joint or muscle swelling, tenderness or deformity   Skin:   " Skin color, texture, turgor normal, no rashes or lesions   Neurologic/Psychiatric:   CNII-XII intact, normal strength and sensation       throughout, no asterixis; normal affect, memory, judgement     and insight     Results:  Lab Results   Component Value Date     (L) 09/03/2024    K 4.6 09/03/2024     09/03/2024    CO2 18 (L) 09/03/2024    BUN 89 (H) 09/03/2024    CREATININE 3.2 (H) 09/03/2024    CALCIUM 8.4 (L) 09/03/2024    ANIONGAP 10 09/03/2024    ESTGFRAFRICA 57.2 (A) 06/24/2022    EGFRNONAA 49.5 (A) 06/24/2022       Lab Results   Component Value Date    CALCIUM 8.4 (L) 09/03/2024    PHOS 3.5 01/14/2020       Recent Labs   Lab 09/03/24  0435   WBC 10.26   RBC 4.41*   HGB 12.3*   HCT 38.1*      MCV 86   MCH 27.9   MCHC 32.3         Patient care was time spent personally by me on the following activities:   Obtaining a history  Examination of patient.  Providing medical care at the patients bedside.  Developing a treatment plan with patient or surrogate and bedside caregivers  Ordering and reviewing laboratory studies, radiographic studies, pulse oximetry.  Ordering and performing treatments and interventions.  Evaluation of patient's response to treatment.  Discussions with consultants while on the unit and immediately available to the patient.  Re-evaluation of the patient's condition.  Documentation in the medical record.       Jose Ryan MD MPH   [facility] [address]  [place] [address]  [phone] (p)  [phone] (f)

## 2024-09-04 DIAGNOSIS — E11.9 TYPE 2 DIABETES MELLITUS WITHOUT COMPLICATION: ICD-10-CM

## 2024-09-04 LAB
ALBUMIN SERPL BCP-MCNC: 3.1 G/DL (ref 3.5–5.2)
ALP SERPL-CCNC: 71 U/L (ref 55–135)
ALT SERPL W/O P-5'-P-CCNC: 60 U/L (ref 10–44)
ANION GAP SERPL CALC-SCNC: 7 MMOL/L (ref 8–16)
AST SERPL-CCNC: 95 U/L (ref 10–40)
BACTERIA UR CULT: NO GROWTH
BASOPHILS # BLD AUTO: 0.03 K/UL (ref 0–0.2)
BASOPHILS NFR BLD: 0.3 % (ref 0–1.9)
BILIRUB SERPL-MCNC: 0.7 MG/DL (ref 0.1–1)
BUN SERPL-MCNC: 52 MG/DL (ref 8–23)
CALCIUM SERPL-MCNC: 9.2 MG/DL (ref 8.7–10.5)
CHLORIDE SERPL-SCNC: 105 MMOL/L (ref 95–110)
CO2 SERPL-SCNC: 23 MMOL/L (ref 23–29)
CREAT SERPL-MCNC: 1.7 MG/DL (ref 0.5–1.4)
DIFFERENTIAL METHOD BLD: ABNORMAL
EOSINOPHIL # BLD AUTO: 0.2 K/UL (ref 0–0.5)
EOSINOPHIL NFR BLD: 2.6 % (ref 0–8)
ERYTHROCYTE [DISTWIDTH] IN BLOOD BY AUTOMATED COUNT: 15.3 % (ref 11.5–14.5)
EST. GFR  (NO RACE VARIABLE): 41.5 ML/MIN/1.73 M^2
GLUCOSE SERPL-MCNC: 289 MG/DL (ref 70–110)
GLUCOSE SERPL-MCNC: 291 MG/DL (ref 70–110)
GLUCOSE SERPL-MCNC: 299 MG/DL (ref 70–110)
HCT VFR BLD AUTO: 37.8 % (ref 40–54)
HGB BLD-MCNC: 12 G/DL (ref 14–18)
IMM GRANULOCYTES # BLD AUTO: 0.09 K/UL (ref 0–0.04)
IMM GRANULOCYTES NFR BLD AUTO: 1 % (ref 0–0.5)
LYMPHOCYTES # BLD AUTO: 1.3 K/UL (ref 1–4.8)
LYMPHOCYTES NFR BLD: 13.6 % (ref 18–48)
MAGNESIUM SERPL-MCNC: 1.9 MG/DL (ref 1.6–2.6)
MCH RBC QN AUTO: 27.4 PG (ref 27–31)
MCHC RBC AUTO-ENTMCNC: 31.7 G/DL (ref 32–36)
MCV RBC AUTO: 86 FL (ref 82–98)
MONOCYTES # BLD AUTO: 0.9 K/UL (ref 0.3–1)
MONOCYTES NFR BLD: 9.3 % (ref 4–15)
NEUTROPHILS # BLD AUTO: 6.9 K/UL (ref 1.8–7.7)
NEUTROPHILS NFR BLD: 73.2 % (ref 38–73)
NRBC BLD-RTO: 0 /100 WBC
PLATELET # BLD AUTO: 199 K/UL (ref 150–450)
PMV BLD AUTO: 11.4 FL (ref 9.2–12.9)
POCT GLUCOSE: 287 MG/DL (ref 70–110)
POCT GLUCOSE: 300 MG/DL (ref 70–110)
POCT GLUCOSE: 360 MG/DL (ref 70–110)
POTASSIUM SERPL-SCNC: 4.7 MMOL/L (ref 3.5–5.1)
PROT SERPL-MCNC: 7 G/DL (ref 6–8.4)
RBC # BLD AUTO: 4.38 M/UL (ref 4.6–6.2)
SODIUM SERPL-SCNC: 135 MMOL/L (ref 136–145)
VANCOMYCIN SERPL-MCNC: 10 UG/ML
WBC # BLD AUTO: 9.38 K/UL (ref 3.9–12.7)

## 2024-09-04 PROCEDURE — 97535 SELF CARE MNGMENT TRAINING: CPT

## 2024-09-04 PROCEDURE — 99900031 HC PATIENT EDUCATION (STAT)

## 2024-09-04 PROCEDURE — 36415 COLL VENOUS BLD VENIPUNCTURE: CPT | Performed by: INTERNAL MEDICINE

## 2024-09-04 PROCEDURE — 25000003 PHARM REV CODE 250: Performed by: HOSPITALIST

## 2024-09-04 PROCEDURE — 36415 COLL VENOUS BLD VENIPUNCTURE: CPT | Performed by: HOSPITALIST

## 2024-09-04 PROCEDURE — 27000221 HC OXYGEN, UP TO 24 HOURS

## 2024-09-04 PROCEDURE — 80053 COMPREHEN METABOLIC PANEL: CPT | Performed by: HOSPITALIST

## 2024-09-04 PROCEDURE — 99221 1ST HOSP IP/OBS SF/LOW 40: CPT | Mod: ,,, | Performed by: FAMILY MEDICINE

## 2024-09-04 PROCEDURE — 94761 N-INVAS EAR/PLS OXIMETRY MLT: CPT

## 2024-09-04 PROCEDURE — 83735 ASSAY OF MAGNESIUM: CPT | Performed by: HOSPITALIST

## 2024-09-04 PROCEDURE — 25000003 PHARM REV CODE 250: Performed by: STUDENT IN AN ORGANIZED HEALTH CARE EDUCATION/TRAINING PROGRAM

## 2024-09-04 PROCEDURE — 63600175 PHARM REV CODE 636 W HCPCS: Performed by: HOSPITALIST

## 2024-09-04 PROCEDURE — 80202 ASSAY OF VANCOMYCIN: CPT | Performed by: INTERNAL MEDICINE

## 2024-09-04 PROCEDURE — 25000003 PHARM REV CODE 250: Performed by: INTERNAL MEDICINE

## 2024-09-04 PROCEDURE — 97166 OT EVAL MOD COMPLEX 45 MIN: CPT

## 2024-09-04 PROCEDURE — 21400001 HC TELEMETRY ROOM

## 2024-09-04 PROCEDURE — 85025 COMPLETE CBC W/AUTO DIFF WBC: CPT | Performed by: HOSPITALIST

## 2024-09-04 PROCEDURE — 63600175 PHARM REV CODE 636 W HCPCS: Performed by: INTERNAL MEDICINE

## 2024-09-04 RX ORDER — INSULIN ASPART 100 [IU]/ML
0-10 INJECTION, SOLUTION INTRAVENOUS; SUBCUTANEOUS
Status: DISCONTINUED | OUTPATIENT
Start: 2024-09-05 | End: 2024-09-05

## 2024-09-04 RX ORDER — ENOXAPARIN SODIUM 100 MG/ML
40 INJECTION SUBCUTANEOUS EVERY 24 HOURS
Status: DISCONTINUED | OUTPATIENT
Start: 2024-09-05 | End: 2024-09-05 | Stop reason: HOSPADM

## 2024-09-04 RX ADMIN — CEFTRIAXONE SODIUM 1 G: 1 INJECTION, POWDER, FOR SOLUTION INTRAMUSCULAR; INTRAVENOUS at 03:09

## 2024-09-04 RX ADMIN — MUPIROCIN 1 G: 20 OINTMENT TOPICAL at 09:09

## 2024-09-04 RX ADMIN — GABAPENTIN 200 MG: 100 CAPSULE ORAL at 09:09

## 2024-09-04 RX ADMIN — ASPIRIN 81 MG 81 MG: 81 TABLET ORAL at 08:09

## 2024-09-04 RX ADMIN — INSULIN ASPART 3 UNITS: 100 INJECTION, SOLUTION INTRAVENOUS; SUBCUTANEOUS at 01:09

## 2024-09-04 RX ADMIN — PRAVASTATIN SODIUM 20 MG: 20 TABLET ORAL at 09:09

## 2024-09-04 RX ADMIN — BUPROPION HYDROCHLORIDE 150 MG: 150 TABLET, EXTENDED RELEASE ORAL at 08:09

## 2024-09-04 RX ADMIN — METOPROLOL SUCCINATE 12.5 MG: 25 TABLET, EXTENDED RELEASE ORAL at 08:09

## 2024-09-04 RX ADMIN — INSULIN ASPART 6 UNITS: 100 INJECTION, SOLUTION INTRAVENOUS; SUBCUTANEOUS at 08:09

## 2024-09-04 RX ADMIN — SERTRALINE HYDROCHLORIDE 25 MG: 25 TABLET ORAL at 09:09

## 2024-09-04 RX ADMIN — HYDROCODONE BITARTRATE AND ACETAMINOPHEN 1 TABLET: 5; 325 TABLET ORAL at 12:09

## 2024-09-04 RX ADMIN — MUPIROCIN 1 G: 20 OINTMENT TOPICAL at 08:09

## 2024-09-04 RX ADMIN — INSULIN ASPART 6 UNITS: 100 INJECTION, SOLUTION INTRAVENOUS; SUBCUTANEOUS at 06:09

## 2024-09-04 RX ADMIN — VANCOMYCIN HYDROCHLORIDE 1500 MG: 1.5 INJECTION, POWDER, LYOPHILIZED, FOR SOLUTION INTRAVENOUS at 05:09

## 2024-09-04 RX ADMIN — INSULIN GLARGINE 10 UNITS: 100 INJECTION, SOLUTION SUBCUTANEOUS at 09:09

## 2024-09-04 RX ADMIN — INSULIN ASPART 10 UNITS: 100 INJECTION, SOLUTION INTRAVENOUS; SUBCUTANEOUS at 02:09

## 2024-09-04 RX ADMIN — HYDROCODONE BITARTRATE AND ACETAMINOPHEN 1 TABLET: 5; 325 TABLET ORAL at 09:09

## 2024-09-04 RX ADMIN — LEVOTHYROXINE SODIUM 50 MCG: 0.03 TABLET ORAL at 05:09

## 2024-09-04 NOTE — SUBJECTIVE & OBJECTIVE
Interval History:     Still weak   AMS better but speech is fluent and a lot   Did poorly with PT .unstable gait . Ordering MRI .    Review of Systems  Objective:     Vital Signs (Most Recent):  Temp: 97.8 °F (36.6 °C) (09/04/24 1106)  Pulse: 97 (09/04/24 1106)  Resp: 16 (09/04/24 1106)  BP: (!) 155/82 (map 103) (09/04/24 1106)  SpO2: 99 % (09/04/24 1106) Vital Signs (24h Range):  Temp:  [97.1 °F (36.2 °C)-98 °F (36.7 °C)] 97.8 °F (36.6 °C)  Pulse:  [] 97  Resp:  [16-20] 16  SpO2:  [95 %-99 %] 99 %  BP: (148-157)/(82-90) 155/82     Weight: 104.1 kg (229 lb 9.6 oz)  Body mass index is 34.91 kg/m².    Intake/Output Summary (Last 24 hours) at 9/4/2024 1416  Last data filed at 9/4/2024 0859  Gross per 24 hour   Intake 250 ml   Output 2850 ml   Net -2600 ml         Physical Exam  Constitutional:       General: He is not in acute distress.     Appearance: He is well-developed.   HENT:      Head: Normocephalic.   Eyes:      Pupils: Pupils are equal, round, and reactive to light.   Cardiovascular:      Rate and Rhythm: Normal rate and regular rhythm.      Pulses: Normal pulses.   Pulmonary:      Effort: No respiratory distress.   Abdominal:      General: There is no distension.      Tenderness: There is no abdominal tenderness.   Musculoskeletal:         General: Normal range of motion.      Cervical back: Neck supple.   Skin:     General: Skin is warm.      Findings: No rash.   Neurological:      Mental Status: He is alert and oriented to person, place, and time.   Psychiatric:         Mood and Affect: Mood normal.             Significant Labs: All pertinent labs within the past 24 hours have been reviewed.  CBC:   Recent Labs   Lab 09/03/24 0435 09/04/24 0411   WBC 10.26 9.38   HGB 12.3* 12.0*   HCT 38.1* 37.8*    199     CMP:   Recent Labs   Lab 09/03/24  0435 09/04/24 0411   * 135*   K 4.6 4.7    105   CO2 18* 23   * 289*   BUN 89* 52*   CREATININE 3.2* 1.7*   CALCIUM 8.4* 9.2   PROT  "6.6 7.0   ALBUMIN 3.1* 3.1*   BILITOT 0.6 0.7   ALKPHOS 73 71   AST 97* 95*   ALT 58* 60*   ANIONGAP 10 7*     Cardiac Markers: No results for input(s): "CKMB", "MYOGLOBIN", "BNP", "TROPISTAT" in the last 48 hours.    Significant Imaging: I have reviewed all pertinent imaging results/findings within the past 24 hours.  "

## 2024-09-04 NOTE — ASSESSMENT & PLAN NOTE
Metabolic Vs toxic     Plan   He had previous the same problem   CT head neg  IV antibiotics   No CNS infection suspected . If fever +, will order LP and MRI brain   He is on tremendous amount of 3 psych medications and nephrotoxic meds  and decrease the doses on very high-dose gabapentin decreased  Will do PT and OT and if focal , will do MRI brain today . Previous MRI in chart 2020 with remote petechial microhemorrhages

## 2024-09-04 NOTE — ASSESSMENT & PLAN NOTE
Normal CT head. Except eye findings but appear not new   PT/OT.  Suspect the fall to be 2/2 generalized weakness 2/2 current infection   MRI of brain

## 2024-09-04 NOTE — PROGRESS NOTES
Pharmacokinetic Assessment: IV Vancomycin     Vancomycin Day # 4    Orlando Treadwell is a 74 y.o. male on antimicrobial therapy with IV Vancomycin.    Indication & Goal: Sepsis - Trough 15 - 20    Patient Labs  104.1 kg (229 lb 9.6 oz)  Recent Labs   Lab 09/01/24  1846 09/02/24  0443 09/02/24  0444 09/03/24  0435 09/04/24  0411   WBC  --    < >  --  10.26 9.38   CREATININE 3.8*  --  4.1* 3.2* 1.7*    < > = values in this interval not displayed.    Estimated Creatinine Clearance: 43.9 mL/min (A) (based on SCr of 1.7 mg/dL (H)).    Dialysis N/A    Drug levels (last 3 results):  Recent Labs   Lab Result Units 09/02/24  0444 09/03/24  1420 09/04/24  1529   Vancomycin, Random ug/mL 15.7 15.9 10.0       Assessment:   Level was taken approximately 23 hours post dose of 1250 mg  on 9/4/24.  The random level was drawn correctly and can be used to guide therapy at this time. The measurement is below the desired definitive target range of 15 to 20 mcg/mL.    Plan:  - Give vancomycin 1500 mg x 1 dose and Re-dose when the random level is less than 20 mcg/mL.  - Vancomycin Random due on 9/5 at 1600    Pharmacy will continue to follow and monitor vancomycin.    Please contact pharmacy at extension [phone] for questions regarding this assessment.    Thank you for this consult,  Neal Mckee, PharmD 09/04/2024 4:42 PM

## 2024-09-04 NOTE — PLAN OF CARE
SW acknowledge OT recommendation for High Intensity Therapy.  SW sent patient information to [facility] via [employer] for review.         09/04/24 7987   Post-Acute Status   Post-Acute Authorization Placement   Post-Acute Placement Status Referrals Sent   Discharge Plan   Discharge Plan A Rehab   Discharge Plan B Skilled Nursing Facility

## 2024-09-04 NOTE — ASSESSMENT & PLAN NOTE
Most recent creatinine and eGFR are listed below.  Recent Labs     09/02/24  0444 09/03/24  0435 09/04/24  0411   CREATININE 4.1* 3.2* 1.7*   EGFRNORACEVR 14.4* 19.4* 41.5*     From UTI and dehydration   Baseline creatinine is 1.4. now improved and nearly baseline   kidney ultrasound with no hydronephrosis   IV fluids to continue

## 2024-09-04 NOTE — ASSESSMENT & PLAN NOTE
The patients most recent potassium results are listed below.  Recent Labs     09/02/24  0444 09/03/24  0435 09/04/24  0411   K 4.7 4.6 4.7       Resolved

## 2024-09-04 NOTE — PROGRESS NOTES
"[facility] Medicine  Progress Note    Patient Name: Orlando Treadwell  MRN: [id number]  Patient Class: IP- Inpatient   Admission Date: 9/1/2024  Length of Stay: 3 days  Attending Physician: Suleman Carpio MD  Primary Care Provider: Kleber Worthy III, MD        Subjective:     Principal Problem:Severe sepsis        HPI:  73 yo man with PMH of diabetes, hypertension, CAD and hypothyroid who presented to the ER because of generalized weakness and hypotension.  Apparently patient has not been feeling well, and appeared confused to the wife for the last 4 days.  He has been having urinary incontinence, frequency, and having generalized weakness.  2 days ago, pt has fallen twice, due to "weak knees" per pt. Today wife noted that his symptoms are getting worse.  She checked his blood pressure and it was in the 70s systolic (despite holding his bp meds for the last 4 days).  As a result, pt was brought to the ER for evaluation. ROS is also positive for dry cough for 2 weeks.     In the ER, vitals had bp of 83/63, HR of 73, RR of 24, afebrile, sating 97% on RA.  His SpO2 then dropped to 89% requiring 2 L nasal cannula.  CBC with white count of 14.  CMP showed multiple abnormalities including sodium of 119, potassium 5.6, negative anion gap, creatinine of 4.9 compared to baseline of 1.4, and blood sugar of 785.  Also elevated LFTs with AST being 106, ALT being 84.  UA with +6 white blood cells, and rare bacteria.  Chest x-ray is negative for pneumonia.  COVID/flu was negative.  Patient was given ceftriaxone and vanco, also 2 L of fluids for resuscitation.  Will be admitted to Medicine for his severe sepsis suspect secondary to UTI.      Overview/Hospital Course:   73 yo man with PMH of diabetes, hypertension, CAD and hypothyroid was admitted with generalized weakness and hypotension and BRII and UIT and AMS and severe hyperglycemia . Also reported repeated falls recently as well .     In the ER, vitals " had bp of 83/63, HR of 73, RR of 24, afebrile, sating 97% on RA.  His SpO2 then dropped to 89% requiring 2 L nasal cannula.  CBC with white count of 14.  CMP showed multiple abnormalities including sodium of 119, potassium 5.6, negative anion gap, creatinine of 4.9 compared to baseline of 1.4, and blood sugar of 785.  Also elevated LFTs with AST being 106, ALT being 84.  UA with +6 white blood cells, and rare bacteria.      Chest x-ray is negative for pneumonia.  COVID/flu was negative.  Patient was given ceftriaxone and vanco, also 2 L of fluids for resuscitation and BP improved . Later glucose better controlled . BRII still the same and nephrology consulted . AMS possible from HHS as well and improved with IVF . Metabolic acidosis from diarrhea . All nephrotoxic medications are hold     Interval History:     Still weak   AMS better but speech is fluent and a lot   Did poorly with PT .unstable gait . Ordering MRI .    Review of Systems  Objective:     Vital Signs (Most Recent):  Temp: 97.8 °F (36.6 °C) (09/04/24 1106)  Pulse: 97 (09/04/24 1106)  Resp: 16 (09/04/24 1106)  BP: (!) 155/82 (map 103) (09/04/24 1106)  SpO2: 99 % (09/04/24 1106) Vital Signs (24h Range):  Temp:  [97.1 °F (36.2 °C)-98 °F (36.7 °C)] 97.8 °F (36.6 °C)  Pulse:  [] 97  Resp:  [16-20] 16  SpO2:  [95 %-99 %] 99 %  BP: (148-157)/(82-90) 155/82     Weight: 104.1 kg (229 lb 9.6 oz)  Body mass index is 34.91 kg/m².    Intake/Output Summary (Last 24 hours) at 9/4/2024 1416  Last data filed at 9/4/2024 0859  Gross per 24 hour   Intake 250 ml   Output 2850 ml   Net -2600 ml         Physical Exam  Constitutional:       General: He is not in acute distress.     Appearance: He is well-developed.   HENT:      Head: Normocephalic.   Eyes:      Pupils: Pupils are equal, round, and reactive to light.   Cardiovascular:      Rate and Rhythm: Normal rate and regular rhythm.      Pulses: Normal pulses.   Pulmonary:      Effort: No respiratory distress.  "  Abdominal:      General: There is no distension.      Tenderness: There is no abdominal tenderness.   Musculoskeletal:         General: Normal range of motion.      Cervical back: Neck supple.   Skin:     General: Skin is warm.      Findings: No rash.   Neurological:      Mental Status: He is alert and oriented to person, place, and time.   Psychiatric:         Mood and Affect: Mood normal.             Significant Labs: All pertinent labs within the past 24 hours have been reviewed.  CBC:   Recent Labs   Lab 09/03/24 0435 09/04/24  0411   WBC 10.26 9.38   HGB 12.3* 12.0*   HCT 38.1* 37.8*    199     CMP:   Recent Labs   Lab 09/03/24  0435 09/04/24  0411   * 135*   K 4.6 4.7    105   CO2 18* 23   * 289*   BUN 89* 52*   CREATININE 3.2* 1.7*   CALCIUM 8.4* 9.2   PROT 6.6 7.0   ALBUMIN 3.1* 3.1*   BILITOT 0.6 0.7   ALKPHOS 73 71   AST 97* 95*   ALT 58* 60*   ANIONGAP 10 7*     Cardiac Markers: No results for input(s): "CKMB", "MYOGLOBIN", "BNP", "TROPISTAT" in the last 48 hours.    Significant Imaging: I have reviewed all pertinent imaging results/findings within the past 24 hours.    Assessment/Plan:      * Severe sepsis  This patient does have evidence of infective focus  My overall impression is sepsis.  Source: Urinary Tract  Antibiotics given-   Antibiotics (72h ago, onward)      Start     Stop Route Frequency Ordered    09/02/24 1411  vancomycin - pharmacy to dose         -- IV pharmacy to manage frequency 09/02/24 1311    09/02/24 1400  cefTRIAXone (ROCEPHIN) 1 g in dextrose 5 % 100 mL IVPB (ready to mix)         -- IV Every 24 hours (non-standard times) 09/01/24 1723    09/01/24 2100  mupirocin 2 % ointment         09/06/24 2059 Nasl 2 times daily 09/01/24 1651          Latest lactate reviewed-  Recent Labs   Lab 09/01/24  1846   LACTATE 1.5       Organ dysfunction indicated by Acute kidney injury    UA has rare bacteria and +6 wbc, but patient has been having symptoms.  Urine " culture was sent and so far neg    Blood cultures neg  Chest x-ray neg  COVID/flu are negative.  comprehensive viral panel neg  Continue ceftriaxone for suspected UTI for now .  Still hold BP meds and restarted beta blocker   Patient was admitted with encephalopathy in the past .   Currently no fever .       Fall  Normal CT head. Except eye findings but appear not new   PT/OT.  Suspect the fall to be 2/2 generalized weakness 2/2 current infection   MRI of brain    Hypothyroid  Resumed  levothyroxine. Patient report his MD stopped because of glucoma !!  Add TSH WNL . Cortisol WNL        Hyperkalemia  The patients most recent potassium results are listed below.  Recent Labs     09/02/24 0444 09/03/24  0435 09/04/24  0411   K 4.7 4.6 4.7       Resolved               Hyponatremia  The patient's most recent sodium results are listed below.  Recent Labs     09/02/24 0444 09/03/24  0435 09/04/24  0411   * 130* 135*     Improved   Appear hypovolumic hypo Na      Type 2 diabetes mellitus with hyperglycemia, without long-term current use of insulin  Blood sugar of 785 at admission and coming down   Started  the patient on Lantus 10 units at night, and Humalog sliding scale.   IV fluids for hydration.  Patient is not in DKA.and no  gap but possible HHS component       Acute renal failure  Most recent creatinine and eGFR are listed below.  Recent Labs     09/02/24 0444 09/03/24  0435 09/04/24  0411   CREATININE 4.1* 3.2* 1.7*   EGFRNORACEVR 14.4* 19.4* 41.5*     From UTI and dehydration   Baseline creatinine is 1.4. now improved and nearly baseline   kidney ultrasound with no hydronephrosis   IV fluids to continue        UTI (urinary tract infection)  Urine cultures were collected.  Patient was started on ceftriaxone . UC no growth so far  UA is not very impressive   Follow blood culture     Encephalopathy, metabolic  Metabolic Vs toxic     Plan   He had previous the same problem   CT head neg  IV antibiotics   No CNS  infection suspected . If fever +, will order LP and MRI brain   He is on tremendous amount of 3 psych medications and nephrotoxic meds  and decrease the doses on very high-dose gabapentin decreased  Will do PT and OT and if focal , will do MRI brain today . Previous MRI in chart 2020 with remote petechial microhemorrhages         VTE Risk Mitigation (From admission, onward)           Ordered     enoxaparin injection 30 mg  Daily         09/01/24 1653     IP VTE HIGH RISK PATIENT  Once         09/01/24 1649     Place sequential compression device  Until discontinued         09/01/24 1649                    Discharge Planning   KRISTEN: 9/6/2024     Code Status: Full Code   Is the patient medically ready for discharge?:     Reason for patient still in hospital (select all that apply): Treatment  Discharge Plan A: Home with family                  Suleman Carpio MD  Department of Hospital Medicine   Former[facility]

## 2024-09-04 NOTE — PROGRESS NOTES
Pharmacist Renal Dose Adjustment Note    Orlando Treadwell is a 74 y.o. male being treated with the medication ENOXAPARIN    Patient Data:    Vital Signs (Most Recent):  Temp: 97.6 °F (36.4 °C) (09/04/24 1531)  Pulse: 93 (09/04/24 1531)  Resp: 16 (09/04/24 1531)  BP: (!) 177/96 (map 112) (09/04/24 1531)  SpO2: 99 % (09/04/24 1531) Vital Signs (72h Range):  Temp:  [97.1 °F (36.2 °C)-98.6 °F (37 °C)]   Pulse:  []   Resp:  [15-94]   BP: ()/(44-96)   SpO2:  [93 %-100 %]      Recent Labs   Lab 09/02/24  0444 09/03/24  0435 09/04/24  0411   CREATININE 4.1* 3.2* 1.7*     Serum creatinine: 1.7 mg/dL (H) 09/04/24 0411  Estimated creatinine clearance: 43.9 mL/min (A)    Medication:ENOXAPARIN 30 MG SQ WILL BE CHANGED TO ENOXAPARIN 40 MG SQ EVERY 24 HOURS.    Pharmacist's Name: Perez Munoz  Pharmacist's Extension: 1110

## 2024-09-04 NOTE — ASSESSMENT & PLAN NOTE
The patient's most recent sodium results are listed below.  Recent Labs     09/02/24  0444 09/03/24  0435 09/04/24  0411   * 130* 135*     Improved   Appear hypovolumic hypo Na

## 2024-09-04 NOTE — ASSESSMENT & PLAN NOTE
This patient does have evidence of infective focus  My overall impression is sepsis.  Source: Urinary Tract  Antibiotics given-   Antibiotics (72h ago, onward)      Start     Stop Route Frequency Ordered    09/02/24 1411  vancomycin - pharmacy to dose         -- IV pharmacy to manage frequency 09/02/24 1311    09/02/24 1400  cefTRIAXone (ROCEPHIN) 1 g in dextrose 5 % 100 mL IVPB (ready to mix)         -- IV Every 24 hours (non-standard times) 09/01/24 1723    09/01/24 2100  mupirocin 2 % ointment         09/06/24 2059 Nasl 2 times daily 09/01/24 1651          Latest lactate reviewed-  Recent Labs   Lab 09/01/24  1846   LACTATE 1.5       Organ dysfunction indicated by Acute kidney injury    UA has rare bacteria and +6 wbc, but patient has been having symptoms.  Urine culture was sent and so far neg    Blood cultures neg  Chest x-ray neg  COVID/flu are negative.  comprehensive viral panel neg  Continue ceftriaxone for suspected UTI for now .  Still hold BP meds and restarted beta blocker   Patient was admitted with encephalopathy in the past .   Currently no fever .

## 2024-09-04 NOTE — PLAN OF CARE
09/04/24 0751   Patient Assessment/Suction   Level of Consciousness (AVPU) alert   Respiratory Effort Normal;Unlabored   Expansion/Accessory Muscles/Retractions no use of accessory muscles;no retractions   All Lung Fields Breath Sounds clear   PRE-TX-O2   Device (Oxygen Therapy) nasal cannula   $ Is the patient on Low Flow Oxygen? Yes   Flow (L/min) (Oxygen Therapy) 2   SpO2 99 %   Pulse Oximetry Type Intermittent   $ Pulse Oximetry - Multiple Charge Pulse Oximetry - Multiple   Pulse 89   Resp 16   Education   $ Education Bronchodilator;15 min

## 2024-09-04 NOTE — PT/OT/SLP PROGRESS
Physical Therapy      Patient Name:  Orlando Treadwell   MRN:  [id number]    Patient not seen today secondary to Other (Comment) (Attempted eval twice in am (1st pt with OT; 2nd pt distracted trying to text his wife who had to leave to meet with .) PT unavailable in pm.). Will follow-up 9/5/24.

## 2024-09-04 NOTE — PT/OT/SLP EVAL
Occupational Therapy   Evaluation    Name: Orlando Treadwell  MRN: [id number]  Admitting Diagnosis: Severe sepsis  Recent Surgery: * No surgery found *      Recommendations:     Discharge Recommendations: High Intensity Therapy  Discharge Equipment Recommendations:  to be determined by next level of care  Barriers to discharge:   (increased assist with ADLs and mobility)    Assessment:     Orlando Treadwell is a 74 y.o. male with a medical diagnosis of Severe sepsis. Pt agreeable to OT evaluation this AM. Performance deficits affecting function: weakness, impaired endurance, impaired self care skills, impaired functional mobility, gait instability, impaired balance, decreased coordination, decreased upper extremity function, decreased lower extremity function, decreased safety awareness, impaired cardiopulmonary response to activity, pain.      Rehab Prognosis: Good; patient would benefit from acute skilled OT services to address these deficits and reach maximum level of function.       Plan:     Patient to be seen 6 x/week to address the above listed problems via self-care/home management, therapeutic activities, therapeutic exercises  Plan of Care Expires: 10/04/24  Plan of Care Reviewed with: patient, spouse    Subjective     Chief Complaint: bilateral heel pain  Patient/Family Comments/goals: none stated    Occupational Profile:  Living Environment: Pt lives with wife in a 1 story home with 2 NANCY. Pt has a WIS with a shower chair  Previous level of function: Independent with ADLs and mobility, except occasional assist with dressing and bathing as needed from wife.   Roles and Routines:   Equipment Used at Home: shower chair, walker, rolling  Assistance upon Discharge: yes, from facility    Pain/Comfort:  Pain Rating 1:  (not rated)  Location - Side 1: Bilateral  Location 1: heel  Pain Addressed 1: Reposition, Distraction, Cessation of Activity    Patients cultural, spiritual, Lutheran conflicts given the  current situation:      Objective:     Communicated with: nursing prior to session.  Patient found HOB elevated with bed alarm, telemetry, peripheral IV, PureWick upon OT entry to room.    General Precautions: Standard, fall  Orthopedic Precautions: N/A  Braces: N/A  Respiratory Status: Nasal cannula, flow 2 L/min; pt urgently needed to use bathroom upon entry; OT doffed O2 for pt to ambulate to bathroom and when pt returned, O2 was at 96% on RA. OT donned O2 back at end of session    Occupational Performance:    Bed Mobility:    Patient completed Supine to Sit with minimum assistance  Patient completed Sit to Supine with minimum assistance    Functional Mobility/Transfers:  Patient completed Sit <> Stand Transfer with minimum assistance  with  no assistive device on 1st attempt, with pt immediately falling back on bed. Min/Mod A with RW on second attempt.  Patient completed Toilet Transfer Step Transfer technique with minimum assistance and moderate assistance with  rolling walker  Functional Mobility: pt amb from bed > bathroom with minimum assistance with RW; After using toilet, pt stood with minimum assistance and instructed to ambulate to sink to wash hands; pt rushing and not waiting for OT instruction and had 3-4 LOB occurrences requiring min/mod A from OT to recover. Pt very unsteady standing at sink with knees buckling and unable to ambulate back to bed. OT had to assist pt into rolling chair and roll pt back to bed     Activities of Daily Living:  Feeding:  independence per pt  Grooming: moderate assistance standing at sink to wash hands; pt unsteady and knees buckling and OT instructed pt to sit down  Toileting: maximal assistance for hygiene after BM from wife    Cognitive/Visual Perceptual:  Cognitive/Psychosocial Skills:     -       Oriented to: Person, Place, Time, and Situation   -       Follows Commands/attention:Follows one-step commands  -       Communication: clear/fluent  -       Memory: No  Deficits noted  -       Safety awareness/insight to disability: impaired   -       Mood/Affect/Coping skills/emotional control: Appropriate to situation    Physical Exam:  Balance:    -       SBA seated balance; Min/Mod A standing balance  Upper Extremity Range of Motion:     -       Right Upper Extremity: WFL  -       Left Upper Extremity: WFL  Upper Extremity Strength:    -       Right Upper Extremity: WFL  -       Left Upper Extremity: WFL   Strength:    -       Right Upper Extremity: WFL  -       Left Upper Extremity: WFL  Fine Motor Coordination:    -       Intact  Gross motor coordination:   WFL    AMPAC 6 Click ADL:  AMPAC Total Score: 15    Treatment & Education:  Pt educated on role of OT/POC, importance of OOB/EOB activity, use of call bell, and safety during ADLs, transfers, and functional mobility.    Patient left HOB elevated with all lines intact, call button in reach, bed alarm on, nursing and MD notified, and wife present    GOALS:   Multidisciplinary Problems       Occupational Therapy Goals          Problem: Occupational Therapy    Goal Priority Disciplines Outcome Interventions   Occupational Therapy Goal     OT, PT/OT     Description: Goals to be met by: 10/4/24     Patient will increase functional independence with ADLs by performing:    UE Dressing with Supervision.  LE Dressing with Supervision.  Grooming while standing at sink with Supervision.  Toileting from toilet with Supervision for hygiene and clothing management.   Toilet transfer to toilet with Supervision.                         History:     Past Medical History:   Diagnosis Date    Abdominal hernia     Arthritis     Asbestos exposure     SOB     Back pain     Bulging discs     lumbar    Chronic back pain     Coronary artery disease     Hypertension     Kidney stone     Prostatitis     Sepsis due to methicillin resistant Staphylococcus aureus (MRSA) 06/2019    Due to large right upper arm abscess    Wears glasses          Past  Surgical History:   Procedure Laterality Date    APPENDECTOMY      BACK SURGERY  09/23/2022    COLONOSCOPY N/A 09/23/2021    Procedure: COLONOSCOPY;  Surgeon: Ashvin Batista MD;  Location: [facility] ENDO;  Service: Endoscopy;  Laterality: N/A;    ESOPHAGOGASTRODUODENOSCOPY N/A 09/23/2021    Procedure: EGD (ESOPHAGOGASTRODUODENOSCOPY);  Surgeon: Ashvin Batista MD;  Location: [facility];  Service: Endoscopy;  Laterality: N/A;    HAND SURGERY      rt hand    INCISION AND DRAINAGE Bilateral 06/2019    Right upper arm and left upper arm abscesses    JOINT REPLACEMENT      right knee    KNEE LIGAMENT RECONSTRUCTION      left knee    LAPAROSCOPIC CHOLECYSTECTOMY N/A 01/17/2020    Procedure: CHOLECYSTECTOMY, LAPAROSCOPIC;  Surgeon: Edgard Hill III, MD;  Location: [facility] OR;  Service: General;  Laterality: N/A;    LITHOTRIPSY      TONSILLECTOMY, ADENOIDECTOMY, BILATERAL MYRINGOTOMY AND TUBES      TOTAL KNEE ARTHROPLASTY  1971    rt knee    URETERAL STENT PLACEMENT         Time Tracking:     OT Date of Treatment: 09/04/24  OT Start Time: 0923  OT Stop Time: 0952  OT Total Time (min): 29 min    Billable Minutes:Evaluation 6  Self Care/Home Management 23 9/4/2024

## 2024-09-05 VITALS
WEIGHT: 230.19 LBS | TEMPERATURE: 98 F | BODY MASS INDEX: 34.89 KG/M2 | OXYGEN SATURATION: 95 % | DIASTOLIC BLOOD PRESSURE: 82 MMHG | HEART RATE: 101 BPM | RESPIRATION RATE: 18 BRPM | HEIGHT: 68 IN | SYSTOLIC BLOOD PRESSURE: 134 MMHG

## 2024-09-05 LAB
ALBUMIN SERPL BCP-MCNC: 3.3 G/DL (ref 3.5–5.2)
ALP SERPL-CCNC: 74 U/L (ref 55–135)
ALT SERPL W/O P-5'-P-CCNC: 70 U/L (ref 10–44)
ANION GAP SERPL CALC-SCNC: 10 MMOL/L (ref 8–16)
AST SERPL-CCNC: 125 U/L (ref 10–40)
BASOPHILS # BLD AUTO: 0.05 K/UL (ref 0–0.2)
BASOPHILS NFR BLD: 0.6 % (ref 0–1.9)
BILIRUB SERPL-MCNC: 0.8 MG/DL (ref 0.1–1)
BUN SERPL-MCNC: 35 MG/DL (ref 8–23)
CALCIUM SERPL-MCNC: 9.3 MG/DL (ref 8.7–10.5)
CHLORIDE SERPL-SCNC: 106 MMOL/L (ref 95–110)
CO2 SERPL-SCNC: 21 MMOL/L (ref 23–29)
CREAT SERPL-MCNC: 1.2 MG/DL (ref 0.5–1.4)
DIFFERENTIAL METHOD BLD: ABNORMAL
EOSINOPHIL # BLD AUTO: 0.3 K/UL (ref 0–0.5)
EOSINOPHIL NFR BLD: 3.1 % (ref 0–8)
ERYTHROCYTE [DISTWIDTH] IN BLOOD BY AUTOMATED COUNT: 15.5 % (ref 11.5–14.5)
EST. GFR  (NO RACE VARIABLE): >60 ML/MIN/1.73 M^2
ESTIMATED AVG GLUCOSE: 301 MG/DL (ref 68–131)
GLUCOSE SERPL-MCNC: 256 MG/DL (ref 70–110)
GLUCOSE SERPL-MCNC: 407 MG/DL (ref 70–110)
GLUCOSE SERPL-MCNC: 454 MG/DL (ref 70–110)
HBA1C MFR BLD: 12.1 % (ref 4.5–6.2)
HCT VFR BLD AUTO: 40.4 % (ref 40–54)
HGB BLD-MCNC: 13 G/DL (ref 14–18)
IMM GRANULOCYTES # BLD AUTO: 0.08 K/UL (ref 0–0.04)
IMM GRANULOCYTES NFR BLD AUTO: 1 % (ref 0–0.5)
LYMPHOCYTES # BLD AUTO: 1.1 K/UL (ref 1–4.8)
LYMPHOCYTES NFR BLD: 14 % (ref 18–48)
MAGNESIUM SERPL-MCNC: 1.8 MG/DL (ref 1.6–2.6)
MCH RBC QN AUTO: 28 PG (ref 27–31)
MCHC RBC AUTO-ENTMCNC: 32.2 G/DL (ref 32–36)
MCV RBC AUTO: 87 FL (ref 82–98)
MONOCYTES # BLD AUTO: 0.8 K/UL (ref 0.3–1)
MONOCYTES NFR BLD: 9.3 % (ref 4–15)
NEUTROPHILS # BLD AUTO: 5.8 K/UL (ref 1.8–7.7)
NEUTROPHILS NFR BLD: 72 % (ref 38–73)
NRBC BLD-RTO: 0 /100 WBC
PLATELET # BLD AUTO: 195 K/UL (ref 150–450)
PMV BLD AUTO: 11.5 FL (ref 9.2–12.9)
POCT GLUCOSE: 329 MG/DL (ref 70–110)
POCT GLUCOSE: 337 MG/DL (ref 70–110)
POTASSIUM SERPL-SCNC: 4.5 MMOL/L (ref 3.5–5.1)
PROT SERPL-MCNC: 6.9 G/DL (ref 6–8.4)
RBC # BLD AUTO: 4.65 M/UL (ref 4.6–6.2)
SODIUM SERPL-SCNC: 137 MMOL/L (ref 136–145)
WBC # BLD AUTO: 8.08 K/UL (ref 3.9–12.7)

## 2024-09-05 PROCEDURE — 25000003 PHARM REV CODE 250: Performed by: HOSPITALIST

## 2024-09-05 PROCEDURE — 97116 GAIT TRAINING THERAPY: CPT

## 2024-09-05 PROCEDURE — 83036 HEMOGLOBIN GLYCOSYLATED A1C: CPT | Performed by: HOSPITALIST

## 2024-09-05 PROCEDURE — 85025 COMPLETE CBC W/AUTO DIFF WBC: CPT | Performed by: HOSPITALIST

## 2024-09-05 PROCEDURE — 94761 N-INVAS EAR/PLS OXIMETRY MLT: CPT

## 2024-09-05 PROCEDURE — 80053 COMPREHEN METABOLIC PANEL: CPT | Performed by: HOSPITALIST

## 2024-09-05 PROCEDURE — 97530 THERAPEUTIC ACTIVITIES: CPT

## 2024-09-05 PROCEDURE — 25000003 PHARM REV CODE 250: Performed by: INTERNAL MEDICINE

## 2024-09-05 PROCEDURE — 25000003 PHARM REV CODE 250: Performed by: STUDENT IN AN ORGANIZED HEALTH CARE EDUCATION/TRAINING PROGRAM

## 2024-09-05 PROCEDURE — 97161 PT EVAL LOW COMPLEX 20 MIN: CPT

## 2024-09-05 PROCEDURE — 83735 ASSAY OF MAGNESIUM: CPT | Performed by: HOSPITALIST

## 2024-09-05 PROCEDURE — 99900031 HC PATIENT EDUCATION (STAT)

## 2024-09-05 PROCEDURE — 36415 COLL VENOUS BLD VENIPUNCTURE: CPT | Performed by: HOSPITALIST

## 2024-09-05 PROCEDURE — 97535 SELF CARE MNGMENT TRAINING: CPT

## 2024-09-05 RX ORDER — INSULIN PUMP SYRINGE, 3 ML
EACH MISCELLANEOUS
Qty: 1 EACH | Refills: 0 | Status: SHIPPED | OUTPATIENT
Start: 2024-09-05 | End: 2025-09-05

## 2024-09-05 RX ORDER — CEFUROXIME AXETIL 500 MG/1
500 TABLET ORAL EVERY 12 HOURS
Qty: 14 TABLET | Refills: 0 | Status: SHIPPED | OUTPATIENT
Start: 2024-09-05

## 2024-09-05 RX ORDER — METFORMIN HYDROCHLORIDE 500 MG/1
1000 TABLET, EXTENDED RELEASE ORAL 2 TIMES DAILY WITH MEALS
Qty: 180 TABLET | Refills: 0 | Status: SHIPPED | OUTPATIENT
Start: 2024-09-05

## 2024-09-05 RX ORDER — INSULIN ASPART 100 [IU]/ML
0-10 INJECTION, SOLUTION INTRAVENOUS; SUBCUTANEOUS
Status: DISCONTINUED | OUTPATIENT
Start: 2024-09-05 | End: 2024-09-05 | Stop reason: HOSPADM

## 2024-09-05 RX ORDER — OXYCODONE HYDROCHLORIDE 10 MG/1
10 TABLET ORAL EVERY 8 HOURS PRN
Status: DISCONTINUED | OUTPATIENT
Start: 2024-09-05 | End: 2024-09-05 | Stop reason: HOSPADM

## 2024-09-05 RX ADMIN — LEVOTHYROXINE SODIUM 50 MCG: 0.03 TABLET ORAL at 05:09

## 2024-09-05 RX ADMIN — ASPIRIN 81 MG 81 MG: 81 TABLET ORAL at 08:09

## 2024-09-05 RX ADMIN — BUPROPION HYDROCHLORIDE 150 MG: 150 TABLET, EXTENDED RELEASE ORAL at 08:09

## 2024-09-05 RX ADMIN — MUPIROCIN 1 G: 20 OINTMENT TOPICAL at 08:09

## 2024-09-05 RX ADMIN — METOPROLOL SUCCINATE 12.5 MG: 25 TABLET, EXTENDED RELEASE ORAL at 08:09

## 2024-09-05 RX ADMIN — HYDROCODONE BITARTRATE AND ACETAMINOPHEN 1 TABLET: 5; 325 TABLET ORAL at 08:09

## 2024-09-05 NOTE — PLAN OF CARE
Problem: Adult Inpatient Plan of Care  Goal: Plan of Care Review  Outcome: Progressing  Goal: Patient-Specific Goal (Individualized)  Outcome: Progressing  Goal: Absence of Hospital-Acquired Illness or Injury  Outcome: Progressing     Problem: Diabetes Comorbidity  Goal: Blood Glucose Level Within Targeted Range  Outcome: Progressing     Problem: Sepsis/Septic Shock  Goal: Optimal Coping  Outcome: Progressing  Goal: Absence of Bleeding  Outcome: Progressing     Problem: Acute Kidney Injury/Impairment  Goal: Fluid and Electrolyte Balance  Outcome: Progressing  Goal: Improved Oral Intake  Outcome: Progressing

## 2024-09-05 NOTE — NURSING
1300-Pt discharged. Pt went home with wife and daughter. He was brought down by wheelchair with no signs of distress.He was given verbal and written instructions for upcoming appointments and updated prescriptions. Iv removed and tele monitor off. Pt discharged.

## 2024-09-05 NOTE — DISCHARGE SUMMARY
"[facility] Medicine  Discharge Summary      Patient Name: Orlando Treadwell  MRN: [id number]  DELMY: [id number]  Patient Class: IP- Inpatient  Admission Date: 9/1/2024  Hospital Length of Stay: 4 days  Discharge Date and Time:  09/05/2024 1:33 PM  Attending Physician: Tracy att. providers found   Discharging Provider: Suleman Carpio MD  Primary Care Provider: Kleber Worthy III, MD    Primary Care Team: Networked reference to record PCT     HPI:   75 yo man with PMH of diabetes, hypertension, CAD and hypothyroid who presented to the ER because of generalized weakness and hypotension.  Apparently patient has not been feeling well, and appeared confused to the wife for the last 4 days.  He has been having urinary incontinence, frequency, and having generalized weakness.  2 days ago, pt has fallen twice, due to "weak knees" per pt. Today wife noted that his symptoms are getting worse.  She checked his blood pressure and it was in the 70s systolic (despite holding his bp meds for the last 4 days).  As a result, pt was brought to the ER for evaluation. ROS is also positive for dry cough for 2 weeks.     In the ER, vitals had bp of 83/63, HR of 73, RR of 24, afebrile, sating 97% on RA.  His SpO2 then dropped to 89% requiring 2 L nasal cannula.  CBC with white count of 14.  CMP showed multiple abnormalities including sodium of 119, potassium 5.6, negative anion gap, creatinine of 4.9 compared to baseline of 1.4, and blood sugar of 785.  Also elevated LFTs with AST being 106, ALT being 84.  UA with +6 white blood cells, and rare bacteria.  Chest x-ray is negative for pneumonia.  COVID/flu was negative.  Patient was given ceftriaxone and vanco, also 2 L of fluids for resuscitation.  Will be admitted to Medicine for his severe sepsis suspect secondary to UTI.      * No surgery found *      Hospital Course:    75 yo man with PMH of diabetes, hypertension, CAD and hypothyroid was admitted with generalized " weakness and hypotension and BRII and UIT and AMS and severe hyperglycemia . Also reported repeated falls recently as well .     In the ER, vitals had bp of 83/63, HR of 73, RR of 24, afebrile, sating 97% on RA.  His SpO2 then dropped to 89% requiring 2 L nasal cannula.  CBC with white count of 14.  CMP showed multiple abnormalities including sodium of 119, potassium 5.6, negative anion gap, creatinine of 4.9 compared to baseline of 1.4, and blood sugar of 785.  Also elevated LFTs with AST being 106, ALT being 84.  UA with +6 white blood cells, and rare bacteria.      Chest x-ray is negative for pneumonia.  COVID/flu was negative.  Patient was given ceftriaxone and vanco, also 2 L of fluids for resuscitation and BP improved . Later glucose better controlled . BRII resolved with IVF . Urine culture came back neg and BC neg too  AMS possible from HHS as well and improved with IVF . Metabolic acidosis from diarrhea better  . All nephrotoxic medications are hold .  Patient gradually better . AMS resolved . PT done and imbalance and MRI brain was done and neg for acute .He claim that he has bone spur at feet . He was on pain Mx and high dose pain killer and put back. SNF/rehab arranging and Later he did not like the hospital food and he adamantly asked to get DC to home . Glucometer prescribed . Metformin dose increased at discharge . Need close follow up with PCP for better control of blood sugar     Goals of Care Treatment Preferences:  Code Status: Full Code      SDOH Screening:  The patient was screened for food insecurity, housing instability, transportation needs, utility difficulties, and interpersonal safety. The social determinant(s) of health identified as a concern this admission are:  Food insecurity        Social Determinants of Health with Concerns     Food Insecurity: Food Insecurity Present (9/2/2024)        Consults:   Consults (From admission, onward)          Status Ordering Provider     Inpatient  "consult to   Once        Provider:  (Not yet assigned)    Acknowledged JUSTIN CHONG     Inpatient consult to Nephrology  Once        Provider:  Neisha Swanson MD    Completed JUSTIN CHONG     Pharmacy to dose Vancomycin consult  Once        Provider:  (Not yet assigned)   Placed in "And" Linked Group    Acknowledged RICCI MCPHERSON DONAVON            No new Assessment & Plan notes have been filed under this hospital service since the last note was generated.  Service: Hospital Medicine    Final Active Diagnoses:    Diagnosis Date Noted POA    PRINCIPAL PROBLEM:  Severe sepsis [A41.9, R65.20] 09/01/2024 Yes    UTI (urinary tract infection) [N39.0] 09/01/2024 Yes    Acute renal failure [N17.9] 09/01/2024 Yes    Type 2 diabetes mellitus with hyperglycemia, without long-term current use of insulin [E11.65] 09/01/2024 Yes    Hyponatremia [E87.1] 09/01/2024 Yes    Hyperkalemia [E87.5] 09/01/2024 Yes    Hypothyroid [E03.9] 09/01/2024 Yes    Fall [W19.XXXA] 09/01/2024 Yes    Encephalopathy, metabolic [G93.41] 10/22/2020 Yes      Problems Resolved During this Admission:       Discharged Condition: fair    Disposition: Home or Self Care    Follow Up:   Follow-up Information       Kleber Worthy III, MD. Go on 9/16/2024.    Specialty: Family Medicine  Why: at 9:30 am.  Contact information:  [address] [place] [address] [id number] [phone]                           Patient Instructions:      WALKER FOR HOME USE     Order Specific Question Answer Comments   Type of Walker: Adult (5'4"-6'6")    With wheels? Yes    Height: 5' 8" (1.727 m)    Weight: 104.4 kg (230 lb 2.6 oz)    Length of need (1-99 months): 99    Does patient have medical equipment at home? shower chair    Does patient have medical equipment at home? walker, rolling    Please check all that apply: Patient's condition impairs ambulation.    Please check all that apply: Patient needs help to get in and out of chair.    Please check " all that apply: Patient is unable to safely ambulate without equipment.      Diet Adult Regular     Activity as tolerated       Significant Diagnostic Studies: Labs: CMP   Recent Labs   Lab 09/04/24  0411 09/05/24 0445   * 137   K 4.7 4.5    106   CO2 23 21*   * 256*   BUN 52* 35*   CREATININE 1.7* 1.2   CALCIUM 9.2 9.3   PROT 7.0 6.9   ALBUMIN 3.1* 3.3*   BILITOT 0.7 0.8   ALKPHOS 71 74   AST 95* 125*   ALT 60* 70*   ANIONGAP 7* 10    and CBC   Recent Labs   Lab 09/04/24  0411 09/05/24 0445   WBC 9.38 8.08   HGB 12.0* 13.0*   HCT 37.8* 40.4    195       Pending Diagnostic Studies:       Procedure Component Value Units Date/Time    Vancomycin, random [[id number]]     Order Status: Sent Lab Status: No result     Specimen: Blood            Medications:  Reconciled Home Medications:      Medication List        START taking these medications      blood-glucose meter kit  Use as instructed     cefUROXime 500 MG tablet  Commonly known as: CEFTIN  Take 1 tablet (500 mg total) by mouth every 12 (twelve) hours.            CHANGE how you take these medications      metFORMIN 500 MG ER 24hr tablet  Commonly known as: GLUCOPHAGE-XR  Take 2 tablets (1,000 mg total) by mouth 2 (two) times daily with meals.  What changed: how much to take     sertraline 100 MG tablet  Commonly known as: ZOLOFT  TAKE 1 & 1/2 TABLETS BY MOUTH ONCE DAILY  What changed:   how much to take  how to take this  when to take this     testosterone cypionate 200 mg/mL injection  Commonly known as: DEPOTESTOTERONE CYPIONATE  INJECT 2 ML INTO THE MUSCLE EVERY 28 DAYS  What changed: See the new instructions.            CONTINUE taking these medications      ascorbic acid (vitamin C) 100 MG tablet  Commonly known as: VITAMIN C  Take 1 tablet by mouth once daily.     aspirin 325 MG tablet  Take 325 mg by mouth once daily.     b complex vitamins tablet  Take 1 tablet by mouth once daily.     buPROPion 300 MG 24 hr tablet  Commonly known  as: WELLBUTRIN XL  Take 1 tablet (300 mg total) by mouth every morning.     diclofenac sodium 1 % Gel  Commonly known as: VOLTAREN  Apply 2 g topically 2 (two) times daily.     gabapentin 300 MG capsule  Commonly known as: NEURONTIN  Take 2 capsules (600 mg total) by mouth every evening.     hydrALAZINE 50 MG tablet  Commonly known as: APRESOLINE  Take 50 mg by mouth 2 (two) times daily.     levothyroxine 50 MCG tablet  Commonly known as: SYNTHROID  Take 1 tablet (50 mcg total) by mouth before breakfast.     LINZESS 145 mcg Cap capsule  Generic drug: linaCLOtide  Take 1 capsule (145 mcg total) by mouth once daily.     MAGNESIUM ORAL  Take 500 mg by mouth once daily.     metoprolol succinate 100 MG 24 hr tablet  Commonly known as: TOPROL-XL  Take 100 mg by mouth once daily.     multivitamin tablet  Commonly known as: THERAGRAN  Take 1 tablet by mouth once daily.     NARCAN 4 mg/actuation Spry  Generic drug: naloxone  1 spray by Nasal route once.     nitroGLYCERIN 0.4 MG SL tablet  Commonly known as: NITROSTAT  Place 0.4 mg under the tongue every 5 (five) minutes as needed for Chest pain.     olmesartan-hydrochlorothiazide 40-25 mg per tablet  Commonly known as: BENICAR HCT  Take 1 tablet by mouth once daily.     oxyCODONE 10 mg Tab immediate release tablet  Commonly known as: ROXICODONE  Take 10 mg by mouth every 8 (eight) hours as needed.     pantoprazole 40 MG tablet  Commonly known as: PROTONIX  Take 1 tablet (40 mg total) by mouth 2 (two) times daily.     pravastatin 20 MG tablet  Commonly known as: PRAVACHOL  Take 1 tablet (20 mg total) by mouth once daily.     VISION ORAL  Take 1 tablet by mouth once daily.     vitamin E (dl, acetate) 180 mg (400 unit) Cap  Take 400 Units by mouth once daily.     zinc gluconate 50 mg tablet  Take 50 mg by mouth once daily.            STOP taking these medications      lamoTRIgine 25 MG tablet  Commonly known as: LaMICtal              Indwelling Lines/Drains at time of  discharge:   Lines/Drains/Airways       None                 General: Patient resting comfortably in no acute distress. Appears as stated age. Calm  Eyes: EOM intact. No conjunctivae injection. No scleral icterus.  ENT: Hearing grossly intact. No discharge from ears. No nasal discharge.   CVS: RRR. No LE edema BL.  Lungs: CTA BL, no wheezing or crackles. Good breath sounds. No accessory muscle use. No acute respiratory distress  Neuro: non focal , Follows commands. Responds appropriately   Time spent on the discharge of patient: 33 minutes         Suleman Carpio MD  Department of Hospital Medicine  [facility]

## 2024-09-05 NOTE — PROGRESS NOTES
"INPATIENT NEPHROLOGY Progress  [facility]    Patient Name: Orlando Treadwell  Date: 09/05/2024    Reason for consultation: BRII    Chief Complaint:   Chief Complaint   Patient presents with    Urinary Frequency     URINATING ON SELF X 3 DAYS. PER WIFE , ACTING CONFUSED OVER LAST FEW DAYS, NOT TAKING HIS MEDS    GEN WEAKNESS     FALLEN X 2X OVER LAST 2 DAYS    Head Injury       History of Present Illness:  73 yo man with PMH of diabetes, hypertension, CAD and hypothyroid who presented to the ER because of generalized weakness and hypotension.  Apparently patient has not been feeling well, and appeared confused to the wife for the last 4 days.  He has been having urinary incontinence, frequency, and having generalized weakness.  2 days ago, pt has fallen twice, due to "weak knees" per pt. Today wife noted that his symptoms are getting worse.  She checked his blood pressure and it was in the 70s systolic (despite holding his bp meds for the last 4 days).  As a result, pt was brought to the ER for evaluation. ROS is also positive for dry cough for 2 weeks. CMP showed multiple abnormalities including sodium of 119, potassium 5.6, negative anion gap, creatinine of 4.9 compared to baseline of 1.4, and blood sugar of 785. Also elevated LFTs with AST being 106, ALT being 84. UA with +6 white blood cells, and rare bacteria. Chest x-ray is negative for pneumonia. COVID/flu was negative. Patient was given ceftriaxone and vanco, also 2 L of fluids for resuscitation. Will be admitted to Medicine for his severe sepsis suspect secondary to UTI. Consulted for BRII.    Home meds- hydral, mg, metformin, metoprolol, benicar/HCTZ     Interval History:  9/2- afebrile, tachy, hypotensive, on 2L NC, UOP 1.4L  9/3  Afebrile, intermittently tachycardic,   1900 cc uop.  No nausea, chest pain, sob.  9/5  1.3L UOP, VSS, renal function improving.  Up in chair, no new complaints.    Plan of " Care:    Assessment:  Urosepsis  Possible HHS; underlying DMII  BRII/CKD III  Baseline HTN  Hyponatremia  Hyperkalemia  Acidosis   SHPT  Anemia of CKD    Plan:    - agree with IV antbx and IVFs- dose for CrCl < 10-30  - blood glucose management per HM- hold metformin  - hold all home BP med including benicar/HCTZ  - trend metabolic parameters with above management- renal/diabetic diet  - no acute RRT needs = reassess daily  - no nsaids or IV contrast- strict ins/outs  - check phos, PTH when more stable  - no acute hematologic issues  --glycemic control given acidosis and and hyperkalemia with brii    Thank you for allowing us to participate in this patient's care. We will continue to follow.    Vital Signs:  Temp Readings from Last 3 Encounters:   09/05/24 97.9 °F (36.6 °C) (Oral)   06/21/24 98 °F (36.7 °C)   05/17/24 98.9 °F (37.2 °C)       Pulse Readings from Last 3 Encounters:   09/05/24 103   06/21/24 73   05/17/24 69       BP Readings from Last 3 Encounters:   09/05/24 127/71   06/21/24 128/72   03/21/24 122/68       Weight:  Wt Readings from Last 3 Encounters:   09/05/24 104.4 kg (230 lb 2.6 oz)   06/21/24 107 kg (236 lb)   05/17/24 107.9 kg (237 lb 12.3 oz)       INS/OUTS:  I/O last 3 completed shifts:  In: 610 [P.O.:610]  Out: 1950 [Urine:1950]  No intake/output data recorded.    Past Medical & Surgical History:  Past Medical History:   Diagnosis Date    Abdominal hernia     Arthritis     Asbestos exposure     SOB     Back pain     Bulging discs     lumbar    Chronic back pain     Coronary artery disease     Hypertension     Kidney stone     Prostatitis     Sepsis due to methicillin resistant Staphylococcus aureus (MRSA) 06/2019    Due to large right upper arm abscess    Wears glasses        Past Surgical History:   Procedure Laterality Date    APPENDECTOMY      BACK SURGERY  09/23/2022    COLONOSCOPY N/A 09/23/2021    Procedure: COLONOSCOPY;  Surgeon: Ashvin Batista MD;  Location: [facility];  Service:  Endoscopy;  Laterality: N/A;    ESOPHAGOGASTRODUODENOSCOPY N/A 09/23/2021    Procedure: EGD (ESOPHAGOGASTRODUODENOSCOPY);  Surgeon: Ashvin Batista MD;  Location: [facility] ENDO;  Service: Endoscopy;  Laterality: N/A;    HAND SURGERY      rt hand    INCISION AND DRAINAGE Bilateral 06/2019    Right upper arm and left upper arm abscesses    JOINT REPLACEMENT      right knee    KNEE LIGAMENT RECONSTRUCTION      left knee    LAPAROSCOPIC CHOLECYSTECTOMY N/A 01/17/2020    Procedure: CHOLECYSTECTOMY, LAPAROSCOPIC;  Surgeon: Edgard Hill III, MD;  Location: [facility] OR;  Service: General;  Laterality: N/A;    LITHOTRIPSY      TONSILLECTOMY, ADENOIDECTOMY, BILATERAL MYRINGOTOMY AND TUBES      TOTAL KNEE ARTHROPLASTY  1971    rt knee    URETERAL STENT PLACEMENT         Past Social History:  Social History     Socioeconomic History    Marital status:    Occupational History    Occupation: RETIRED   Tobacco Use    Smoking status: Never    Smokeless tobacco: Never   Substance and Sexual Activity    Alcohol use: No    Drug use: No    Sexual activity: Yes     Partners: Female     Social Determinants of Health     Financial Resource Strain: Medium Risk (9/2/2024)    Overall Financial Resource Strain (CARDIA)     Difficulty of Paying Living Expenses: Somewhat hard   Food Insecurity: Food Insecurity Present (9/2/2024)    Hunger Vital Sign     Worried About Running Out of Food in the Last Year: Sometimes true     Ran Out of Food in the Last Year: Sometimes true   Transportation Needs: No Transportation Needs (9/2/2024)    TRANSPORTATION NEEDS     Transportation : No   Physical Activity: Inactive (9/2/2024)    Exercise Vital Sign     Days of Exercise per Week: 0 days     Minutes of Exercise per Session: 0 min   Stress: Stress Concern Present (9/2/2024)    [employer] - Occupational Stress Questionnaire     Feeling of Stress : Very much   Housing Stability: Low Risk  (9/2/2024)    Housing Stability  Vital Sign     Unable to Pay for Housing in the Last Year: No     Homeless in the Last Year: No       Medications:  Scheduled Meds:   aspirin  81 mg Oral Daily    buPROPion  150 mg Oral Daily    cefTRIAXone (Rocephin) IV (PEDS and ADULTS)  1 g Intravenous Q24H    enoxparin  40 mg Subcutaneous Daily    gabapentin  200 mg Oral QHS    insulin glargine U-100  10 Units Subcutaneous QHS    levothyroxine  50 mcg Oral Before breakfast    metoprolol succinate  12.5 mg Oral Daily    mupirocin   Nasal BID    pravastatin  20 mg Oral QHS    sertraline  25 mg Oral QHS     Continuous Infusions:   0.9% NaCl   Intravenous Continuous   Stopped at 09/05/24 0629     PRN Meds:.  Current Facility-Administered Medications:     acetaminophen, 650 mg, Oral, Q8H PRN    acetaminophen, 650 mg, Oral, Q4H PRN    aluminum-magnesium hydroxide-simethicone, 30 mL, Oral, QID PRN    dextrose 50%, 12.5 g, Intravenous, PRN    dextrose 50%, 25 g, Intravenous, PRN    glucagon (human recombinant), 1 mg, Intramuscular, PRN    glucose, 16 g, Oral, PRN    glucose, 24 g, Oral, PRN    HYDROcodone-acetaminophen, 1 tablet, Oral, Q8H PRN    insulin aspart U-100, 0-10 Units, Subcutaneous, QID (PC + HS) PRN    magnesium oxide, 800 mg, Oral, PRN    magnesium oxide, 800 mg, Oral, PRN    naloxone, 0.02 mg, Intravenous, PRN    ondansetron, 4 mg, Intravenous, Q6H PRN    potassium bicarbonate, 35 mEq, Oral, PRN    potassium bicarbonate, 50 mEq, Oral, PRN    potassium bicarbonate, 60 mEq, Oral, PRN    potassium, sodium phosphates, 2 packet, Oral, PRN    potassium, sodium phosphates, 2 packet, Oral, PRN    potassium, sodium phosphates, 2 packet, Oral, PRN    senna-docusate 8.6-50 mg, 1 tablet, Oral, BID PRN    sodium chloride 0.9%, 2 mL, Intravenous, Q12H PRN    vancomycin - pharmacy to dose, , Intravenous, pharmacy to manage frequency  No current facility-administered medications on file prior to encounter.     Current Outpatient Medications on File Prior to Encounter    Medication Sig Dispense Refill    ascorbic acid, vitamin C, (VITAMIN C) 100 MG tablet Take 1 tablet by mouth once daily.      aspirin 325 MG tablet Take 325 mg by mouth once daily.      b complex vitamins tablet Take 1 tablet by mouth once daily.      beta-carotene,A,-vits C,E/mins (VISION ORAL) Take 1 tablet by mouth once daily.      buPROPion (WELLBUTRIN XL) 300 MG 24 hr tablet Take 1 tablet (300 mg total) by mouth every morning. 90 tablet 3    diclofenac sodium 1 % Gel Apply 2 g topically 2 (two) times daily. 1 Tube 2    gabapentin (NEURONTIN) 300 MG capsule Take 2 capsules (600 mg total) by mouth every evening. 180 capsule 3    hydrALAZINE (APRESOLINE) 50 MG tablet Take 50 mg by mouth 2 (two) times daily.      levothyroxine (SYNTHROID) 50 MCG tablet Take 1 tablet (50 mcg total) by mouth before breakfast. 90 tablet 3    LINZESS 145 mcg Cap capsule Take 1 capsule (145 mcg total) by mouth once daily. 30 capsule 5    MAGNESIUM ORAL Take 500 mg by mouth once daily.      metFORMIN (GLUCOPHAGE-XR) 500 MG ER 24hr tablet Take 1 tablet (500 mg total) by mouth 2 (two) times daily with meals. 180 tablet 1    metoprolol succinate (TOPROL-XL) 100 MG 24 hr tablet Take 100 mg by mouth once daily.      multivitamin (THERAGRAN) tablet Take 1 tablet by mouth once daily.      NARCAN 4 mg/actuation Spry 1 spray by Nasal route once.      nitroGLYCERIN (NITROSTAT) 0.4 MG SL tablet Place 0.4 mg under the tongue every 5 (five) minutes as needed for Chest pain.      olmesartan-hydrochlorothiazide (BENICAR HCT) 40-25 mg per tablet Take 1 tablet by mouth once daily.      oxyCODONE (ROXICODONE) 10 mg Tab immediate release tablet Take 10 mg by mouth every 8 (eight) hours as needed.      pantoprazole (PROTONIX) 40 MG tablet Take 1 tablet (40 mg total) by mouth 2 (two) times daily. 180 tablet 3    pravastatin (PRAVACHOL) 20 MG tablet Take 1 tablet (20 mg total) by mouth once daily. 90 tablet 3    sertraline (ZOLOFT) 100 MG tablet TAKE 1 &  "1/2 TABLETS BY MOUTH ONCE DAILY (Patient taking differently: Take 150 mg by mouth once daily. TAKE 1 & 1/2 TABLETS BY MOUTH ONCE DAILY) 135 tablet 3    testosterone cypionate (DEPOTESTOTERONE CYPIONATE) 200 mg/mL injection INJECT 2 ML INTO THE MUSCLE EVERY 28 DAYS (Patient taking differently: Inject 2 mLs into the muscle every 28 days.) 10 mL 1    vitamin E, dl,tocopheryl acet, (VITAMIN E, DL, ACETATE,) 180 mg (400 unit) Cap Take 400 Units by mouth once daily.      zinc gluconate 50 mg tablet Take 50 mg by mouth once daily.      lamoTRIgine (LAMICTAL) 25 MG tablet Take one tab nightly for 7 days then one tab every 12 hours (Patient not taking: Reported on 6/21/2024) 60 tablet 3       Allergies:  Codeine, Sulfamethoxazole-trimethoprim, Codeine sulfate, and Morphine    Past Family History:  Reviewed; refer to Hospitalist Admission Note    Review of Systems:  Review of Systems - All 14 systems reviewed and negative, except as noted in HPI    Physical Exam:  /71 (BP Location: Right arm, Patient Position: Lying)   Pulse 103   Temp 97.9 °F (36.6 °C) (Oral)   Resp 18   Ht 5' 8" (1.727 m)   Wt 104.4 kg (230 lb 2.6 oz)   SpO2 (!) 93%   BMI 35.00 kg/m²     General Appearance:    NAD, AAO x 3, cooperative, appears stated age   Head:    Normocephalic, atraumatic   Eyes:    PER, EOMI, and conjunctiva/sclera clear bilaterally        Mouth:   Moist mucus membranes, no thrush or oral lesions, normal      dentition   Back:     No CVA tenderness   Lungs:     Clear to auscultation bilaterally, no wheeze, crackles, rales      or rhonchi, symmetric air movement, respirations unlabored   Heart:    Regular rate and rhythm, S1 and S2 normal, no murmur, rub   or gallop   Abdomen:     Soft, non-tender, non-distended, bowel sounds active all four   quadrants, no RT or guarding, no masses, no organomegaly   Extremities:   Warm and well perfused, distal pulses intact, no cyanosis or    peripheral edema   MSK:   No joint or muscle " swelling, tenderness or deformity   Skin:   Skin color, texture, turgor normal, no rashes or lesions   Neurologic/Psychiatric:   CNII-XII intact, normal strength and sensation       throughout, no asterixis; normal affect, memory, judgement     and insight     Results:  Lab Results   Component Value Date     09/05/2024    K 4.5 09/05/2024     09/05/2024    CO2 21 (L) 09/05/2024    BUN 35 (H) 09/05/2024    CREATININE 1.2 09/05/2024    CALCIUM 9.3 09/05/2024    ANIONGAP 10 09/05/2024    ESTGFRAFRICA 57.2 (A) 06/24/2022    EGFRNONAA 49.5 (A) 06/24/2022       Lab Results   Component Value Date    CALCIUM 9.3 09/05/2024    PHOS 3.5 01/14/2020       Recent Labs   Lab 09/05/24  0445   WBC 8.08   RBC 4.65   HGB 13.0*   HCT 40.4      MCV 87   MCH 28.0   MCHC 32.2         Patient care was time spent personally by me on the following activities:   Obtaining a history  Examination of patient.  Providing medical care at the patients bedside.  Developing a treatment plan with patient or surrogate and bedside caregivers  Ordering and reviewing laboratory studies, radiographic studies, pulse oximetry.  Ordering and performing treatments and interventions.  Evaluation of patient's response to treatment.  Discussions with consultants while on the unit and immediately available to the patient.  Re-evaluation of the patient's condition.  Documentation in the medical record.       Eve Ramirez NP    [facility] [address]  [place] [address]  [phone] (p)  [phone] (f)

## 2024-09-05 NOTE — PLAN OF CARE
RAMONA sent a SC to Pat liaison with [facility] to inquire if she has evaluated pt for rehab. RAMONA is waiting for Pat to respond.       09/05/24 0938   Post-Acute Status   Post-Acute Authorization Placement   Discharge Plan   Discharge Plan A Rehab

## 2024-09-05 NOTE — CONSULTS
Chief complaint:  Urinary Frequency (URINATING ON SELF X 3 DAYS. PER WIFE , ACTING CONFUSED OVER LAST FEW DAYS, NOT TAKING HIS MEDS), GEN WEAKNESS (FALLEN X 2X OVER LAST 2 DAYS), and Head Injury      HPI:  Orlando Treadwell is a 74 y.o. male presenting with a left buttock DTI. Wound was noted on 9/3 but no photos prior to that. Pt stated that it was tender to lay on the wound. No other complaints today    PMH:  As per HPI and below:  Past Medical History:   Diagnosis Date    Abdominal hernia     Arthritis     Asbestos exposure     SOB     Back pain     Bulging discs     lumbar    Chronic back pain     Coronary artery disease     Hypertension     Kidney stone     Prostatitis     Sepsis due to methicillin resistant Staphylococcus aureus (MRSA) 06/2019    Due to large right upper arm abscess    Wears glasses        Social History     Socioeconomic History    Marital status:    Occupational History    Occupation: RETIRED   Tobacco Use    Smoking status: Never    Smokeless tobacco: Never   Substance and Sexual Activity    Alcohol use: No    Drug use: No    Sexual activity: Yes     Partners: Female     Social Determinants of Health     Financial Resource Strain: Medium Risk (9/2/2024)    Overall Financial Resource Strain (CARDIA)     Difficulty of Paying Living Expenses: Somewhat hard   Food Insecurity: Food Insecurity Present (9/2/2024)    Hunger Vital Sign     Worried About Running Out of Food in the Last Year: Sometimes true     Ran Out of Food in the Last Year: Sometimes true   Transportation Needs: No Transportation Needs (9/2/2024)    TRANSPORTATION NEEDS     Transportation : No   Physical Activity: Inactive (9/2/2024)    Exercise Vital Sign     Days of Exercise per Week: 0 days     Minutes of Exercise per Session: 0 min   Stress: Stress Concern Present (9/2/2024)    Citizen of [place] [employer] - Occupational Stress Questionnaire     Feeling of Stress : Very much   Housing Stability: Low Risk   (9/2/2024)    Housing Stability Vital Sign     Unable to Pay for Housing in the Last Year: No     Homeless in the Last Year: No       Past Surgical History:   Procedure Laterality Date    APPENDECTOMY      BACK SURGERY  09/23/2022    COLONOSCOPY N/A 09/23/2021    Procedure: COLONOSCOPY;  Surgeon: Ashvin aBtista MD;  Location: [facility] ENDO;  Service: Endoscopy;  Laterality: N/A;    ESOPHAGOGASTRODUODENOSCOPY N/A 09/23/2021    Procedure: EGD (ESOPHAGOGASTRODUODENOSCOPY);  Surgeon: Ashvin Batista MD;  Location: [facility] ENDO;  Service: Endoscopy;  Laterality: N/A;    HAND SURGERY      rt hand    INCISION AND DRAINAGE Bilateral 06/2019    Right upper arm and left upper arm abscesses    JOINT REPLACEMENT      right knee    KNEE LIGAMENT RECONSTRUCTION      left knee    LAPAROSCOPIC CHOLECYSTECTOMY N/A 01/17/2020    Procedure: CHOLECYSTECTOMY, LAPAROSCOPIC;  Surgeon: Edgard Hill III, MD;  Location: [facility] OR;  Service: General;  Laterality: N/A;    LITHOTRIPSY      TONSILLECTOMY, ADENOIDECTOMY, BILATERAL MYRINGOTOMY AND TUBES      TOTAL KNEE ARTHROPLASTY  1971    rt knee    URETERAL STENT PLACEMENT         Family History   Problem Relation Name Age of Onset    Cancer Mother      Stroke Father      Heart disease Father      Collagen disease Neg Hx      Melanoma Neg Hx      Psoriasis Neg Hx      Lupus Neg Hx      Eczema Neg Hx         Review of patient's allergies indicates:   Allergen Reactions    Codeine     Sulfamethoxazole-trimethoprim      dizziness    Codeine sulfate Nausea And Vomiting    Morphine Nausea Only     Ok with nausea med.       No current facility-administered medications on file prior to encounter.     Current Outpatient Medications on File Prior to Encounter   Medication Sig Dispense Refill    ascorbic acid, vitamin C, (VITAMIN C) 100 MG tablet Take 1 tablet by mouth once daily.      aspirin 325 MG tablet Take 325 mg by mouth once daily.      b complex vitamins tablet Take 1 tablet by mouth once daily.       beta-carotene,A,-vits C,E/mins (VISION ORAL) Take 1 tablet by mouth once daily.      buPROPion (WELLBUTRIN XL) 300 MG 24 hr tablet Take 1 tablet (300 mg total) by mouth every morning. 90 tablet 3    diclofenac sodium 1 % Gel Apply 2 g topically 2 (two) times daily. 1 Tube 2    gabapentin (NEURONTIN) 300 MG capsule Take 2 capsules (600 mg total) by mouth every evening. 180 capsule 3    hydrALAZINE (APRESOLINE) 50 MG tablet Take 50 mg by mouth 2 (two) times daily.      levothyroxine (SYNTHROID) 50 MCG tablet Take 1 tablet (50 mcg total) by mouth before breakfast. 90 tablet 3    LINZESS 145 mcg Cap capsule Take 1 capsule (145 mcg total) by mouth once daily. 30 capsule 5    MAGNESIUM ORAL Take 500 mg by mouth once daily.      metFORMIN (GLUCOPHAGE-XR) 500 MG ER 24hr tablet Take 1 tablet (500 mg total) by mouth 2 (two) times daily with meals. 180 tablet 1    metoprolol succinate (TOPROL-XL) 100 MG 24 hr tablet Take 100 mg by mouth once daily.      multivitamin (THERAGRAN) tablet Take 1 tablet by mouth once daily.      NARCAN 4 mg/actuation Spry 1 spray by Nasal route once.      nitroGLYCERIN (NITROSTAT) 0.4 MG SL tablet Place 0.4 mg under the tongue every 5 (five) minutes as needed for Chest pain.      olmesartan-hydrochlorothiazide (BENICAR HCT) 40-25 mg per tablet Take 1 tablet by mouth once daily.      oxyCODONE (ROXICODONE) 10 mg Tab immediate release tablet Take 10 mg by mouth every 8 (eight) hours as needed.      pantoprazole (PROTONIX) 40 MG tablet Take 1 tablet (40 mg total) by mouth 2 (two) times daily. 180 tablet 3    pravastatin (PRAVACHOL) 20 MG tablet Take 1 tablet (20 mg total) by mouth once daily. 90 tablet 3    sertraline (ZOLOFT) 100 MG tablet TAKE 1 & 1/2 TABLETS BY MOUTH ONCE DAILY (Patient taking differently: Take 150 mg by mouth once daily. TAKE 1 & 1/2 TABLETS BY MOUTH ONCE DAILY) 135 tablet 3    testosterone cypionate (DEPOTESTOTERONE CYPIONATE) 200 mg/mL injection INJECT 2 ML INTO THE MUSCLE  "EVERY 28 DAYS (Patient taking differently: Inject 2 mLs into the muscle every 28 days.) 10 mL 1    vitamin E, dl,tocopheryl acet, (VITAMIN E, DL, ACETATE,) 180 mg (400 unit) Cap Take 400 Units by mouth once daily.      zinc gluconate 50 mg tablet Take 50 mg by mouth once daily.      lamoTRIgine (LAMICTAL) 25 MG tablet Take one tab nightly for 7 days then one tab every 12 hours (Patient not taking: Reported on 2024) 60 tablet 3       ROS: As per HPI and below:  Pertinent items are noted in HPI.      Physical Exam:     Vitals:    24 1531 24 20324   BP: (!) 177/96  135/88    Pulse: 93 96 105    Resp: 16 18 18 18   Temp: 97.6 °F (36.4 °C)  98 °F (36.7 °C)    TempSrc: Temporal  Oral    SpO2: 99% 99% 100%    Weight:       Height:           BP  Min: 82/46  Max: 177/96  Temp  Av °F (36.7 °C)  Min: 97.1 °F (36.2 °C)  Max: 98.6 °F (37 °C)  Pulse  Av.7  Min: 66  Max: 132  Resp  Av.3  Min: 14  Max: 94  SpO2  Av.7 %  Min: 89 %  Max: 100 %  Height  Av' 7.5" (171.5 cm)  Min: 5' 7" (170.2 cm)  Max: 5' 8" (172.7 cm)  Weight  Av kg (231 lb 6.2 oz)  Min: 104.1 kg (229 lb 9.6 oz)  Max: 106.4 kg (234 lb 9.1 oz)    Body mass index is 34.91 kg/m².          General:             Well developed, well nourished, no apparent distress  HEENT:              NCAT, no JVD, mucous membranes moist, EOM intact  Cardiovascular:  Regular rate and rhythm, normal S1, normal S2, No murmurs, rubs, or gallops  Respiratory:        Normal breath sounds, no wheezes, no rales, no rhonchi  Abdomen:           Bowel sounds present, non tender, non distended, no masses, no hepatojugular reflux  Extremities:        No clubbing, no cyanosis, no edema  Vascular:            2+ b/l radial.  Peripheral pulses intact.  No carotid bruits.  Neurological:      No focal deficits  Skin:                   No obvious rashes or erythema, left buttock DTI      Lab Results   Component Value Date    WBC 9.38 " 09/04/2024    HGB 12.0 (L) 09/04/2024    HCT 37.8 (L) 09/04/2024    MCV 86 09/04/2024     09/04/2024     Lab Results   Component Value Date    CHOL 212 (H) 09/02/2024    CHOL 179 04/15/2024    CHOL 147 09/28/2023     Lab Results   Component Value Date    HDL 18 (L) 09/02/2024    HDL 34 (L) 04/15/2024    HDL 30 (L) 09/28/2023     Lab Results   Component Value Date    LDLCALC Invalid, Trig>400.0 09/02/2024    LDLCALC 100.4 04/15/2024    LDLCALC 76.8 09/28/2023     Lab Results   Component Value Date    TRIG 571 (H) 09/02/2024    TRIG 223 (H) 04/15/2024    TRIG 201 (H) 09/28/2023     Lab Results   Component Value Date    CHOLHDL 8.5 (L) 09/02/2024    CHOLHDL 19.0 (L) 04/15/2024    CHOLHDL 20.4 09/28/2023     CMP  Recent Labs   Lab 09/04/24  0411   *   CALCIUM 9.2   ALBUMIN 3.1*   PROT 7.0   *   K 4.7   CO2 23      BUN 52*   CREATININE 1.7*   ALKPHOS 71   ALT 60*   AST 95*   BILITOT 0.7      Lab Results   Component Value Date    TSH 1.254 09/02/2024         Assessment and Recommendations       Diagnoses:    1. Left buttock DTI    Plan:  Triad + mepilex to the left buttock wound  Waffle mattress to the bed    Complexity:    moderate

## 2024-09-05 NOTE — PLAN OF CARE
Problem: Adult Inpatient Plan of Care  Goal: Plan of Care Review  Outcome: Met  Goal: Patient-Specific Goal (Individualized)  Outcome: Met  Goal: Absence of Hospital-Acquired Illness or Injury  Outcome: Met  Goal: Optimal Comfort and Wellbeing  Outcome: Met  Goal: Readiness for Transition of Care  Outcome: Met     Problem: Diabetes Comorbidity  Goal: Blood Glucose Level Within Targeted Range  Outcome: Met     Problem: Sepsis/Septic Shock  Goal: Optimal Coping  Outcome: Met  Goal: Absence of Bleeding  Outcome: Met  Goal: Blood Glucose Level Within Targeted Range  Outcome: Met  Goal: Absence of Infection Signs and Symptoms  Outcome: Met  Goal: Optimal Nutrition Intake  Outcome: Met     Problem: Acute Kidney Injury/Impairment  Goal: Fluid and Electrolyte Balance  Outcome: Met  Goal: Improved Oral Intake  Outcome: Met  Goal: Effective Renal Function  Outcome: Met     Problem: Skin Injury Risk Increased  Goal: Skin Health and Integrity  Outcome: Met

## 2024-09-05 NOTE — PLAN OF CARE
Pat with [facility] reviewing patient for acceptance today, 9/5.  Auth request sent to N via [employer] Fax.         09/05/24 0557   Post-Acute Status   Post-Acute Authorization Placement   Post-Acute Placement Status Pending payor review/awaiting authorization (if required)   Discharge Plan   Discharge Plan A Rehab   Discharge Plan B Rehab

## 2024-09-05 NOTE — PLAN OF CARE
09/05/24 0741   Patient Assessment/Suction   Level of Consciousness (AVPU) alert   Respiratory Effort Normal;Unlabored   Expansion/Accessory Muscles/Retractions no retractions;no use of accessory muscles   PRE-TX-O2   Device (Oxygen Therapy) room air   SpO2 (!) 93 %   Pulse Oximetry Type Intermittent   $ Pulse Oximetry - Multiple Charge Pulse Oximetry - Multiple   Pulse 103   Resp 16   Education   $ Education 15 min

## 2024-09-05 NOTE — PT/OT/SLP EVAL
Physical Therapy Evaluation    Patient Name:  Orlando Treadwell   MRN:  [id number]    Recommendations:     Discharge Recommendations: Low Intensity Therapy   Discharge Equipment Recommendations: none   Barriers to discharge:  Decreased Safety awareness, but pt's wife willing to provide supervision & PT recommended use of RW at home.    Assessment:     Orlando Treadwell is a 74 y.o. male admitted with a medical diagnosis of Severe sepsis.  He presents with the following impairments/functional limitations: weakness, impaired endurance, impaired functional mobility, gait instability, impaired balance, decreased safety awareness, pain, orthopedic precautions.    Pt found HOB elevated with wife and daughter present and pt reluctantly agreeable to working with PT, but complied with his wife's encouragement. Pt A & O x  4, but with some cues/prompts needed.  The pt has the following co-morbidities: UTI, Hx back surgery & B TKAs.  Pt tolerated session only fairly due to his disagreeing with PT's assessment of L LE being weaker than R, but required only CGA with use of a rolling walker for safe mobilization during session today. Pt would benefit from acute PT during hospitalization to increase strength, endurance and safety with mobility and would benefit from use of his rolling walker with close supervision and Low Intensity Therapy upon discharge home. PT attempted to talk pt into OP therapy, but he was only agreeable to HH PT      Rehab Prognosis: Fair; patient would benefit from acute skilled PT services to address these deficits and reach maximum level of function.    Recent Surgery: * No surgery found *      Plan:     During this hospitalization, patient to be seen   to address the identified rehab impairments via   and progress toward the following goals:    Plan of Care Expires:  09/05/24    Subjective     Chief Complaint: Pt did not feel he needs therapy (as such he was argumentative during session several  times)  Patient/Family Comments/goals: home  Pain/Comfort:  Pain Rating 1:  (not rated)  Location - Side 1: Bilateral  Location 1: heel  Pain Addressed 1: Reposition, Distraction, Cessation of Activity  Pain Rating 2:  (not rated)  Location - Orientation 2: lower  Location 2: back  Pain Addressed 2: Reposition, Distraction, Cessation of Activity    Patients cultural, spiritual, Buddhism conflicts given the current situation:      Living Environment:  Pt lives with his wife in a H with 2 steps to enter/no railing.  Prior to admission, patients level of function was independent until a few prior to admit resulting in 2 falls at home.  Equipment used at home: shower chair, walker, rolling.  DME owned (not currently used): rolling walker.  Upon discharge, patient will have assistance from his wife.    Objective:     Communicated with [address] prior to session.  Patient found HOB elevated with bed alarm, peripheral IV, telemetry  upon PT entry to room.    General Precautions: Standard, fall  Orthopedic Precautions:spinal precautions (hx of back surgery)   Braces: N/A  Respiratory Status: Room air    Exams:  Cognitive Exam:  Patient is oriented to Person, Place, Time, Situation, and with cues/prompts needed  RLE ROM: WFL  RLE Strength: grossly 4+/5  LLE ROM: WFL  LLE Strength: grossly 4- to 4/5    Functional Mobility:  Bed Mobility:     Scooting: stand by assistance and in sitting  Supine to Sit: minimum assistance  Transfers:     Sit to Stand:  contact guard assistance with rolling walker  Toilet Transfer: contact guard assistance with  rolling walker and grab bars  using  Step Transfer  Gait: x 150' with rolling walker and CGa for safety. PT urged to use his RW at home until returned to his PLOF with  PT. Pt and wife in agreement.      AM-PAC 6 CLICK MOBILITY  Total Score:20       Treatment & Education:  Pt was educated on the following: call light use, importance of OOB activity and functional mobility to  "negate the negative effects of prolonged bed rest during this hospitalization, safe transfers/ambulation and discharge planning recommendations/options with extra time needed due to pt becoming argumentative several times during session. Pt's wife reported this is pt's usual personality("he's back to his usual self.")      Patient left  on toilet in bathroom with instruction to pull call light  with call button in reach, RN notified, pt's wife  present, and able to watch pt from sofa in room to remind him to not get up until RN comes in to assist back to bed .    GOALS:   Multidisciplinary Problems       Physical Therapy Goals       Not on file                    History:     Past Medical History:   Diagnosis Date    Abdominal hernia     Arthritis     Asbestos exposure     SOB     Back pain     Bulging discs     lumbar    Chronic back pain     Coronary artery disease     Hypertension     Kidney stone     Prostatitis     Sepsis due to methicillin resistant Staphylococcus aureus (MRSA) 06/2019    Due to large right upper arm abscess    Wears glasses        Past Surgical History:   Procedure Laterality Date    APPENDECTOMY      BACK SURGERY  09/23/2022    COLONOSCOPY N/A 09/23/2021    Procedure: COLONOSCOPY;  Surgeon: Ashvin Batista MD;  Location: [facility];  Service: Endoscopy;  Laterality: N/A;    ESOPHAGOGASTRODUODENOSCOPY N/A 09/23/2021    Procedure: EGD (ESOPHAGOGASTRODUODENOSCOPY);  Surgeon: Ashvin Batista MD;  Location: [facility];  Service: Endoscopy;  Laterality: N/A;    HAND SURGERY      rt hand    INCISION AND DRAINAGE Bilateral 06/2019    Right upper arm and left upper arm abscesses    JOINT REPLACEMENT      right knee    KNEE LIGAMENT RECONSTRUCTION      left knee    LAPAROSCOPIC CHOLECYSTECTOMY N/A 01/17/2020    Procedure: CHOLECYSTECTOMY, LAPAROSCOPIC;  Surgeon: Edgard Hill III, MD;  Location: [facility] OR;  Service: General;  Laterality: N/A;    LITHOTRIPSY      TONSILLECTOMY, ADENOIDECTOMY, " BILATERAL MYRINGOTOMY AND TUBES      TOTAL KNEE ARTHROPLASTY  1971    rt knee    URETERAL STENT PLACEMENT         Time Tracking:     PT Received On: 09/05/24  PT Start Time: 1014     PT Stop Time: 1050  PT Total Time (min): 36 min     Billable Minutes: Evaluation 10, Gait Training 10, and Therapeutic Activity 16      09/05/2024

## 2024-09-05 NOTE — PT/OT/SLP PROGRESS
Occupational Therapy   Treatment    Name: Orlando Treadwell  MRN: [id number]  Admitting Diagnosis:  Severe sepsis       Recommendations:     Discharge Recommendations: High Intensity Therapy  Discharge Equipment Recommendations:  to be determined by next level of care  Barriers to discharge:   (increased assist with ADLs and mobility)    Assessment:     Orlando Treadwell is a 74 y.o. male with a medical diagnosis of Severe sepsis.  Pt agreeable to OT therapy session this AM. Performance deficits affecting function are weakness, impaired endurance, impaired self care skills, impaired functional mobility, gait instability, impaired balance, decreased coordination, decreased upper extremity function, decreased lower extremity function, decreased safety awareness, impaired cardiopulmonary response to activity.  Pt required increased time during session for self expression.     Rehab Prognosis:  Good; patient would benefit from acute skilled OT services to address these deficits and reach maximum level of function.       Plan:     Patient to be seen 6 x/week to address the above listed problems via self-care/home management, therapeutic activities, therapeutic exercises  Plan of Care Expires: 10/04/24  Plan of Care Reviewed with: patient, spouse, daughter    Subjective     Chief Complaint: none stated  Patient/Family Comments/goals: none stated  Pain/Comfort:  Pain Rating 1: 0/10    Objective:     Communicated with: nursing prior to session.  Patient found HOB elevated with bed alarm, telemetry, peripheral IV upon OT entry to room.    General Precautions: Standard, fall    Orthopedic Precautions:N/A  Braces: N/A  Respiratory Status: Room air     Occupational Performance:     Bed Mobility:    Patient completed Supine to Sit with minimum assistance  Patient completed Sit to Supine with stand by assistance     Functional Mobility/Transfers:  Patient completed Sit <> Stand Transfer with contact guard assistance  with  rolling  walker   Functional Mobility: pt amb in room with CGA with RW with no LOB or SOB    Activities of Daily Living:  Grooming: contact guard assistance standing at sink to brush teeth and to wash face ; pt required cues for safety to step closer to sink    Treatment & Education:  Pt educated on role of OT/POC, importance of OOB/EOB activity, use of call bell, and safety during ADLs, transfers, and functional mobility.    Patient left HOB elevated with all lines intact, call button in reach, bed alarm on, and family present    GOALS:   Multidisciplinary Problems       Occupational Therapy Goals          Problem: Occupational Therapy    Goal Priority Disciplines Outcome Interventions   Occupational Therapy Goal     OT, PT/OT     Description: Goals to be met by: 10/4/24     Patient will increase functional independence with ADLs by performing:    UE Dressing with Supervision.  LE Dressing with Supervision.  Grooming while standing at sink with Supervision.  Toileting from toilet with Supervision for hygiene and clothing management.   Toilet transfer to toilet with Supervision.                         Time Tracking:     OT Date of Treatment: 09/05/24  OT Start Time: 1126  OT Stop Time: 1146  OT Total Time (min): 20 min    Billable Minutes:Self Care/Home Management 20    OT/ZION: OT          9/5/2024

## 2024-09-05 NOTE — PLAN OF CARE
DC orders and chart reviewed. No discharge needs noted.  Patient cleared for discharge from .    Patient is discharging to home.   was working on rehab placement and insurance auth, but patient is now refusing placement.  Glucometer sent to patient's pharmacy.  PCP follow up scheduled and added to AVS.  Family at bedside to transport home.        09/05/24 1120   Final Note   Assessment Type Final Discharge Note   Anticipated Discharge Disposition Home   What phone number can be called within the next 1-3 days to see how you are doing after discharge? [phone]   Hospital Resources/Appts/Education Provided Appointments scheduled and added to AVS   Post-Acute Status   Post-Acute Authorization Placement   Post-Acute Placement Status Patient declined/refused   Discharge Delays None known at this time

## 2024-09-06 ENCOUNTER — PATIENT OUTREACH (OUTPATIENT)
Dept: FAMILY MEDICINE | Facility: CLINIC | Age: 74
End: 2024-09-06
Payer: MEDICARE

## 2024-09-06 ENCOUNTER — PATIENT OUTREACH (OUTPATIENT)
Dept: ADMINISTRATIVE | Facility: CLINIC | Age: 74
End: 2024-09-06
Payer: MEDICARE

## 2024-09-06 DIAGNOSIS — E11.9 TYPE 2 DIABETES MELLITUS WITHOUT COMPLICATION, WITHOUT LONG-TERM CURRENT USE OF INSULIN: Primary | ICD-10-CM

## 2024-09-06 LAB
BACTERIA BLD CULT: NORMAL
BACTERIA BLD CULT: NORMAL

## 2024-09-06 RX ORDER — BLOOD-GLUCOSE CONTROL, NORMAL
EACH MISCELLANEOUS
Qty: 100 EACH | Refills: 2 | Status: SHIPPED | OUTPATIENT
Start: 2024-09-06

## 2024-09-06 NOTE — PROGRESS NOTES
C3 nurse attempted to contact Orlando Treadwell for a TCC post hospital discharge follow up call. No answer. Left voicemail with callback information. The patient has a scheduled HOSFU appointment with Kleber Worthy III on 09/16/24 @ 1183.

## 2024-09-06 NOTE — TELEPHONE ENCOUNTER
Discharge Information    Discharge Date:  9/5/2024    Primary Discharge Diagnosis:  severe sepsis    Discharge Summary:  Reviewed      Medication & Order Review    Were medication changes made or new medications added?   Yes    If so, has the patient filled the prescriptions?  Yes    Was Home Health ordered? No    If so, has Home Health contacted patient and/or initiated services?  Na    Name of Home Health Agency? N/A    Durable Medical Equipment ordered?  Yes, but sent to pharm - glucometer    If so, has the DME provider contacted patient and delivered equipment?  glucose meter no test strips or lancets at pharm    Follow Up    Any problems since discharge? No    How is the patient feeling since returning home? wife stated he is better but still a little fatigue     Have you set up recommended follow up appointments?  9/16 /24 dr willard [facility]    Schedule Hospital Follow-up appointment within 7-14 days (preferably 7).      Notes:  TCC can be billed     Kenton Fournier LPN

## 2024-09-06 NOTE — TELEPHONE ENCOUNTER
----- Message from Michele Dias sent at 9/6/2024  3:30 PM CDT -----  Contact: wife  Type:  Needs Medical Advice    Who Called: wife Tiffany      Pharmacy name and phone #:    [facility] - [place] - [address]  [address]  [place] [address]  Phone: [phone] Fax: [phone]      Would the patient rather a call back or a response via [facility]? Call back    Best Call Back Number: [phone]      Additional Information: Sts that while pt was in the hospital the pt was given a glycosometer but no lancets or strips and no prescription for them and needs to get some ordered.   Please advise -- Thank you

## 2024-09-06 NOTE — TELEPHONE ENCOUNTER
No care due was identified.  [facility] Embedded Care Due Messages. Reference number: [id number].   9/06/2024 3:44:41 PM CDT

## 2024-09-09 ENCOUNTER — PATIENT MESSAGE (OUTPATIENT)
Dept: ADMINISTRATIVE | Facility: CLINIC | Age: 74
End: 2024-09-09
Payer: MEDICARE

## 2024-09-09 LAB — GLUCOSE SERPL-MCNC: 402 MG/DL (ref 70–110)

## 2024-09-12 LAB
OHS QRS DURATION: 104 MS
OHS QTC CALCULATION: 454 MS

## 2024-09-16 ENCOUNTER — OFFICE VISIT (OUTPATIENT)
Dept: FAMILY MEDICINE | Facility: CLINIC | Age: 74
End: 2024-09-16
Payer: MEDICARE

## 2024-09-16 ENCOUNTER — LAB VISIT (OUTPATIENT)
Dept: LAB | Facility: HOSPITAL | Age: 74
End: 2024-09-16
Attending: FAMILY MEDICINE
Payer: MEDICARE

## 2024-09-16 VITALS
WEIGHT: 226.19 LBS | HEIGHT: 68 IN | HEART RATE: 76 BPM | DIASTOLIC BLOOD PRESSURE: 60 MMHG | BODY MASS INDEX: 34.28 KG/M2 | SYSTOLIC BLOOD PRESSURE: 110 MMHG | TEMPERATURE: 98 F | OXYGEN SATURATION: 98 %

## 2024-09-16 DIAGNOSIS — L97.409 ULCER OF HEEL, UNSPECIFIED LATERALITY, UNSPECIFIED ULCER STAGE: ICD-10-CM

## 2024-09-16 DIAGNOSIS — L89.159 PRESSURE INJURY OF SKIN OF SACRAL REGION, UNSPECIFIED INJURY STAGE: ICD-10-CM

## 2024-09-16 DIAGNOSIS — N17.9 AKI (ACUTE KIDNEY INJURY): ICD-10-CM

## 2024-09-16 DIAGNOSIS — I25.10 CORONARY ARTERY DISEASE INVOLVING NATIVE CORONARY ARTERY OF NATIVE HEART WITHOUT ANGINA PECTORIS: ICD-10-CM

## 2024-09-16 DIAGNOSIS — K76.0 FATTY LIVER: ICD-10-CM

## 2024-09-16 DIAGNOSIS — K25.9 GASTRIC ULCER, UNSPECIFIED CHRONICITY, UNSPECIFIED WHETHER GASTRIC ULCER HEMORRHAGE OR PERFORATION PRESENT: ICD-10-CM

## 2024-09-16 DIAGNOSIS — E11.9 TYPE 2 DIABETES MELLITUS WITHOUT COMPLICATION, WITHOUT LONG-TERM CURRENT USE OF INSULIN: Primary | ICD-10-CM

## 2024-09-16 DIAGNOSIS — Z23 NEED FOR VACCINATION: ICD-10-CM

## 2024-09-16 DIAGNOSIS — Z79.82 ASPIRIN LONG-TERM USE: ICD-10-CM

## 2024-09-16 DIAGNOSIS — E03.9 HYPOTHYROIDISM, UNSPECIFIED TYPE: ICD-10-CM

## 2024-09-16 DIAGNOSIS — Z95.5 STENTED CORONARY ARTERY: ICD-10-CM

## 2024-09-16 DIAGNOSIS — E78.5 HYPERLIPIDEMIA, UNSPECIFIED HYPERLIPIDEMIA TYPE: ICD-10-CM

## 2024-09-16 DIAGNOSIS — I10 HYPERTENSION, ESSENTIAL: ICD-10-CM

## 2024-09-16 DIAGNOSIS — E11.9 TYPE 2 DIABETES MELLITUS WITHOUT COMPLICATION: ICD-10-CM

## 2024-09-16 LAB
ALBUMIN/CREAT UR: 53.9 UG/MG (ref 0–30)
CREAT UR-MCNC: 153.2 MG/DL (ref 23–375)
MICROALBUMIN UR DL<=1MG/L-MCNC: 82.5 UG/ML
TSH SERPL DL<=0.005 MIU/L-ACNC: 5.03 UIU/ML (ref 0.34–5.6)

## 2024-09-16 PROCEDURE — 3046F HEMOGLOBIN A1C LEVEL >9.0%: CPT | Mod: CPTII,S$GLB,, | Performed by: FAMILY MEDICINE

## 2024-09-16 PROCEDURE — 90653 IIV ADJUVANT VACCINE IM: CPT | Mod: S$GLB,,, | Performed by: FAMILY MEDICINE

## 2024-09-16 PROCEDURE — G0008 ADMIN INFLUENZA VIRUS VAC: HCPCS | Mod: S$GLB,,, | Performed by: FAMILY MEDICINE

## 2024-09-16 PROCEDURE — 3066F NEPHROPATHY DOC TX: CPT | Mod: CPTII,S$GLB,, | Performed by: FAMILY MEDICINE

## 2024-09-16 PROCEDURE — 1159F MED LIST DOCD IN RCRD: CPT | Mod: CPTII,S$GLB,, | Performed by: FAMILY MEDICINE

## 2024-09-16 PROCEDURE — 36415 COLL VENOUS BLD VENIPUNCTURE: CPT | Performed by: FAMILY MEDICINE

## 2024-09-16 PROCEDURE — 82570 ASSAY OF URINE CREATININE: CPT | Performed by: FAMILY MEDICINE

## 2024-09-16 PROCEDURE — 3288F FALL RISK ASSESSMENT DOCD: CPT | Mod: CPTII,S$GLB,, | Performed by: FAMILY MEDICINE

## 2024-09-16 PROCEDURE — 3074F SYST BP LT 130 MM HG: CPT | Mod: CPTII,S$GLB,, | Performed by: FAMILY MEDICINE

## 2024-09-16 PROCEDURE — 1101F PT FALLS ASSESS-DOCD LE1/YR: CPT | Mod: CPTII,S$GLB,, | Performed by: FAMILY MEDICINE

## 2024-09-16 PROCEDURE — 84443 ASSAY THYROID STIM HORMONE: CPT | Performed by: FAMILY MEDICINE

## 2024-09-16 PROCEDURE — 1111F DSCHRG MED/CURRENT MED MERGE: CPT | Mod: CPTII,S$GLB,, | Performed by: FAMILY MEDICINE

## 2024-09-16 PROCEDURE — 99215 OFFICE O/P EST HI 40 MIN: CPT | Mod: 25,S$GLB,, | Performed by: FAMILY MEDICINE

## 2024-09-16 PROCEDURE — 1160F RVW MEDS BY RX/DR IN RCRD: CPT | Mod: CPTII,S$GLB,, | Performed by: FAMILY MEDICINE

## 2024-09-16 PROCEDURE — 99999 PR PBB SHADOW E&M-EST. PATIENT-LVL V: CPT | Mod: PBBFAC,,, | Performed by: FAMILY MEDICINE

## 2024-09-16 PROCEDURE — 82043 UR ALBUMIN QUANTITATIVE: CPT | Performed by: FAMILY MEDICINE

## 2024-09-16 PROCEDURE — 3078F DIAST BP <80 MM HG: CPT | Mod: CPTII,S$GLB,, | Performed by: FAMILY MEDICINE

## 2024-09-16 PROCEDURE — 1125F AMNT PAIN NOTED PAIN PRSNT: CPT | Mod: CPTII,S$GLB,, | Performed by: FAMILY MEDICINE

## 2024-09-16 PROCEDURE — 3060F POS MICROALBUMINURIA REV: CPT | Mod: CPTII,S$GLB,, | Performed by: FAMILY MEDICINE

## 2024-09-16 RX ORDER — MUPIROCIN 20 MG/G
OINTMENT TOPICAL 2 TIMES DAILY
Qty: 22 G | Refills: 0 | Status: SHIPPED | OUTPATIENT
Start: 2024-09-16

## 2024-09-16 NOTE — PATIENT INSTRUCTIONS
List of morning fasting sugars readings    Follow up in 2 weeks     Bactroban ointment will be sent to pharm after 5:30 pm today

## 2024-09-17 NOTE — PROGRESS NOTES
Subjective:       Patient ID: Orlando Treadwell is a 74 y.o. male.    Chief Complaint: No chief complaint on file.    Hospital follow-up.  Did not start the diabetes medicine that was given to him at his last visit here.  Continue to be noncompliant with diet.  Sugar gradually increase to the point where he had mental status changes dehydration urinary frequency was admitted to the hospital with a glucose of 785.  BMI of 34.  Hospitalized some September 1st through the 5th hydrated.  Treated with antibiotics due to possible sepsis.  Blood cultures and urine culture negative.  A1c from 7.5 up to 12.1 over the three-month time. .  Glucose 192 today.  But nonfasting.  Also hypothyroidism was taken off of his thyroid medicine by eye doctor.  Though his TSH appears to have been normal.  He is off the medicine now.  Also has hypertension.  Coronary stent.  Coronary artery disease.  In the hospital he developed a sacral decubitus.  Also ulcers on both heels.  He has fatty liver.  Epigastric pain for few months now.  Since his fall but it seems to be more centered around the left [place] injured.  Had acute kidney injury GFR dropped down to 14.4 even as low as 11.7 in his back up over 60 now.  Hyperlipidemia on pravastatin.  He has some chronic constipation on Linzess.  He was discharged home on metformin now.  He has started yet no GI problems from it.  Wife is with him today.    Physical examination.  Vital signs noted.  Overweight male no acute distress.  Neck without bruit.  Chest clear.  Heart regular rate and rhythm.  Abdomen bowel sounds are positive soft nontender.  Little tender over the left lower anterior ribs.  Extremities without edema.  Dark area on both heels on the left posterior lateral heel there is a 1.5 cm dry darkened area.  On the right side there is a 3 cm area similar in appearance.  No drainage no surrounding erythema.  On the right buttock series a 3 x 5 cm shallow ulcer.  No exposed fat  layer.        Objective:        Assessment:       1. Type 2 diabetes mellitus without complication, without long-term current use of insulin    2. BMI 34.0-34.9,adult    3. Stented coronary artery    4. Aspirin long-term use    5. Hypertension, essential    6. Coronary artery disease involving native coronary artery of native heart without angina pectoris    7. Hypothyroidism, unspecified type    8. Pressure injury of skin of sacral region, unspecified injury stage    9. Fatty liver    10. Gastric ulcer, unspecified chronicity, unspecified whether gastric ulcer hemorrhage or perforation present    11. BRII (acute kidney injury)    12. Hyperlipidemia, unspecified hyperlipidemia type    13. Ulcer of heel, unspecified laterality, unspecified ulcer stage    14. Need for vaccination        Plan:       Type 2 diabetes mellitus without complication, without long-term current use of insulin    BMI 34.0-34.9,adult    Stented coronary artery    Aspirin long-term use    Hypertension, essential    Coronary artery disease involving native coronary artery of native heart without angina pectoris    Hypothyroidism, unspecified type  -     TSH; Future; Expected date: 09/16/2024    Pressure injury of skin of sacral region, unspecified injury stage    Fatty liver    Gastric ulcer, unspecified chronicity, unspecified whether gastric ulcer hemorrhage or perforation present    BRII (acute kidney injury)    Hyperlipidemia, unspecified hyperlipidemia type    Ulcer of heel, unspecified laterality, unspecified ulcer stage    Need for vaccination  -     influenza (adjuvanted) (Fluad) 45 mcg/0.5 mL IM vaccine (> or = 64 yo) 0.5 mL    Other orders  -     mupirocin (BACTROBAN) 2 % ointment; Apply topically 2 (two) times daily.  Dispense: 22 g; Refill: 0    Bactroban to the decubitus.  Stay off of the sacral decubitus and no pressure on the heels.  Recheck him here in 2 weeks.  Discontinue his hydralazine since pressure is little low.  Flu shot  high-dose today.  Bactroban ointment.  Continue his Benicar HCT.  Check a.m. fasting sugars.  Send these to me in 4 days.  Continue metformin at a dose of 2 b.i.d..  Check TSH.  Stress need for dietary compliance.  Patient is somewhat resistant to the idea of dieting.

## 2024-09-19 ENCOUNTER — TELEPHONE (OUTPATIENT)
Dept: FAMILY MEDICINE | Facility: CLINIC | Age: 74
End: 2024-09-19
Payer: MEDICARE

## 2024-09-19 NOTE — TELEPHONE ENCOUNTER
----- Message from Giovana Luther sent at 9/17/2024 10:57 AM CDT -----  Type:  Patient Returning Call    Who Called:  pt   Who Left Message for Patient:  nurse  Does the patient know what this is regarding?:  yes   Best Call Back Number:  [phone]      Additional Information:  please advise

## 2024-09-23 ENCOUNTER — TELEPHONE (OUTPATIENT)
Dept: FAMILY MEDICINE | Facility: CLINIC | Age: 74
End: 2024-09-23
Payer: MEDICARE

## 2024-09-23 NOTE — TELEPHONE ENCOUNTER
9/17/2024 188 7:45 am  9/18/2024 184 7:12 am  9/19/2024 166 7:03 am  9/20/2024 138 7:47 am  9/21/2024 159 6:51 am  9/22/2024 152 6:45 am  9/23/2024 136 7:43 am     Left message blood sugar numbers are good

## 2024-10-01 ENCOUNTER — OFFICE VISIT (OUTPATIENT)
Dept: FAMILY MEDICINE | Facility: CLINIC | Age: 74
End: 2024-10-01
Payer: MEDICARE

## 2024-10-01 VITALS
HEART RATE: 48 BPM | OXYGEN SATURATION: 98 % | WEIGHT: 225.5 LBS | HEIGHT: 68 IN | SYSTOLIC BLOOD PRESSURE: 106 MMHG | DIASTOLIC BLOOD PRESSURE: 86 MMHG | TEMPERATURE: 98 F | BODY MASS INDEX: 34.17 KG/M2

## 2024-10-01 DIAGNOSIS — R42 DIZZY: Primary | ICD-10-CM

## 2024-10-01 DIAGNOSIS — L89.622 PRESSURE INJURY OF LEFT HEEL, STAGE 2: ICD-10-CM

## 2024-10-01 DIAGNOSIS — L89.159 PRESSURE INJURY OF SKIN OF SACRAL REGION, UNSPECIFIED INJURY STAGE: ICD-10-CM

## 2024-10-01 DIAGNOSIS — E03.9 HYPOTHYROIDISM, UNSPECIFIED TYPE: ICD-10-CM

## 2024-10-01 DIAGNOSIS — E11.29 TYPE 2 DIABETES MELLITUS WITH DIABETIC MICROALBUMINURIA, WITHOUT LONG-TERM CURRENT USE OF INSULIN: ICD-10-CM

## 2024-10-01 DIAGNOSIS — I95.1 ORTHOSTATIC HYPOTENSION: ICD-10-CM

## 2024-10-01 DIAGNOSIS — L89.612 PRESSURE INJURY OF RIGHT HEEL, STAGE 2: ICD-10-CM

## 2024-10-01 DIAGNOSIS — R80.9 TYPE 2 DIABETES MELLITUS WITH DIABETIC MICROALBUMINURIA, WITHOUT LONG-TERM CURRENT USE OF INSULIN: ICD-10-CM

## 2024-10-01 PROCEDURE — 1111F DSCHRG MED/CURRENT MED MERGE: CPT | Mod: CPTII,S$GLB,, | Performed by: FAMILY MEDICINE

## 2024-10-01 PROCEDURE — 3066F NEPHROPATHY DOC TX: CPT | Mod: CPTII,S$GLB,, | Performed by: FAMILY MEDICINE

## 2024-10-01 PROCEDURE — 3288F FALL RISK ASSESSMENT DOCD: CPT | Mod: CPTII,S$GLB,, | Performed by: FAMILY MEDICINE

## 2024-10-01 PROCEDURE — 99214 OFFICE O/P EST MOD 30 MIN: CPT | Mod: S$GLB,,, | Performed by: FAMILY MEDICINE

## 2024-10-01 PROCEDURE — G2211 COMPLEX E/M VISIT ADD ON: HCPCS | Mod: S$GLB,,, | Performed by: FAMILY MEDICINE

## 2024-10-01 PROCEDURE — 99999 PR PBB SHADOW E&M-EST. PATIENT-LVL V: CPT | Mod: PBBFAC,,, | Performed by: FAMILY MEDICINE

## 2024-10-01 PROCEDURE — 3074F SYST BP LT 130 MM HG: CPT | Mod: CPTII,S$GLB,, | Performed by: FAMILY MEDICINE

## 2024-10-01 PROCEDURE — 3079F DIAST BP 80-89 MM HG: CPT | Mod: CPTII,S$GLB,, | Performed by: FAMILY MEDICINE

## 2024-10-01 PROCEDURE — 3060F POS MICROALBUMINURIA REV: CPT | Mod: CPTII,S$GLB,, | Performed by: FAMILY MEDICINE

## 2024-10-01 PROCEDURE — 3046F HEMOGLOBIN A1C LEVEL >9.0%: CPT | Mod: CPTII,S$GLB,, | Performed by: FAMILY MEDICINE

## 2024-10-01 PROCEDURE — 4010F ACE/ARB THERAPY RXD/TAKEN: CPT | Mod: CPTII,S$GLB,, | Performed by: FAMILY MEDICINE

## 2024-10-01 PROCEDURE — 1101F PT FALLS ASSESS-DOCD LE1/YR: CPT | Mod: CPTII,S$GLB,, | Performed by: FAMILY MEDICINE

## 2024-10-01 PROCEDURE — 1159F MED LIST DOCD IN RCRD: CPT | Mod: CPTII,S$GLB,, | Performed by: FAMILY MEDICINE

## 2024-10-01 PROCEDURE — 1160F RVW MEDS BY RX/DR IN RCRD: CPT | Mod: CPTII,S$GLB,, | Performed by: FAMILY MEDICINE

## 2024-10-01 RX ORDER — LEVOTHYROXINE SODIUM 25 UG/1
25 TABLET ORAL
Qty: 90 TABLET | Refills: 1 | Status: SHIPPED | OUTPATIENT
Start: 2024-10-01

## 2024-10-01 RX ORDER — GABAPENTIN 300 MG/1
300 CAPSULE ORAL NIGHTLY
Qty: 90 CAPSULE | Refills: 1 | Status: SHIPPED | OUTPATIENT
Start: 2024-10-01

## 2024-10-01 RX ORDER — OLMESARTAN MEDOXOMIL 20 MG/1
20 TABLET ORAL DAILY
COMMUNITY
End: 2024-10-03 | Stop reason: SDUPTHER

## 2024-10-01 RX ORDER — LANCETS 30 GAUGE
EACH MISCELLANEOUS
COMMUNITY
Start: 2024-09-05

## 2024-10-02 PROBLEM — R80.9 TYPE 2 DIABETES MELLITUS WITH DIABETIC MICROALBUMINURIA, WITHOUT LONG-TERM CURRENT USE OF INSULIN: Status: ACTIVE | Noted: 2024-06-23

## 2024-10-02 PROBLEM — E11.29 TYPE 2 DIABETES MELLITUS WITH DIABETIC MICROALBUMINURIA, WITHOUT LONG-TERM CURRENT USE OF INSULIN: Status: ACTIVE | Noted: 2024-06-23

## 2024-10-02 PROBLEM — L89.159 PRESSURE INJURY OF SKIN OF SACRAL REGION: Status: ACTIVE | Noted: 2024-10-02

## 2024-10-03 ENCOUNTER — TELEPHONE (OUTPATIENT)
Dept: FAMILY MEDICINE | Facility: CLINIC | Age: 74
End: 2024-10-03
Payer: MEDICARE

## 2024-10-03 RX ORDER — OLMESARTAN MEDOXOMIL 20 MG/1
20 TABLET ORAL DAILY
Qty: 90 TABLET | Refills: 1 | Status: SHIPPED | OUTPATIENT
Start: 2024-10-03

## 2024-10-03 NOTE — PROGRESS NOTES
Subjective:       Patient ID: Orlando Treadwell is a 74 y.o. male.    Chief Complaint: Follow-up (3 month)    Has been dizzy the past fiber 6 days.  Happens upon getting up.  Shaky okay supine.  Blood pressure is low as 90/67.  Has hypothyroidism.  TSH is 5.03.  Diabetes mellitus type 2 with positive microalbumin.  Diet is better.  Morning sugars last few days have been [id number].  Sacral decubitus is better they are using antibiotic ointment on it.  Heel decubitus I have improved some also.  Staying off of them as much as possible.      Physical examination.  Vital signs noted.  No acute distress neck without bruit.  Chest clear.  Heart regular rate and rhythm.  Sacrum there is a 3 cm fairly superficial decubitus on the right medial buttock.  It is clean and granulating.  Both heels have darkened areas the right heel posterior lateral blackened area about 3 cm no redness.  No fluctuance.  Appears to be improving.  On left heel there is a smaller area about 1.5 x 1.5 cm triangular area.  Also appears to be improving no fluctuance or purulence.          Objective:        Assessment:       1. Dizzy    2. Hypothyroidism, unspecified type    3. Type 2 diabetes mellitus with diabetic microalbuminuria, without long-term current use of insulin    4. Pressure injury of skin of sacral region, unspecified injury stage    5. BMI 34.0-34.9,adult    6. Orthostatic hypotension    7. Pressure injury of left heel, stage 2    8. Pressure injury of right heel, stage 2        Plan:       Dizzy    Hypothyroidism, unspecified type    Type 2 diabetes mellitus with diabetic microalbuminuria, without long-term current use of insulin    Pressure injury of skin of sacral region, unspecified injury stage    BMI 34.0-34.9,adult    Orthostatic hypotension    Pressure injury of left heel, stage 2    Pressure injury of right heel, stage 2    Other orders  -     levothyroxine (SYNTHROID) 25 MCG tablet; Take 1 tablet (25 mcg total) by mouth before  breakfast.  Dispense: 90 tablet; Refill: 1  -     gabapentin (NEURONTIN) 300 MG capsule; Take 1 capsule (300 mg total) by mouth every evening.  Dispense: 90 capsule; Refill: 1    Decubitus all occurred during hospital stay.  Levothyroxine 25 mcg started.  Ninety pills.  Will do a TSH in 2 months.  Toprol- decrease this to 1/2 pill daily.  Discontinue his Benicar HCT.  Go with Benicar 20 mg daily 30 pills.  Two week follow-up.  Continue daily glucose checks.  Gabapentin 300 HS refilled 90 with a refill.

## 2024-10-03 NOTE — TELEPHONE ENCOUNTER
Left vm dr willard just sent it         ----- Message from Eulalia sent at 10/2/2024  3:21 PM CDT -----  Contact: wife  Type: Needs Medical Advice  Who Called:  Tiffany/Wife    Pharmacy name and phone #:    [facility] - [place] - [address] [facility]  [address] [place] [address]  Phone: [phone] Fax: [phone]      Best Call Back Number: [phone]  Additional Information: States she would like to speak with office regarding pt blood pressure medication not being sent to pharmacy.Please call back

## 2024-10-15 ENCOUNTER — OFFICE VISIT (OUTPATIENT)
Dept: FAMILY MEDICINE | Facility: CLINIC | Age: 74
End: 2024-10-15
Payer: MEDICARE

## 2024-10-15 VITALS
HEIGHT: 68 IN | BODY MASS INDEX: 34.43 KG/M2 | TEMPERATURE: 98 F | WEIGHT: 227.19 LBS | OXYGEN SATURATION: 98 % | HEART RATE: 68 BPM | DIASTOLIC BLOOD PRESSURE: 72 MMHG | SYSTOLIC BLOOD PRESSURE: 138 MMHG | RESPIRATION RATE: 18 BRPM

## 2024-10-15 DIAGNOSIS — F43.10 PTSD (POST-TRAUMATIC STRESS DISORDER): Primary | ICD-10-CM

## 2024-10-15 DIAGNOSIS — L89.612 PRESSURE INJURY OF RIGHT HEEL, STAGE 2: ICD-10-CM

## 2024-10-15 DIAGNOSIS — E03.9 HYPOTHYROIDISM, UNSPECIFIED TYPE: ICD-10-CM

## 2024-10-15 DIAGNOSIS — R80.9 TYPE 2 DIABETES MELLITUS WITH DIABETIC MICROALBUMINURIA, WITHOUT LONG-TERM CURRENT USE OF INSULIN: ICD-10-CM

## 2024-10-15 DIAGNOSIS — L89.152 PRESSURE INJURY OF SACRAL REGION, STAGE 2: ICD-10-CM

## 2024-10-15 DIAGNOSIS — E11.29 TYPE 2 DIABETES MELLITUS WITH DIABETIC MICROALBUMINURIA, WITHOUT LONG-TERM CURRENT USE OF INSULIN: ICD-10-CM

## 2024-10-15 DIAGNOSIS — I10 HYPERTENSION, ESSENTIAL: ICD-10-CM

## 2024-10-15 PROCEDURE — 3078F DIAST BP <80 MM HG: CPT | Mod: CPTII,S$GLB,, | Performed by: FAMILY MEDICINE

## 2024-10-15 PROCEDURE — 1159F MED LIST DOCD IN RCRD: CPT | Mod: CPTII,S$GLB,, | Performed by: FAMILY MEDICINE

## 2024-10-15 PROCEDURE — 3288F FALL RISK ASSESSMENT DOCD: CPT | Mod: CPTII,S$GLB,, | Performed by: FAMILY MEDICINE

## 2024-10-15 PROCEDURE — 3066F NEPHROPATHY DOC TX: CPT | Mod: CPTII,S$GLB,, | Performed by: FAMILY MEDICINE

## 2024-10-15 PROCEDURE — 1101F PT FALLS ASSESS-DOCD LE1/YR: CPT | Mod: CPTII,S$GLB,, | Performed by: FAMILY MEDICINE

## 2024-10-15 PROCEDURE — 3075F SYST BP GE 130 - 139MM HG: CPT | Mod: CPTII,S$GLB,, | Performed by: FAMILY MEDICINE

## 2024-10-15 PROCEDURE — 3060F POS MICROALBUMINURIA REV: CPT | Mod: CPTII,S$GLB,, | Performed by: FAMILY MEDICINE

## 2024-10-15 PROCEDURE — 99214 OFFICE O/P EST MOD 30 MIN: CPT | Mod: S$GLB,,, | Performed by: FAMILY MEDICINE

## 2024-10-15 PROCEDURE — G2211 COMPLEX E/M VISIT ADD ON: HCPCS | Mod: S$GLB,,, | Performed by: FAMILY MEDICINE

## 2024-10-15 PROCEDURE — 4010F ACE/ARB THERAPY RXD/TAKEN: CPT | Mod: CPTII,S$GLB,, | Performed by: FAMILY MEDICINE

## 2024-10-15 PROCEDURE — 3046F HEMOGLOBIN A1C LEVEL >9.0%: CPT | Mod: CPTII,S$GLB,, | Performed by: FAMILY MEDICINE

## 2024-10-15 PROCEDURE — 1160F RVW MEDS BY RX/DR IN RCRD: CPT | Mod: CPTII,S$GLB,, | Performed by: FAMILY MEDICINE

## 2024-10-15 PROCEDURE — 99999 PR PBB SHADOW E&M-EST. PATIENT-LVL III: CPT | Mod: PBBFAC,,, | Performed by: FAMILY MEDICINE

## 2024-10-15 PROCEDURE — 1125F AMNT PAIN NOTED PAIN PRSNT: CPT | Mod: CPTII,S$GLB,, | Performed by: FAMILY MEDICINE

## 2024-10-15 RX ORDER — SERTRALINE HYDROCHLORIDE 100 MG/1
TABLET, FILM COATED ORAL
Qty: 135 TABLET | Refills: 3 | Status: SHIPPED | OUTPATIENT
Start: 2024-10-15

## 2024-10-17 NOTE — PROGRESS NOTES
Subjective:       Patient ID: Orlando Treadwell is a 74 y.o. male.    Chief Complaint: Follow-up (2wk)    Diabetes mellitus type 2 with microalbuminuria.  Fasting glucose 110 today.  Has hypothyroidism also.  Sacral decubitus is healing well.  Heel decubitus left 1 is gone.  But the right 1 eschar has opened up now.  Over the last day or 2.  Hypertension doing okay now.  With medication change.  No longer hypotensive.  PTSD on Zoloft and Wellbutrin.    Physical examination.  Vital signs noted.  Neck without bruit.  No adenopathy.  Chest clear.  Heart regular rate rhythm.  Right medial buttock.  Superficial ulceration nearly closed.  Area left open about 1 x 2 cm now.  Left heel heel.  Right heel the eschar has cracked open in his coming off.  There is skin tissue below at which seems to be healing in well.  No drainage.        Objective:        Assessment:       1. PTSD (post-traumatic stress disorder)    2. Hypertension, essential    3. Hypothyroidism, unspecified type    4. Type 2 diabetes mellitus with diabetic microalbuminuria, without long-term current use of insulin        Plan:       PTSD (post-traumatic stress disorder)    Hypertension, essential    Hypothyroidism, unspecified type  -     TSH; Future; Expected date: 11/15/2024    Type 2 diabetes mellitus with diabetic microalbuminuria, without long-term current use of insulin  -     HEMOGLOBIN A1C; Future; Expected date: 11/15/2024    Other orders  -     sertraline (ZOLOFT) 100 MG tablet; TAKE 1 & 1/2 TABLETS BY MOUTH ONCE DAILY  Dispense: 135 tablet; Refill: 3    Continue care to the sacral decubitus.  Continue to keep the right heel ulcer clean and dry.  Report any sign of infection immediately.  Follow-up in one-month with a TSH and A1c.  Same antihypertensive medications.  Continue his Zoloft and Wellbutrin.

## 2024-11-18 ENCOUNTER — LAB VISIT (OUTPATIENT)
Dept: LAB | Facility: HOSPITAL | Age: 74
End: 2024-11-18
Attending: FAMILY MEDICINE
Payer: MEDICARE

## 2024-11-18 DIAGNOSIS — R80.9 TYPE 2 DIABETES MELLITUS WITH DIABETIC MICROALBUMINURIA, WITHOUT LONG-TERM CURRENT USE OF INSULIN: ICD-10-CM

## 2024-11-18 DIAGNOSIS — E11.29 TYPE 2 DIABETES MELLITUS WITH DIABETIC MICROALBUMINURIA, WITHOUT LONG-TERM CURRENT USE OF INSULIN: ICD-10-CM

## 2024-11-18 DIAGNOSIS — E03.9 HYPOTHYROIDISM, UNSPECIFIED TYPE: ICD-10-CM

## 2024-11-18 LAB — TSH SERPL DL<=0.005 MIU/L-ACNC: 3.68 UIU/ML (ref 0.34–5.6)

## 2024-11-18 PROCEDURE — 83036 HEMOGLOBIN GLYCOSYLATED A1C: CPT | Performed by: FAMILY MEDICINE

## 2024-11-18 PROCEDURE — 36415 COLL VENOUS BLD VENIPUNCTURE: CPT | Performed by: FAMILY MEDICINE

## 2024-11-18 PROCEDURE — 84443 ASSAY THYROID STIM HORMONE: CPT | Performed by: FAMILY MEDICINE

## 2024-11-19 LAB
ESTIMATED AVG GLUCOSE: 171 MG/DL (ref 68–131)
HBA1C MFR BLD: 7.6 % (ref 4.5–6.2)

## 2024-11-20 ENCOUNTER — OFFICE VISIT (OUTPATIENT)
Dept: FAMILY MEDICINE | Facility: CLINIC | Age: 74
End: 2024-11-20
Payer: MEDICARE

## 2024-11-20 VITALS
HEIGHT: 68 IN | DIASTOLIC BLOOD PRESSURE: 84 MMHG | HEART RATE: 67 BPM | SYSTOLIC BLOOD PRESSURE: 138 MMHG | TEMPERATURE: 98 F | BODY MASS INDEX: 34.76 KG/M2 | WEIGHT: 229.38 LBS | OXYGEN SATURATION: 97 %

## 2024-11-20 DIAGNOSIS — L89.159 PRESSURE INJURY OF SKIN OF SACRAL REGION, UNSPECIFIED INJURY STAGE: ICD-10-CM

## 2024-11-20 DIAGNOSIS — E11.29 TYPE 2 DIABETES MELLITUS WITH DIABETIC MICROALBUMINURIA, WITHOUT LONG-TERM CURRENT USE OF INSULIN: ICD-10-CM

## 2024-11-20 DIAGNOSIS — L89.622 PRESSURE INJURY OF LEFT HEEL, STAGE 2: ICD-10-CM

## 2024-11-20 DIAGNOSIS — E03.9 HYPOTHYROIDISM, UNSPECIFIED TYPE: ICD-10-CM

## 2024-11-20 DIAGNOSIS — I10 HYPERTENSION, ESSENTIAL: Primary | ICD-10-CM

## 2024-11-20 DIAGNOSIS — R80.9 TYPE 2 DIABETES MELLITUS WITH DIABETIC MICROALBUMINURIA, WITHOUT LONG-TERM CURRENT USE OF INSULIN: ICD-10-CM

## 2024-11-20 PROCEDURE — 99214 OFFICE O/P EST MOD 30 MIN: CPT | Mod: S$GLB,,, | Performed by: FAMILY MEDICINE

## 2024-11-20 PROCEDURE — 3075F SYST BP GE 130 - 139MM HG: CPT | Mod: CPTII,S$GLB,, | Performed by: FAMILY MEDICINE

## 2024-11-20 PROCEDURE — 1126F AMNT PAIN NOTED NONE PRSNT: CPT | Mod: CPTII,S$GLB,, | Performed by: FAMILY MEDICINE

## 2024-11-20 PROCEDURE — 1159F MED LIST DOCD IN RCRD: CPT | Mod: CPTII,S$GLB,, | Performed by: FAMILY MEDICINE

## 2024-11-20 PROCEDURE — 3066F NEPHROPATHY DOC TX: CPT | Mod: CPTII,S$GLB,, | Performed by: FAMILY MEDICINE

## 2024-11-20 PROCEDURE — 3079F DIAST BP 80-89 MM HG: CPT | Mod: CPTII,S$GLB,, | Performed by: FAMILY MEDICINE

## 2024-11-20 PROCEDURE — 99999 PR PBB SHADOW E&M-EST. PATIENT-LVL III: CPT | Mod: PBBFAC,,, | Performed by: FAMILY MEDICINE

## 2024-11-20 PROCEDURE — 1160F RVW MEDS BY RX/DR IN RCRD: CPT | Mod: CPTII,S$GLB,, | Performed by: FAMILY MEDICINE

## 2024-11-20 PROCEDURE — 3060F POS MICROALBUMINURIA REV: CPT | Mod: CPTII,S$GLB,, | Performed by: FAMILY MEDICINE

## 2024-11-20 PROCEDURE — G2211 COMPLEX E/M VISIT ADD ON: HCPCS | Mod: S$GLB,,, | Performed by: FAMILY MEDICINE

## 2024-11-20 PROCEDURE — 1101F PT FALLS ASSESS-DOCD LE1/YR: CPT | Mod: CPTII,S$GLB,, | Performed by: FAMILY MEDICINE

## 2024-11-20 PROCEDURE — 3051F HG A1C>EQUAL 7.0%<8.0%: CPT | Mod: CPTII,S$GLB,, | Performed by: FAMILY MEDICINE

## 2024-11-20 PROCEDURE — 4010F ACE/ARB THERAPY RXD/TAKEN: CPT | Mod: CPTII,S$GLB,, | Performed by: FAMILY MEDICINE

## 2024-11-20 PROCEDURE — 3288F FALL RISK ASSESSMENT DOCD: CPT | Mod: CPTII,S$GLB,, | Performed by: FAMILY MEDICINE

## 2024-11-20 RX ORDER — METOPROLOL SUCCINATE 100 MG/1
100 TABLET, EXTENDED RELEASE ORAL DAILY
Qty: 90 TABLET | Refills: 1 | Status: SHIPPED | OUTPATIENT
Start: 2024-11-20

## 2024-11-23 NOTE — PROGRESS NOTES
Subjective:       Patient ID: Orlando Treadwell is a 74 y.o. male.    Chief Complaint: Follow-up (1 month)    Follow-up of hypertension.  Blood pressure controlled today.  Has done well with increase of metoprolol back to 100 mg daily.  Hypothyroidism.  Off levothyroxine.  TSH 3.68.  Diabetes mellitus type 2 A1c 7.6 down from 12.1 much improved.  Sacral decubitus is finally healed.  But the right heel decubitus is not completely healed.  Thin skin over the area.    Physical examination.  Vital signs noted.  Neck without bruit.  Chest clear.  Heart regular rate and rhythm.  Abdomen bowel sounds are positive soft nontender.  Extremities without edema positive pedal pulses.  Right lateral somewhat plantar heel.  There is a pressure ulcer which is healing well.  It was about 2 by 2 cm now.  No erythema no drainage.        Objective:        Assessment:       1. Hypertension, essential    2. Hypothyroidism, unspecified type    3. Type 2 diabetes mellitus with diabetic microalbuminuria, without long-term current use of insulin    4. Pressure injury of skin of sacral region, unspecified injury stage    5. Pressure injury of left heel, stage 2    6. BMI 34.0-34.9,adult        Plan:       Hypertension, essential  -     Basic Metabolic Panel; Future; Expected date: 12/04/2024    Hypothyroidism, unspecified type  -     TSH; Future; Expected date: 12/04/2024    Type 2 diabetes mellitus with diabetic microalbuminuria, without long-term current use of insulin    Pressure injury of skin of sacral region, unspecified injury stage    Pressure injury of left heel, stage 2    BMI 34.0-34.9,adult    Other orders  -     metoprolol succinate (TOPROL-XL) 100 MG 24 hr tablet; Take 1 tablet (100 mg total) by mouth once daily.  Dispense: 90 tablet; Refill: 1    Toprol- daily refilled.  TSH BMP in 3 months.  Benicar 20 mg refill.  RSV vaccine recommended.  Go for colonoscopy.  Diet and weight reduction.

## 2024-11-27 ENCOUNTER — TELEPHONE (OUTPATIENT)
Dept: FAMILY MEDICINE | Facility: CLINIC | Age: 74
End: 2024-11-27
Payer: MEDICARE

## 2024-11-27 NOTE — TELEPHONE ENCOUNTER
----- Message from Kleber Worthy MD sent at 11/19/2024  9:38 PM CST -----  .  Much better.  FOLLOW-UP AS RECOMMENDED

## 2024-12-04 DIAGNOSIS — Z86.14 HISTORY OF MRSA INFECTION: Chronic | ICD-10-CM

## 2024-12-04 DIAGNOSIS — A49.02 MRSA INFECTION: ICD-10-CM

## 2024-12-04 RX ORDER — DOXYCYCLINE 100 MG/1
100 CAPSULE ORAL 2 TIMES DAILY
Qty: 20 CAPSULE | Refills: 2 | Status: SHIPPED | OUTPATIENT
Start: 2024-12-04

## 2024-12-30 ENCOUNTER — TELEPHONE (OUTPATIENT)
Dept: UROLOGY | Facility: CLINIC | Age: 74
End: 2024-12-30
Payer: MEDICARE

## 2024-12-30 NOTE — TELEPHONE ENCOUNTER
----- Message from Sima sent at 12/30/2024  9:29 AM CST -----  Contact: self  Type:  Sooner Appointment Request    Caller is requesting a sooner appointment.  Caller declined first available appointment listed below.  Caller will not accept being placed on the waitlist and is requesting a message be sent to doctor.    Name of Caller:  pt  When is the first available appointment?  N/a  Symptoms:  psa levels and hospital follow up  Would the patient rather a call back or a response via [facility]? call  Best Call Back Number:  [phone]   Additional Information:  please call

## 2025-01-13 RX ORDER — METFORMIN HYDROCHLORIDE 500 MG/1
1000 TABLET, EXTENDED RELEASE ORAL 2 TIMES DAILY WITH MEALS
Qty: 360 TABLET | Refills: 0 | Status: SHIPPED | OUTPATIENT
Start: 2025-01-13

## 2025-01-13 NOTE — TELEPHONE ENCOUNTER
Refill Routing Note   Medication(s) are not appropriate for processing by [facility] for the following reason(s):        No active prescription written by provider    [facility] action(s):  Defer               Appointments  past 12m or future 3m with PCP    Date Provider   Last Visit   11/20/2024 Kleber Worthy III, MD   Next Visit   2/21/2025 Kleber Worthy III, MD   ED visits in past 90 days: 0        Note composed:2:18 PM 01/13/2025

## 2025-01-13 NOTE — TELEPHONE ENCOUNTER
No care due was identified.  [facility] Embedded Care Due Messages. Reference number: [id number].   1/13/2025 10:31:40 AM CST

## 2025-01-13 NOTE — TELEPHONE ENCOUNTER
----- Message from Negro sent at 1/10/2025 12:54 PM CST -----  Type:  RX Refill Request    Who Called:  pt  Refill or New Rx:  REFILL  RX Name and Strength:  metFORMIN (GLUCOPHAGE-XR) 500 MG ER 24hr tablet  How is the patient currently taking it? (ex. 1XDay):  As directed  Is this a 30 day or 90 day RX:  180  Preferred Pharmacy with phone number:    [facility] - [place] - [address] [facility]  [address] [facility]  [place] [address]  Phone: [phone] Fax: [phone]      Local or Mail Order: local    Ordering Provider:  montse Naqvi Call Back Number:  [phone]  Additional Information:  Please call back to advise. Thanks.

## 2025-02-06 ENCOUNTER — OFFICE VISIT (OUTPATIENT)
Dept: UROLOGY | Facility: CLINIC | Age: 75
End: 2025-02-06
Payer: MEDICARE

## 2025-02-06 VITALS — BODY MASS INDEX: 32.74 KG/M2 | WEIGHT: 216 LBS | HEIGHT: 68 IN

## 2025-02-06 DIAGNOSIS — R79.89 LOW TESTOSTERONE: Primary | ICD-10-CM

## 2025-02-06 DIAGNOSIS — R39.9 LOWER URINARY TRACT SYMPTOMS (LUTS): ICD-10-CM

## 2025-02-06 DIAGNOSIS — Z12.5 SCREENING FOR PROSTATE CANCER: ICD-10-CM

## 2025-02-06 DIAGNOSIS — Z87.442 HISTORY OF KIDNEY STONES: ICD-10-CM

## 2025-02-06 LAB
BILIRUBIN, UA POC OHS: NEGATIVE
BLOOD, UA POC OHS: NEGATIVE
CLARITY, UA POC OHS: CLEAR
COLOR, UA POC OHS: YELLOW
GLUCOSE, UA POC OHS: NEGATIVE
KETONES, UA POC OHS: NEGATIVE
LEUKOCYTES, UA POC OHS: NEGATIVE
NITRITE, UA POC OHS: NEGATIVE
PH, UA POC OHS: 5.5
PROTEIN, UA POC OHS: ABNORMAL
SPECIFIC GRAVITY, UA POC OHS: 1.02
UROBILINOGEN, UA POC OHS: 0.2

## 2025-02-06 PROCEDURE — 99214 OFFICE O/P EST MOD 30 MIN: CPT | Mod: S$GLB,,, | Performed by: UROLOGY

## 2025-02-06 PROCEDURE — 1159F MED LIST DOCD IN RCRD: CPT | Mod: CPTII,S$GLB,, | Performed by: UROLOGY

## 2025-02-06 PROCEDURE — 1160F RVW MEDS BY RX/DR IN RCRD: CPT | Mod: CPTII,S$GLB,, | Performed by: UROLOGY

## 2025-02-06 PROCEDURE — 3288F FALL RISK ASSESSMENT DOCD: CPT | Mod: CPTII,S$GLB,, | Performed by: UROLOGY

## 2025-02-06 PROCEDURE — 99999 PR PBB SHADOW E&M-EST. PATIENT-LVL II: CPT | Mod: PBBFAC,,, | Performed by: UROLOGY

## 2025-02-06 PROCEDURE — 1126F AMNT PAIN NOTED NONE PRSNT: CPT | Mod: CPTII,S$GLB,, | Performed by: UROLOGY

## 2025-02-06 PROCEDURE — G2211 COMPLEX E/M VISIT ADD ON: HCPCS | Mod: S$GLB,,, | Performed by: UROLOGY

## 2025-02-06 PROCEDURE — 81003 URINALYSIS AUTO W/O SCOPE: CPT | Mod: QW,S$GLB,, | Performed by: UROLOGY

## 2025-02-06 PROCEDURE — 1101F PT FALLS ASSESS-DOCD LE1/YR: CPT | Mod: CPTII,S$GLB,, | Performed by: UROLOGY

## 2025-02-06 NOTE — PROGRESS NOTES
[facility] Established Patient/H&P:    Orlando Treadwell is a 74 y.o. male who presents for follow up for lower urinary tract symptoms and low testosterone.     Patient previously managed by Dr. Stark for lower urinary tract symptoms and low testosterone. He has a history of nocturia x 1 - 2 that is not significantly bothersome and has been managed well conservatively.      Regarding his low testosterone, he has been managed well on Testosterone 200 mg every month per Dr. Stark.      Of note, his CT abd/pelvis on 1/16/20 revealed a 5 mm left distal ureteral stone. No imaging since. States he has had ESWL several years ago.        Interval History     6/3/21: Testosterone 94 on 2/18/21. Remains on Testosterone 200 mg every month - but reports hot flashes and low energy recently. CT abd pelvis wo contrast on 3/13/21 with only a 3 mm non-obstructing renal stone. Recently hospitalized with MRSA and Salmonella.      3/7/22: Patient had his Testosterone increased to 400 mg every month during his last visit, but states that the pharmacy instructions did not change so he never started the increased dosage. Continues to report symptoms of hypogonadism - low energy,  fatigue and depression.      6/22/22: Patient once again has not increased his dosage of his testosterone stating that he still did not understand. He has been taking 200 mg monthly. As expected, testosterone remains low at 31. Continues to have low energy, fatigue and depression. States that he got the new RX, but didn't understand he needed to increase to 400 mg.     5/10/23:  He is on Testosterone 400 mg every month. No recent Testosterone level since 12/2022. States he feels overall improved, however, he did have back surgery a few months ago which he is still recovering from. No significant lower urinary tract symptoms.     2/6/25: Here today for follow up. Has not been seen in almost 2 years. He has stable, mild lower urinary tract  symptoms. Not on Testosterone at this time. No recent PSA. Last Testosterone level 2.0. Wishes to restart therapy. Urine dipstick with only trace protein today.     Of note, he was admitted in 9/2024 with sepsis. However, urine culture negative during that admission.       Denies any flank pain, gross hematuria, urinary tract infection,  trauma or history of  malignancy.         PSA  3.6  11/24/23  3.0  5/18/23  2.0                   5/20/22  1.9                   2/18/21  1.6                   12/2/19  1.3                   11/6/18  3.8                   7/18/18  1.9                   12/16/16  2.3                   12/7/15  2.2                   12/10/14     Testosterone  48  4/15/24  802  5/18/23  53                    12/20/22  31                    5/20/22  94                    2/18/21  221                  12/16/16  733                  12/7/15  121                  5/16/14     IPSS    QoL       2          0          12/18/20    Urine culture  No growth 9/1/24    A1C  7.6  11/18/24  12.1  9/5/24    Past Medical History:   Diagnosis Date    Abdominal hernia     Arthritis     Asbestos exposure     SOB     Back pain     Bulging discs     lumbar    Chronic back pain     Coronary artery disease     Hypertension     Kidney stone     Prostatitis     Sepsis due to methicillin resistant Staphylococcus aureus (MRSA) 06/2019    Due to large right upper arm abscess    Wears glasses        Past Surgical History:   Procedure Laterality Date    APPENDECTOMY      BACK SURGERY  09/23/2022    COLONOSCOPY N/A 09/23/2021    Procedure: COLONOSCOPY;  Surgeon: Ashvin Batista MD;  Location: [facility];  Service: Endoscopy;  Laterality: N/A;    ESOPHAGOGASTRODUODENOSCOPY N/A 09/23/2021    Procedure: EGD (ESOPHAGOGASTRODUODENOSCOPY);  Surgeon: Ashvin Batista MD;  Location: [facility];  Service: Endoscopy;  Laterality: N/A;    HAND SURGERY      rt hand    INCISION AND DRAINAGE Bilateral 06/2019    Right upper arm and left upper arm  "abscesses    JOINT REPLACEMENT      right knee    KNEE LIGAMENT RECONSTRUCTION      left knee    LAPAROSCOPIC CHOLECYSTECTOMY N/A 01/17/2020    Procedure: CHOLECYSTECTOMY, LAPAROSCOPIC;  Surgeon: Edgard Hill III, MD;  Location: [facility];  Service: General;  Laterality: N/A;    LITHOTRIPSY      TONSILLECTOMY, ADENOIDECTOMY, BILATERAL MYRINGOTOMY AND TUBES      TOTAL KNEE ARTHROPLASTY  1971    rt knee    URETERAL STENT PLACEMENT         Review of patient's allergies indicates:   Allergen Reactions    Codeine     Sulfamethoxazole-trimethoprim      dizziness    Codeine sulfate Nausea And Vomiting    Morphine Nausea Only     Ok with nausea med.       Medications Reviewed: see MAR    FOCUSED PHYSICAL EXAM:    There were no vitals filed for this visit.  Body mass index is 32.84 kg/m². Weight: 98 kg (216 lb) Height: 5' 8" (172.7 cm)       General: Alert, cooperative, no distress, appears stated age  Abdomen: Soft, non-tender, no CVA tenderness, non-distended      LABS:    Recent Results (from the past 2 weeks)   POCT Urinalysis(Instrument)    Collection Time: 02/06/25  9:02 AM   Result Value Ref Range    Color, POC UA Yellow Yellow, Straw, Colorless    Clarity, POC UA Clear Clear    Glucose, POC UA Negative Negative    Bilirubin, POC UA Negative Negative    Ketones, POC UA Negative Negative    Spec Grav POC UA 1.020 1.005 - 1.030    Blood, POC UA Negative Negative    pH, POC UA 5.5 5.0 - 8.0    Protein, POC UA Trace (A) Negative    Urobilinogen, POC UA 0.2 <=1.0    Nitrite, POC UA Negative Negative    WBC, POC UA Negative Negative           Assessment/Diagnosis:    1. Low testosterone  POCT Urinalysis(Instrument)    CBC Auto Differential    Testosterone Panel      2. Screening for prostate cancer  PSA, Screening      3. History of kidney stones        4. Lower urinary tract symptoms (LUTS)            Plans:    - I spent 30 minutes of the day of this encounter preparing for, treating and managing this patient. Visit " today included increased complexity associated with the care of the episodic problem addressed and managing the longitudinal care of the patient due to the serious and/or complex managed problem(s) low testosterone and lower urinary tract symptoms. Discussed the risks and benefits of testosterone replacement today.  We went over different testosterone treatments. Wishes to continue.   - CBC, Testosterone and PSA next available. Will refill Testosterone 400 mg every 28 days once labs resulted as his PSA has been slightly trending up.   - RTC in 1 year with CBC, PSA and testosterone.

## 2025-02-07 ENCOUNTER — LAB VISIT (OUTPATIENT)
Dept: LAB | Facility: HOSPITAL | Age: 75
End: 2025-02-07
Attending: UROLOGY
Payer: MEDICARE

## 2025-02-07 DIAGNOSIS — Z12.5 SCREENING FOR PROSTATE CANCER: ICD-10-CM

## 2025-02-07 DIAGNOSIS — R79.89 LOW TESTOSTERONE: ICD-10-CM

## 2025-02-07 LAB
BASOPHILS # BLD AUTO: 0.06 K/UL (ref 0–0.2)
BASOPHILS NFR BLD: 0.6 % (ref 0–1.9)
COMPLEXED PSA SERPL-MCNC: 1.5 NG/ML (ref 0–4)
DIFFERENTIAL METHOD BLD: ABNORMAL
EOSINOPHIL # BLD AUTO: 0.4 K/UL (ref 0–0.5)
EOSINOPHIL NFR BLD: 4.2 % (ref 0–8)
ERYTHROCYTE [DISTWIDTH] IN BLOOD BY AUTOMATED COUNT: 15.9 % (ref 11.5–14.5)
HCT VFR BLD AUTO: 40.1 % (ref 40–54)
HGB BLD-MCNC: 12.3 G/DL (ref 14–18)
IMM GRANULOCYTES # BLD AUTO: 0.09 K/UL (ref 0–0.04)
IMM GRANULOCYTES NFR BLD AUTO: 1 % (ref 0–0.5)
LYMPHOCYTES # BLD AUTO: 1.5 K/UL (ref 1–4.8)
LYMPHOCYTES NFR BLD: 16.1 % (ref 18–48)
MCH RBC QN AUTO: 26.7 PG (ref 27–31)
MCHC RBC AUTO-ENTMCNC: 30.7 G/DL (ref 32–36)
MCV RBC AUTO: 87 FL (ref 82–98)
MONOCYTES # BLD AUTO: 0.9 K/UL (ref 0.3–1)
MONOCYTES NFR BLD: 9.6 % (ref 4–15)
NEUTROPHILS # BLD AUTO: 6.4 K/UL (ref 1.8–7.7)
NEUTROPHILS NFR BLD: 68.5 % (ref 38–73)
NRBC BLD-RTO: 0 /100 WBC
PLATELET # BLD AUTO: 215 K/UL (ref 150–450)
PMV BLD AUTO: 10.5 FL (ref 9.2–12.9)
RBC # BLD AUTO: 4.61 M/UL (ref 4.6–6.2)
WBC # BLD AUTO: 9.37 K/UL (ref 3.9–12.7)

## 2025-02-07 PROCEDURE — 84403 ASSAY OF TOTAL TESTOSTERONE: CPT | Performed by: UROLOGY

## 2025-02-07 PROCEDURE — 36415 COLL VENOUS BLD VENIPUNCTURE: CPT | Performed by: UROLOGY

## 2025-02-07 PROCEDURE — 85025 COMPLETE CBC W/AUTO DIFF WBC: CPT | Performed by: UROLOGY

## 2025-02-07 PROCEDURE — 84153 ASSAY OF PSA TOTAL: CPT | Performed by: UROLOGY

## 2025-02-13 LAB
ALBUMIN SERPL-MCNC: 4 G/DL (ref 3.6–5.1)
SHBG SERPL-SCNC: 22 NMOL/L (ref 22–77)
TESTOST FREE SERPL-MCNC: 3.1 PG/ML (ref 6–73)
TESTOST SERPL-MCNC: 19 NG/DL (ref 250–1100)
TESTOSTERONE.FREE+WB SERPL-MCNC: 5.6 NG/DL (ref 15–150)

## 2025-02-14 DIAGNOSIS — R79.89 LOW TESTOSTERONE: Primary | ICD-10-CM

## 2025-02-14 RX ORDER — TESTOSTERONE CYPIONATE 200 MG/ML
400 INJECTION, SOLUTION INTRAMUSCULAR
Qty: 5 ML | Refills: 2 | Status: SHIPPED | OUTPATIENT
Start: 2025-02-14 | End: 2025-08-13

## 2025-02-21 ENCOUNTER — OFFICE VISIT (OUTPATIENT)
Dept: FAMILY MEDICINE | Facility: CLINIC | Age: 75
End: 2025-02-21
Payer: MEDICARE

## 2025-02-21 VITALS
TEMPERATURE: 98 F | BODY MASS INDEX: 35.61 KG/M2 | WEIGHT: 235 LBS | HEART RATE: 67 BPM | HEIGHT: 68 IN | OXYGEN SATURATION: 97 %

## 2025-02-21 DIAGNOSIS — Z12.11 COLON CANCER SCREENING: ICD-10-CM

## 2025-02-21 DIAGNOSIS — G47.33 OSA (OBSTRUCTIVE SLEEP APNEA): ICD-10-CM

## 2025-02-21 DIAGNOSIS — K22.2 ESOPHAGEAL STRICTURE: ICD-10-CM

## 2025-02-21 DIAGNOSIS — M51.369 DEGENERATION OF INTERVERTEBRAL DISC OF LUMBAR REGION, UNSPECIFIED WHETHER PAIN PRESENT: ICD-10-CM

## 2025-02-21 DIAGNOSIS — F43.10 PTSD (POST-TRAUMATIC STRESS DISORDER): Primary | ICD-10-CM

## 2025-02-21 DIAGNOSIS — E11.29 TYPE 2 DIABETES MELLITUS WITH DIABETIC MICROALBUMINURIA, WITHOUT LONG-TERM CURRENT USE OF INSULIN: ICD-10-CM

## 2025-02-21 DIAGNOSIS — E66.01 CLASS 2 SEVERE OBESITY DUE TO EXCESS CALORIES WITH SERIOUS COMORBIDITY AND BODY MASS INDEX (BMI) OF 36.0 TO 36.9 IN ADULT: ICD-10-CM

## 2025-02-21 DIAGNOSIS — E03.9 HYPOTHYROIDISM, UNSPECIFIED TYPE: ICD-10-CM

## 2025-02-21 DIAGNOSIS — E78.5 HYPERLIPIDEMIA, UNSPECIFIED HYPERLIPIDEMIA TYPE: ICD-10-CM

## 2025-02-21 DIAGNOSIS — E29.1 HYPOGONADISM MALE: ICD-10-CM

## 2025-02-21 DIAGNOSIS — F11.20 OPIOID DEPENDENCE, UNCOMPLICATED: ICD-10-CM

## 2025-02-21 DIAGNOSIS — N18.31 STAGE 3A CHRONIC KIDNEY DISEASE: ICD-10-CM

## 2025-02-21 DIAGNOSIS — I25.10 CORONARY ARTERY DISEASE INVOLVING NATIVE CORONARY ARTERY OF NATIVE HEART WITHOUT ANGINA PECTORIS: ICD-10-CM

## 2025-02-21 DIAGNOSIS — R80.9 TYPE 2 DIABETES MELLITUS WITH DIABETIC MICROALBUMINURIA, WITHOUT LONG-TERM CURRENT USE OF INSULIN: ICD-10-CM

## 2025-02-21 DIAGNOSIS — E66.812 CLASS 2 SEVERE OBESITY DUE TO EXCESS CALORIES WITH SERIOUS COMORBIDITY AND BODY MASS INDEX (BMI) OF 36.0 TO 36.9 IN ADULT: ICD-10-CM

## 2025-02-21 DIAGNOSIS — F31.9 BIPOLAR AFFECTIVE DISORDER, REMISSION STATUS UNSPECIFIED: ICD-10-CM

## 2025-02-21 DIAGNOSIS — Z95.5 STENTED CORONARY ARTERY: ICD-10-CM

## 2025-02-21 DIAGNOSIS — G95.89 OTHER SPECIFIED DISEASES OF SPINAL CORD: ICD-10-CM

## 2025-02-21 DIAGNOSIS — I10 HYPERTENSION, ESSENTIAL: ICD-10-CM

## 2025-02-21 DIAGNOSIS — Z98.1 S/P LUMBAR FUSION: ICD-10-CM

## 2025-02-21 PROCEDURE — 99999 PR PBB SHADOW E&M-EST. PATIENT-LVL III: CPT | Mod: PBBFAC,,, | Performed by: FAMILY MEDICINE

## 2025-02-21 RX ORDER — MIRTAZAPINE 15 MG/1
15 TABLET, FILM COATED ORAL NIGHTLY
COMMUNITY
End: 2025-02-21 | Stop reason: SDUPTHER

## 2025-02-21 RX ORDER — MIRTAZAPINE 15 MG/1
15 TABLET, FILM COATED ORAL NIGHTLY
Qty: 30 TABLET | Refills: 2 | Status: SHIPPED | OUTPATIENT
Start: 2025-02-21
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
## 2025-02-23 PROBLEM — Z98.1 S/P LUMBAR FUSION: Status: ACTIVE | Noted: 2025-02-23

## 2025-02-23 PROBLEM — K22.2 ESOPHAGEAL STRICTURE: Status: ACTIVE | Noted: 2025-02-23

## 2025-02-28 DIAGNOSIS — Z86.14 HISTORY OF MRSA INFECTION: Chronic | ICD-10-CM

## 2025-02-28 DIAGNOSIS — A49.02 MRSA INFECTION: ICD-10-CM

## 2025-02-28 RX ORDER — DOXYCYCLINE 100 MG/1
100 CAPSULE ORAL 2 TIMES DAILY
Qty: 20 CAPSULE | Refills: 2 | Status: SHIPPED | OUTPATIENT
Start: 2025-02-28

## 2025-03-05 ENCOUNTER — HOSPITAL ENCOUNTER (EMERGENCY)
Facility: HOSPITAL | Age: 75
Discharge: HOME | End: 2025-03-05
Attending: EMERGENCY MEDICINE
Payer: MEDICARE

## 2025-03-05 VITALS
WEIGHT: 230 LBS | TEMPERATURE: 98 F | BODY MASS INDEX: 36.1 KG/M2 | DIASTOLIC BLOOD PRESSURE: 59 MMHG | HEIGHT: 67 IN | OXYGEN SATURATION: 96 % | SYSTOLIC BLOOD PRESSURE: 105 MMHG | HEART RATE: 68 BPM | RESPIRATION RATE: 18 BRPM

## 2025-03-05 DIAGNOSIS — T14.90XA TRAUMA: ICD-10-CM
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
## 2025-03-05 DIAGNOSIS — S76.012A HIP STRAIN, LEFT, INITIAL ENCOUNTER: Primary | ICD-10-CM

## 2025-03-05 PROCEDURE — 99284 EMERGENCY DEPT VISIT MOD MDM: CPT | Mod: 25

## 2025-03-05 PROCEDURE — 63600175 PHARM REV CODE 636 W HCPCS: Performed by: EMERGENCY MEDICINE

## 2025-03-05 PROCEDURE — 96372 THER/PROPH/DIAG INJ SC/IM: CPT | Performed by: EMERGENCY MEDICINE

## 2025-03-05 RX ORDER — MORPHINE SULFATE 2 MG/ML
6 INJECTION, SOLUTION INTRAMUSCULAR; INTRAVENOUS
Status: COMPLETED | OUTPATIENT
Start: 2025-03-05 | End: 2025-03-05

## 2025-03-05 RX ADMIN — MORPHINE SULFATE 6 MG: 2 INJECTION, SOLUTION INTRAMUSCULAR; INTRAVENOUS at 07:03

## 2025-03-05 NOTE — ED PROVIDER NOTES
Encounter Date: 3/5/2025       History     Chief Complaint   Patient presents with    Fall     Pt fell while getting dressed, complains of left hip pain.  EMS advised no shorting or rotation.       This is a 75-year-old male who presents emergency department after a fall.  He was trying to put his pants on when his foot got stuck in the crotch part of his pants and he fell directly onto his left hip.  He was unable to get up.  He was scheduled to go today for an epidural steroid injection in his low back as he has chronic low back issues and has had back surgery as well.  He says that he has pain in his left hip worse when he flexes it better at rest.  There was no shortening or external rotation noted.  Patient did not hit his head.  He denies any other injuries anywhere else.      Review of patient's allergies indicates:   Allergen Reactions    Codeine     Sulfamethoxazole-trimethoprim      dizziness    Codeine sulfate Nausea And Vomiting    Morphine Nausea Only     Ok with nausea med.     Past Medical History:   Diagnosis Date    Abdominal hernia     Arthritis     Asbestos exposure     SOB     Back pain     Bulging discs     lumbar    Chronic back pain     Coronary artery disease     Hypertension     Kidney stone     Prostatitis     Sepsis due to methicillin resistant Staphylococcus aureus (MRSA) 06/2019    Due to large right upper arm abscess    Wears glasses      Past Surgical History:   Procedure Laterality Date    APPENDECTOMY      BACK SURGERY  09/23/2022    COLONOSCOPY N/A 09/23/2021    Procedure: COLONOSCOPY;  Surgeon: Ashvin Batista MD;  Location: [facility];  Service: Endoscopy;  Laterality: N/A;    ESOPHAGOGASTRODUODENOSCOPY N/A 09/23/2021    Procedure: EGD (ESOPHAGOGASTRODUODENOSCOPY);  Surgeon: Ashvin Batista MD;  Location: [facility];  Service: Endoscopy;  Laterality: N/A;    HAND SURGERY      rt hand    INCISION AND DRAINAGE Bilateral 06/2019    Right upper arm and left upper arm abscesses    JOINT  REPLACEMENT      right knee    KNEE LIGAMENT RECONSTRUCTION      left knee    LAPAROSCOPIC CHOLECYSTECTOMY N/A 01/17/2020    Procedure: CHOLECYSTECTOMY, LAPAROSCOPIC;  Surgeon: Edgard Hill III, MD;  Location: [facility];  Service: General;  Laterality: N/A;    LITHOTRIPSY      TONSILLECTOMY, ADENOIDECTOMY, BILATERAL MYRINGOTOMY AND TUBES      TOTAL KNEE ARTHROPLASTY  1971    rt knee    URETERAL STENT PLACEMENT       Family History   Problem Relation Name Age of Onset    Cancer Mother      Stroke Father      Heart disease Father      Collagen disease Neg Hx      Melanoma Neg Hx      Psoriasis Neg Hx      Lupus Neg Hx      Eczema Neg Hx       Social History[1]  Review of Systems   Constitutional:  Negative for chills and fever.   HENT:  Negative for sore throat.    Respiratory:  Negative for shortness of breath.    Cardiovascular:  Negative for chest pain.   Gastrointestinal:  Negative for abdominal pain, nausea and vomiting.   Genitourinary:  Negative for dysuria.   Musculoskeletal:  Positive for arthralgias and back pain.   Skin:  Negative for rash.   Neurological:  Negative for weakness.   Hematological:  Does not bruise/bleed easily.   All other systems reviewed and are negative.      Physical Exam     Initial Vitals [03/05/25 0734]   BP Pulse Resp Temp SpO2   (!) 178/90 78 17 97.9 °F (36.6 °C) 98 %      MAP       --         Physical Exam    Nursing note and vitals reviewed.  Constitutional: He appears well-developed and well-nourished. He is not diaphoretic. No distress.   HENT:   Head: Normocephalic and atraumatic. Mouth/Throat: Oropharynx is clear and moist. No oropharyngeal exudate.   Eyes: Conjunctivae and EOM are normal. Pupils are equal, round, and reactive to light.   Neck: No tracheal deviation present.   Cardiovascular:  Normal rate, regular rhythm, normal heart sounds and intact distal pulses.           No murmur heard.  Pulmonary/Chest: Breath sounds normal. No stridor. No respiratory distress. He  has no wheezes. He has no rhonchi. He has no rales.   Abdominal: Abdomen is soft. Bowel sounds are normal. He exhibits no distension. There is no abdominal tenderness. There is no rebound and no guarding.   Musculoskeletal:         General: No tenderness or edema. Normal range of motion.      Comments: Left hip: Patient able to flex up to about 70° passively.  He has decent range of motion in internal and external rotation with minimal pain.  He does not have any shortening or external rotation to his leg.  He has a 2+ pulse present in the posterior tibial artery in the left foot.  Foot is warm and well-perfused.    Left knee: There is no tenderness to palpation.  No joint effusion.  Full range of motion.    Lumbar spine: Mild tenderness paraspinal left and right side bilaterally.     Neurological: He is alert and oriented to person, place, and time. He has normal strength. No cranial nerve deficit or sensory deficit.   Skin: Skin is warm and dry. Capillary refill takes less than 2 seconds. No rash noted. No erythema. No pallor.   Psychiatric: He has a normal mood and affect. His behavior is normal. Thought content normal.         ED Course   Procedures  Labs Reviewed - No data to display       Imaging Results              X-Ray Hip 2 or 3 views Left with Pelvis when performed (Final result)  Result time 03/05/25 09:19:19      Final result by Amando Bertrand MD (03/05/25 09:19:19)                   Impression:      Mild degenerative osteoarthrosis of the left hip.      Electronically signed by: Amando Bertrand  Date:    03/05/2025  Time:    09:19               Narrative:    EXAMINATION:  XR HIP WITH PELVIS WHEN PERFORMED 2 OR 3 VIEWS LEFT    CLINICAL HISTORY:  Injury, unspecified, initial encounter    TECHNIQUE:  AP pelvis and two views of the left hip.    COMPARISON:  None    FINDINGS:  No acute fracture or dislocation.  No significant soft tissue swelling.    Femoral head is normally positioned within the  native acetabulum.  Mild femoroacetabular degenerative osteoarthrosis with small osteophyte formation.  Joint space is preserved.    Pubic symphysis is intact.  SI joints are intact.  Bilateral pubic rami are intact. Prior posterior fusion of the lower lumbar spine.                                       X-Ray Lumbar Spine 5 View (Final result)  Result time 03/05/25 09:14:26      Final result by Dov Nichols MD (03/05/25 09:14:26)                   Impression:      1. No acute fracture or malalignment.  2. Transitional lumbosacral anatomy, as above.  Stable postoperative changes at L5-S1.  No acute hardware complication.      Electronically signed by: Dov Nichols  Date:    03/05/2025  Time:    09:14               Narrative:    EXAMINATION:  XR LUMBAR SPINE COMPLETE 5 VIEW    TECHNIQUE:  AP, lateral, spot and bilateral oblique views of the lumbar spine were performed.    COMPARISON:  12/13/2023, MRI total spine 05/30/2023    FINDINGS:  Transitional lumbosacral anatomy with lumbarized S1 vertebral body, as demonstrated on total spine imaging from 05/30/2023.  Postoperative changes of anterior interbody and left-sided posterior instrumented fusion at L5-S1.  No evidence of hardware fracture or abnormal perihardware lucency.  Stable mild dextroconvex curvature at the thoracolumbar junction and levoconvex curvature of the lower lumbar spine.  Stable right lateral subluxation of L3 on L4.  Vertebral body heights are maintained.  No acute fracture.  No abnormal subluxation.  Moderate intervertebral disc space narrowing with marginal osteophyte formation at L3-4, L4-5 and S1-2.  Moderate lower lumbar facet arthropathy.  Sacroiliac joints are maintained.                                       Medications   morphine injection 6 mg (6 mg Intramuscular Given 3/5/25 2398)     Medical Decision Making  Differential includes but not limited to fracture, dislocation, strain, sprain, contusion, abrasion,    Emergent evaluation of a  75-year-old male presents emergency department after a fall.  He injured his left hip.  Initially stated he fell in the left hip but then changed his story and stated that he actually fell right hip in twisted his left hip in the fall.  I do not see any fracture left hip no obvious dislocation likely a strain or sprain of the groin or left hip region.  He was able to walk in the emergency department unassisted and bear weight on his leg so I have low suspicion for occult fracture.  Will recommend supportive care Tylenol and he can follow up with his orthopedist for any worsening of symptoms.  He will return if his symptoms change or worsen.    A dictation software program was used for this note.  Please expect some simple typographical  errors in this note.    I had a detailed discussion with the patient and/or guardian regarding: The historical points, exam findings, and diagnostic results supporting the discharge diagnosis, lab results, pertinent radiology results, and the need for outpatient follow-up, for definitive care with an orthopedist and to return to the emergency department if symptoms worsen or persist or if there are any questions or concerns that arise at home. All questions have been answered in detail. Strict return to Emergency Department precautions have been provided.       Amount and/or Complexity of Data Reviewed  External Data Reviewed: labs and notes.  Radiology: ordered and independent interpretation performed. Decision-making details documented in ED Course.    Risk  OTC drugs.  Prescription drug management.  Decision regarding hospitalization.               ED Course as of 03/05/25 1024   Wed Mar 05, 2025   1258 I re-evaluated the patient and he says now he actually remembers what happened is that he actually fell on his other hip,  and he twisted his left hip and did not fall in his left hip he actually fell in his right hip but the right one does not hurt.  Will attempt to ambulate the  patient and will reassess. [JR]   1020 Nursing staff reports that patient was able to ambulate unassisted.  Plan to discharge him home. [JR]      ED Course User Index  [JR] Tan Bardales DO                           Clinical Impression:  Final diagnoses:  [T14.90XA] Trauma  [S76.012A] Hip strain, left, initial encounter (Primary)  [W19.XXXA] Fall, initial encounter          ED Disposition Condition    Discharge Stable          ED Prescriptions    None       Follow-up Information       Follow up With Specialties Details Why Contact Info Additional Information    Kleber Worthy III, MD Family Medicine In 1 week  [address] [facility]  [address]  [place] [id number]  [phone]       [facility] -  Emergency Medicine  If symptoms worsen [address] [place] [address] [address] [place] [id number] 1st floor    Guille Ty MD Sports Medicine, Orthopedic Surgery In 3 days  [address] [place] [address] [address]  [place] [id number]  [phone]                  [1]   Social History  Tobacco Use    Smoking status: Never    Smokeless tobacco: Never   Substance Use Topics    Alcohol use: No    Drug use: No        Tan Bardales DO  03/05/25 1024

## 2025-03-05 NOTE — DISCHARGE INSTRUCTIONS
RETURN TO EMERGENCY DEPARTMENT WITHOUT FAIL, IF YOUR SYMPTOMS WORSEN, IF YOU GET NEW OR DIFFERENT SYMPTOMS, IF YOU ARE UNABLE TO FOLLOW UP AS DIRECTED, OR IF YOU HAVE ANY CONCERNS OR WORRIES.    Follow up with your orthopedic on an outpatient basis.  Take Tylenol as needed for pain

## 2025-03-06 ENCOUNTER — PATIENT OUTREACH (OUTPATIENT)
Facility: OTHER | Age: 75
End: 2025-03-06
Payer: MEDICARE

## 2025-03-07 NOTE — PROGRESS NOTES
Patient was seen in the ED on 3/5/25. Phoned patient on 2 separate occasions to assist with Post ED Discharge Navigation. Patient was unavailable. Message left on voice mail. Encounter closed.  Keyona Hancock

## 2025-03-24 DIAGNOSIS — Z00.00 ENCOUNTER FOR MEDICARE ANNUAL WELLNESS EXAM: ICD-10-CM

## 2025-03-24 RX ORDER — OLMESARTAN MEDOXOMIL 20 MG/1
20 TABLET ORAL DAILY
Qty: 90 TABLET | Refills: 1 | Status: SHIPPED | OUTPATIENT
Start: 2025-03-24

## 2025-04-17 ENCOUNTER — HOSPITAL ENCOUNTER (OUTPATIENT)
Dept: PREADMISSION TESTING | Facility: HOSPITAL | Age: 75
Discharge: HOME | End: 2025-04-17
Attending: INTERNAL MEDICINE
Payer: MEDICARE

## 2025-04-17 VITALS
RESPIRATION RATE: 20 BRPM | SYSTOLIC BLOOD PRESSURE: 158 MMHG | DIASTOLIC BLOOD PRESSURE: 84 MMHG | TEMPERATURE: 98 F | HEIGHT: 67 IN | OXYGEN SATURATION: 95 % | HEART RATE: 69 BPM | WEIGHT: 235.88 LBS | BODY MASS INDEX: 37.02 KG/M2

## 2025-04-17 DIAGNOSIS — Z01.818 PRE-OP TESTING: Primary | ICD-10-CM

## 2025-04-17 LAB
OHS QRS DURATION: 92 MS
OHS QTC CALCULATION: 438 MS

## 2025-04-17 PROCEDURE — 93010 ELECTROCARDIOGRAM REPORT: CPT | Mod: ,,, | Performed by: GENERAL PRACTICE

## 2025-04-17 PROCEDURE — 93005 ELECTROCARDIOGRAM TRACING: CPT | Performed by: GENERAL PRACTICE

## 2025-04-17 NOTE — PRE-PROCEDURE INSTRUCTIONS
History and medicines reviewed and updated with patient. EKG completed in office. Verbalized understanding of all pre op instructions as per AVS. Escorted to lab.

## 2025-04-17 NOTE — DISCHARGE INSTRUCTIONS
To confirm, Your procedure is scheduled for: Thursday, April 24, 2025    Endoscopy will call the afternoon prior with the final arrival time on Wednesday, April 23, 2025    Please report to [facility]patient [place] via [facility] entrance. Check in at registration desk.    Do not eat anything after midnight the night before procedure.  You may have clear liquids up until 2 hours prior to arrival at hospital.    *Clear liquids include: WATER, GATORADE, CLEAR JUICES (NO PULP), BLACK COFFEE OR TEA.    ONLY if you are diabetic, check your sugar in the morning before your procedure and do not take any diabetic medicines.    TAKE ONLY THESE MEDICATIONS WITH A SMALL SIP OF WATER THE MORNING OF YOUR PROCEDURE:  METOPROLOL      DO NOT TAKE THESE MEDICATIONS PRIOR to your procedure or per your surgeon's request: ASPIRIN, ALEVE, ADVIL, IBUPROFEN, FISH OIL VITAMIN E, HERBALS  (May take Tylenol)    INSTRUCTIONS IMPORTANT!!    If you wear contact lenses, dentures, hearing aids or glasses, bring a container to put them in during surgery and give to a family member for safe keeping.      Please leave all jewelry, piercing's and valuables at home.     ONLY for patients requiring bowel prep, written instructions will be given by your doctor's office.    Make arrangements in advance for transportation home by a responsible adult.    You must make arrangements for transportation, TAXI'S, UBER'S OR LYFTS ARE NOT ALLOWED.        If you have any questions about these instructions, call Pre-Op Admit  Nursing at [phone] or the Pre-Op Endoscopy [phone]

## 2025-04-21 RX ORDER — METFORMIN HYDROCHLORIDE 500 MG/1
1000 TABLET, EXTENDED RELEASE ORAL 2 TIMES DAILY WITH MEALS
Qty: 360 TABLET | Refills: 0 | Status: SHIPPED | OUTPATIENT
Start: 2025-04-21

## 2025-04-21 NOTE — TELEPHONE ENCOUNTER
Care Due:                  Date            Visit Type   Department     Provider  --------------------------------------------------------------------------------                                EP -                              PRIMARY      [facility]  Last Visit: 02-      CARE ([facility])   [facility]  Next Visit: None Scheduled  None         None Found                                                            Last  Test          Frequency    Reason                     Performed    Due Date  --------------------------------------------------------------------------------    HBA1C.......  6 months...  metFORMIN................  11- 05-    [facility] Embedded Care Due Messages. Reference number: [id number].   4/21/2025 8:50:05 AM CDT

## 2025-04-24 ENCOUNTER — ANESTHESIA (OUTPATIENT)
Dept: SURGERY | Facility: HOSPITAL | Age: 75
End: 2025-04-24
Payer: MEDICARE

## 2025-04-24 ENCOUNTER — ANESTHESIA EVENT (OUTPATIENT)
Dept: SURGERY | Facility: HOSPITAL | Age: 75
End: 2025-04-24
Payer: MEDICARE

## 2025-04-24 ENCOUNTER — HOSPITAL ENCOUNTER (OUTPATIENT)
Facility: HOSPITAL | Age: 75
Discharge: HOME | End: 2025-04-24
Attending: INTERNAL MEDICINE | Admitting: INTERNAL MEDICINE
Payer: MEDICARE

## 2025-04-24 VITALS
SYSTOLIC BLOOD PRESSURE: 183 MMHG | OXYGEN SATURATION: 96 % | TEMPERATURE: 98 F | DIASTOLIC BLOOD PRESSURE: 82 MMHG | HEART RATE: 60 BPM | RESPIRATION RATE: 16 BRPM

## 2025-04-24 DIAGNOSIS — Z86.0100 PERSONAL HISTORY OF COLON POLYPS, UNSPECIFIED: ICD-10-CM

## 2025-04-24 PROCEDURE — 43450 DILATE ESOPHAGUS 1/MULT PASS: CPT | Performed by: INTERNAL MEDICINE

## 2025-04-24 PROCEDURE — 27200043 HC FORCEPS, BIOPSY: Performed by: INTERNAL MEDICINE

## 2025-04-24 PROCEDURE — 88305 TISSUE EXAM BY PATHOLOGIST: CPT | Mod: TC | Performed by: PATHOLOGY

## 2025-04-24 PROCEDURE — 25000003 PHARM REV CODE 250: Performed by: INTERNAL MEDICINE

## 2025-04-24 PROCEDURE — 37000008 HC ANESTHESIA 1ST 15 MINUTES: Performed by: INTERNAL MEDICINE

## 2025-04-24 PROCEDURE — 63600175 PHARM REV CODE 636 W HCPCS: Performed by: NURSE ANESTHETIST, CERTIFIED REGISTERED

## 2025-04-24 PROCEDURE — 37000009 HC ANESTHESIA EA ADD 15 MINS: Performed by: INTERNAL MEDICINE

## 2025-04-24 PROCEDURE — 43239 EGD BIOPSY SINGLE/MULTIPLE: CPT | Performed by: INTERNAL MEDICINE

## 2025-04-24 PROCEDURE — G0105 COLORECTAL SCRN; HI RISK IND: HCPCS | Performed by: INTERNAL MEDICINE

## 2025-04-24 RX ORDER — LIDOCAINE HYDROCHLORIDE 20 MG/ML
INJECTION INTRAVENOUS
Status: DISCONTINUED | OUTPATIENT
Start: 2025-04-24 | End: 2025-04-24

## 2025-04-24 RX ORDER — PROPOFOL 10 MG/ML
VIAL (ML) INTRAVENOUS
Status: DISCONTINUED | OUTPATIENT
Start: 2025-04-24 | End: 2025-04-24

## 2025-04-24 RX ORDER — OXYCODONE HYDROCHLORIDE 10 MG/1
10 TABLET ORAL ONCE
Refills: 0 | Status: COMPLETED | OUTPATIENT
Start: 2025-04-24 | End: 2025-04-24

## 2025-04-24 RX ADMIN — PROPOFOL 30 MG: 10 INJECTION, EMULSION INTRAVENOUS at 08:04

## 2025-04-24 RX ADMIN — OXYCODONE HYDROCHLORIDE 10 MG: 10 TABLET ORAL at 09:04

## 2025-04-24 RX ADMIN — LIDOCAINE HYDROCHLORIDE 60 MG: 20 INJECTION, SOLUTION INTRAVENOUS at 08:04

## 2025-04-24 RX ADMIN — PROPOFOL 10 MG: 10 INJECTION, EMULSION INTRAVENOUS at 08:04

## 2025-04-24 RX ADMIN — SODIUM CHLORIDE, SODIUM LACTATE, POTASSIUM CHLORIDE, AND CALCIUM CHLORIDE: .6; .31; .03; .02 INJECTION, SOLUTION INTRAVENOUS at 08:04

## 2025-04-24 RX ADMIN — PROPOFOL 100 MG: 10 INJECTION, EMULSION INTRAVENOUS at 08:04

## 2025-04-24 NOTE — H&P
PCP: Kleber Worthy III, MD    No chief complaint on file.      HPI: Patient is a 75 y.o. male with PMHx  colon polyps 2021 and dysphagia with balloon dilation 2021 of UES due to challenge with dan passing the UES due to neck/tongue anatomy.      Ate yesterday...    Past Medical History:  Past Medical History:   Diagnosis Date    Abdominal hernia     Arthritis     Asbestos exposure     SOB     Back pain     Bulging discs     lumbar    Chronic back pain     Coronary artery disease     Hypertension     Kidney stone     Prostatitis     Sepsis due to methicillin resistant Staphylococcus aureus (MRSA) 06/2019    Due to large right upper arm abscess    Wears glasses         Past Surgical History:  Past Surgical History:   Procedure Laterality Date    APPENDECTOMY      BACK SURGERY  09/23/2022    COLONOSCOPY N/A 09/23/2021    Procedure: COLONOSCOPY;  Surgeon: Ashvin Batista MD;  Location: [facility];  Service: Endoscopy;  Laterality: N/A;    ESOPHAGOGASTRODUODENOSCOPY N/A 09/23/2021    Procedure: EGD (ESOPHAGOGASTRODUODENOSCOPY);  Surgeon: Ashvin Batista MD;  Location: [facility];  Service: Endoscopy;  Laterality: N/A;    HAND SURGERY Right     rt hand    INCISION AND DRAINAGE Bilateral 06/2019    Right upper arm and left upper arm abscesses    JOINT REPLACEMENT      right knee    KNEE LIGAMENT RECONSTRUCTION      left knee    LAPAROSCOPIC CHOLECYSTECTOMY N/A 01/17/2020    Procedure: CHOLECYSTECTOMY, LAPAROSCOPIC;  Surgeon: Edgard Hill III, MD;  Location: [facility];  Service: General;  Laterality: N/A;    LITHOTRIPSY      TONSILLECTOMY, ADENOIDECTOMY, BILATERAL MYRINGOTOMY AND TUBES      TOTAL KNEE ARTHROPLASTY  1971    rt knee    URETERAL STENT PLACEMENT          Home Medications:  Prescriptions Prior to Admission[1]    Inpatient Medications:        Review of patient's allergies indicates:   Allergen Reactions    Codeine     Sulfamethoxazole-trimethoprim      dizziness    Codeine sulfate Nausea And  "Vomiting    Morphine Nausea Only     Ok with nausea med.       Social History:   Social History     Occupational History    Occupation: RETIRED   Tobacco Use    Smoking status: Never    Smokeless tobacco: Never   Substance and Sexual Activity    Alcohol use: No    Drug use: No    Sexual activity: Yes     Partners: Female       Family History:   Family History   Problem Relation Name Age of Onset    Cancer Mother      Stroke Father      Heart disease Father      Collagen disease Neg Hx      Melanoma Neg Hx      Psoriasis Neg Hx      Lupus Neg Hx      Eczema Neg Hx         Review of Systems:  A 10 point review of systems was performed and was normal, except as mentioned in the HPI, including constitutional, HEENT, heme, lymph, cardiovascular, respiratory, gastrointestinal, genitourinary, neurologic, endocrine, psychiatric and musculoskeletal.      OBJECTIVE:    Physical Exam:  24 Hour Vital Sign Ranges:    Most recent vitals: There were no vitals taken for this visit.   GEN: well-developed, well-nourished, awake and alert, non-toxic appearing adult  HEENT: PERRL, sclera anicteric, oral mucosa pink and moist without lesion  NECK: trachea midline; Good ROM  CV: regular rate and rhythm, no murmurs or gallops  RESP: clear to auscultation bilaterally, no wheezes, rhonci or rales  ABD: soft, non-tender, non-distended, normal bowel sounds  EXT: no swelling or edema, 2+ pulses distally  SKIN: no rashes or jaundice  PSYCH: normal affect    Labs:   No results for input(s): "WBC", "MCV", "PLT" in the last 72 hours.    Invalid input(s): "HGBAU"  No results for input(s): "NA", "K", "CL", "CO2", "BUN", "GLU" in the last 72 hours.    Invalid input(s): "CREA"  No results for input(s): "ALB" in the last 72 hours.    Invalid input(s): "ALKP", "SGOT", "SGPT", "TBIL", "DBIL", "TPRO"  No results for input(s): "PT", "INR", "PTT" in the last 72 hours.      Radiology Review:  No orders to display         IMPRESSION / RECOMMENDATIONS:  H/o " polyps  -ate yesterday.     Dysphagia/GERD  -EGD with possible dilation    Thank you for this consult.    Ashvin Batista  4/24/2025  7:46 AM             [1]   Medications Prior to Admission   Medication Sig Dispense Refill Last Dose/Taking    aspirin 325 MG tablet Take 325 mg by mouth once daily.       b complex vitamins tablet Take 1 tablet by mouth once daily.       beta-carotene,A,-vits C,E/mins (VISION ORAL) Take 1 tablet by mouth once daily.       blood sugar diagnostic Strp Check glucose daily 100 each 2     buPROPion (WELLBUTRIN XL) 300 MG 24 hr tablet Take 1 tablet (300 mg total) by mouth every morning. 90 tablet 3     diclofenac sodium 1 % Gel Apply 2 g topically 2 (two) times daily. 1 Tube 2     doxycycline (VIBRAMYCIN) 100 MG Cap TAKE ONE CAPSULE BY MOUTH TWICE DAILY 20 capsule 2     lancets 30 gauge Misc Check glucose daily 100 each 2     LINZESS 145 mcg Cap capsule Take 1 capsule (145 mcg total) by mouth once daily. 30 capsule 5     MAGNESIUM ORAL Take 500 mg by mouth once daily.       metFORMIN (GLUCOPHAGE-XR) 500 MG ER 24hr tablet TAKE 2 TABLETS BY MOUTH TWICE DAILY WITH MEALS 360 tablet 0     metoprolol succinate (TOPROL-XL) 100 MG 24 hr tablet Take 1 tablet (100 mg total) by mouth once daily. 90 tablet 1     mirtazapine (REMERON) 15 MG tablet Take 1 tablet (15 mg total) by mouth every evening. 30 tablet 2     multivitamin (THERAGRAN) tablet Take 1 tablet by mouth once daily.       mupirocin (BACTROBAN) 2 % ointment Apply topically 2 (two) times daily. 22 g 0     nitroGLYCERIN (NITROSTAT) 0.4 MG SL tablet Place 0.4 mg under the tongue every 5 (five) minutes as needed for Chest pain.       olmesartan (BENICAR) 20 MG tablet Take 1 tablet (20 mg total) by mouth once daily. 90 tablet 1     ONETOUCH VERIO FLEX METER Misc use as directed       oxyCODONE (ROXICODONE) 10 mg Tab immediate release tablet Take 10 mg by mouth every 8 (eight) hours as needed for Pain.       pantoprazole (PROTONIX) 40 MG tablet Take  1 tablet (40 mg total) by mouth 2 (two) times daily. 180 tablet 3     pravastatin (PRAVACHOL) 20 MG tablet Take 1 tablet (20 mg total) by mouth once daily. 90 tablet 3     sertraline (ZOLOFT) 100 MG tablet TAKE 1 & 1/2 TABLETS BY MOUTH ONCE DAILY (Patient taking differently: Take 150 mg by mouth every evening. TAKE 1 & 1/2 TABLETS BY MOUTH ONCE DAILY) 135 tablet 3     testosterone cypionate (DEPOTESTOTERONE CYPIONATE) 200 mg/mL injection Inject 2 mLs (400 mg total) into the muscle every 28 days. 5 mL 2     vitamin E, dl,tocopheryl acet, (VITAMIN E, DL, ACETATE,) 180 mg (400 unit) Cap Take 400 Units by mouth once daily.       zinc gluconate 50 mg tablet Take 50 mg by mouth once daily.

## 2025-04-24 NOTE — TRANSFER OF CARE
Anesthesia Transfer of Care Note    Patient: Orlando Treadwell    Procedure(s) Performed: Procedure(s) (LRB):  COLONOSCOPY (N/A)  EGD, DILATION (N/A)    Patient location: GI    Anesthesia Type: general    Transport from OR: Transported from OR on room air with adequate spontaneous ventilation    Post pain: adequate analgesia    Post assessment: no apparent anesthetic complications    Post vital signs: stable    Level of consciousness: awake and alert    Nausea/Vomiting: no nausea/vomiting    Complications: none    Transfer of care protocol was followed      Last vitals: Visit Vitals  BP (!) 179/94   Pulse 61   Temp 37.1 °C (98.7 °F) (Tympanic)   Resp 17   SpO2 95%

## 2025-04-24 NOTE — [employer] PATIENT INSTRUCTIONS
Discharge Summary/Instructions after an Endoscopic Procedure  Patient Name: Orlando Treadwell  Patient MRN: [id number]  Patient YOB: 1950 Thursday, April 24, 2025  Ashvin Batista MD  RESTRICTIONS:  During your procedure today, you received medications for sedation.  These   medications may affect your judgment, balance and coordination.  Therefore,   for 24 hours, you have the following restrictions:   - DO NOT drive a car, operate machinery, make legal/financial decisions,   sign important papers or drink alcohol.    ACTIVITY:  Today: no heavy lifting, straining or running due to procedural   sedation/anesthesia.  The following day: return to full activity including work.  DIET:  Eat and drink normally unless instructed otherwise.     TREATMENT FOR COMMON SIDE EFFECTS:  - Mild abdominal pain, nausea, belching, bloating or excessive gas:  rest,   eat lightly and use a heating pad.  - Sore Throat: treat with throat lozenges and/or gargle with warm salt   water.  - Because air was used during the procedure, expelling large amounts of air   from your rectum or belching is normal.  - If a bowel prep was taken, you may not have a bowel movement for 1-3 days.    This is normal.  SYMPTOMS TO WATCH FOR AND REPORT TO YOUR PHYSICIAN:  1. Abdominal pain or bloating, other than gas cramps.  2. Chest pain.  3. Back pain.  4. Signs of infection such as: chills or fever occurring within 24 hours   after the procedure.  5. Rectal bleeding, which would show as bright red, maroon, or black stools.   (A tablespoon of blood from the rectum is not serious, especially if   hemorrhoids are present.)  6. Vomiting.  7. Weakness or dizziness.  GO DIRECTLY TO THE NEAREST EMERGENCY ROOM IF YOU HAVE ANY OF THE FOLLOWING:      Difficulty breathing              Chills and/or fever over 101 F   Persistent vomiting and/or vomiting blood   Severe abdominal pain   Severe chest pain   Black, tarry stools   Bleeding- more than one  tablespoon   Any other symptom or condition that you feel may need urgent attention  Your doctor recommends these additional instructions:  If any biopsies were taken, your doctors clinic will contact you in 1 to 2   weeks with any results.  - Patient has a contact number available for emergencies.  The signs and   symptoms of potential delayed complications were discussed with the   patient.  Return to normal activities tomorrow.  Written discharge   instructions were provided to the patient.   - Resume previous diet.   - Continue present medications.   - Await pathology results.   - Discharge patient to home (with escort).  For questions, problems or results please call your physician - Ashvin Batista MD at Work:  [phone].  [facility], EMERGENCY ROOM PHONE NUMBER: [phone]  IF A COMPLICATION OR EMERGENCY SITUATION ARISES AND YOU ARE UNABLE TO REACH   YOUR PHYSICIAN - GO DIRECTLY TO THE EMERGENCY ROOM.  MD Ashvin Tan MD  4/24/2025 8:59:25 AM  This report has been verified and signed electronically.  Dear patient,  As a result of recent federal legislation (The Federal Cures Act), you may   receive lab or pathology results from your procedure in your [facility]   account before your physician is able to contact you. Your physician or   their representative will relay the results to you with their   recommendations at their soonest availability.  Thank you,  [employer]

## 2025-04-24 NOTE — ANESTHESIA PREPROCEDURE EVALUATION
04/24/2025  Orlando Treadwell is a 75 y.o., male.      Results for orders placed or performed during the hospital encounter of 04/17/25   EKG 12-lead    Collection Time: 04/17/25  9:32 AM   Result Value Ref Range    QRS Duration 92 ms    [facility] QTC Calculation 438 ms    Narrative    Test Reason : Z01.818,    Vent. Rate :  66 BPM     Atrial Rate :  66 BPM     P-R Int : 178 ms          QRS Dur :  92 ms      QT Int : 418 ms       P-R-T Axes :  68  47  55 degrees    QTcB Int : 438 ms    Normal sinus rhythm  Normal ECG  No previous ECGs available  Confirmed by John Crawford ([id number]) on 4/17/2025 9:51:24 PM    Referred By:            Confirmed By: John Crawford             Lab Results   Component Value Date    WBC 12.64 04/17/2025    HGB 13.1 (L) 04/17/2025    HCT 43.2 04/17/2025    MCV 87 04/17/2025     04/17/2025     BMP  Lab Results   Component Value Date     (L) 04/17/2025    K 4.1 04/17/2025     09/05/2024    CO2 28 04/17/2025    BUN 15 04/17/2025    CREATININE 1.2 04/17/2025    CALCIUM 9.0 04/17/2025    ANIONGAP 10 09/05/2024     (H) 09/05/2024     (H) 09/04/2024     (H) 09/03/2024       Results for orders placed during the hospital encounter of 03/13/21    Echo Color Flow Doppler? Yes    Interpretation Summary  · Concentric remodeling and hyperdynamic systolic function. The estimated ejection fraction is 70-77%  · Normal central venous pressure (3 mmHg).  · The estimated PA systolic pressure is 29 mmHg.  · Normal left ventricular diastolic function.  · Normal right ventricular size.  · Plaque present in the aortic root (sinus).  · Mild tricuspid regurgitation.  · Mild pulmonic regurgitation.  · The quantitatively derived ejection fraction is 70%.         Pre-op Assessment    I have reviewed the Patient Summary Reports.     I have reviewed the Nursing Notes. I have reviewed  the NPO Status.   I have reviewed the Medications.     Review of Systems  Anesthesia Hx:  No problems with previous Anesthesia             Denies Family Hx of Anesthesia complications.    Denies Personal Hx of Anesthesia complications.                    Social:  No Alcohol Use, Non-Smoker       Hematology/Oncology:    Oncology Normal                Hematology Comments: ASA Therapy                     EENT/Dental:  EENT/Dental Normal           Cardiovascular:     Hypertension, well controlled   CAD   CABG/stent        hyperlipidemia   ECG has been reviewed.  Patient on beta blockers                          Pulmonary:        Sleep Apnea           Education provided regarding risk of obstructive sleep apnea            Renal/:  Chronic Renal Disease renal calculi  History of BRII   Chronic prostatitis     Kidney Function/Disease, Chronic Kidney Disease (CKD) , CKD Stage III (GFR 30-59)            Hepatic/GI:  Bowel Prep.   GERD        Esophageal / Stomach Disorders Esophageal Disorder, Esophageal Stricture          Musculoskeletal:  Arthritis   S/P lumbar fusion   Joint Disease:  Arthritis, Osteoarthritis     Spine Disorders: lumbar Disc disease, Chronic Pain and Degenerative disease           Neurological:           History of Encephalopathy, metabolic    Osteoarthritis                           Endocrine:  Diabetes, poorly controlled, type 2, using insulin Hypothyroidism        Obesity / BMI > 30  Psych:  Psychiatric History         Psychotic Disorder, Bipolar disorder and Post-traumatic Stress Disorder.          Physical Exam  General: Well nourished, Cooperative, Alert and Oriented    Airway:  Mallampati: III   Mouth Opening: Normal  TM Distance: > 6 cm  Tongue: Normal  Neck ROM: Normal ROM    Chest/Lungs:  Clear to auscultation, Normal Respiratory Rate    Heart:  Rate: Normal  Rhythm: Regular Rhythm        Anesthesia Plan  Type of Anesthesia, risks & benefits discussed:    Anesthesia Type: Gen Natural  Airway  Intra-op Monitoring Plan: Standard ASA Monitors  Post Op Pain Control Plan:   (medical reason for not using multimodal pain management)  Induction:  IV  ASA Score: 3  Anesthesia Plan Notes:       GNA  POM  Propofol  PSA Precautions    Ready For Surgery From Anesthesia Perspective.     .

## 2025-04-24 NOTE — ANESTHESIA POSTPROCEDURE EVALUATION
Anesthesia Post Evaluation    Patient: Orlando Treadwell    Procedure(s) Performed: Procedure(s) (LRB):  COLONOSCOPY (N/A)  EGD, DILATION (N/A)    Final Anesthesia Type: general      Patient location during evaluation: GI PACU  Patient participation: Yes- Able to Participate  Level of consciousness: awake and alert  Post-procedure vital signs: reviewed and stable  Pain management: adequate  Airway patency: patent    PONV status at discharge: No PONV  Anesthetic complications: no      Cardiovascular status: hemodynamically stable  Respiratory status: unassisted, spontaneous ventilation and room air  Hydration status: euvolemic  Follow-up not needed.              Vitals Value Taken Time   /82 04/24/25 09:24   Temp 36.9 °C (98.4 °F) 04/24/25 09:24   Pulse 58 04/24/25 09:29   Resp 16 04/24/25 09:24   SpO2 93 % 04/24/25 09:29   Vitals shown include unfiled device data.      No case tracking events are documented in the log.      Pain/Sergio Score: Pain Rating Prior to Med Admin: 8 (4/24/2025  9:22 AM)  Sergio Score: 10 (4/24/2025  9:12 AM)

## 2025-05-11 RX ORDER — MIRTAZAPINE 15 MG/1
15 TABLET, FILM COATED ORAL NIGHTLY
Qty: 30 TABLET | Refills: 2 | Status: SHIPPED | OUTPATIENT
Start: 2025-05-11

## 2025-05-11 NOTE — TELEPHONE ENCOUNTER
No care due was identified.  [facility] Embedded Care Due Messages. Reference number: [id number].   5/11/2025 8:04:15 AM CDT

## 2025-06-08 RX ORDER — BUPROPION HYDROCHLORIDE 300 MG/1
300 TABLET ORAL EVERY MORNING
Qty: 90 TABLET | Refills: 3 | Status: SHIPPED | OUTPATIENT
Start: 2025-06-08

## 2025-06-08 NOTE — TELEPHONE ENCOUNTER
Care Due:                  Date            Visit Type   Department     Provider  --------------------------------------------------------------------------------                                EP [facility]  Last Visit: 02-      [facility]  Next Visit: None Scheduled  None         None Found                                                            Last  Test          Frequency    Reason                     Performed    Due Date  --------------------------------------------------------------------------------    CMP.........  12 months..  mirtazapine, pravastatin.  09- 08-    Lipid Panel.  12 months..  pravastatin..............  09- 08-    [facility] Embedded Care Due Messages. Reference number: [id number].   6/08/2025 8:00:58 AM CDT

## 2025-06-26 ENCOUNTER — OFFICE VISIT (OUTPATIENT)
Dept: OTOLARYNGOLOGY | Facility: CLINIC | Age: 75
End: 2025-06-26
Payer: MEDICARE

## 2025-06-26 VITALS
WEIGHT: 233.94 LBS | BODY MASS INDEX: 36.72 KG/M2 | HEART RATE: 78 BPM | SYSTOLIC BLOOD PRESSURE: 168 MMHG | HEIGHT: 67 IN | DIASTOLIC BLOOD PRESSURE: 83 MMHG

## 2025-06-26 DIAGNOSIS — R13.14 PHARYNGOESOPHAGEAL DYSPHAGIA: Primary | ICD-10-CM

## 2025-06-26 PROCEDURE — 1101F PT FALLS ASSESS-DOCD LE1/YR: CPT | Mod: CPTII,S$GLB,, | Performed by: OTOLARYNGOLOGY

## 2025-06-26 PROCEDURE — 99203 OFFICE O/P NEW LOW 30 MIN: CPT | Mod: 25,S$GLB,, | Performed by: OTOLARYNGOLOGY

## 2025-06-26 PROCEDURE — 99999 PR PBB SHADOW E&M-EST. PATIENT-LVL IV: CPT | Mod: PBBFAC,,, | Performed by: OTOLARYNGOLOGY

## 2025-06-26 PROCEDURE — 3077F SYST BP >= 140 MM HG: CPT | Mod: CPTII,S$GLB,, | Performed by: OTOLARYNGOLOGY

## 2025-06-26 PROCEDURE — 3079F DIAST BP 80-89 MM HG: CPT | Mod: CPTII,S$GLB,, | Performed by: OTOLARYNGOLOGY

## 2025-06-26 PROCEDURE — 1126F AMNT PAIN NOTED NONE PRSNT: CPT | Mod: CPTII,S$GLB,, | Performed by: OTOLARYNGOLOGY

## 2025-06-26 PROCEDURE — 3288F FALL RISK ASSESSMENT DOCD: CPT | Mod: CPTII,S$GLB,, | Performed by: OTOLARYNGOLOGY

## 2025-06-26 PROCEDURE — 31575 DIAGNOSTIC LARYNGOSCOPY: CPT | Mod: S$GLB,,, | Performed by: OTOLARYNGOLOGY

## 2025-06-26 NOTE — PROGRESS NOTES
[facility]    Subjective:      Patient: Orlando Treadwell Patient PCP: Kleber Worthy III, MD         :  1950     Sex:  male      MRN:  [id number]          Date of Visit: 2025      Chief Complaint: Dysphagia (Food getting stuck in throat. Patient states any piece of food gets stuck and it hard for him to swallow for about 1 year. RSI  GERD 0/5)      Patient ID: Orlando Treadwell is a 75 y.o. male     Patient is a  lifelong NON-smoker with a past medical history of degenerative disc disease with history of transabdominal fusion, HTN, CAD status post PTCI with stent, HLD, CKD 3, class 2 obesity, type 2 diabetes with an A1c of 7.6% and obstructive sleep apnea self-referred for longstanding dysphagia.  He describes small pieces of meat tending to trigger what sounds like laryngo pharyngeal spasm he has not inability to breathe that is quite scary this happens infrequently but is quite alarming.  He had an EGD as recently as April which showed no endoscopic explanation for dysphagia dilation was performed without benefit.  Patient has had dilations in past years which did improve his difficulty with swallowing.      No sore throat, voice change, hemoptysis or otalgia.  No history of recurrent pneumonia or subjective aspiration to thin liquids.  No regurgitation of chewed foods.    Labs:  WBC   Date Value Ref Range Status   2025 12.64 3.90 - 12.70 K/uL Final     Hgb   Date Value Ref Range Status   2025 13.1 (L) 14.0 - 18.0 gm/dL Final     Platelet Count   Date Value Ref Range Status   2025 229 150 - 450 K/uL Final     Creatinine   Date Value Ref Range Status   2025 1.2 0.5 - 1.4 mg/dL Final     TSH   Date Value Ref Range Status   2024 3.681 0.340 - 5.600 uIU/mL Final     Hemoglobin A1C   Date Value Ref Range Status   2024 7.6 (H) 4.5 - 6.2 % Final     Comment:     According to ADA guidelines, hemoglobin A1C <7.0% represents  optimal control in non-pregnant diabetic  patients.  Different  metrics may apply to specific populations.   Standards of Medical Care in Diabetes - 2016.    For the purpose of screening for the presence of diabetes:  <5.7%     Consistent with the absence of diabetes  5.7-6.4%  Consistent with increasing risk for diabetes   (prediabetes)  >or=6.5%  Consistent with diabetes    Currently no consensus exists for use of hemoglobin A1C  for diagnosis of diabetes for children.         Past Medical History  He has a past medical history of Abdominal hernia, Arthritis, Asbestos exposure, Back pain, Bulging discs, Chronic back pain, Coronary artery disease, Hypertension, Kidney stone, Prostatitis, Sepsis due to methicillin resistant Staphylococcus aureus (MRSA), and Wears glasses.    Family / Surgical / Social History  His family history includes Cancer in his mother; Heart disease in his father; Stroke in his father.    Past Surgical History:   Procedure Laterality Date    APPENDECTOMY      BACK SURGERY  09/23/2022    COLONOSCOPY N/A 09/23/2021    Procedure: COLONOSCOPY;  Surgeon: Ashvin Batista MD;  Location: [facility];  Service: Endoscopy;  Laterality: N/A;    COLONOSCOPY N/A 4/24/2025    Procedure: COLONOSCOPY;  Surgeon: Ashvin Batista MD;  Location: [facility];  Service: Endoscopy;  Laterality: N/A;    EGD, WITH BALLOON DILATION OF LESS THAN 30 MILLIMETERS N/A 4/24/2025    Procedure: EGD, DILATION;  Surgeon: Ashvin Batista MD;  Location: [facility];  Service: Endoscopy;  Laterality: N/A;    ESOPHAGOGASTRODUODENOSCOPY N/A 09/23/2021    Procedure: EGD (ESOPHAGOGASTRODUODENOSCOPY);  Surgeon: Ashvin Batista MD;  Location: [facility];  Service: Endoscopy;  Laterality: N/A;    HAND SURGERY Right     rt hand    INCISION AND DRAINAGE Bilateral 06/2019    Right upper arm and left upper arm abscesses    JOINT REPLACEMENT      right knee    KNEE LIGAMENT RECONSTRUCTION      left knee    LAPAROSCOPIC CHOLECYSTECTOMY N/A 01/17/2020    Procedure: CHOLECYSTECTOMY,  "LAPAROSCOPIC;  Surgeon: Edgard Hill III, MD;  Location: [facility];  Service: General;  Laterality: N/A;    LITHOTRIPSY      TONSILLECTOMY, ADENOIDECTOMY, BILATERAL MYRINGOTOMY AND TUBES      TOTAL KNEE ARTHROPLASTY  1971    rt knee    URETERAL STENT PLACEMENT         Social History     Tobacco Use    Smoking status: Never    Smokeless tobacco: Never   Substance and Sexual Activity    Alcohol use: No    Drug use: No    Sexual activity: Yes     Partners: Female       Medications  He has a current medication list which includes the following prescription(s): aspirin, b complex vitamins, beta-carotene(a)-vits c,e/mins, blood sugar diagnostic, bupropion, diclofenac sodium, lancets, linzess, magnesium, metformin, metoprolol succinate, mirtazapine, multivitamin, mupirocin, nitroglycerin, olmesartan, onetouch verio flex meter, oxycodone, pantoprazole, pravastatin, sertraline, testosterone cypionate, vitamin e (dl, acetate), and zinc gluconate.      Allergies  Review of patient's allergies indicates:   Allergen Reactions    Sulfamethoxazole-trimethoprim      dizziness    Codeine sulfate Nausea And Vomiting    Morphine Nausea Only     Ok with nausea med.       All medications, allergies, and past history have been reviewed.    Objective:      Vitals:      4/17/2025     9:33 AM 4/24/2025     8:00 AM 6/26/2025     1:03 PM   Vitals - 1 value per visit   SYSTOLIC 158 179 168   DIASTOLIC 84 94 83   Pulse 69 61 78   Temp 98.1 °F (36.7 °C) 98.7 °F (37.1 °C)    Resp 20 17    SPO2 95 % 95 %    Weight (lb)   233.91   Weight (kg)   106.1   Height   5' 7" (1.702 m)   BMI (Calculated)   36.6   Pain Score   Zero       Body surface area is 2.24 meters squared.    Physical Exam:    GENERAL  APPEARANCE -  alert, appears stated age, cooperative, and moderately obese  BARRIER(S) TO COMMUNICATION -  none VOICE - appropriate for age and gender    INTEGUMENTARY  no suspicious head and neck lesions    HEENT  HEAD: Normocephalic, without " obvious abnormality, atraumatic  FACE: INSPECTION - Symmetric, no signs of trauma, no suspicious lesion(s)      STRENGTH - facial symmetry intact     PALPATION -  No masses     SALIVARY GLANDS - non-tender with no appreciable mass    NECK/THYROID: normal atraumatic, no neck masses, normal thyroid, no jvd    EYES  Normal occular alignment and mobility with no visible nystagmus at rest    EARS/NOSE/MOUTH/THROAT  EARS  PINNAE AND EXTERNAL EARS - no suspicious lesion OTOSCOPIC EXAM (surgical microscopy was not used for visualization/instrumentation): EAR EXAM - Normal ear canals, tympanic membranes and mobility, and middle ear spaces bilaterally.  HEARING - grossly intact to voice/finger rub    NOSE AND SINUSES  EXTERNAL NOSE - Grossly normal for age/sex  SEPTUM - normal/no obstruction on anterior exam without decongestion TURBINATES - within normal limits MUCOSA - within normal limits     MOUTH AND THROAT   ORAL CAVITY, LIPS, TEETH, GUMS & TONGUE - moist, no suspicious lesions  OROPHARYNX /TONSILS/PHARYNGEAL WALLS/HYPOPHARYNX - no erythema or exudates  NASOPHARYNX - limited mirror exam - unable to visualize due to anatomy/gag  LARYNX -  - limited mirror exam - unable to visualize due to anatomy/gag      CHEST AND LUNG   INSPECTION & AUSCULTATION - normal effort, no stridor    CARDIOVASCULAR  AUSCULTATION & PERIPHERAL VASCULAR - regular rate and rhythm.    NEUROLOGIC  MENTAL STATUS - alert, interactive CRANIAL NERVES - normal    LYMPHATIC  HEAD AND NECK - non-palpable; SUPRACLAVICULAR - deferred      Procedure(s):  Flexible laryngoscopy performed.  See procedure note.    Flexible video endoscopy with slimline Storz scope used through the slightly more patent left nares with evaluation of the nasal cavity nasopharynx oropharynx hypopharynx and larynx.  There some mild deformity of the larynx twisting towards the patient's right but no mass.  Base of tongue is symmetric vallecula is clear.  There is some mild  interarytenoid and postcricoid mucosal edema without significant erythema.  Cords move well and symmetrically.  Piriforms open well on positive pressure nose blow.  No asymmetry of movement or any lesion appreciated.              Assessment:      Problem List Items Addressed This Visit    None  Visit Diagnoses         Pharyngoesophageal dysphagia    -  Primary                 Plan:      Barium esophagram with tablet.  May need FEES.  Sounds like cricopharyngeal dysphagia without Zenker's clinically.  May need more aggressive dilation and Botox or even cricopharyngeal myotomy if determined by the swallowing study.    Follow up three-week to review study.  General information on throat care reflux and throat clearing provided.          Voice recognition software was used in the creation of this note/communication and any sound-alike errors which may have occurred from its use should be taken in context when interpreting.  If such errors prevent a clear understanding of the note/communication, please contact the office for clarification.

## 2025-06-26 NOTE — PATIENT INSTRUCTIONS
THROAT CLEARING     Why am I clearing my throat so often?   Irritation of the vocal folds and surrounding area can cause the urge to clear your throat. This irritation can be caused by acid reflux, allergies, or environmental factors, as well as from a cold or sore throat. Talk with your doctor about the treatment of possible underlying causes. However, throat clearing can turn into a cycle. You clear your throat after feeling an irritation, which causes more irritation, which causes you to continue clearing your throat, which causes more irritation.     What can I do about it?   Ask a friend or family member to help you keep track - you may not even realize how often you do it.   Replace the throat clearing with a different behavior.   Take a sip of water and then swallow. Repeat until the sensation subsides.  Swallow hard (even without water)   Use the silent throat clear method by making the h sound when you exhale  Breathe in deeply through your nose and exhale on shhh   Hum quietly   Keep yourself hydrated.   Drink plenty of water   Eat wet snacks (grapes, apples, melon, cucumber)   Use a humidifier at home or at work   Suck on hard candies, but avoid too much sugar and avoid anything with mint, menthol, camphor, or eucaplyptus, as these will have a more irritating effect.        ACID REFLUX   What is acid reflux?    When we eat, food passes from the throat and into the stomach through a tube called the esophagus. At the bottom of the esophagus is a ring of muscles that acts as a valve between the esophagus and stomach, called the lower esophageal sphincter. Smoking, alcohol, and certain types of food may weaken the sphincter, so it may stop closing properly. The contents in the stomach then may leak back, or reflux, into the esophagus. This problem is called gastroesophageal reflux disease (GERD). Symptoms of GERD include heartburn, belching, regurgitation of stomach contents, and swallowing  difficulties.    Sometimes, the stomach acid travels up through the esophagus and spills into the larynx or pharynx (voice box). This is called laryngopharyngeal reflux (LPR) and is irritating to the vocal folds and surrounding tissues. Often, patients with LPR do not experience heartburn as a symptom. More commonly, symptoms of LPR include hoarseness, excessive mucous resulting in frequent throat clearing, post-nasal drip, coughing, throat soreness or burning, choking episodes, difficulty swallowing, and sensation of a lump in the throat.     How is acid reflux treated?   Treatment for acid reflux can involve any combination of medication, lifestyle modifications, and surgery.   Medications. Your doctor may prescribe a proton pump inhibitor (PPI) or an H2 blocker. If you are prescribed a PPI, take in on an empty stomach in the morning 30 minutes prior to eating breakfast. Keep in mind that it may take 4-6 weeks before symptoms begin to resolve, so do not stop medications without consulting your doctor.   Lifestyle and dietary modifications. Eat smaller meals at a slower pace. Avoid over-eating. If you are overweight, try to lose weight. Do not lie down or exercise directly after eating; eat your last meal of the day at least 2-3 hours prior to going to sleep. Avoid tight-fitting clothes. If you are a smoker, reduce or quit smoking. Elevate your head of bed 4-6 inches by putting phone books under the legs at the head of your bed or buy a wedge pillow, but do not use more than two regular pillows as this causes the body to curl and compresses your stomach.     Food group Foods to avoid to reduce reflux   Beverages  Whole milk, 2% milk, chocolate milk/hot chocolate, alcohol, coffee (regular and decaf), caffeinated tea, mint tea, carbonated beverages, citrus juice    Breads/grains Commercial sweet rolls, doughnuts, croissants, and other high-fat pastries    Fruits and vegetables Fried or cream-style vegetables,  tomatoes, tomato-based products, citrus fruits, hot peppers    Soups and seasonings Cream, cheese, tomato-based soups, vinegar    Meats and proteins Fatty or fried meat/fish, bauer, sausage, pepperoni, lunch meat, fried eggs    Fats and oil Lard, bauer drippings, salt pork, meat drippings, gravies, highly seasoned salad dressings, nuts    Sweets/desserts Anything made with or from chocolate, peppermint, spearmint, whole milk, or cream; high-fat pastries, gum, hard candy           VOCAL HYGIENE AND HYDRATION RECOMMENDATIONS     DO   Drink plenty of waterabout  oz a day! If your urine is pale, you should be well-hydrated.  Try some of these tips to increase hydration as well.  Eat wet snacks such as grapes, melon, cucumbers, apple slices   Reduce intake coffee and other caffeinated and carbonated beverages   Suck on pastille lozenges or sugar free candies (not mentholated lozenges)   Use a room or personal humidifier   Use sinus rinses/irrigations or nasal saline spray   Inhale steam for a few minutes before speaking or singing   Pay attention to how, and how much, you use your voice.   Give yourself vocal breaks throughout the day.   Breathe when talking, take frequent pauses for breath.   Use a microphone when speaking in front of a group of 15 or more to reduce voice strain.   Learn how to use your voice correctly to get loud with voice therapy techniques.  Stay healthy. Eat right and get plenty of rest. Follow a reflux precaution diet if indicated.   ____________________________________________________________________    REDUCE   Loud talking, yelling, cheering, or screaming. Voice injury can take place over a long time, or all at once.  If you need to talk loud or yell, be sure you are doing it properly.   Clearing your throat and forceful coughing. The vocal folds collect mucus when irritated or inflamed and this can cause the urge to clear your throat. The trauma of clearing the throat creates more  inflammation and mucus. See tips above for keeping hydrated to assist with cutting back on throat clearing.  Instead of clearing your throat, try these behaviors until the sensation passes:   Take a sip of water   Silently clear with a hard exhale making an hhh sound   Swallow hard   Drying agents such as anti-histamines or decongestant medications. You should always take medications as prescribed, but keep in mind these will dry you out, so increase your hydration!   ____________________________________________________________________    AVOID   Inhalant irritants such as smoke, strong fumes, and allergens. Take advantage of 1-800-QUIT-NOW if you are ready to stop smoking.   Unnecessary non-speech noises, such as grunting during exercise, loud noises, or excessively high- or low-pitched sounds. Save your voice for talking and singing!   Whispering. Whispering places your vocal folds in a tenser position than usual.

## 2025-06-26 NOTE — PROCEDURES
"Laryngoscopy    Date/Time: 6/26/2025 1:00 PM    Performed by: Heath Smith MD  Authorized by: Heath Smith MD    Time out: Immediately prior to procedure a "time out" was called to verify the correct patient, procedure, equipment, support staff and site/side marked as required.    Consent Done?:  Yes (Verbal)  Anesthesia:     Local anesthetic:  Afrin and lidocaine 4%    Patient tolerance:  Patient tolerated the procedure well with no immediate complications    Decongestion performed?: Yes    Laryngoscopy:     Areas examined:  Nasal cavities, nasopharynx, oropharynx, hypopharynx, larynx and vocal cords  Larynx/hypopharynx:      Flexible video endoscopy with slimline Storz scope used through the slightly more patent left nares with evaluation of the nasal cavity nasopharynx oropharynx hypopharynx and larynx.  There some mild deformity of the larynx twisting towards the patient's right but no mass.  Base of tongue is symmetric vallecula is clear.  There is some mild interarytenoid and postcricoid mucosal edema without significant erythema.  Cords move well and symmetrically.  Piriforms open well on positive pressure nose blow.  No asymmetry of movement or any lesion appreciated.  See photos    "

## 2025-08-01 DIAGNOSIS — E11.9 TYPE 2 DIABETES MELLITUS WITHOUT COMPLICATION, WITHOUT LONG-TERM CURRENT USE OF INSULIN: ICD-10-CM

## 2025-08-01 RX ORDER — BLOOD-GLUCOSE METER
EACH MISCELLANEOUS
Qty: 100 STRIP | Refills: 2 | Status: SHIPPED | OUTPATIENT
Start: 2025-08-01

## 2025-08-01 NOTE — TELEPHONE ENCOUNTER
Care Due:                  Date            Visit Type   Department     Provider  --------------------------------------------------------------------------------                                EP -                              PRIMARY      [facility]  Last Visit: 02-      CARE ([facility])   [facility]  Next Visit: None Scheduled  None         None Found                                                            Last  Test          Frequency    Reason                     Performed    Due Date  --------------------------------------------------------------------------------    HBA1C.......  6 months...  metFORMIN................  11- 05-    Health [facility] Embedded Care Due Messages. Reference number: [id number].   8/01/2025 8:03:25 AM CDT   Jelani Felipe presents today for   Chief Complaint   Patient presents with    Medicare AWV     Medicare wellness       Is someone accompanying this pt? no    Is the patient using any DME equipment during OV? cane    Risk Factor Screenings with Interventions     Fall Risk:  2 or more falls in past year?: no  Fall with injury in past year?: no    Alcohol:  Alcohol Use: Not At Risk (7/23/2025)    AUDIT-C     Frequency of Alcohol Consumption: Never     Average Number of Drinks: Patient does not drink     Frequency of Binge Drinking: Never       Depression:  PHQ-2 Score: 0    Cognitive:  Clock Drawing Test (CDT): (!) Abnormal  Words recalled: 0 Words Recalled  Total Score: (!) 0  Total Score Interpretation: Abnormal Mini-Cog    Health Risk Assessment:     General  In general, how would you say your health is?: Good  In the past 7 days, have you experienced any of the following: New or Increased Pain, New or Increased Fatigue, Loneliness, Social Isolation, Stress or Anger?: No      Health Habits/Nutrition  On average, how many days per week do you engage in moderate to strenuous exercise (like a brisk walk)?: 2 days  On average, how many minutes do you engage in exercise at this level?: 10 min  Do you eat balanced/healthy meals regularly?: Yes  Have you seen the dentist within the past year?: N/A - wear dentures  Body mass index is 26.97 kg/m².      Hearing/Vision  Have you had an eye exam within the past year?: Yes  Do you have difficulty driving, watching TV, or doing any of your daily activities because of your eyesight?: No  Do you or your family notice any trouble with your hearing that hasn't been managed with hearing aids?: No      Safety  Do you have any tripping hazards - loose or unsecured carpets or rugs?: No  Do you have non-slip mats or non-slip surfaces or shower bars or grab bars in your shower or bathtub?: Yes  Do you have working smoke detectors?: Yes  Do you always fasten your seatbelt when you are in

## 2025-08-04 RX ORDER — METFORMIN HYDROCHLORIDE 500 MG/1
1000 TABLET, EXTENDED RELEASE ORAL 2 TIMES DAILY WITH MEALS
Qty: 360 TABLET | Refills: 0 | Status: SHIPPED | OUTPATIENT
Start: 2025-08-04

## 2025-08-04 NOTE — TELEPHONE ENCOUNTER
No care due was identified.  Health [facility] Embedded Care Due Messages. Reference number: [id number].   8/04/2025 9:14:11 AM CDT

## 2025-08-08 RX ORDER — MIRTAZAPINE 15 MG/1
15 TABLET, FILM COATED ORAL NIGHTLY
Qty: 30 TABLET | Refills: 2 | Status: SHIPPED | OUTPATIENT
Start: 2025-08-08

## 2025-08-14 RX ORDER — PRAVASTATIN SODIUM 20 MG/1
20 TABLET ORAL
Qty: 90 TABLET | Refills: 3 | Status: SHIPPED | OUTPATIENT
Start: 2025-08-14

## 2025-08-27 ENCOUNTER — OFFICE VISIT (OUTPATIENT)
Dept: FAMILY MEDICINE | Facility: CLINIC | Age: 75
End: 2025-08-27
Payer: MEDICARE

## 2025-08-27 ENCOUNTER — LAB VISIT (OUTPATIENT)
Dept: LAB | Facility: HOSPITAL | Age: 75
End: 2025-08-27
Attending: PHYSICIAN ASSISTANT
Payer: MEDICARE

## 2025-08-27 ENCOUNTER — PATIENT MESSAGE (OUTPATIENT)
Dept: ADMINISTRATIVE | Facility: HOSPITAL | Age: 75
End: 2025-08-27
Payer: MEDICARE

## 2025-08-27 VITALS
HEIGHT: 67 IN | DIASTOLIC BLOOD PRESSURE: 69 MMHG | HEART RATE: 64 BPM | TEMPERATURE: 98 F | BODY MASS INDEX: 35.47 KG/M2 | SYSTOLIC BLOOD PRESSURE: 116 MMHG | OXYGEN SATURATION: 95 % | WEIGHT: 226 LBS

## 2025-08-27 DIAGNOSIS — E11.59 HYPERTENSION ASSOCIATED WITH DIABETES: ICD-10-CM

## 2025-08-27 DIAGNOSIS — E29.1 HYPOGONADISM MALE: ICD-10-CM

## 2025-08-27 DIAGNOSIS — K21.9 GASTROESOPHAGEAL REFLUX DISEASE, UNSPECIFIED WHETHER ESOPHAGITIS PRESENT: ICD-10-CM

## 2025-08-27 DIAGNOSIS — F31.31 BIPOLAR AFFECTIVE DISORDER, CURRENTLY DEPRESSED, MILD: ICD-10-CM

## 2025-08-27 DIAGNOSIS — E11.29 TYPE 2 DIABETES MELLITUS WITH DIABETIC MICROALBUMINURIA, WITHOUT LONG-TERM CURRENT USE OF INSULIN: ICD-10-CM

## 2025-08-27 DIAGNOSIS — E66.812 CLASS 2 SEVERE OBESITY DUE TO EXCESS CALORIES WITH SERIOUS COMORBIDITY AND BODY MASS INDEX (BMI) OF 35.0 TO 35.9 IN ADULT: ICD-10-CM

## 2025-08-27 DIAGNOSIS — M51.369 DEGENERATION OF INTERVERTEBRAL DISC OF LUMBAR REGION, UNSPECIFIED WHETHER PAIN PRESENT: ICD-10-CM

## 2025-08-27 DIAGNOSIS — F43.10 PTSD (POST-TRAUMATIC STRESS DISORDER): ICD-10-CM

## 2025-08-27 DIAGNOSIS — E11.9 TYPE 2 DIABETES MELLITUS WITHOUT COMPLICATION, UNSPECIFIED WHETHER LONG TERM INSULIN USE: ICD-10-CM

## 2025-08-27 DIAGNOSIS — G89.29 OTHER CHRONIC PAIN: ICD-10-CM

## 2025-08-27 DIAGNOSIS — Z77.090 ASBESTOS EXPOSURE: ICD-10-CM

## 2025-08-27 DIAGNOSIS — Z00.00 ENCOUNTER FOR MEDICARE ANNUAL WELLNESS EXAM: Primary | ICD-10-CM

## 2025-08-27 DIAGNOSIS — F33.0 MILD EPISODE OF RECURRENT MAJOR DEPRESSIVE DISORDER: ICD-10-CM

## 2025-08-27 DIAGNOSIS — E11.69 HYPERLIPIDEMIA ASSOCIATED WITH TYPE 2 DIABETES MELLITUS: ICD-10-CM

## 2025-08-27 DIAGNOSIS — I25.10 CORONARY ARTERY DISEASE INVOLVING NATIVE CORONARY ARTERY OF NATIVE HEART WITHOUT ANGINA PECTORIS: ICD-10-CM

## 2025-08-27 DIAGNOSIS — N18.31 STAGE 3A CHRONIC KIDNEY DISEASE: ICD-10-CM

## 2025-08-27 DIAGNOSIS — F11.20 OPIOID DEPENDENCE, UNCOMPLICATED: ICD-10-CM

## 2025-08-27 DIAGNOSIS — R80.9 TYPE 2 DIABETES MELLITUS WITH DIABETIC MICROALBUMINURIA, WITHOUT LONG-TERM CURRENT USE OF INSULIN: ICD-10-CM

## 2025-08-27 DIAGNOSIS — Z79.82 ASPIRIN LONG-TERM USE: ICD-10-CM

## 2025-08-27 DIAGNOSIS — F99 ABNORMAL MMSE: ICD-10-CM

## 2025-08-27 DIAGNOSIS — E03.9 HYPOTHYROIDISM, UNSPECIFIED TYPE: ICD-10-CM

## 2025-08-27 DIAGNOSIS — I15.2 HYPERTENSION ASSOCIATED WITH DIABETES: ICD-10-CM

## 2025-08-27 DIAGNOSIS — E66.01 CLASS 2 SEVERE OBESITY DUE TO EXCESS CALORIES WITH SERIOUS COMORBIDITY AND BODY MASS INDEX (BMI) OF 35.0 TO 35.9 IN ADULT: ICD-10-CM

## 2025-08-27 DIAGNOSIS — K63.5 POLYP OF COLON, UNSPECIFIED PART OF COLON, UNSPECIFIED TYPE: ICD-10-CM

## 2025-08-27 DIAGNOSIS — G47.33 OSA (OBSTRUCTIVE SLEEP APNEA): ICD-10-CM

## 2025-08-27 DIAGNOSIS — G95.89 OTHER SPECIFIED DISEASES OF SPINAL CORD: ICD-10-CM

## 2025-08-27 DIAGNOSIS — I70.0 ATHEROSCLEROSIS OF AORTA: ICD-10-CM

## 2025-08-27 DIAGNOSIS — E78.5 HYPERLIPIDEMIA ASSOCIATED WITH TYPE 2 DIABETES MELLITUS: ICD-10-CM

## 2025-08-27 PROBLEM — A41.9 SEVERE SEPSIS: Status: RESOLVED | Noted: 2024-09-01 | Resolved: 2025-08-27

## 2025-08-27 PROBLEM — N17.9 ACUTE RENAL FAILURE: Status: RESOLVED | Noted: 2024-09-01 | Resolved: 2025-08-27

## 2025-08-27 PROBLEM — M46.06 SPINAL ENTHESOPATHY, LUMBAR REGION: Status: RESOLVED | Noted: 2023-07-27 | Resolved: 2025-08-27

## 2025-08-27 PROBLEM — E11.65 TYPE 2 DIABETES MELLITUS WITH HYPERGLYCEMIA, WITHOUT LONG-TERM CURRENT USE OF INSULIN: Status: RESOLVED | Noted: 2024-09-01 | Resolved: 2025-08-27

## 2025-08-27 PROBLEM — Z86.14 HISTORY OF MRSA INFECTION: Chronic | Status: RESOLVED | Noted: 2021-03-13 | Resolved: 2025-08-27

## 2025-08-27 PROBLEM — L89.159 PRESSURE INJURY OF SKIN OF SACRAL REGION: Status: RESOLVED | Noted: 2024-10-02 | Resolved: 2025-08-27

## 2025-08-27 PROBLEM — E87.5 HYPERKALEMIA: Status: RESOLVED | Noted: 2024-09-01 | Resolved: 2025-08-27
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
## 2025-08-27 PROBLEM — E87.1 HYPONATREMIA: Status: RESOLVED | Noted: 2024-09-01 | Resolved: 2025-08-27

## 2025-08-27 PROBLEM — N39.0 UTI (URINARY TRACT INFECTION): Status: RESOLVED | Noted: 2024-09-01 | Resolved: 2025-08-27

## 2025-08-27 PROBLEM — R41.82 ALTERED MENTAL STATUS: Status: RESOLVED | Noted: 2021-03-13 | Resolved: 2025-08-27

## 2025-08-27 PROBLEM — F11.90 CHRONIC NARCOTIC USE: Status: RESOLVED | Noted: 2020-01-08 | Resolved: 2025-08-27

## 2025-08-27 PROBLEM — K22.2 ESOPHAGEAL STRICTURE: Status: RESOLVED | Noted: 2025-02-23 | Resolved: 2025-08-27

## 2025-08-27 PROBLEM — Z95.5 STENTED CORONARY ARTERY: Status: RESOLVED | Noted: 2020-05-27 | Resolved: 2025-08-27

## 2025-08-27 PROBLEM — G93.41 ENCEPHALOPATHY, METABOLIC: Status: RESOLVED | Noted: 2020-10-22 | Resolved: 2025-08-27

## 2025-08-27 PROBLEM — Z98.1 S/P LUMBAR FUSION: Status: RESOLVED | Noted: 2025-02-23 | Resolved: 2025-08-27

## 2025-08-27 PROBLEM — R65.20 SEVERE SEPSIS: Status: RESOLVED | Noted: 2024-09-01 | Resolved: 2025-08-27

## 2025-08-27 LAB
ABSOLUTE EOSINOPHIL (OHS): 0.69 K/UL
ABSOLUTE MONOCYTE (OHS): 1.04 K/UL (ref 0.3–1)
ABSOLUTE NEUTROPHIL COUNT (OHS): 7.16 K/UL (ref 1.8–7.7)
ALBUMIN SERPL BCP-MCNC: 3.9 G/DL (ref 3.5–5.2)
ALP SERPL-CCNC: 53 UNIT/L (ref 40–150)
ALT SERPL W/O P-5'-P-CCNC: 133 UNIT/L (ref 0–55)
ANION GAP (OHS): 13 MMOL/L (ref 8–16)
AST SERPL-CCNC: 137 UNIT/L (ref 0–50)
BASOPHILS # BLD AUTO: 0.1 K/UL
BASOPHILS NFR BLD AUTO: 0.9 %
BILIRUB SERPL-MCNC: 0.5 MG/DL (ref 0.1–1)
BUN SERPL-MCNC: 27 MG/DL (ref 8–23)
CALCIUM SERPL-MCNC: 10.3 MG/DL (ref 8.7–10.5)
CHLORIDE SERPL-SCNC: 102 MMOL/L (ref 95–110)
CHOLEST SERPL-MCNC: 192 MG/DL (ref 120–199)
CHOLEST/HDLC SERPL: 5.6 {RATIO} (ref 2–5)
CO2 SERPL-SCNC: 22 MMOL/L (ref 23–29)
CREAT SERPL-MCNC: 1.4 MG/DL (ref 0.5–1.4)
EAG (OHS): 169 MG/DL (ref 68–131)
ERYTHROCYTE [DISTWIDTH] IN BLOOD BY AUTOMATED COUNT: 17.4 % (ref 11.5–14.5)
GFR SERPLBLD CREATININE-BSD FMLA CKD-EPI: 52 ML/MIN/1.73/M2
GLUCOSE SERPL-MCNC: 142 MG/DL (ref 70–110)
HBA1C MFR BLD: 7.5 % (ref 4–5.6)
HCT VFR BLD AUTO: 45.8 % (ref 40–54)
HDLC SERPL-MCNC: 34 MG/DL (ref 40–75)
HDLC SERPL: 17.7 % (ref 20–50)
HGB BLD-MCNC: 13.5 GM/DL (ref 14–18)
IMM GRANULOCYTES # BLD AUTO: 0.03 K/UL (ref 0–0.04)
IMM GRANULOCYTES NFR BLD AUTO: 0.3 % (ref 0–0.5)
LDLC SERPL CALC-MCNC: 92.4 MG/DL (ref 63–159)
LYMPHOCYTES # BLD AUTO: 1.55 K/UL (ref 1–4.8)
MCH RBC QN AUTO: 24.5 PG (ref 27–31)
MCHC RBC AUTO-ENTMCNC: 29.5 G/DL (ref 32–36)
MCV RBC AUTO: 83 FL (ref 82–98)
NONHDLC SERPL-MCNC: 158 MG/DL
NUCLEATED RBC (/100WBC) (OHS): 0 /100 WBC
PLATELET # BLD AUTO: 221 K/UL (ref 150–450)
PMV BLD AUTO: 11.9 FL (ref 9.2–12.9)
POTASSIUM SERPL-SCNC: 5.1 MMOL/L (ref 3.5–5.1)
PROT SERPL-MCNC: 8.6 GM/DL (ref 6–8.4)
RBC # BLD AUTO: 5.51 M/UL (ref 4.6–6.2)
RELATIVE EOSINOPHIL (OHS): 6.5 %
RELATIVE LYMPHOCYTE (OHS): 14.7 % (ref 18–48)
RELATIVE MONOCYTE (OHS): 9.8 % (ref 4–15)
RELATIVE NEUTROPHIL (OHS): 67.8 % (ref 38–73)
SODIUM SERPL-SCNC: 137 MMOL/L (ref 136–145)
TRIGL SERPL-MCNC: 328 MG/DL (ref 30–150)
WBC # BLD AUTO: 10.57 K/UL (ref 3.9–12.7)

## 2025-08-27 PROCEDURE — 83036 HEMOGLOBIN GLYCOSYLATED A1C: CPT

## 2025-08-27 PROCEDURE — 80061 LIPID PANEL: CPT

## 2025-08-27 PROCEDURE — 99999 PR PBB SHADOW E&M-EST. PATIENT-LVL V: CPT | Mod: PBBFAC,,, | Performed by: PHYSICIAN ASSISTANT

## 2025-08-27 PROCEDURE — 80053 COMPREHEN METABOLIC PANEL: CPT

## 2025-08-27 PROCEDURE — 85025 COMPLETE CBC W/AUTO DIFF WBC: CPT

## 2025-08-27 PROCEDURE — 82043 UR ALBUMIN QUANTITATIVE: CPT

## 2025-08-27 PROCEDURE — 36415 COLL VENOUS BLD VENIPUNCTURE: CPT | Mod: PN

## 2025-08-27 RX ORDER — OLMESARTAN MEDOXOMIL 40 MG/1
40 TABLET ORAL DAILY
COMMUNITY
Start: 2025-08-04 | End: 2026-08-04

## 2025-08-28 ENCOUNTER — RESULTS FOLLOW-UP (OUTPATIENT)
Dept: FAMILY MEDICINE | Facility: CLINIC | Age: 75
End: 2025-08-28
Payer: MEDICARE

## 2025-08-28 LAB
ALBUMIN/CREAT UR: 46.2 UG/MG
CREAT UR-MCNC: 179.7 MG/DL (ref 23–375)
MICROALBUMIN UR-MCNC: 83 UG/ML

## 2025-09-02 RX ORDER — PANTOPRAZOLE SODIUM 40 MG/1
40 TABLET, DELAYED RELEASE ORAL 2 TIMES DAILY
Qty: 180 TABLET | Refills: 3 | Status: SHIPPED | OUTPATIENT
Start: 2025-09-02

## (undated) DEVICE — GLOVE BIOGEL PI ULTRA TOUCH GRAY SZ7.5

## (undated) DEVICE — SOLUTION IRRI NS BOTTLE 1000ML R5200-01

## (undated) DEVICE — SUCTION/IRRIGATOR W/TIP

## (undated) DEVICE — TROCAR BALL. BLNT HASSON C0R47

## (undated) DEVICE — HEMOSTAT SURGICEL 4X8

## (undated) DEVICE — APPLIER CLIP  5MM

## (undated) DEVICE — TRAY GENERAL LAPAROSCOPY

## (undated) DEVICE — CABLE MONOPOLAR 10FT DISPOSABLE

## (undated) DEVICE — SCISSORS 5MM APPLIED MEDICAL   CB030

## (undated) DEVICE — RETRIEVER ENDOPOUCH SPEC BAG

## (undated) DEVICE — SLEEVE TROCAR 5MMX100MM  CTS02

## (undated) DEVICE — UNDERGLOVE BIOGEL PI MICRO BLUE SZ 8

## (undated) DEVICE — APPLIER MULTIPLE CLIP LG 12MM ER420

## (undated) DEVICE — TUBING INSUFFLATION 10FT

## (undated) DEVICE — PAD BOVIE ADULT

## (undated) DEVICE — SUTURE MONOCRYL 4-0 PS-2 27 MCP426H

## (undated) DEVICE — TROCAR ADVANCED FIXATION  CFF03

## (undated) DEVICE — SUTURE ETHIBOND 0 MO-6 18 CX45D

## (undated) DEVICE — DISSECTOR KITTNER 13300

## (undated) DEVICE — SOLUTION IRRI H2O BOTTLE 1000ML

## (undated) DEVICE — SOLUTION NACL 0.9% 3000ML

## (undated) DEVICE — GLOVE BIOGEL PI ULTRA TOUCH G GRAY SZ 7

## (undated) DEVICE — TROCAR OPTICAL ZTHREAD 12MMX100MM CTF73